# Patient Record
Sex: FEMALE | Race: WHITE | Employment: OTHER
[De-identification: names, ages, dates, MRNs, and addresses within clinical notes are randomized per-mention and may not be internally consistent; named-entity substitution may affect disease eponyms.]

---

## 2017-01-12 ENCOUNTER — OFFICE VISIT (OUTPATIENT)
Dept: FAMILY MEDICINE CLINIC | Facility: CLINIC | Age: 77
End: 2017-01-12

## 2017-01-12 ENCOUNTER — CARE COORDINATOR VISIT (OUTPATIENT)
Dept: CARE COORDINATION | Facility: CLINIC | Age: 77
End: 2017-01-12

## 2017-01-12 VITALS
OXYGEN SATURATION: 99 % | DIASTOLIC BLOOD PRESSURE: 77 MMHG | HEART RATE: 107 BPM | SYSTOLIC BLOOD PRESSURE: 125 MMHG | TEMPERATURE: 97.4 F | WEIGHT: 123.2 LBS | BODY MASS INDEX: 21.03 KG/M2 | HEIGHT: 64 IN

## 2017-01-12 DIAGNOSIS — R82.90 ABNORMAL URINALYSIS: Primary | ICD-10-CM

## 2017-01-12 DIAGNOSIS — Z23 NEED FOR PNEUMOCOCCAL VACCINATION: ICD-10-CM

## 2017-01-12 DIAGNOSIS — R00.0 TACHYCARDIA: ICD-10-CM

## 2017-01-12 DIAGNOSIS — S01.112A LACERATION OF EYEBROW, LEFT, INITIAL ENCOUNTER: ICD-10-CM

## 2017-01-12 DIAGNOSIS — I49.3 PVC (PREMATURE VENTRICULAR CONTRACTION): ICD-10-CM

## 2017-01-12 DIAGNOSIS — R55 VASOVAGAL SYNCOPE: Primary | ICD-10-CM

## 2017-01-12 DIAGNOSIS — N17.9 AKI (ACUTE KIDNEY INJURY) (HCC): ICD-10-CM
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
## 2017-01-12 DIAGNOSIS — R10.32 LLQ PAIN: ICD-10-CM

## 2017-01-12 PROCEDURE — G0009 ADMIN PNEUMOCOCCAL VACCINE: HCPCS | Performed by: FAMILY MEDICINE

## 2017-01-12 PROCEDURE — 90670 PCV13 VACCINE IM: CPT | Performed by: FAMILY MEDICINE

## 2017-01-12 PROCEDURE — 99214 OFFICE O/P EST MOD 30 MIN: CPT | Performed by: FAMILY MEDICINE

## 2017-01-12 ASSESSMENT — ENCOUNTER SYMPTOMS
NAUSEA: 0
ABDOMINAL PAIN: 1
CHEST TIGHTNESS: 0
SHORTNESS OF BREATH: 0
COUGH: 0
ABDOMINAL DISTENTION: 0
DIARRHEA: 0
VOMITING: 0
CONSTIPATION: 0
WHEEZING: 0

## 2017-02-21 ENCOUNTER — OFFICE VISIT (OUTPATIENT)
Dept: GASTROENTEROLOGY | Facility: CLINIC | Age: 77
End: 2017-02-21

## 2017-02-21 VITALS
WEIGHT: 121 LBS | HEIGHT: 67 IN | DIASTOLIC BLOOD PRESSURE: 85 MMHG | TEMPERATURE: 97.5 F | HEART RATE: 101 BPM | BODY MASS INDEX: 18.99 KG/M2 | RESPIRATION RATE: 12 BRPM | OXYGEN SATURATION: 96 % | SYSTOLIC BLOOD PRESSURE: 140 MMHG

## 2017-02-21 DIAGNOSIS — Z86.010 HISTORY OF COLON POLYPS: ICD-10-CM

## 2017-02-21 DIAGNOSIS — R10.32 LLQ PAIN: ICD-10-CM

## 2017-02-21 DIAGNOSIS — K21.9 GASTROESOPHAGEAL REFLUX DISEASE WITHOUT ESOPHAGITIS: Primary | ICD-10-CM

## 2017-02-21 DIAGNOSIS — R63.4 WEIGHT LOSS: ICD-10-CM

## 2017-02-21 PROCEDURE — 99214 OFFICE O/P EST MOD 30 MIN: CPT | Performed by: INTERNAL MEDICINE

## 2017-02-21 RX ORDER — RANITIDINE 150 MG/1
150 CAPSULE ORAL PRN
COMMUNITY
End: 2017-03-10 | Stop reason: ALTCHOICE

## 2017-02-21 ASSESSMENT — ENCOUNTER SYMPTOMS
ABDOMINAL DISTENTION: 1
NAUSEA: 0
CHOKING: 0
VOICE CHANGE: 0
TROUBLE SWALLOWING: 0
VOMITING: 0
CONSTIPATION: 0
SORE THROAT: 0
DIARRHEA: 0
ABDOMINAL PAIN: 1
RESPIRATORY NEGATIVE: 1
WHEEZING: 0
BLOOD IN STOOL: 0
ANAL BLEEDING: 0
COUGH: 0
SHORTNESS OF BREATH: 0
SINUS PRESSURE: 1
RECTAL PAIN: 0
BACK PAIN: 1

## 2017-03-10 ENCOUNTER — CARE COORDINATOR VISIT (OUTPATIENT)
Dept: CARE COORDINATION | Facility: CLINIC | Age: 77
End: 2017-03-10

## 2017-03-10 ENCOUNTER — OFFICE VISIT (OUTPATIENT)
Dept: FAMILY MEDICINE CLINIC | Facility: CLINIC | Age: 77
End: 2017-03-10

## 2017-03-10 VITALS
SYSTOLIC BLOOD PRESSURE: 116 MMHG | DIASTOLIC BLOOD PRESSURE: 68 MMHG | OXYGEN SATURATION: 97 % | BODY MASS INDEX: 20.69 KG/M2 | TEMPERATURE: 96.8 F | WEIGHT: 121.2 LBS | HEART RATE: 102 BPM | HEIGHT: 64 IN

## 2017-03-10 DIAGNOSIS — R42 ORTHOSTATIC DIZZINESS: Primary | ICD-10-CM

## 2017-03-10 DIAGNOSIS — R00.0 TACHYCARDIA: ICD-10-CM

## 2017-03-10 DIAGNOSIS — E21.3 HYPERPARATHYROIDISM (HCC): ICD-10-CM

## 2017-03-10 DIAGNOSIS — F41.1 GAD (GENERALIZED ANXIETY DISORDER): ICD-10-CM

## 2017-03-10 DIAGNOSIS — F51.01 PRIMARY INSOMNIA: ICD-10-CM

## 2017-03-10 DIAGNOSIS — F33.42 RECURRENT MAJOR DEPRESSIVE EPISODES, IN FULL REMISSION (HCC): ICD-10-CM

## 2017-03-10 DIAGNOSIS — J43.1 PANLOBULAR EMPHYSEMA (HCC): ICD-10-CM

## 2017-03-10 DIAGNOSIS — R55 VASOVAGAL SYNCOPE: ICD-10-CM

## 2017-03-10 DIAGNOSIS — I49.3 PVC (PREMATURE VENTRICULAR CONTRACTION): ICD-10-CM

## 2017-03-10 PROCEDURE — G0444 DEPRESSION SCREEN ANNUAL: HCPCS | Performed by: FAMILY MEDICINE

## 2017-03-10 PROCEDURE — 99215 OFFICE O/P EST HI 40 MIN: CPT | Performed by: FAMILY MEDICINE

## 2017-03-10 RX ORDER — ESZOPICLONE 3 MG/1
3 TABLET, FILM COATED ORAL NIGHTLY
Qty: 90 TABLET | Refills: 0 | Status: SHIPPED | OUTPATIENT
Start: 2017-03-10 | End: 2017-05-12 | Stop reason: SDUPTHER

## 2017-03-10 RX ORDER — PAROXETINE HYDROCHLORIDE 12.5 MG/1
12.5 TABLET, FILM COATED, EXTENDED RELEASE ORAL EVERY EVENING
Qty: 90 TABLET | Refills: 0 | Status: SHIPPED | OUTPATIENT
Start: 2017-03-10 | End: 2017-05-12 | Stop reason: HOSPADM

## 2017-03-10 ASSESSMENT — ANXIETY QUESTIONNAIRES
1. FEELING NERVOUS, ANXIOUS, OR ON EDGE: 1-SEVERAL DAYS
7. FEELING AFRAID AS IF SOMETHING AWFUL MIGHT HAPPEN: 0-NOT AT ALL SURE
4. TROUBLE RELAXING: 0-NOT AT ALL SURE
5. BEING SO RESTLESS THAT IT IS HARD TO SIT STILL: 0-NOT AT ALL SURE
6. BECOMING EASILY ANNOYED OR IRRITABLE: 1-SEVERAL DAYS
3. WORRYING TOO MUCH ABOUT DIFFERENT THINGS: 0-NOT AT ALL SURE
2. NOT BEING ABLE TO STOP OR CONTROL WORRYING: 1-SEVERAL DAYS
GAD7 TOTAL SCORE: 3

## 2017-03-10 ASSESSMENT — ENCOUNTER SYMPTOMS
DIARRHEA: 0
SHORTNESS OF BREATH: 0
NAUSEA: 0
WHEEZING: 0
COUGH: 0
ABDOMINAL PAIN: 0
VOMITING: 0
CONSTIPATION: 0
ABDOMINAL DISTENTION: 0
CHEST TIGHTNESS: 0

## 2017-03-10 ASSESSMENT — PATIENT HEALTH QUESTIONNAIRE - PHQ9
1. LITTLE INTEREST OR PLEASURE IN DOING THINGS: 0
7. TROUBLE CONCENTRATING ON THINGS, SUCH AS READING THE NEWSPAPER OR WATCHING TELEVISION: 0
10. IF YOU CHECKED OFF ANY PROBLEMS, HOW DIFFICULT HAVE THESE PROBLEMS MADE IT FOR YOU TO DO YOUR WORK, TAKE CARE OF THINGS AT HOME, OR GET ALONG WITH OTHER PEOPLE: 3
2. FEELING DOWN, DEPRESSED OR HOPELESS: 0
8. MOVING OR SPEAKING SO SLOWLY THAT OTHER PEOPLE COULD HAVE NOTICED. OR THE OPPOSITE, BEING SO FIGETY OR RESTLESS THAT YOU HAVE BEEN MOVING AROUND A LOT MORE THAN USUAL: 0
9. THOUGHTS THAT YOU WOULD BE BETTER OFF DEAD, OR OF HURTING YOURSELF: 0
SUM OF ALL RESPONSES TO PHQ QUESTIONS 1-9: 4
4. FEELING TIRED OR HAVING LITTLE ENERGY: 1
SUM OF ALL RESPONSES TO PHQ9 QUESTIONS 1 & 2: 0
6. FEELING BAD ABOUT YOURSELF - OR THAT YOU ARE A FAILURE OR HAVE LET YOURSELF OR YOUR FAMILY DOWN: 0
3. TROUBLE FALLING OR STAYING ASLEEP: 3
5. POOR APPETITE OR OVEREATING: 0

## 2017-03-12 PROBLEM — F33.42 RECURRENT MAJOR DEPRESSIVE EPISODES, IN FULL REMISSION (HCC): Status: ACTIVE | Noted: 2017-03-12

## 2017-03-12 PROBLEM — R42 ORTHOSTATIC DIZZINESS: Status: ACTIVE | Noted: 2017-03-12
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
## 2017-03-13 DIAGNOSIS — R55 VASOVAGAL SYNCOPE: ICD-10-CM

## 2017-04-05 ENCOUNTER — HOSPITAL ENCOUNTER (OUTPATIENT)
Dept: ULTRASOUND IMAGING | Age: 77
Discharge: HOME | End: 2017-04-05
Payer: MEDICARE

## 2017-04-05 ENCOUNTER — HOSPITAL ENCOUNTER (OUTPATIENT)
Dept: NON INVASIVE DIAGNOSTICS | Age: 77
Discharge: HOME | End: 2017-04-05
Payer: MEDICARE

## 2017-04-05 DIAGNOSIS — R42 ORTHOSTATIC DIZZINESS: ICD-10-CM

## 2017-04-05 DIAGNOSIS — R55 VASOVAGAL SYNCOPE: ICD-10-CM

## 2017-04-05 DIAGNOSIS — I49.3 PVC (PREMATURE VENTRICULAR CONTRACTION): ICD-10-CM

## 2017-04-05 DIAGNOSIS — E05.90 HYPERTHYROIDISM: ICD-10-CM

## 2017-04-05 DIAGNOSIS — R00.0 TACHYCARDIA: ICD-10-CM

## 2017-04-05 LAB
LV EF: 60 %
LVEF MODALITY: NORMAL

## 2017-04-05 PROCEDURE — 93306 TTE W/DOPPLER COMPLETE: CPT

## 2017-04-05 PROCEDURE — 76536 US EXAM OF HEAD AND NECK: CPT

## 2017-04-13 ENCOUNTER — HOSPITAL ENCOUNTER (OUTPATIENT)
Age: 77
Discharge: HOME | End: 2017-04-13
Payer: MEDICARE

## 2017-04-13 LAB
CALCIUM SERPL-MCNC: 9.1 MG/DL (ref 8.6–10.4)
PTH INTACT: 124 PG/ML (ref 15–65)
T3 FREE: 2.99 PG/ML (ref 2.02–4.43)
THYROXINE, FREE: 1.21 NG/DL (ref 0.93–1.7)
TSH SERPL DL<=0.05 MIU/L-ACNC: 1.36 MIU/L (ref 0.3–5)

## 2017-04-13 PROCEDURE — 83970 ASSAY OF PARATHORMONE: CPT

## 2017-04-13 PROCEDURE — 84439 ASSAY OF FREE THYROXINE: CPT

## 2017-04-13 PROCEDURE — 84481 FREE ASSAY (FT-3): CPT

## 2017-04-13 PROCEDURE — 82310 ASSAY OF CALCIUM: CPT

## 2017-04-13 PROCEDURE — 36415 COLL VENOUS BLD VENIPUNCTURE: CPT

## 2017-04-13 PROCEDURE — 82523 COLLAGEN CROSSLINKS: CPT

## 2017-04-13 PROCEDURE — 84443 ASSAY THYROID STIM HORMONE: CPT

## 2017-04-15 LAB
CREATININE URINE /VOLUME: 149 MG/DL
N-TELO/CREAT. RATIO: 12

## 2017-05-12 ENCOUNTER — CARE COORDINATOR VISIT (OUTPATIENT)
Dept: CARE COORDINATION | Age: 77
End: 2017-05-12

## 2017-05-12 ENCOUNTER — OFFICE VISIT (OUTPATIENT)
Dept: FAMILY MEDICINE CLINIC | Age: 77
End: 2017-05-12
Payer: MEDICARE

## 2017-05-12 VITALS
DIASTOLIC BLOOD PRESSURE: 69 MMHG | SYSTOLIC BLOOD PRESSURE: 108 MMHG | RESPIRATION RATE: 20 BRPM | TEMPERATURE: 97.3 F | OXYGEN SATURATION: 96 % | HEIGHT: 64 IN | WEIGHT: 124 LBS | BODY MASS INDEX: 21.17 KG/M2 | HEART RATE: 87 BPM

## 2017-05-12 DIAGNOSIS — F51.01 PRIMARY INSOMNIA: ICD-10-CM

## 2017-05-12 DIAGNOSIS — F41.1 GAD (GENERALIZED ANXIETY DISORDER): ICD-10-CM

## 2017-05-12 DIAGNOSIS — H61.21 IMPACTED CERUMEN OF RIGHT EAR: ICD-10-CM

## 2017-05-12 DIAGNOSIS — M81.0 OSTEOPOROSIS: ICD-10-CM

## 2017-05-12 DIAGNOSIS — J43.1 PANLOBULAR EMPHYSEMA (HCC): ICD-10-CM

## 2017-05-12 DIAGNOSIS — E78.5 HYPERLIPIDEMIA WITH TARGET LDL LESS THAN 100: ICD-10-CM

## 2017-05-12 DIAGNOSIS — R53.82 CHRONIC FATIGUE: Primary | ICD-10-CM

## 2017-05-12 PROBLEM — R00.0 TACHYCARDIA: Status: RESOLVED | Noted: 2017-01-12 | Resolved: 2017-05-12

## 2017-05-12 PROBLEM — R10.32 LLQ PAIN: Status: RESOLVED | Noted: 2017-01-12 | Resolved: 2017-05-12

## 2017-05-12 PROBLEM — N17.9 AKI (ACUTE KIDNEY INJURY) (HCC): Status: RESOLVED | Noted: 2017-01-12 | Resolved: 2017-05-12

## 2017-05-12 PROCEDURE — G8510 SCR DEP NEG, NO PLAN REQD: HCPCS | Performed by: FAMILY MEDICINE

## 2017-05-12 PROCEDURE — 99214 OFFICE O/P EST MOD 30 MIN: CPT | Performed by: FAMILY MEDICINE

## 2017-05-12 RX ORDER — ESZOPICLONE 3 MG/1
3 TABLET, FILM COATED ORAL NIGHTLY
Qty: 90 TABLET | Refills: 0 | Status: SHIPPED | OUTPATIENT
Start: 2017-05-12 | End: 2017-09-18 | Stop reason: SDUPTHER

## 2017-05-12 RX ORDER — INHALER, ASSIST DEVICES
SPACER (EA) MISCELLANEOUS
Qty: 1 EACH | Refills: 0 | Status: SHIPPED | OUTPATIENT
Start: 2017-05-12 | End: 2021-12-07 | Stop reason: ALTCHOICE

## 2017-05-12 RX ORDER — SERTRALINE HYDROCHLORIDE 25 MG/1
25 TABLET, FILM COATED ORAL EVERY MORNING
Qty: 30 TABLET | Refills: 0 | Status: SHIPPED | OUTPATIENT
Start: 2017-05-12 | End: 2017-10-20

## 2017-05-12 RX ORDER — PRAVASTATIN SODIUM 20 MG
20 TABLET ORAL EVERY EVENING
Qty: 90 TABLET | Refills: 3 | Status: SHIPPED | OUTPATIENT
Start: 2017-05-12 | End: 2017-10-20

## 2017-05-12 RX ORDER — ALBUTEROL SULFATE 90 UG/1
2 AEROSOL, METERED RESPIRATORY (INHALATION) EVERY 6 HOURS PRN
Qty: 18 G | Refills: 3 | Status: SHIPPED | OUTPATIENT
Start: 2017-05-12 | End: 2019-05-09 | Stop reason: SDUPTHER

## 2017-05-12 ASSESSMENT — ANXIETY QUESTIONNAIRES
3. WORRYING TOO MUCH ABOUT DIFFERENT THINGS: 1-SEVERAL DAYS
GAD7 TOTAL SCORE: 3
2. NOT BEING ABLE TO STOP OR CONTROL WORRYING: 0-NOT AT ALL SURE
4. TROUBLE RELAXING: 0-NOT AT ALL SURE
5. BEING SO RESTLESS THAT IT IS HARD TO SIT STILL: 1-SEVERAL DAYS
6. BECOMING EASILY ANNOYED OR IRRITABLE: 0-NOT AT ALL SURE
7. FEELING AFRAID AS IF SOMETHING AWFUL MIGHT HAPPEN: 0-NOT AT ALL SURE
1. FEELING NERVOUS, ANXIOUS, OR ON EDGE: 1-SEVERAL DAYS

## 2017-05-12 ASSESSMENT — PATIENT HEALTH QUESTIONNAIRE - PHQ9
SUM OF ALL RESPONSES TO PHQ9 QUESTIONS 1 & 2: 0
1. LITTLE INTEREST OR PLEASURE IN DOING THINGS: 0
SUM OF ALL RESPONSES TO PHQ QUESTIONS 1-9: 0
2. FEELING DOWN, DEPRESSED OR HOPELESS: 0

## 2017-05-12 ASSESSMENT — ENCOUNTER SYMPTOMS
SHORTNESS OF BREATH: 1
ABDOMINAL DISTENTION: 0
VOMITING: 0
CHEST TIGHTNESS: 0
CONSTIPATION: 0
COUGH: 0
WHEEZING: 0
ABDOMINAL PAIN: 0
NAUSEA: 0
DIARRHEA: 0

## 2017-06-26 ENCOUNTER — TELEPHONE (OUTPATIENT)
Dept: INFUSION THERAPY | Age: 77
End: 2017-06-26

## 2017-07-18 ENCOUNTER — CARE COORDINATION (OUTPATIENT)
Dept: CARE COORDINATION | Age: 77
End: 2017-07-18

## 2017-08-09 ENCOUNTER — HOSPITAL ENCOUNTER (OUTPATIENT)
Age: 77
Discharge: HOME | End: 2017-08-09
Payer: MEDICARE

## 2017-08-09 DIAGNOSIS — E78.5 HYPERLIPIDEMIA WITH TARGET LDL LESS THAN 100: ICD-10-CM

## 2017-08-09 DIAGNOSIS — R53.82 CHRONIC FATIGUE: ICD-10-CM

## 2017-08-09 DIAGNOSIS — M81.0 OSTEOPOROSIS: ICD-10-CM

## 2017-08-09 LAB
ALBUMIN SERPL-MCNC: 4 G/DL (ref 3.5–5.2)
ALBUMIN/GLOBULIN RATIO: ABNORMAL (ref 1–2.5)
ALP BLD-CCNC: 70 U/L (ref 35–104)
ALT SERPL-CCNC: 13 U/L (ref 5–33)
ANION GAP SERPL CALCULATED.3IONS-SCNC: 14 MMOL/L (ref 9–17)
AST SERPL-CCNC: 16 U/L
BILIRUB SERPL-MCNC: 0.39 MG/DL (ref 0.3–1.2)
BUN BLDV-MCNC: 20 MG/DL (ref 8–23)
BUN/CREAT BLD: ABNORMAL (ref 9–20)
CALCIUM SERPL-MCNC: 8.9 MG/DL (ref 8.6–10.4)
CHLORIDE BLD-SCNC: 101 MMOL/L (ref 98–107)
CHOLESTEROL/HDL RATIO: 3.5
CHOLESTEROL: 207 MG/DL
CO2: 26 MMOL/L (ref 20–31)
CREAT SERPL-MCNC: 0.94 MG/DL (ref 0.5–0.9)
FOLATE: 14.3 NG/ML
GFR AFRICAN AMERICAN: >60 ML/MIN
GFR NON-AFRICAN AMERICAN: 58 ML/MIN
GFR SERPL CREATININE-BSD FRML MDRD: ABNORMAL ML/MIN/{1.73_M2}
GFR SERPL CREATININE-BSD FRML MDRD: ABNORMAL ML/MIN/{1.73_M2}
GLUCOSE BLD-MCNC: 111 MG/DL (ref 70–99)
HDLC SERPL-MCNC: 60 MG/DL
LDL CHOLESTEROL: 130 MG/DL (ref 0–130)
POTASSIUM SERPL-SCNC: 4.4 MMOL/L (ref 3.7–5.3)
SODIUM BLD-SCNC: 141 MMOL/L (ref 135–144)
TOTAL PROTEIN: 6.5 G/DL (ref 6.4–8.3)
TRIGL SERPL-MCNC: 84 MG/DL
VITAMIN B-12: 427 PG/ML (ref 211–946)
VITAMIN D 25-HYDROXY: 49 NG/ML (ref 30–100)
VLDLC SERPL CALC-MCNC: ABNORMAL MG/DL (ref 1–30)

## 2017-08-09 PROCEDURE — 82746 ASSAY OF FOLIC ACID SERUM: CPT

## 2017-08-09 PROCEDURE — 80053 COMPREHEN METABOLIC PANEL: CPT

## 2017-08-09 PROCEDURE — 80061 LIPID PANEL: CPT

## 2017-08-09 PROCEDURE — 82607 VITAMIN B-12: CPT

## 2017-08-09 PROCEDURE — 36415 COLL VENOUS BLD VENIPUNCTURE: CPT

## 2017-08-09 PROCEDURE — 82306 VITAMIN D 25 HYDROXY: CPT

## 2017-08-14 ENCOUNTER — OFFICE VISIT (OUTPATIENT)
Dept: FAMILY MEDICINE CLINIC | Age: 77
End: 2017-08-14
Payer: MEDICARE

## 2017-08-14 VITALS
SYSTOLIC BLOOD PRESSURE: 112 MMHG | DIASTOLIC BLOOD PRESSURE: 69 MMHG | HEART RATE: 85 BPM | TEMPERATURE: 97 F | RESPIRATION RATE: 17 BRPM | BODY MASS INDEX: 21.62 KG/M2 | OXYGEN SATURATION: 98 % | HEIGHT: 63 IN | WEIGHT: 122 LBS

## 2017-08-14 DIAGNOSIS — M54.9 CHRONIC BACK PAIN GREATER THAN 3 MONTHS DURATION: ICD-10-CM

## 2017-08-14 DIAGNOSIS — J43.1 PANLOBULAR EMPHYSEMA (HCC): Primary | ICD-10-CM

## 2017-08-14 DIAGNOSIS — F51.01 PRIMARY INSOMNIA: ICD-10-CM

## 2017-08-14 DIAGNOSIS — E78.5 HYPERLIPIDEMIA WITH TARGET LDL LESS THAN 100: ICD-10-CM

## 2017-08-14 DIAGNOSIS — R73.9 HYPERGLYCEMIA: ICD-10-CM

## 2017-08-14 DIAGNOSIS — G89.29 CHRONIC BACK PAIN GREATER THAN 3 MONTHS DURATION: ICD-10-CM

## 2017-08-14 DIAGNOSIS — R35.0 FREQUENCY OF URINATION: ICD-10-CM

## 2017-08-14 PROCEDURE — 99214 OFFICE O/P EST MOD 30 MIN: CPT | Performed by: FAMILY MEDICINE

## 2017-08-14 ASSESSMENT — PATIENT HEALTH QUESTIONNAIRE - PHQ9
1. LITTLE INTEREST OR PLEASURE IN DOING THINGS: 0
SUM OF ALL RESPONSES TO PHQ9 QUESTIONS 1 & 2: 0
SUM OF ALL RESPONSES TO PHQ QUESTIONS 1-9: 0
2. FEELING DOWN, DEPRESSED OR HOPELESS: 0

## 2017-08-14 ASSESSMENT — ANXIETY QUESTIONNAIRES
5. BEING SO RESTLESS THAT IT IS HARD TO SIT STILL: 0-NOT AT ALL SURE
GAD7 TOTAL SCORE: 2
4. TROUBLE RELAXING: 0-NOT AT ALL SURE
2. NOT BEING ABLE TO STOP OR CONTROL WORRYING: 0-NOT AT ALL SURE
3. WORRYING TOO MUCH ABOUT DIFFERENT THINGS: 0-NOT AT ALL SURE
1. FEELING NERVOUS, ANXIOUS, OR ON EDGE: 1-SEVERAL DAYS
7. FEELING AFRAID AS IF SOMETHING AWFUL MIGHT HAPPEN: 0-NOT AT ALL SURE
6. BECOMING EASILY ANNOYED OR IRRITABLE: 1-SEVERAL DAYS

## 2017-08-14 ASSESSMENT — ENCOUNTER SYMPTOMS
ABDOMINAL DISTENTION: 0
NAUSEA: 0
DIARRHEA: 0
BACK PAIN: 1
SHORTNESS OF BREATH: 1
WHEEZING: 0
CHEST TIGHTNESS: 0
CONSTIPATION: 0
COUGH: 0
ABDOMINAL PAIN: 1
VOMITING: 0

## 2017-08-28 ENCOUNTER — TELEPHONE (OUTPATIENT)
Dept: FAMILY MEDICINE CLINIC | Age: 77
End: 2017-08-28

## 2017-09-13 ENCOUNTER — HOSPITAL ENCOUNTER (OUTPATIENT)
Dept: PHYSICAL THERAPY | Age: 77
Setting detail: THERAPIES SERIES
Discharge: HOME | End: 2017-09-13
Payer: MEDICARE

## 2017-09-13 ENCOUNTER — CARE COORDINATION (OUTPATIENT)
Dept: CARE COORDINATION | Age: 77
End: 2017-09-13

## 2017-09-13 PROCEDURE — G8978 MOBILITY CURRENT STATUS: HCPCS

## 2017-09-13 PROCEDURE — 97161 PT EVAL LOW COMPLEX 20 MIN: CPT

## 2017-09-13 PROCEDURE — 97110 THERAPEUTIC EXERCISES: CPT

## 2017-09-13 PROCEDURE — G8979 MOBILITY GOAL STATUS: HCPCS

## 2017-09-13 ASSESSMENT — PAIN DESCRIPTION - PAIN TYPE: TYPE: CHRONIC PAIN

## 2017-09-13 ASSESSMENT — PAIN DESCRIPTION - LOCATION: LOCATION: BACK

## 2017-09-13 ASSESSMENT — PAIN DESCRIPTION - ONSET: ONSET: GRADUAL

## 2017-09-13 ASSESSMENT — PAIN DESCRIPTION - PROGRESSION: CLINICAL_PROGRESSION: GRADUALLY WORSENING

## 2017-09-13 ASSESSMENT — PAIN SCALES - GENERAL: PAINLEVEL_OUTOF10: 6

## 2017-09-13 ASSESSMENT — PAIN DESCRIPTION - DESCRIPTORS: DESCRIPTORS: ACHING;BURNING

## 2017-09-13 ASSESSMENT — PAIN DESCRIPTION - ORIENTATION: ORIENTATION: MID;UPPER

## 2017-09-13 ASSESSMENT — PAIN DESCRIPTION - FREQUENCY: FREQUENCY: INTERMITTENT

## 2017-09-18 DIAGNOSIS — F51.01 PRIMARY INSOMNIA: ICD-10-CM

## 2017-09-18 RX ORDER — ESZOPICLONE 3 MG/1
3 TABLET, FILM COATED ORAL NIGHTLY
Qty: 90 TABLET | Refills: 0 | Status: SHIPPED | OUTPATIENT
Start: 2017-09-18 | End: 2017-09-27

## 2017-09-19 ENCOUNTER — PATIENT MESSAGE (OUTPATIENT)
Dept: FAMILY MEDICINE CLINIC | Age: 77
End: 2017-09-19

## 2017-09-19 DIAGNOSIS — J30.9 ALLERGIC RHINITIS, UNSPECIFIED ALLERGIC RHINITIS TRIGGER, UNSPECIFIED RHINITIS SEASONALITY: Primary | ICD-10-CM

## 2017-09-20 RX ORDER — LORATADINE 10 MG/1
10 TABLET ORAL DAILY
Qty: 90 TABLET | Refills: 3 | Status: SHIPPED | OUTPATIENT
Start: 2017-09-20 | End: 2019-04-11 | Stop reason: SDUPTHER

## 2017-09-27 ENCOUNTER — TELEPHONE (OUTPATIENT)
Dept: FAMILY MEDICINE CLINIC | Age: 77
End: 2017-09-27

## 2017-09-27 DIAGNOSIS — F51.04 PSYCHOPHYSIOLOGICAL INSOMNIA: Primary | ICD-10-CM

## 2017-09-27 RX ORDER — RAMELTEON 8 MG/1
8 TABLET ORAL NIGHTLY
Qty: 90 TABLET | Refills: 0 | Status: SHIPPED | OUTPATIENT
Start: 2017-09-27 | End: 2017-10-20

## 2017-10-09 ENCOUNTER — HOSPITAL ENCOUNTER (OUTPATIENT)
Dept: PHYSICAL THERAPY | Age: 77
Setting detail: THERAPIES SERIES
Discharge: HOME | End: 2017-10-09
Payer: MEDICARE

## 2017-10-09 PROCEDURE — 97110 THERAPEUTIC EXERCISES: CPT

## 2017-10-09 ASSESSMENT — PAIN DESCRIPTION - LOCATION: LOCATION: BACK

## 2017-10-09 ASSESSMENT — PAIN SCALES - GENERAL: PAINLEVEL_OUTOF10: 0

## 2017-10-09 ASSESSMENT — PAIN DESCRIPTION - ONSET: ONSET: GRADUAL

## 2017-10-09 ASSESSMENT — PAIN DESCRIPTION - FREQUENCY: FREQUENCY: INTERMITTENT

## 2017-10-09 ASSESSMENT — PAIN DESCRIPTION - DESCRIPTORS: DESCRIPTORS: ACHING

## 2017-10-09 ASSESSMENT — PAIN DESCRIPTION - ORIENTATION: ORIENTATION: MID;LOWER

## 2017-10-09 ASSESSMENT — PAIN DESCRIPTION - PROGRESSION: CLINICAL_PROGRESSION: GRADUALLY IMPROVING

## 2017-10-09 ASSESSMENT — PAIN DESCRIPTION - PAIN TYPE: TYPE: CHRONIC PAIN

## 2017-10-09 NOTE — PROGRESS NOTES
Therapeutic Intervention  Body structures, Functions, Activity limitations: Decreased endurance  Assessment: Pt would benefit from combo of land/water based ex's to improve postural endurance and dec pain  Treatment Diagnosis: Upper back weakness, postural issues due to osteoporosis  Prognosis: Good  Decision Making: Low Complexity  Patient Education: HEP, posture, POC  Barriers to Learning: none  REQUIRES PT FOLLOW UP: Yes  Treatment Initiated : eval, education  Activity Tolerance  Activity Tolerance: Patient Tolerated treatment well      G-Code:     OutComes Score                                                     Goals:  Short term goals  Time Frame for Short term goals: 9 visits  Short term goal 1: Indep HEP  Short term goal 2: Dec pain 50 %  Long term goals  Time Frame for Long term goals : 18 visits  Long term goal 1: Optimal <4  Patient Goals   Patient goals : Get rid of the pain    Plan:     Continue   Timed Code Treatment Minutes: 30 Minutes  Total Treatment Time: 30     Therapy Time   Individual Concurrent Group Co-treatment   Time In  1400         Time Out  1430         Minutes  30         Timed Code Treatment Minutes: [place] [address] Wayne Schwarz, PT

## 2017-10-11 ENCOUNTER — HOSPITAL ENCOUNTER (OUTPATIENT)
Dept: PHYSICAL THERAPY | Age: 77
Setting detail: THERAPIES SERIES
Discharge: HOME | End: 2017-10-11
Payer: MEDICARE

## 2017-10-11 ENCOUNTER — HOSPITAL ENCOUNTER (OUTPATIENT)
Age: 77
Discharge: HOME | End: 2017-10-11
Payer: MEDICARE

## 2017-10-11 DIAGNOSIS — R73.9 HYPERGLYCEMIA: ICD-10-CM

## 2017-10-11 DIAGNOSIS — R35.0 FREQUENCY OF URINATION: ICD-10-CM

## 2017-10-11 LAB
-: ABNORMAL
AMORPHOUS: ABNORMAL
BACTERIA: ABNORMAL
BILIRUBIN URINE: NEGATIVE
CALCIUM SERPL-MCNC: 9 MG/DL (ref 8.6–10.4)
CASTS UA: ABNORMAL /LPF
COLOR: YELLOW
COMMENT UA: ABNORMAL
CRYSTALS, UA: ABNORMAL /HPF
EPITHELIAL CELLS UA: ABNORMAL /HPF
ESTIMATED AVERAGE GLUCOSE: 111 MG/DL
GLUCOSE URINE: NEGATIVE
HBA1C MFR BLD: 5.5 % (ref 4–6)
KETONES, URINE: NEGATIVE
LEUKOCYTE ESTERASE, URINE: ABNORMAL
MUCUS: ABNORMAL
NITRITE, URINE: NEGATIVE
OTHER OBSERVATIONS UA: ABNORMAL
PH UA: 6.5 (ref 5–8)
PROTEIN UA: NEGATIVE
PTH INTACT: 114.7 PG/ML (ref 15–65)
RBC UA: ABNORMAL /HPF
RENAL EPITHELIAL, UA: ABNORMAL /HPF
SPECIFIC GRAVITY UA: 1.01 (ref 1–1.03)
TRICHOMONAS: ABNORMAL
TURBIDITY: CLEAR
URINE HGB: NEGATIVE
UROBILINOGEN, URINE: NORMAL
WBC UA: ABNORMAL /HPF
YEAST: ABNORMAL

## 2017-10-11 PROCEDURE — 82043 UR ALBUMIN QUANTITATIVE: CPT

## 2017-10-11 PROCEDURE — 82570 ASSAY OF URINE CREATININE: CPT

## 2017-10-11 PROCEDURE — 97113 AQUATIC THERAPY/EXERCISES: CPT

## 2017-10-11 PROCEDURE — 36415 COLL VENOUS BLD VENIPUNCTURE: CPT

## 2017-10-11 PROCEDURE — 87086 URINE CULTURE/COLONY COUNT: CPT

## 2017-10-11 PROCEDURE — 83970 ASSAY OF PARATHORMONE: CPT

## 2017-10-11 PROCEDURE — 81001 URINALYSIS AUTO W/SCOPE: CPT

## 2017-10-11 PROCEDURE — 87077 CULTURE AEROBIC IDENTIFY: CPT

## 2017-10-11 PROCEDURE — 83036 HEMOGLOBIN GLYCOSYLATED A1C: CPT

## 2017-10-11 PROCEDURE — 87186 SC STD MICRODIL/AGAR DIL: CPT

## 2017-10-11 PROCEDURE — 82310 ASSAY OF CALCIUM: CPT

## 2017-10-11 ASSESSMENT — PAIN DESCRIPTION - DIRECTION: RADIATING_TOWARDS: DENIES

## 2017-10-11 ASSESSMENT — PAIN DESCRIPTION - LOCATION: LOCATION: BACK

## 2017-10-11 ASSESSMENT — PAIN DESCRIPTION - ORIENTATION: ORIENTATION: LOWER;MID

## 2017-10-11 ASSESSMENT — PAIN DESCRIPTION - PAIN TYPE: TYPE: CHRONIC PAIN

## 2017-10-11 ASSESSMENT — PAIN SCALES - GENERAL: PAINLEVEL_OUTOF10: 3

## 2017-10-11 NOTE — PROGRESS NOTES
[address]   Outpatient Physical Therapy  [address] [facility]. [address]  Phone: [phone]  Fax: [phone]  Daily Progress Note    Date: 10/11/17    Patient Name: Teofilo Sow        MRN: [id number]  Account: [de-identified] : 1940      General Information:  Referring Practitioner: Prakash Kerr  Referral Date : 17  Diagnosis: Chronic LBP. PT Insurance Information: [employer] Medicare  Total # of Visits Approved: 18  Total # of Visits to Date: 3  No Show: 0  Canceled Appointment: 0  Progress Note Counter: 3/10 G-Codes    Subjective:  Subjective: Reports pain stays constant in low back. Pain:  Patient Currently in Pain: Yes  Pain Assessment: 0-10  Pain Level: 3  Pain Type: Chronic pain  Pain Location: Back  Pain Orientation: Lower;Mid  Pain Radiating Towards: Denies     Objective:  [facility] Exercise Log  Aquatic, Hip & DLS Program- Phase 1    Date of Eval:    17                            Primary PT: Sveta Goodwin, PT  Diagnosis: Chronic Low Back Pain  Things to Focus On (goals):   Surgical Precautions:  Medical Precautions:  [] C-9 dates  [] Occ Med   [x] Medicare     Date 10/11/17       Visit # 3/18       Walk F/L/R 2 Laps       Marching 10x       Squats 10x5\"       Step-Ups F/L        Heel-toe raises 10x       SLR F/L/R 10x       Knee/Flex/Ext        F/L Lunges        Kickboard Ex. Small       Iso Abd. 10x5\"       Push-pull 10x       Paddling                UE Exercise  Add      Horiz Abd/Add        IR/ER        Alt Flex/Ext        Alt Press Down        Abd/Add        Stretches        Achllies 2x20\"       Hamstring        . Cool Down 2 Laps       Pain Rating 3         Comment:  Comments: Initiated aquatic therapy with emphasis on core stability & postural awareness. Educated patient on pelvic neutral.    Assessment:   Body structures, Functions, Activity limitations: Decreased endurance  Treatment Diagnosis: Upper back weakness,

## 2017-10-11 NOTE — PROGRESS NOTES
Soniat comment sent to patient. Abnormal urine test, let's wait for urine culture.  Can take 48 hrs for the result to come back  A1c is normal, no prediabetes

## 2017-10-12 ENCOUNTER — HOSPITAL ENCOUNTER (OUTPATIENT)
Dept: PHYSICAL THERAPY | Age: 77
Setting detail: THERAPIES SERIES
Discharge: HOME | End: 2017-10-12
Payer: MEDICARE

## 2017-10-12 LAB
CREATININE URINE: 77.7 MG/DL (ref 28–217)
MICROALBUMIN/CREAT 24H UR: <12 MG/L
MICROALBUMIN/CREAT UR-RTO: 15 MCG/MG CREAT

## 2017-10-12 PROCEDURE — 97113 AQUATIC THERAPY/EXERCISES: CPT

## 2017-10-12 NOTE — PROGRESS NOTES
Mychart comment sent to patient.   There are no proteins in the urine, which is great  Urine culture is still pending

## 2017-10-12 NOTE — PROGRESS NOTES
[address]   Outpatient Physical Therapy  [address] [facility]. [address]  Phone: [phone]  Fax: [phone]  Daily Progress Note     Date: 10/12/17    Patient Name: Ky Thomas        UTX:[id number]   Account: [de-identified]   : 1940                      General Information:  Referring Practitioner: Carl Cerda  Referral Date : 17  Diagnosis: Chronic LBP. PT Insurance Information: [employer] Medicare  Total # of Visits Approved: 18  Total # of Visits to Date: 4  No Show: 0  Canceled Appointment: 0  Progress Note Counter: 4/10 G-Codes     Subjective:  Subjective: No issues after last visit- pain is about the same  Pain:  Patient Currently in Pain: Yes  Pain Assessment: 0-10  Pain Level: 3  Pain Type: Chronic pain  Pain Location: Back  Pain Orientation: Lower;Mid  Pain Radiating Towards: Denies     Objective:  [address] [facility] Exercise Log  Aquatic, Hip & DLS Program- Phase 1     Date of Eval:    17                            Primary PT: Brittney Ribeiro, PT  Diagnosis: Chronic Low Back Pain  Things to Focus On (goals):   Surgical Precautions:  Medical Precautions:  [] C-9 dates  [] Occ Med   [x] Medicare      Date 10/11/17 10/12/17          Visit # 3/18  4/18         Walk F/L/R 2 Laps  2 Laps         Marching 10x 10x          Squats 10x5\"  10x5\"         Step-Ups F/L    Low 10x         Heel-toe raises 10x 10x          SLR F/L/R 10x  10x         Knee/Flex/Ext    10x         F/L Lunges             Kickboard Ex.  Small  Small         Iso Abd. 10x5\"  10x5\"         Push-pull 10x  10x         Paddling                           UE Exercise    Add        Horiz Abd/Add             IR/ER             Alt Flex/Ext             Alt Press Down             Abd/Add             Stretches             Achllies 2x20\" 2x20\"          Hamstring    2x20\"         .             Cool Down 2 Laps  2 Laps         Pain Rating 3 3             Comment:  Comments: Reviewed postural awareness and core stability- emphasis on technique with all exercise     Assessment: Body structures, Functions, Activity limitations: Decreased endurance  Treatment Diagnosis: Upper back weakness, postural issues due to osteoporosis  Prognosis: Good  REQUIRES PT FOLLOW UP: Yes  Activity Tolerance: Patient Tolerated treatment well- added step ups, hamstring stretches and hip/knee flex/ext.    Comments: Tightness in low back noted with exercise but no increased pain     Plan:  Plan: Continue with current plan - add UE exercise for core stability     Therapy Time:  Time In: 1353  Time Out: 1435  Minutes: 42  Timed Code Treatment Minutes: 34 Minutes     Treatment Charges: Minutes Units   []  Ultrasound       []  Electrical-Stim       []  Iontophoresis       []  Traction       []  Massage       []  Eval       []  Gait       []  Ther Exercise       []  Manual Therapy       []  Ther Activities       [x]  Aquatics 34 2   []  Neuro Re-Ed       []  Other       Total Treatment Time: 29  2         Chayo Cox, JOSH

## 2017-10-13 ENCOUNTER — PATIENT MESSAGE (OUTPATIENT)
Dept: FAMILY MEDICINE CLINIC | Age: 77
End: 2017-10-13

## 2017-10-13 DIAGNOSIS — N30.00 ACUTE CYSTITIS WITHOUT HEMATURIA: Primary | ICD-10-CM

## 2017-10-13 RX ORDER — CIPROFLOXACIN 500 MG/1
500 TABLET, FILM COATED ORAL 2 TIMES DAILY
Qty: 20 TABLET | Refills: 0 | Status: SHIPPED | OUTPATIENT
Start: 2017-10-13 | End: 2017-10-23

## 2017-10-13 NOTE — PROGRESS NOTES
Please notify pt that I sent a prescription to [address] [place] [address] for UTI.   cipro BID for 10 days

## 2017-10-14 LAB
CULTURE: ABNORMAL
CULTURE: ABNORMAL
Lab: ABNORMAL
ORGANISM: ABNORMAL
SPECIMEN DESCRIPTION: ABNORMAL
SPECIMEN DESCRIPTION: ABNORMAL
STATUS: ABNORMAL

## 2017-10-14 NOTE — TELEPHONE ENCOUNTER
From: Stephanie Brizuela MD  To: Anita Harper  Sent: 10/13/2017 4:46 PM EDT  Subject: UTI    Jerierick Christopher     I sent medication for you to your pharmacy: [facility] on [address]. Ciprofloxacin 500 MG by mouth twice a day for 10 days    If you have any questions, please let me know.  REPLY TO THIS MESSAGE, OTHERWISE I WILL NOT GET IT RIGHT AWAY      Stephanie Brizuela MD

## 2017-10-16 ENCOUNTER — HOSPITAL ENCOUNTER (OUTPATIENT)
Dept: PULMONOLOGY | Age: 77
Discharge: HOME | End: 2017-10-16
Payer: MEDICARE

## 2017-10-16 ENCOUNTER — CARE COORDINATION (OUTPATIENT)
Dept: CARE COORDINATION | Age: 77
End: 2017-10-16

## 2017-10-16 ENCOUNTER — APPOINTMENT (OUTPATIENT)
Dept: PHYSICAL THERAPY | Age: 77
End: 2017-10-16
Payer: MEDICARE

## 2017-10-16 DIAGNOSIS — J43.1 PANLOBULAR EMPHYSEMA (HCC): ICD-10-CM

## 2017-10-16 PROCEDURE — 94726 PLETHYSMOGRAPHY LUNG VOLUMES: CPT

## 2017-10-16 PROCEDURE — 94728 AIRWY RESIST BY OSCILLOMETRY: CPT

## 2017-10-16 PROCEDURE — 94060 EVALUATION OF WHEEZING: CPT

## 2017-10-16 PROCEDURE — 94727 GAS DIL/WSHOT DETER LNG VOL: CPT

## 2017-10-16 PROCEDURE — 94729 DIFFUSING CAPACITY: CPT

## 2017-10-16 PROCEDURE — 6360000002 HC RX W HCPCS: Performed by: FAMILY MEDICINE

## 2017-10-16 RX ORDER — ALBUTEROL SULFATE 2.5 MG/3ML
2.5 SOLUTION RESPIRATORY (INHALATION) ONCE
Status: COMPLETED | OUTPATIENT
Start: 2017-10-16 | End: 2017-10-16

## 2017-10-16 RX ADMIN — ALBUTEROL SULFATE 2.5 MG: 2.5 SOLUTION RESPIRATORY (INHALATION) at 13:20

## 2017-10-16 ASSESSMENT — ENCOUNTER SYMPTOMS: DYSPNEA ASSOCIATED WITH: EXERTION

## 2017-10-16 NOTE — CARE COORDINATION
MD Kenneth   PROLIA 60 MG/ML SOLN SC injection  4/25/17  Yes Historical Provider, MD   sertraline (ZOLOFT) 25 MG tablet Take 1 tablet by mouth every morning STOP PAXIL.  TAKE WITH FOOD 5/12/17  Yes Cristofer Chery MD   pravastatin (PRAVACHOL) 20 MG tablet Take 1 tablet by mouth every evening 5/12/17  Yes Cristofer Chery MD   albuterol sulfate  (90 BASE) MCG/ACT inhaler Inhale 2 puffs into the lungs every 6 hours as needed for Wheezing or Shortness of Breath 5/12/17  Yes Cristofer Chery MD   Spacer/Aero-Holding Chambers (AEROCHAMBER MV) MISC To be used with inhaler 5/12/17  Yes Cristofer Chery MD   dorzolamide-timolol (COSOPT) 22.3-6.8 MG/ML ophthalmic solution Place 1 drop into both eyes 2 times daily   Yes Historical Provider, MD   Multiple Vitamins-Minerals (OCUVITE EXTRA) TABS Take 1 tablet by mouth daily   Yes Historical Provider, MD   Biotin 2500 MCG CAPS Take 1 capsule by mouth daily   Yes Historical Provider, MD   ALPHAGAN P 0.1 % SOLN Place 1 drop into both eyes 3 times daily  8/7/16  Yes Historical Provider, MD   latanoprost (XALATAN) 0.005 % ophthalmic solution Place 1 drop into both eyes nightly  8/25/16  Yes Historical Provider, MD   pantoprazole (PROTONIX) 40 MG tablet Take 1 tablet by mouth daily 9/7/16  Yes Ivan Cordova MD   SENSIPAR 30 MG tablet Take 30 mg by mouth daily  12/10/15  Yes Historical Provider, MD       Future Appointments  Date Time Provider Shirley Vibha   10/19/2017 4:30 PM Yoli Faulkner PTA STCZ MOB PT 1 [facility]   10/23/2017 10:00 AM Malini Chang PTA ST[facility] MOB PT 1 [facility]   10/23/2017 1:30 PM MD AMY Jara [facility]   10/25/2017 2:00 PM Everardo Hernandez, PT ST[facility] MOB PT 1 [facility]   10/26/2017 1:45 PM Yoli Faulkner PTA ST[facility] MOB PT 1 [facility]   11/14/2017 10:45 AM Cristofer Chery MD [facility]   11/15/2017 11:00 AM ST SHORT STAY INFUSION CHAIR 01 [place]  None

## 2017-10-16 NOTE — TELEPHONE ENCOUNTER
I cannot do \"medical necessity papers \" for Lunesta. The main reason this was discontinued, and she also had a nurse from insurance coming to her house, is that Toni Estrada is a high risk medication at her age. If she falls and I'm the prescriber, then it's my fault about it. I WOULD SUGGEST TRYING OTHER WAYS TO DEAL with INSOMNIA. 1. SUGGEST appointment with Arik Norman, our [address] [place] [address] Consultant to work on cognitive behavioral therapy. 2. CHAMOMILE TEA. LAVENDER AND CHAMOMILE OILS    3. DISCUSS SOME OF SLEEP HYGIENE OPTIONS AND KEEP DIARY    4.  MELATONIN UP TO 10 MG, 30-60 MINS BEFORE SLEEP                      Future Appointments  Date Time Provider Shirley Dunne   10/19/2017 4:30 PM Shawn Perez MOB PT [place]   10/23/2017 10:00 AM JOSH LincolnCZ MOB PT [place]   10/23/2017 1:30 PM [facility]   10/25/2017 2:00 PM ADIN Rushing MOB PT [place]   10/26/2017 1:45 PM JOSH Perez MOB PT [place]   11/14/2017 10:45 AM [facility]   11/15/2017 11:00 AM [facility] SHORT STAY INFUSION CHAIR 01 [place]  None

## 2017-10-17 ENCOUNTER — CARE COORDINATION (OUTPATIENT)
Dept: CARE COORDINATION | Age: 77
End: 2017-10-17

## 2017-10-18 ENCOUNTER — CARE COORDINATION (OUTPATIENT)
Dept: CARE COORDINATION | Age: 77
End: 2017-10-18

## 2017-10-18 NOTE — CARE COORDINATION
Call received from patient to follow up on insomnia. Reviewed conversation with PCP and explained reasoning of why Hilton Udell was discontinued. Discussed other intervention to help promote sleep (Teas, oils, melatonin, sleep hygiene options, Paxil, meeting with [facility] counselor). Patient states she has tried many of those and [place] don't work. \"    Patient was reluctant to switch to Paxil or meet with [facility]. Appointment scheduled with PCP.

## 2017-10-19 ENCOUNTER — HOSPITAL ENCOUNTER (OUTPATIENT)
Dept: PHYSICAL THERAPY | Age: 77
Setting detail: THERAPIES SERIES
Discharge: HOME | End: 2017-10-19
Payer: MEDICARE

## 2017-10-19 PROCEDURE — 97113 AQUATIC THERAPY/EXERCISES: CPT

## 2017-10-20 ENCOUNTER — TELEPHONE (OUTPATIENT)
Dept: FAMILY MEDICINE CLINIC | Age: 77
End: 2017-10-20

## 2017-10-20 ENCOUNTER — OFFICE VISIT (OUTPATIENT)
Dept: FAMILY MEDICINE CLINIC | Age: 77
End: 2017-10-20
Payer: MEDICARE

## 2017-10-20 ENCOUNTER — CARE COORDINATOR VISIT (OUTPATIENT)
Dept: CARE COORDINATION | Age: 77
End: 2017-10-20

## 2017-10-20 VITALS
SYSTOLIC BLOOD PRESSURE: 134 MMHG | TEMPERATURE: 97.4 F | WEIGHT: 124.4 LBS | OXYGEN SATURATION: 98 % | HEIGHT: 63 IN | HEART RATE: 79 BPM | BODY MASS INDEX: 22.04 KG/M2 | DIASTOLIC BLOOD PRESSURE: 80 MMHG

## 2017-10-20 DIAGNOSIS — F41.1 GAD (GENERALIZED ANXIETY DISORDER): ICD-10-CM

## 2017-10-20 DIAGNOSIS — F51.04 PSYCHOPHYSIOLOGICAL INSOMNIA: Primary | ICD-10-CM

## 2017-10-20 PROCEDURE — 99213 OFFICE O/P EST LOW 20 MIN: CPT | Performed by: FAMILY MEDICINE

## 2017-10-20 RX ORDER — PAROXETINE 10 MG/1
10 TABLET, FILM COATED ORAL EVERY EVENING
Qty: 30 TABLET | Refills: 0 | Status: SHIPPED | OUTPATIENT
Start: 2017-10-20 | End: 2018-01-08 | Stop reason: SDUPTHER

## 2017-10-20 ASSESSMENT — ENCOUNTER SYMPTOMS
DYSPNEA ASSOCIATED WITH: EXERTION
SHORTNESS OF BREATH: 0

## 2017-10-20 NOTE — PATIENT INSTRUCTIONS

## 2017-10-20 NOTE — PROGRESS NOTES
Chief Complaint   Patient presents with    Insomnia    Discuss Medications         Patient presents today for an acute visit secondary to Insomnia and Discuss Medications   . HPI    Insomnia  Patient has difficulty falling asleep and staying asleep . She is very frustrated that Rosalva Riggins is not covered anymore and she is actually telling me that I should do a prior authorization as she spoke with her insurance. She needs an order for medical necessity for Lunesta. Has been struggling with insomnia for many years. Reports that she worries a lot when she needs to go to sleep . She also has difficulty staying asleep    She did try Effexor at one point,but made her more anxious. She tried mirtazapine, trazodone, Lunesta 2 mg , but they didn't work. She also tried melatonin and Rozerem and they don't work. Recently she was placed on Zoloft but she doesn't take it anymore and she didn't feel it helped her relax. Says she tried scopolamine from a friend, and it helped. Aleve PM hurt her stomach    We discussed high risk medications at her age and I showed her the alert in the system regarding risks of taking  Lunesta at her age: can increase the risk of falls , delirium and altered mental status  She tells me she doesn't fall. Patient had an episode of vasovagal syncope 1/12/17  laceration and had injury     -vital signs stable and within normal limits  /80   Pulse 79   Temp 97.4 °F (36.3 °C) (Tympanic)   Ht 5' 3\" (1.6 m)   Wt 124 lb 6.4 oz (56.4 kg)   SpO2 98% Comment: ra @ rest  Breastfeeding? No   BMI 22.04 kg/m²   Body mass index is 22.04 kg/m².   Wt Readings from Last 3 Encounters:   10/20/17 124 lb 6.4 oz (56.4 kg)   08/14/17 122 lb (55.3 kg)   05/12/17 124 lb (56.2 kg)       Past Medical History:   Diagnosis Date    Abdominal pain     Colon polyp 3/7/2012    TUBULAR ADENOMA    COPD (chronic obstructive pulmonary disease) ([facility] [address]     Diverticul disease small and large intestine, no perforati or abscess     GERD (gastroesophageal reflux disease)     Glaucoma     Hyperthyroidism 2012    Orthostatic dizziness 3/12/2017    Tubal pregnancy 8581,8254             The patient's past medical, surgical, social, and family history as well as her current medications and allergies were reviewed as documented in today's encounter. Rest of complaints with corresponding details per ROS. Review of Systems   Constitutional: Positive for fatigue. Negative for activity change, appetite change, chills, diaphoresis and fever. Respiratory: Negative for shortness of breath. Psychiatric/Behavioral: Positive for sleep disturbance. The patient is nervous/anxious. Physical Exam   Constitutional: She is oriented to person, place, and time. She appears well-developed and well-nourished. No distress. HENT:   Head: Normocephalic and atraumatic. Eyes: Conjunctivae and EOM are normal. Right eye exhibits no discharge. Left eye exhibits no discharge. Neck: Normal range of motion. Neck supple. Pulmonary/Chest: Effort normal. No respiratory distress. Neurological: She is alert and oriented to person, place, and time. Skin: Skin is warm and dry. She is not diaphoretic. Psychiatric: Her behavior is normal. Judgment and thought content normal. Her mood appears anxious. Nursing note and vitals reviewed. ASSESSMENT AND PLAN      1. Psychophysiological insomnia  Patient adamant to restart Lunesta. I had a long discussion with the patient regarding Schaller Ogren which is a high risk medication at her age, for falls, delirium, altered mental status. Reluctantly, she accepts to try Paxil for a month  We discussed also sleep hygiene measures  I advised her to keep a sleep diary  - trial of PARoxetine (PAXIL) 10 MG tablet; Take 1 tablet by mouth every evening  Dispense: 30 tablet;  Refill: 6 - [address] [place] meeting with cognitive behavioral therapist  I reprinted prior instructions  Advised to try chamomile tea, lavender  Defers melatonin  Might want to try Tylenol PM but has benadryl in it and I told her might be better off of it  Refusing sleep study  Agrees for sleep referral  Will try Lunesta 2 mg if Paxil doesn't help     2. DINORA (generalized anxiety disorder)  - trial of PARoxetine (PAXIL) 10 MG tablet; Take 1 tablet by mouth every evening  Dispense: 30 tablet; Refill: 0                  Orders Placed This Encounter   Procedures   [facility]     Referral Priority:   Routine     Referral Type:   Outpatient Service     Referral Reason:   Specialty Services Required     Requested Specialty:   Sleep Center     Number of Visits Requested:   1       Orders Placed This Encounter   Medications    PARoxetine (PAXIL) 10 MG tablet     Sig: Take 1 tablet by mouth every evening     Dispense:  30 tablet     Refill:  0       Medications Discontinued During This Encounter   Medication Reason    pantoprazole (PROTONIX) 40 MG tablet     pravastatin (PRAVACHOL) 20 MG tablet     sertraline (ZOLOFT) 25 MG tablet     ramelteon (ROZEREM) 8 MG tablet        Marrilee received counseling on the following healthy behaviors: nutrition, exercise and medication adherence  Reviewed prior labs and health maintenance. Continue current medications, diet and exercise. Discussed use, benefit, and side effects of prescribed medications. Barriers to medication compliance addressed. Patient given educational materials - see patient instructions. All patient questions answered. Patient voiced understanding. The patient's past medical, surgical, social, and family history as well as her   current medications and allergies were reviewed as documented in today's encounter. Medications, labs, diagnostic studies, consultations and follow-up as documented in this encounter. Return for KEEP APPT. Patient was seen with total face to face time of 15 minutes. More than 50% of this visit was counseling and education. Future Appointments  Date Time Provider Shirley Goodmani   10/23/2017 10:00 AM Cindy Tor, PTA [facility] MOB PT [facility]   10/23/2017 1:30 PM [facility]   10/25/2017 2:00 PM Jimi Dickerson, PTA [facility] MOB PT [facility]   10/26/2017 1:45 PM Juliaraysa Alvares, PTA [facility] MOB PT [facility]   11/14/2017 10:45 AM [facility]   11/15/2017 11:00 AM ST SHORT STAY INFUSION CHAIR 01 [facility] SS None     This note was completed by using the assistance of a speech-recognition program. However, inadvertent computerized transcription errors may be present. Although every effort was made to ensure accuracy, no guarantees can be provided that every mistake has been identified and corrected by editing.     Electronically signed by Noemi Jaime MD on 10/20/2017 at 12:57 PM

## 2017-10-20 NOTE — TELEPHONE ENCOUNTER
I placed a new referral and was able to place the name of the doctor this time. Do you need it faxed or you can see it in the system?

## 2017-10-20 NOTE — PATIENT INSTRUCTIONS
1.) Start Paxil as ordered  2.) Follow up with sleep center at [facility]. 3.) Contact CC if sleep center hasn't contacted you in one week.

## 2017-10-20 NOTE — CARE COORDINATION
Ambulatory Care Coordination Note  10/20/2017  CM Risk Score: 2  Deepika Mortality Risk Score: 16.97    ACC: Mir Young RN    Summary Note: Toshia Urbina is a 68 y.o. female. PMH includes COPD, GERD, recurrent UTI, Tachycardia, History of PVCs, and Fall history. Here today to discuss insomnia with PCP. Patient states for past several nights she sleeps very little if any. Voices no complaints concerning  COPD. COPD Assessment    Does the patient understand envrionmental exposure?:  Yes  Is the patient able to verbalize Rescue vs. Long Acting medications?:  Yes  Does the patient have a nebulizer?:  No  Does the patient use a space with inhaled medications?:  Yes            Symptoms:   None:  Yes      Symptom course:  stable  Breathlessness:  exertion  Increase use of rapid acting/rescue inhaled medications?:  No  Change in chronic cough?:  No/At Baseline  Change in sputum?:  No/At Baseline  Sputum characteristics:  White  Have you had a recent diagnosis of pneumonia either by PCP or at a hospital?:  No       Care Coordination Interventions    Program Enrollment:  Rising Risk  Referral from Primary Care Provider:  No  Suggested Interventions and Community Resources  Other Services:  Completed (Comment: [facility])         Goals Addressed      Care Coordination Self Management                  CC Self Management Goal  Patient Goal (What steps will patient take to achieve goal? )  To overcome insomnia as directed by PCP.   Patient is able to discuss self-management of condition(s):  Pt demonstrates adherence to medications  Pt demonstrates understanding of self-monitoring  Patient is able to identify Red Flags:  Alert to potential adverse drug reactions(s) or side effects and actions to take should they arise  Discuss target symptoms and actions to take should they arise  Identify problems that require immediate PCP or specialist visit  Patient demonstrates understanding of access to PCP/Specialist:  Understands about scheduling routine Follow Up appointments   Understands about sick day appointment options for worsening of symptoms/progression (Same Day, E Visits)            Prior to Admission medications    Medication Sig Start Date End Date Taking?  Authorizing Provider   PARoxetine (PAXIL) 10 MG tablet Take 1 tablet by mouth every evening 10/20/17   Jean Rodriguez MD   ciprofloxacin (CIPRO) 500 MG tablet Take 1 tablet by mouth 2 times daily for 10 days For UTI with pseudomonas 10/13/17 10/23/17  Jean Rodriguez MD   loratadine (CLARITIN) 10 MG tablet Take 1 tablet by mouth daily 9/20/17   Jean Rodriguez MD   tiotropium (Lockie Showman) 18 MCG inhalation capsule Inhale 1 capsule into the lungs daily 8/14/17   Jean Rodriguez MD   PROLIA 60 MG/ML SOLN SC injection  4/25/17   Historical Provider, MD   albuterol sulfate  (90 BASE) MCG/ACT inhaler Inhale 2 puffs into the lungs every 6 hours as needed for Wheezing or Shortness of Breath 5/12/17   Jean Rodriguez MD   Spacer/Aero-Holding Chambers (AEROCHAMBER MV) MISC To be used with inhaler 5/12/17   Jean Rodriguez MD   dorzolamide-timolol (COSOPT) 22.3-6.8 MG/ML ophthalmic solution Place 1 drop into both eyes 2 times daily    Historical Provider, MD   Multiple Vitamins-Minerals (OCUVITE EXTRA) TABS Take 1 tablet by mouth daily    Historical Provider, MD   Biotin 2500 MCG CAPS Take 1 capsule by mouth daily    Historical Provider, MD   ALPHAGAN P 0.1 % SOLN Place 1 drop into both eyes 3 times daily  8/7/16   Historical Provider, MD   latanoprost (XALATAN) 0.005 % ophthalmic solution Place 1 drop into both eyes nightly  8/25/16   Historical Provider, MD   SENSIPAR 30 MG tablet Take 30 mg by mouth daily  12/10/15   Historical Provider, MD       Future Appointments  Date Time Provider Shirley Dunne   10/23/2017 10:00 AM Aki Gonzalez PTA ST[facility] MOB PT [facility]   10/23/2017 1:30 PM Mai Juan MD [facility]   10/25/2017 2:00 PM Ruth Hughes, JOSH STWIL MOB PT [facility]   10/26/2017 1:45 PM JOSH Rain MOB PT [facility]   11/14/2017 10:45 AM MD [facility]ardo [facility]   11/15/2017 11:00 AM [facility] SHORT STAY INFUSION CHAIR 01 FRANK  None

## 2017-10-23 ENCOUNTER — OFFICE VISIT (OUTPATIENT)
Dept: GASTROENTEROLOGY | Age: 77
End: 2017-10-23
Payer: MEDICARE

## 2017-10-23 ENCOUNTER — HOSPITAL ENCOUNTER (OUTPATIENT)
Dept: PHYSICAL THERAPY | Age: 77
Setting detail: THERAPIES SERIES
Discharge: HOME | End: 2017-10-23
Payer: MEDICARE

## 2017-10-23 VITALS
SYSTOLIC BLOOD PRESSURE: 128 MMHG | DIASTOLIC BLOOD PRESSURE: 80 MMHG | TEMPERATURE: 98.1 F | WEIGHT: 127.1 LBS | HEART RATE: 79 BPM | RESPIRATION RATE: 14 BRPM | HEIGHT: 63 IN | OXYGEN SATURATION: 99 % | BODY MASS INDEX: 22.52 KG/M2

## 2017-10-23 DIAGNOSIS — Z86.010 HISTORY OF COLON POLYPS: ICD-10-CM

## 2017-10-23 DIAGNOSIS — K21.9 GASTROESOPHAGEAL REFLUX DISEASE WITHOUT ESOPHAGITIS: Primary | ICD-10-CM

## 2017-10-23 PROCEDURE — 97113 AQUATIC THERAPY/EXERCISES: CPT

## 2017-10-23 PROCEDURE — 99214 OFFICE O/P EST MOD 30 MIN: CPT | Performed by: INTERNAL MEDICINE

## 2017-10-23 ASSESSMENT — ENCOUNTER SYMPTOMS
WHEEZING: 0
ABDOMINAL DISTENTION: 0
CONSTIPATION: 0
DIARRHEA: 0
ANAL BLEEDING: 0
BLOOD IN STOOL: 0
GASTROINTESTINAL NEGATIVE: 1
RECTAL PAIN: 0
SORE THROAT: 0
VOICE CHANGE: 0
SINUS PRESSURE: 1
VOMITING: 0
ABDOMINAL PAIN: 0
COUGH: 0
CHOKING: 0
RESPIRATORY NEGATIVE: 1
BACK PAIN: 1
NAUSEA: 0
TROUBLE SWALLOWING: 0
SHORTNESS OF BREATH: 0

## 2017-10-23 ASSESSMENT — PAIN DESCRIPTION - PAIN TYPE: TYPE: CHRONIC PAIN

## 2017-10-23 ASSESSMENT — PAIN DESCRIPTION - LOCATION: LOCATION: BACK

## 2017-10-23 ASSESSMENT — PAIN SCALES - GENERAL: PAINLEVEL_OUTOF10: 4

## 2017-10-23 NOTE — PROGRESS NOTES
Subjective:      Patient ID: Calli Osei is a 68 y.o. female. HPI   Dr. Linette Mora MD our mutual patient Calli Osei was seen  for   1. Gastroesophageal reflux disease without esophagitis    2. History of colon polyps, tubular adenoma in 2012     . Her for f/u after 8 months  She was losing weight and now has gained 6 lbs  She had some gastritis in the past but is doing well now  Has been on PPI now  Pt is clinically feeling well GI wise  Has No significant abdominal pains, bloating or cramping  Has no sig GERD symptoms  Has no Dysphagia, No Nausea or any vomiting  Denies any rectal bleeding or any melena  Denies any sig constipation or any diarrhea symptoms   appetite is generally good. Past Medical, Family, and Social History reviewed and does contribute to the patient presenting condition. patient\"s PMH/PSH,SH,PSYCH hx, MEDs, ALLERGIES, and ROS was all reviewed and updated ion the appropriate sections      Review of Systems   Constitutional: Negative. Negative for appetite change, fatigue and unexpected weight change. HENT: Positive for postnasal drip and sinus pressure. Negative for dental problem, sore throat, trouble swallowing and voice change. Eyes: Positive for visual disturbance (glasses). Respiratory: Negative. Negative for cough, choking, shortness of breath and wheezing. Cardiovascular: Negative. Negative for chest pain. Gastrointestinal: Negative. Negative for abdominal distention, abdominal pain, anal bleeding, blood in stool, constipation, diarrhea, nausea, rectal pain and vomiting. Denies   Endocrine: Negative. Genitourinary: Negative. Musculoskeletal: Positive for arthralgias and back pain. Skin: Negative. Allergic/Immunologic: Positive for environmental allergies. Neurological: Positive for dizziness. Negative for weakness and light-headedness. Hematological: Negative. Psychiatric/Behavioral: Positive for sleep disturbance.

## 2017-10-23 NOTE — PROGRESS NOTES
Physical Therapy  [address] [facility]  [address] [facility]. [address]  Phone: [phone]  Fax: [phone]  Daily Progress Note    Date: 10/23/17    Patient Name: Inocencia Lopez        MRN: [id number]  Account: [de-identified] : 1940      General Information:  Referring Practitioner: Kimmie Velasco  Referral Date : 17  Diagnosis: Chronic LBP. PT Insurance Information: [employer] Medicare  Total # of Visits Approved: 18  Total # of Visits to Date: 6  No Show: 0  Canceled Appointment: 0  Progress Note Counter: 6/10 G-codes    Subjective:  Subjective: reports the weather causing increased pain when patient awoke this morning. states did not do anything out of the ordinary this weekend to cause increased pain. Pain:  Patient Currently in Pain: Yes  Pain Assessment: 0-10  Pain Level: 4  Pain Type: Chronic pain  Pain Location: Back       Objective:  [address] [facility] Exercise Log  Aquatic, Hip & DLS Program- Phase 1     Date of Eval:    17                            Primary PT: Elsy Knowles PT  Diagnosis: Chronic Low Back Pain  Things to Focus On (goals):   Surgical Precautions:  Medical Precautions:  [] C-9 dates  [] Occ Med   [x] Medicare      Date 10/11/17 10/12/17  10/19/17   10/23/17     Visit # 3/18  4/18  5/18  6/18     Walk F/L/R 2 Laps  2 Laps  2 Laps 2 Laps     Marching 10x 10x  2 Laps  2 Laps     Squats 10x5\"  10x5\" 15x5\"  15x5\"     Step-Ups F/L    Low 10x Low 15x  Low 10x     Heel-toe raises 10x 10x   15x  15x     SLR F/L/R 10x  10x  15x 15x     Knee/Flex/Ext    10x  15x 15x     F/L Lunges             Kickboard Ex.  Small  Small Med  Med     Iso Abd. 10x5\"  10x5\"  10x5\" 10x5\"     Push-pull 10x  10x  10x  10x     Paddling                           UE Exercise             Horiz Abd/Add     10x  10x     IR/ER       10x     Alt Flex/Ext     10x  10x     Alt Press Down       10x      Abd/Add      10x 10x     Stretches             Achllies 2x20\" 2x20\"   2x20\"  2x20\"     Hamstring    2x20\"  2x20\"  2x20\"     .             Cool Down 2 Laps  2 Laps  2 Laps  2 Laps     Pain Rating 3 3   3 4          Assessment:   Body structures, Functions, Activity limitations: Decreased endurance  Treatment Diagnosis: Upper back weakness, postural issues due to osteoporosis  Prognosis: Good  REQUIRES PT FOLLOW UP: Yes  Activity Tolerance: Patient Tolerated treatment well    Plan:  Plan: Continue with current plan    Therapy Time:  Time In: 1000  Time Out: 1040  Minutes: 40  Timed Code Treatment Minutes: 30 Minutes    Treatment Charges: Minutes Units   []  Ultrasound     []  Electrical-Stim     []  Iontophoresis     []  Traction     []  Massage       []  Eval     []  Gait     []  Ther Exercise       []  Manual Therapy       []  Ther Activities       [x]  Aquatics 30 2   []  Neuro Re-Ed       []  Other       Total Treatment Time: 30  2       Jean Morin, JOSH

## 2017-10-24 NOTE — PROCEDURES
[address]                [address] [facility], [address]                              PULMONARY FUNCTION    PATIENT NAME: Karine Milton                    :             1940  MED REC NO:   [id number]                               ROOM:  ACCOUNT NO:   [de-identified]                            ADMISSION DATE:  10/16/2017  PROVIDER:     Jennifer Sibley    DATE OF PROCEDURE:  10/16/2017    Flow volume loop showed adequate effort and tracings. Spirometry is  notable for a moderate-to-severe obstructive defect. By ATS criteria,  there is no improvement with bronchodilator therapy. Static lung  volumes revealed moderate-to-severe air trapping and mild  hyperinflation. The DLCO is severely reduced. 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         Gildardo Castillo    D: 10/23/2017 18:40:03       T: 10/23/2017 19:46:11     [email or web address]  Job#: [id number]     Doc#: [id number]

## 2017-10-24 NOTE — PROGRESS NOTES
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 I would like to see her a pulmonologist and I could add one more inhaler  as well. please let me know if she agrees.

## 2017-10-25 ENCOUNTER — HOSPITAL ENCOUNTER (OUTPATIENT)
Dept: PHYSICAL THERAPY | Age: 77
Setting detail: THERAPIES SERIES
Discharge: HOME | End: 2017-10-25
Payer: MEDICARE

## 2017-10-25 PROCEDURE — 97110 THERAPEUTIC EXERCISES: CPT

## 2017-10-25 ASSESSMENT — PAIN DESCRIPTION - PROGRESSION: CLINICAL_PROGRESSION: GRADUALLY IMPROVING

## 2017-10-25 NOTE — PROGRESS NOTES
Physical Therapy  Daily Treatment Note  Date: 10/25/2017  Patient Name: Orlando Gomez  MRN: [id number]     :   1940    Subjective:   General  Additional Pertinent Hx: Osteoporosis  Referring Practitioner: Larissa Beauchamp Visit Information  PT Insurance Information: A[employer]na Medicare  Total # of Visits Approved: 18  Total # of Visits to Date: 7  No Show: 0  Canceled Appointment: 0  Progress Note Counter: 7/10  Subjective  Subjective: Patient noted LBP only present in AM and with activity, no pain at rest  General Comment  Comments: Patient expressed interest in progressing to more land based therapy now that pain has improved to focs on strengthening   Pain Screening  Patient Currently in Pain: Yes  Pain Assessment  Clinical Progression: Gradually improving  Vital Signs  Patient Currently in Pain: Yes       Treatment Activities:      Bed mobility  Scooting: Independent  Transfers  Sit to Stand: Independent  Stand to sit:  Independent   Balance  Posture: Good  Sitting - Static: Good  Sitting - Dynamic: Good  Standing - Static: Good  Standing - Dynamic: Good  AROM RLE (degrees)  RLE AROM: WNL  AROM LLE (degrees)  LLE AROM : WNL  AROM RUE (degrees)  RUE AROM : WNL  AROM LUE (degrees)  LUE AROM : WNL  Strength RLE  Strength RLE: WFL  Strength LLE  Strength LLE: WFL  Strength RUE  Strength RUE: WFL  Strength LUE  Strength LUE: WFL    Exercises  Exercise 1: LTR 10x  Exercise 2: SKTC 10x  Exercise 3: DKTC  10x  Exercise 4: Parifimis 30' x 2   Exercise 5: HS 30 '' x 2 (B)   Exercise 6: Bridges 10x   Exercise 7: Dead bug 10 x   Exercise 8: seated flexion 10 x   Exercise 9: Supine AB Curl up 10x  Exercise 10: Prone LE Ext 10x  Exercise 11: Prone Alt UE/LE Ext 10x  Exercise 12: Cable Rows/Ext #20 2x10      Assessment:   Conditions Requiring Skilled Therapeutic Intervention  Body structures, Functions, Activity limitations: Decreased endurance  Assessment: Pt would benefit from combo of land/water based ex's to improve postural endurance and dec pain  Treatment Diagnosis: Upper back weakness, postural issues due to osteoporosis  Prognosis: Good  Patient Education: HEP, posture, POC  REQUIRES PT FOLLOW UP: Yes  Treatment Initiated : eval, education  Activity Tolerance  Activity Tolerance: Patient Tolerated treatment well      G-Code: 7/10   Goals:  Short term goals  Time Frame for Short term goals: 9 visits  Short term goal 1: Indep HEP  Short term goal 2: Dec pain 50 %  Long term goals  Time Frame for Long term goals : 18 visits  Long term goal 1: Optimal <4    Plan:  Cont with POC     Timed Code Treatment Minutes: 30 Minutes  Total Treatment Time: 30     Therapy Time   Individual Concurrent Group Co-treatment   Time In  2:00         Time Out  2:30         Minutes  30         Timed Code Treatment Minutes: Zafar Wu 92, PTA

## 2017-10-26 ENCOUNTER — HOSPITAL ENCOUNTER (OUTPATIENT)
Dept: PHYSICAL THERAPY | Age: 77
Setting detail: THERAPIES SERIES
Discharge: HOME | End: 2017-10-26
Payer: MEDICARE

## 2017-10-26 PROCEDURE — 97113 AQUATIC THERAPY/EXERCISES: CPT

## 2017-10-26 ASSESSMENT — PAIN DESCRIPTION - DIRECTION: RADIATING_TOWARDS: DENIES

## 2017-10-26 ASSESSMENT — PAIN DESCRIPTION - LOCATION: LOCATION: BACK

## 2017-10-26 ASSESSMENT — PAIN DESCRIPTION - PAIN TYPE: TYPE: CHRONIC PAIN

## 2017-10-26 ASSESSMENT — PAIN DESCRIPTION - ORIENTATION: ORIENTATION: MID;LOWER

## 2017-10-26 ASSESSMENT — PAIN SCALES - GENERAL: PAINLEVEL_OUTOF10: 3

## 2017-10-29 ENCOUNTER — PATIENT MESSAGE (OUTPATIENT)
Dept: FAMILY MEDICINE CLINIC | Age: 77
End: 2017-10-29

## 2017-10-29 DIAGNOSIS — F51.04 PSYCHOPHYSIOLOGICAL INSOMNIA: Primary | ICD-10-CM

## 2017-10-30 ENCOUNTER — PATIENT MESSAGE (OUTPATIENT)
Dept: FAMILY MEDICINE CLINIC | Age: 77
End: 2017-10-30

## 2017-10-30 ENCOUNTER — HOSPITAL ENCOUNTER (OUTPATIENT)
Dept: PHYSICAL THERAPY | Age: 77
Setting detail: THERAPIES SERIES
Discharge: HOME | End: 2017-10-30
Payer: MEDICARE

## 2017-10-30 PROCEDURE — G8979 MOBILITY GOAL STATUS: HCPCS

## 2017-10-30 PROCEDURE — 97110 THERAPEUTIC EXERCISES: CPT

## 2017-10-30 PROCEDURE — G8978 MOBILITY CURRENT STATUS: HCPCS

## 2017-10-30 RX ORDER — ESZOPICLONE 2 MG/1
2 TABLET, FILM COATED ORAL NIGHTLY
Qty: 30 TABLET | Refills: 0 | Status: SHIPPED | OUTPATIENT
Start: 2017-10-30 | End: 2017-12-07 | Stop reason: SDUPTHER

## 2017-10-30 ASSESSMENT — PAIN DESCRIPTION - PAIN TYPE: TYPE: CHRONIC PAIN

## 2017-10-30 ASSESSMENT — PAIN DESCRIPTION - LOCATION: LOCATION: BACK

## 2017-10-30 ASSESSMENT — PAIN DESCRIPTION - PROGRESSION: CLINICAL_PROGRESSION: GRADUALLY IMPROVING

## 2017-10-30 ASSESSMENT — PAIN DESCRIPTION - ORIENTATION: ORIENTATION: MID;LOWER

## 2017-10-30 ASSESSMENT — PAIN SCALES - GENERAL: PAINLEVEL_OUTOF10: 3

## 2017-11-01 ENCOUNTER — APPOINTMENT (OUTPATIENT)
Dept: PHYSICAL THERAPY | Age: 77
End: 2017-11-01
Payer: MEDICARE

## 2017-11-01 ENCOUNTER — HOSPITAL ENCOUNTER (OUTPATIENT)
Dept: PHYSICAL THERAPY | Age: 77
Setting detail: THERAPIES SERIES
Discharge: HOME | End: 2017-11-01
Payer: MEDICARE

## 2017-11-01 PROCEDURE — 97110 THERAPEUTIC EXERCISES: CPT

## 2017-11-01 ASSESSMENT — PAIN DESCRIPTION - LOCATION: LOCATION: BACK

## 2017-11-01 ASSESSMENT — PAIN DESCRIPTION - PROGRESSION: CLINICAL_PROGRESSION: GRADUALLY IMPROVING

## 2017-11-01 ASSESSMENT — PAIN DESCRIPTION - ORIENTATION: ORIENTATION: MID;LOWER

## 2017-11-01 ASSESSMENT — PAIN SCALES - GENERAL: PAINLEVEL_OUTOF10: 2

## 2017-11-01 ASSESSMENT — PAIN DESCRIPTION - PAIN TYPE: TYPE: CHRONIC PAIN

## 2017-11-01 NOTE — PROGRESS NOTES
Ther Activities       []  Aquatics     []  Neuro Re-Ed       []  Other       Total Treatment Time: 30 2       Keegan Ceballos, PT

## 2017-11-03 ENCOUNTER — APPOINTMENT (OUTPATIENT)
Dept: PHYSICAL THERAPY | Age: 77
End: 2017-11-03
Payer: MEDICARE

## 2017-11-06 ENCOUNTER — HOSPITAL ENCOUNTER (OUTPATIENT)
Dept: PHYSICAL THERAPY | Age: 77
Setting detail: THERAPIES SERIES
Discharge: HOME | End: 2017-11-06
Payer: MEDICARE

## 2017-11-06 PROCEDURE — 97110 THERAPEUTIC EXERCISES: CPT

## 2017-11-06 ASSESSMENT — PAIN DESCRIPTION - PAIN TYPE: TYPE: CHRONIC PAIN

## 2017-11-06 ASSESSMENT — PAIN DESCRIPTION - LOCATION: LOCATION: BACK

## 2017-11-06 ASSESSMENT — PAIN DESCRIPTION - PROGRESSION: CLINICAL_PROGRESSION: GRADUALLY IMPROVING

## 2017-11-06 ASSESSMENT — PAIN SCALES - GENERAL: PAINLEVEL_OUTOF10: 1

## 2017-11-06 ASSESSMENT — PAIN DESCRIPTION - ORIENTATION: ORIENTATION: MID;LOWER

## 2017-11-06 NOTE — PROGRESS NOTES
Physical Therapy  Daily Treatment Note  Date: 2017  Patient Name: Matthieu Luna  MRN: [id number]     :   1940    Subjective:   General  Additional Pertinent Hx: Osteoporosis  Referring Practitioner: Luis A Nguyen Visit Information  PT Insurance Information: A[employer]na Medicare  Total # of Visits Approved: 18  Total # of Visits to Date: 11  No Show: 0  Progress Note Counter: 3/10 G-Codes  Subjective  Subjective: I feel real good today. Pain Screening  Patient Currently in Pain: Yes  Pain Assessment  Pain Assessment: 0-10  Pain Level: 1  Pain Type: Chronic pain  Pain Location: Back  Pain Orientation: Mid;Lower  Clinical Progression: Gradually improving  Vital Signs  Patient Currently in Pain: Yes       Treatment Activities:      Bed mobility  Scooting: Independent  Transfers  Sit to Stand: Independent  Stand to sit:  Independent      Balance  Posture: Good  Sitting - Static: Good  Sitting - Dynamic: Good  Standing - Static: Good  AROM RLE (degrees)  RLE AROM: WNL  AROM LLE (degrees)  LLE AROM : WNL  AROM RUE (degrees)  RUE AROM : WNL  AROM LUE (degrees)  LUE AROM : WNL  Strength RLE  Strength RLE: WFL  Strength LLE  Strength LLE: WFL  Strength RUE  Strength RUE: WFL  Strength LUE  Strength LUE: WFL    Exercises  Exercise 1: LTR 15x  Exercise 2: SKTC 15x  Exercise 3: DKTC  15x  Exercise 4: Parifimis 30' x 2   Exercise 5: HS 30 '' x 2 (B)   Exercise 6: Bridges 15x   Exercise 7: Dead bug 15 x   Exercise 8: seated flexion 10 x   Exercise 9: seated pariformis 30 '' x 2 (B)  Exercise 10: Bridge kick outs 15 x   Exercise 11: T mill 10 min 2.5 mph   Exercise 12: Cable Rows/Ext #20 2x10         Assessment:   Conditions Requiring Skilled Therapeutic Intervention  Body structures, Functions, Activity limitations: Decreased endurance  Treatment Diagnosis: Upper back weakness, postural issues due to osteoporosis  Prognosis: Good  REQUIRES PT FOLLOW UP: Yes  Activity Tolerance  Activity Tolerance: Patient Tolerated

## 2017-11-08 ENCOUNTER — HOSPITAL ENCOUNTER (OUTPATIENT)
Dept: PHYSICAL THERAPY | Age: 77
Setting detail: THERAPIES SERIES
Discharge: HOME | End: 2017-11-08
Payer: MEDICARE

## 2017-11-08 PROCEDURE — 97110 THERAPEUTIC EXERCISES: CPT

## 2017-11-08 ASSESSMENT — PAIN DESCRIPTION - ORIENTATION: ORIENTATION: LOWER;MID

## 2017-11-08 ASSESSMENT — PAIN SCALES - GENERAL: PAINLEVEL_OUTOF10: 1

## 2017-11-08 ASSESSMENT — PAIN DESCRIPTION - LOCATION: LOCATION: BACK

## 2017-11-08 ASSESSMENT — PAIN DESCRIPTION - PAIN TYPE: TYPE: CHRONIC PAIN

## 2017-11-08 ASSESSMENT — PAIN DESCRIPTION - PROGRESSION: CLINICAL_PROGRESSION: GRADUALLY IMPROVING

## 2017-11-09 ENCOUNTER — HOSPITAL ENCOUNTER (OUTPATIENT)
Dept: PHYSICAL THERAPY | Age: 77
Setting detail: THERAPIES SERIES
Discharge: HOME | End: 2017-11-09
Payer: MEDICARE

## 2017-11-09 PROCEDURE — 97110 THERAPEUTIC EXERCISES: CPT

## 2017-11-09 ASSESSMENT — PAIN DESCRIPTION - LOCATION: LOCATION: BACK

## 2017-11-09 ASSESSMENT — PAIN DESCRIPTION - PAIN TYPE: TYPE: CHRONIC PAIN

## 2017-11-09 ASSESSMENT — PAIN DESCRIPTION - PROGRESSION: CLINICAL_PROGRESSION: GRADUALLY IMPROVING

## 2017-11-09 ASSESSMENT — PAIN DESCRIPTION - ORIENTATION: ORIENTATION: LOWER;MID

## 2017-11-09 ASSESSMENT — PAIN SCALES - GENERAL: PAINLEVEL_OUTOF10: 1

## 2017-11-09 NOTE — PROGRESS NOTES
Home independently  Activity Tolerance  Activity Tolerance: Patient Tolerated treatment well         Goals:  Short term goals  Time Frame for Short term goals: 9 visits  Short term goal 1: Indep HEP (Goal Met )  Short term goal 2: Dec pain 50 % (Goal Not Met)  Long term goals  Time Frame for Long term goals : 18 visits  Long term goal 1: Optimal less than 4/3.   (Goal Not Met )    Plan:     Continue   Timed Code Treatment Minutes: 45 Minutes  Total Treatment Time: 45  Frequency and duration of TX  Days: 3  Weeks: 4     Therapy Time   Individual Concurrent Group Co-treatment   Time In  1200         Time Out  1245         Minutes  45 min   Ther x          Timed Code Treatment Minutes: [address] [facility] Lewis Muir, PT

## 2017-11-13 ENCOUNTER — HOSPITAL ENCOUNTER (OUTPATIENT)
Dept: PHYSICAL THERAPY | Age: 77
Setting detail: THERAPIES SERIES
Discharge: HOME | End: 2017-11-13
Payer: MEDICARE

## 2017-11-13 PROCEDURE — 97110 THERAPEUTIC EXERCISES: CPT

## 2017-11-13 ASSESSMENT — PAIN SCALES - GENERAL: PAINLEVEL_OUTOF10: 0

## 2017-11-13 NOTE — PROGRESS NOTES
Physical Therapy  Daily Treatment Note  Date: 2017  Patient Name: Dalton Martinez  MRN: [id number]     :   1940    Subjective:   General  Additional Pertinent Hx: Osteoporosis  Referring Practitioner: Lisa Odonnell Visit Information  PT Insurance Information: Aetna Medicare  Total # of Visits Approved: 18  Total # of Visits to Date: 14  No Show: 0  Progress Note Counter: 6/10 G-Codes  Subjective  Subjective: I feel like I will be ready to finish up this week. Pain Screening  Patient Currently in Pain: Denies  Pain Assessment  Pain Assessment: 0-10  Pain Level: 0  Vital Signs  Patient Currently in Pain: Denies       Treatment Activities:      Bed mobility  Scooting: Independent  Transfers  Sit to Stand: Independent  Stand to sit:  Independent                 Balance  Posture: Good  Sitting - Static: Good  Sitting - Dynamic: Good  Standing - Static: Good  AROM RLE (degrees)  RLE AROM: WNL  AROM LLE (degrees)  LLE AROM : WNL  AROM RUE (degrees)  RUE AROM : WNL  AROM LUE (degrees)  LUE AROM : WNL  Strength RLE  Strength RLE: WFL  Strength LLE  Strength LLE: WFL  Strength RUE  Strength RUE: WFL  Strength LUE  Strength LUE: WFL    Exercises  Exercise 1: LTR 15x  Exercise 2: SKTC 15x  Exercise 3: DKTC  15x  Exercise 4: Parifimis 30' x 2   Exercise 5: HS 30 '' x 2 (B)   Exercise 6: Bridges 15x   Exercise 7: Dead bug 15 x   Exercise 8: seated flexion 10 x   Exercise 9: seated piriformis 30 '' x 2 (B)  Exercise 10: Bridge kick outs 15 x   Exercise 11: T mill 10 min 2.5 mph   Exercise 12: Cable Rows/Ext #20 2x10            Assessment:   Conditions Requiring Skilled Therapeutic Intervention  Body structures, Functions, Activity limitations: Decreased endurance  Treatment Diagnosis: Upper back weakness, postural issues due to osteoporosis  Prognosis: Good  REQUIRES PT FOLLOW UP: Yes  Discharge Recommendations: Home independently  Activity Tolerance  Activity Tolerance: Patient Tolerated treatment well Goals:  Short term goals  Time Frame for Short term goals: 9 visits  Short term goal 1: Indep HEP (Goal Met )  Short term goal 2: Dec pain 50 % (Goal Not Met)  Long term goals  Time Frame for Long term goals : 18 visits  Long term goal 1: Optimal <4 (Goal Not Met )    Plan:     Continue   Timed Code Treatment Minutes: 30 Minutes  Total Treatment Time: 30  Frequency and duration of TX  Days: 3  Weeks: 4     Therapy Time   Individual Concurrent Group Co-treatment   Time In  1230         Time Out  1300         Minutes  30         Timed Code Treatment Minutes: [address] [facility] Shant Lopez, PT

## 2017-11-15 ENCOUNTER — HOSPITAL ENCOUNTER (OUTPATIENT)
Dept: PHYSICAL THERAPY | Age: 77
Setting detail: THERAPIES SERIES
Discharge: HOME | End: 2017-11-15
Payer: MEDICARE

## 2017-11-15 ENCOUNTER — HOSPITAL ENCOUNTER (OUTPATIENT)
Dept: VASCULAR LAB | Age: 77
Discharge: HOME | End: 2017-11-15
Payer: MEDICARE

## 2017-11-15 ENCOUNTER — HOSPITAL ENCOUNTER (OUTPATIENT)
Dept: GENERAL RADIOLOGY | Age: 77
Discharge: HOME | End: 2017-11-15
Payer: MEDICARE

## 2017-11-15 ENCOUNTER — HOSPITAL ENCOUNTER (OUTPATIENT)
Age: 77
Discharge: HOME | End: 2017-11-15
Payer: MEDICARE

## 2017-11-15 ENCOUNTER — HOSPITAL ENCOUNTER (OUTPATIENT)
Dept: INPATIENT UNIT | Age: 77
Setting detail: THERAPIES SERIES
Discharge: HOME | End: 2017-11-15
Payer: MEDICARE

## 2017-11-15 VITALS
HEART RATE: 80 BPM | DIASTOLIC BLOOD PRESSURE: 67 MMHG | SYSTOLIC BLOOD PRESSURE: 123 MMHG | OXYGEN SATURATION: 100 % | RESPIRATION RATE: 16 BRPM | TEMPERATURE: 97 F

## 2017-11-15 DIAGNOSIS — R55 VASOVAGAL SYNCOPE: ICD-10-CM

## 2017-11-15 DIAGNOSIS — J43.1 PANLOBULAR EMPHYSEMA (HCC): ICD-10-CM

## 2017-11-15 DIAGNOSIS — M81.0 OSTEOPOROSIS, UNSPECIFIED OSTEOPOROSIS TYPE, UNSPECIFIED PATHOLOGICAL FRACTURE PRESENCE: ICD-10-CM
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
## 2017-11-15 LAB
CALCIUM SERPL-MCNC: 9 MG/DL (ref 8.6–10.4)
CREAT SERPL-MCNC: 1.07 MG/DL (ref 0.5–0.9)
GFR AFRICAN AMERICAN: >60 ML/MIN
GFR NON-AFRICAN AMERICAN: 50 ML/MIN
GFR SERPL CREATININE-BSD FRML MDRD: ABNORMAL ML/MIN/{1.73_M2}
GFR SERPL CREATININE-BSD FRML MDRD: ABNORMAL ML/MIN/{1.73_M2}
MAGNESIUM: 1.9 MG/DL (ref 1.6–2.6)
PHOSPHORUS: 3.2 MG/DL (ref 2.6–4.5)

## 2017-11-15 PROCEDURE — 83735 ASSAY OF MAGNESIUM: CPT

## 2017-11-15 PROCEDURE — 82310 ASSAY OF CALCIUM: CPT

## 2017-11-15 PROCEDURE — 71020 XR CHEST STANDARD TWO VW: CPT

## 2017-11-15 PROCEDURE — 93880 EXTRACRANIAL BILAT STUDY: CPT

## 2017-11-15 PROCEDURE — 36415 COLL VENOUS BLD VENIPUNCTURE: CPT

## 2017-11-15 PROCEDURE — 6360000002 HC RX W HCPCS: Performed by: INTERNAL MEDICINE

## 2017-11-15 PROCEDURE — 82565 ASSAY OF CREATININE: CPT

## 2017-11-15 PROCEDURE — 97110 THERAPEUTIC EXERCISES: CPT

## 2017-11-15 PROCEDURE — 84100 ASSAY OF PHOSPHORUS: CPT

## 2017-11-15 PROCEDURE — 96372 THER/PROPH/DIAG INJ SC/IM: CPT

## 2017-11-15 RX ADMIN — DENOSUMAB 60 MG: 60 INJECTION SUBCUTANEOUS at 11:23

## 2017-11-16 ENCOUNTER — HOSPITAL ENCOUNTER (OUTPATIENT)
Dept: PHYSICAL THERAPY | Age: 77
Setting detail: THERAPIES SERIES
Discharge: HOME | End: 2017-11-16
Payer: MEDICARE

## 2017-11-16 PROCEDURE — G8979 MOBILITY GOAL STATUS: HCPCS

## 2017-11-16 PROCEDURE — G8980 MOBILITY D/C STATUS: HCPCS

## 2017-11-16 PROCEDURE — 97110 THERAPEUTIC EXERCISES: CPT

## 2017-11-16 NOTE — PROGRESS NOTES
Physical Therapy  Discharge Note  Date: 2017  Patient Name: Jimmy Solano  MRN: [id number]     :   1940    Subjective:   General  Additional Pertinent Hx: Osteoporosis  Referring Practitioner: Santino Kolb Visit Information  PT Insurance Information: Aetna Medicare  Total # of Visits Approved: 18  Total # of Visits to Date: 16  No Show: 0  Canceled Appointment: 0  Progress Note Counter: 87/10 G-Codes  Subjective  Subjective: I'm ready to go at it on my own, I'm doing well. Pain Screening  Patient Currently in Pain: Denies  Vital Signs  Patient Currently in Pain: Denies       Treatment Activities:      Bed mobility  Scooting: Independent  Transfers  Sit to Stand: Independent  Stand to sit: Independent                 Balance  Posture: Good  Sitting - Static: Good  Sitting - Dynamic: Good  Standing - Static: Good  AROM RLE (degrees)  RLE AROM: WNL  AROM LLE (degrees)  LLE AROM : WNL  AROM RUE (degrees)  RUE AROM : WNL  AROM LUE (degrees)  LUE AROM : WNL  Strength RLE  Strength RLE: WFL  Strength LLE  Strength LLE: WFL  Strength RUE  Strength RUE: WFL  Strength LUE  Strength LUE: WFL    Exercises  Exercise 1: LTR 15x  Exercise 2: SKTC 15x  Exercise 3: DKTC  15x  Exercise 4: Parifimis 30' x 2   Exercise 5: HS 30 '' x 2 (B)   Exercise 6: Bridges 15x   Exercise 7: Dead bug 15 x   Exercise 8: seated flexion 10 x   Exercise 9: seated piriformis 30 '' x 2 (B)  Exercise 10: Bridge kick outs 15 x   Exercise 11: T mill 10 min 2.5 mph   Exercise 12: Cable Rows/Ext #20 2x10            Assessment:   Conditions Requiring Skilled Therapeutic Intervention  Body structures, Functions, Activity limitations: Decreased endurance  Assessment: Discharge PT at this time.    Treatment Diagnosis: Upper back weakness, postural issues due to osteoporosis  Prognosis: Good  REQUIRES PT FOLLOW UP: No  No Skilled PT: Independent with functional mobility   Discharge Recommendations: Home independently  Activity Tolerance  Activity Tolerance: Patient Tolerated treatment well      G-Code:  PT G-Codes  Functional Assessment Tool Used: OPTIMAL   Score: OPTIMAL = 3/3   Functional Limitation: Mobility: Walking and moving around  Mobility: Walking and Moving Around Goal Status (): 0 percent impaired, limited or restricted  Mobility: Walking and Moving Around Discharge Status (): 0 percent impaired, limited or restricted  Goals:  Short term goals  Time Frame for Short term goals: 9 visits  Short term goal 1: Indep HEP (Goal Met )  Short term goal 2: Dec pain 50 % (Goal Met)  Long term goals  Time Frame for Long term goals : 18 visits  Long term goal 1: Optimal <4 (Goal Met )    Plan:     Discharge to home program   Timed Code Treatment Minutes: 30 Minutes  Total Treatment Time: 30  Frequency and duration of TX  Days: 3  Weeks: 4     Therapy Time   Individual Concurrent Group Co-treatment   Time In  1230         Time Out  1300         Minutes  30 min   There x          Timed Code Treatment Minutes: [address] Ev Cyr, PT

## 2017-11-29 ENCOUNTER — HOSPITAL ENCOUNTER (OUTPATIENT)
Dept: SLEEP CENTER | Age: 77
Discharge: HOME | End: 2017-11-29
Payer: MEDICARE

## 2017-11-29 PROCEDURE — 90834 PSYTX W PT 45 MINUTES: CPT

## 2017-11-29 NOTE — PROGRESS NOTES
Patient seen for 60 minute session of individual psychotherapy. Initial consultation for chronic insomnia.       Diagnosis:  F51.01    Yakelin Marcelino, Ph.D  Licensed Clinical Psychologist  Diplomate, American Board of Sleep Medicine          D

## 2017-12-05 ENCOUNTER — TELEPHONE (OUTPATIENT)
Dept: FAMILY MEDICINE CLINIC | Age: 77
End: 2017-12-05

## 2017-12-05 DIAGNOSIS — F51.04 PSYCHOPHYSIOLOGICAL INSOMNIA: ICD-10-CM

## 2017-12-05 NOTE — TELEPHONE ENCOUNTER
I received a call from Dr. Priya Noonan. Patient had visit with sleep specialist.  He is suggesting to continue with Lunesta 2 mg and to start psychotherapy and sleep hygiene. Patient failed multiple other therapies before. Patient was previously adamant to increase dosage to Lunesta 3 MG. We, me and Dr. Priya Noonan both agreed to try to work with patient and continue Lunesta 2 mg and to start some cognitive behavioral therapy and sleep hygiene.

## 2017-12-06 ENCOUNTER — PATIENT MESSAGE (OUTPATIENT)
Dept: FAMILY MEDICINE CLINIC | Age: 77
End: 2017-12-06

## 2017-12-06 ENCOUNTER — TELEPHONE (OUTPATIENT)
Dept: FAMILY MEDICINE CLINIC | Age: 77
End: 2017-12-06

## 2017-12-06 ENCOUNTER — HOSPITAL ENCOUNTER (OUTPATIENT)
Dept: SLEEP CENTER | Age: 77
Discharge: HOME | End: 2017-12-06
Payer: MEDICARE

## 2017-12-06 DIAGNOSIS — F51.04 PSYCHOPHYSIOLOGICAL INSOMNIA: Primary | ICD-10-CM

## 2017-12-06 PROCEDURE — 90834 PSYTX W PT 45 MINUTES: CPT

## 2017-12-06 NOTE — TELEPHONE ENCOUNTER
Thank you! Will continue Lunesta 2 mg for now    FYI    Additional information   Received: Today   Message Contents   Girma Wilson, PhD  Kimberly Roy MD             I met with this patient again today to lay out a treatment plan, relative to continued use of 2mg [place].  She was surprisingly receptive and will pursue my behavioral recommendations along with the [place] for the next two weeks. I will see her back in my office then for review of progress and continued treatment.       I forgot to mention yesterday that I do NOT feel that Ms. Napier is in need of a sleep study at this time.       Will keep you informed.      Davis People

## 2017-12-07 ENCOUNTER — CARE COORDINATION (OUTPATIENT)
Dept: CARE COORDINATION | Age: 77
End: 2017-12-07

## 2017-12-07 RX ORDER — ESZOPICLONE 2 MG/1
2 TABLET, FILM COATED ORAL NIGHTLY
Qty: 30 TABLET | Refills: 0 | Status: SHIPPED | OUTPATIENT
Start: 2017-12-07 | End: 2018-01-08 | Stop reason: SDUPTHER

## 2017-12-07 ASSESSMENT — ENCOUNTER SYMPTOMS: DYSPNEA ASSOCIATED WITH: EXERTION

## 2017-12-07 NOTE — CARE COORDINATION
to discuss self-management of condition(s):  Pt demonstrates adherence to medications  Pt demonstrates understanding of self-monitoring  Patient is able to identify Red Flags:  Alert to potential adverse drug reactions(s) or side effects and actions to take should they arise  Discuss target symptoms and actions to take should they arise  Identify problems that require immediate PCP or specialist visit  Patient demonstrates understanding of access to PCP/Specialist:  Understands about scheduling routine Follow Up appointments   Understands about sick day appointment options for worsening of symptoms/progression (Same Day, E Visits)            Prior to Admission medications    Medication Sig Start Date End Date Taking? Authorizing Provider   eszopiclone (LUNESTA) 2 MG TABS Take 1 tablet by mouth nightly .  12/7/17 12/7/18 Yes Wade Hernandez MD   PARoxetine (PAXIL) 10 MG tablet Take 1 tablet by mouth every evening 10/20/17  Yes Wade Hernandez MD   loratadine (CLARITIN) 10 MG tablet Take 1 tablet by mouth daily 9/20/17  Yes Wade Hernandez MD   tiotropium (Isidore Lessen) 18 MCG inhalation capsule Inhale 1 capsule into the lungs daily 8/14/17  Yes Wade Hernandez MD   PROLIA 60 MG/ML SOLN SC injection  4/25/17  Yes Historical Provider, MD   albuterol sulfate  (90 BASE) MCG/ACT inhaler Inhale 2 puffs into the lungs every 6 hours as needed for Wheezing or Shortness of Breath 5/12/17  Yes Wade Hernandez MD   Spacer/Aero-Holding Chambers (AEROCHAMBER MV) MISC To be used with inhaler 5/12/17  Yes Wade Hernandez MD   dorzolamide-timolol (COSOPT) 22.3-6.8 MG/ML ophthalmic solution Place 1 drop into both eyes 2 times daily   Yes Historical Provider, MD   Multiple Vitamins-Minerals (OCUVITE EXTRA) TABS Take 1 tablet by mouth daily   Yes Historical Provider, MD   Biotin 2500 MCG CAPS Take 1 capsule by mouth daily   Yes Historical Provider, MD   ALPHAGAN P 0.1 % SOLN Place 1 drop into both

## 2017-12-11 ENCOUNTER — OFFICE VISIT (OUTPATIENT)
Dept: FAMILY MEDICINE CLINIC | Age: 77
End: 2017-12-11
Payer: MEDICARE

## 2017-12-11 VITALS
OXYGEN SATURATION: 99 % | BODY MASS INDEX: 21.68 KG/M2 | WEIGHT: 127 LBS | HEIGHT: 64 IN | SYSTOLIC BLOOD PRESSURE: 133 MMHG | RESPIRATION RATE: 17 BRPM | TEMPERATURE: 97.2 F | HEART RATE: 73 BPM | DIASTOLIC BLOOD PRESSURE: 76 MMHG

## 2017-12-11 DIAGNOSIS — F41.1 GAD (GENERALIZED ANXIETY DISORDER): ICD-10-CM

## 2017-12-11 DIAGNOSIS — Z80.3 FAMILY HISTORY OF BREAST CANCER IN SISTER: ICD-10-CM

## 2017-12-11 DIAGNOSIS — F51.04 PSYCHOPHYSIOLOGICAL INSOMNIA: Primary | ICD-10-CM

## 2017-12-11 DIAGNOSIS — Z12.31 ENCOUNTER FOR SCREENING MAMMOGRAM FOR BREAST CANCER: ICD-10-CM

## 2017-12-11 DIAGNOSIS — R53.82 CHRONIC FATIGUE: ICD-10-CM

## 2017-12-11 DIAGNOSIS — N18.30 CKD (CHRONIC KIDNEY DISEASE) STAGE 3, GFR 30-59 ML/MIN (HCC): ICD-10-CM

## 2017-12-11 DIAGNOSIS — J43.1 PANLOBULAR EMPHYSEMA (HCC): ICD-10-CM

## 2017-12-11 DIAGNOSIS — R91.1 LUNG NODULE, SOLITARY: ICD-10-CM

## 2017-12-11 PROCEDURE — 99214 OFFICE O/P EST MOD 30 MIN: CPT | Performed by: FAMILY MEDICINE

## 2017-12-11 ASSESSMENT — ENCOUNTER SYMPTOMS
DIARRHEA: 0
VOMITING: 0
ABDOMINAL PAIN: 0
COUGH: 0
SHORTNESS OF BREATH: 1
ABDOMINAL DISTENTION: 0
WHEEZING: 0
NAUSEA: 0
CONSTIPATION: 0
CHEST TIGHTNESS: 0

## 2017-12-11 NOTE — PROGRESS NOTES
Chief Complaint   Patient presents with    COPD     f/u    Discuss Labs    Insomnia   `      Anita Harper  here today for follow up on chronic medical problems, go over labs and/or diagnostic studies, and medication refills. COPD (f/u); Discuss Labs; and Insomnia      HPI    Insomnia  Patient has difficulty falling asleep and staying asleep for many years. She currently established with sleep specialist, Dr. Milena Arita and has been doing sleep therapy. She is to change the way she sleeps and to fall asleep and to go to sleep at the same time. Patient was started back on Lunesta 2 mg. She says that with Lunesta 2 mg, she has been waking up 3-4 times/night,  and takes her 1 hr to fall back asleep. Wakes up every night when taking 2 mg  Taking  Ibuprofen to help her relax, but she is afraid she is going to upset her stomach again. Last night she took 3 mg Lunesta and she slept the whole night and she did not wake up at all. Says that it takes her 2 hrs for Lunesta to work. Patient tried multiple sleeping aids in the past, Rozerem, melatonin, trazodone, mirtazapine, but none worked. At one point, I gave her a prescription of Paxil and she says she still has it in the cupboard and she tried some in the past but didn't work. I suggested her to try to take it with dinner, and then to take the Lunesta at the time she takes it usually around 10 PM.  She continues to worry a lot about her sleep and other issues, and she has trouble relaxing. She admits that she never tried the Aromatherapy I suggested her in the past.    COPD:  Reports dyspnea on exertion only with intense exertion otherwise doing fine. Cold weather doesn't make it worse. She refuses to see pulmonologist.  Denies cough, wheezing or chest tightness. She reports compliance with this.   The and she has been using Proair sporadically    CXR 11/15/17  Emphysematous changes within the lungs.       There appears to be a small nodular density over the right upper lung.  This   may be artifactual due to overlapping structures or represent a small lung   nodule.  Dedicated chest CT recommended for further evaluation on a   nonemergent basis.         PFTs On 10/16/17-Moderate to severe emphysema and she refused to see the pulmonologist    \"Flow volume loop showed adequate effort and tracings. Spirometry is  notable for a moderate-to-severe obstructive defect. By ATS criteria,  there is no improvement with bronchodilator therapy. Static lung  volumes revealed moderate-to-severe air trapping and mild  hyperinflation. The DLCO is severely reduced.   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 \"     Fatigue  She admits that she always feels tired. She thinks that because she doesn't sleep well, she is tired. Received Prolia on 11/15/17  Started to go to [facility]  Reports shooting pains in the right hip. She runs on the treadmill. I advised her to do the elliptical and she will let me know if the pain persists. She is afraid about fractures while taking Prolia. -vital signs stable and within normal limits     /76   Pulse 73   Temp 97.2 °F (36.2 °C) (Temporal)   Resp 17   Ht 5' 3.5\" (1.613 m)   Wt 127 lb (57.6 kg)   SpO2 99% Comment: COPD  BMI 22.14 kg/m²   Body mass index is 22.14 kg/m². Wt Readings from Last 3 Encounters:   12/11/17 127 lb (57.6 kg)   10/23/17 127 lb 1.6 oz (57.7 kg)   10/20/17 124 lb 6.4 oz (56.4 kg)           [x]Negative depression screening. PHQ Scores 8/14/2017 5/12/2017 3/10/2017 10/20/2016 6/8/2016   PHQ2 Score 0 0 0 0 2   PHQ9 Score 0 0 4 0 2     Discussed testing with the patient and all questions fully answered. Chronic kidney disease stage 3, slightly worsening from prior . Mild hyperglycemia, no prediabetes. Mild hyperlipidemia, slightly worsening from prior.   Hospital Outpatient Visit on 11/15/2017   Component Date Value Ref Range Status    Calcium 11/15/2017 9.0 8.6 - 10.4 mg/dL Final    Comment: Performed at [facility]: [address] [facility] [address]   [phone]      CREATININE 11/15/2017 1.07* 0.50 - 0.90 mg/dL Final    GFR Non- 11/15/2017 50* >60 mL/min Final    GFR  11/15/2017 >60  >60 mL/min Final    GFR Comment 11/15/2017        Final    Comment: Average GFR for 79or more years old:   76 mL/min/1.73sq m  Chronic Kidney Disease:   <60 mL/min/1.73sq m  Kidney failure:   <15 mL/min/1.73sq m              eGFR calculated using average adult body mass. Additional eGFR calculator   available at:        "[employer].br        Performed at [facility]: [facility] [address]   [phone]      GFR Staging 11/15/2017 NOT REPORTED   Final    Phosphorus 11/15/2017 3.2  2.6 - 4.5 mg/dL Final    Comment: Performed at [facility]: [facility] [address]   [phone]      Magnesium 11/15/2017 1.9  1.6 - 2.6 mg/dL Final    Comment: Performed at [facility]: [facility] [address]  [address]   [phone]         Lab Results   Component Value Date    WBC 8.6 01/09/2017    HGB 14.3 01/09/2017    HCT 42.8 01/09/2017    MCV 90.0 01/09/2017     01/09/2017       Lab Results   Component Value Date     08/09/2017    K 4.4 08/09/2017     08/09/2017    CO2 26 08/09/2017    BUN 20 08/09/2017    CREATININE 1.07 11/15/2017    GLUCOSE 111 08/09/2017    GLUCOSE 104 02/27/2012    CALCIUM 9.0 11/15/2017      Lab Results   Component Value Date    LABA1C 5.5 10/11/2017     Lab Results   Component Value Date     10/11/2017       Lab Results   Component Value Date    ALT 13 08/09/2017    AST 16 08/09/2017    ALKPHOS 70 08/09/2017    BILITOT 0.39 08/09/2017       Lab Results   Component Value Date    TSH 1.36 04/13/2017       Lab Results   Component Value Date    CHOL 207 (H) 08/09/2017 CHOL 154 06/10/2016    CHOL 204 (H) 03/31/2015     Lab Results   Component Value Date    TRIG 84 08/09/2017    TRIG 71 06/10/2016    TRIG 93 03/31/2015     Lab Results   Component Value Date    HDL 60 08/09/2017    HDL 56 06/10/2016    HDL 63 03/31/2015     Lab Results   Component Value Date    LDLCHOLESTEROL 130 08/09/2017    LDLCHOLESTEROL 84 06/10/2016    LDLCHOLESTEROL 122 03/31/2015       Lab Results   Component Value Date    CHOLHDLRATIO 3.5 08/09/2017    CHOLHDLRATIO 2.8 06/10/2016    CHOLHDLRATIO 3.2 03/31/2015       Lab Results   Component Value Date    LABA1C 5.5 10/11/2017       Lab Results   Component Value Date    ZIULQYZQ12 427 08/09/2017       Lab Results   Component Value Date    FOLATE 14.3 08/09/2017       No results found for: IRON, TIBC, FERRITIN    Lab Results   Component Value Date    VITD25 49.0 08/09/2017             Current Outpatient Prescriptions   Medication Sig Dispense Refill    eszopiclone (LUNESTA) 2 MG TABS Take 1 tablet by mouth nightly .  30 tablet 0    PARoxetine (PAXIL) 10 MG tablet Take 1 tablet by mouth every evening 30 tablet 0    loratadine (CLARITIN) 10 MG tablet Take 1 tablet by mouth daily 90 tablet 3    tiotropium (SPIRIVA HANDIHALER) 18 MCG inhalation capsule Inhale 1 capsule into the lungs daily 90 capsule 3    PROLIA 60 MG/ML SOLN SC injection   0    albuterol sulfate  (90 BASE) MCG/ACT inhaler Inhale 2 puffs into the lungs every 6 hours as needed for Wheezing or Shortness of Breath 18 g 3    Spacer/Aero-Holding Chambers (AEROCHAMBER MV) MISC To be used with inhaler 1 each 0    dorzolamide-timolol (COSOPT) 22.3-6.8 MG/ML ophthalmic solution Place 1 drop into both eyes 2 times daily      Multiple Vitamins-Minerals (OCUVITE EXTRA) TABS Take 1 tablet by mouth daily      Biotin 2500 MCG CAPS Take 1 capsule by mouth daily      ALPHAGAN P 0.1 % SOLN Place 1 drop into both eyes 3 times daily       latanoprost (XALATAN) 0.005 % ophthalmic solution Place 1 drop into both eyes nightly       SENSIPAR 30 MG tablet Take 30 mg by mouth daily   0     No current facility-administered medications for this visit. Social History     Social History    Marital status:      Spouse name: N/A    Number of children: N/A    Years of education: N/A     Occupational History    Not on file. Social History Main Topics    Smoking status: Former Smoker     Packs/day: 1.00     Years: 30.00     Types: Cigarettes     Start date: 6/1/1960     Quit date: 6/20/2000    Smokeless tobacco: Never Used      Comment: still uses OTC nicorette gum    Alcohol use 0.0 oz/week      Comment: occassional    Drug use: No    Sexual activity: Not on file     Other Topics Concern    Not on file     Social History Narrative    No narrative on file     Counseling given: Yes        Family History   Problem Relation Age of Onset    Other Father      COPD    Stroke Sister     Heart Attack Sister     Breast Cancer Sister     Cancer Maternal Grandmother      Stomach cancer    Osteoporosis Mother     Other Sister      suicide             -rest of complaints with corresponding details per ROS    The patient's past medical, surgical, social, and family history as well as her current medications and allergies were reviewed as documented in today's encounter. Review of Systems   Constitutional: Positive for fatigue. Negative for activity change, appetite change, chills, diaphoresis, fever and unexpected weight change. Eyes: Positive for visual disturbance (wears glasses). Respiratory: Positive for shortness of breath (dyspnea on exertion with intense exertion). Negative for cough, chest tightness and wheezing. Cardiovascular: Negative for chest pain, palpitations and leg swelling. Gastrointestinal: Negative for abdominal distention, abdominal pain, constipation, diarrhea, nausea and vomiting.    Endocrine: Negative for cold intolerance, heat intolerance, polydipsia, polyphagia and polyuria. Genitourinary: Negative for difficulty urinating. Denies urinary incontinence   Musculoskeletal: Positive for arthralgias (right hip and right SIJ). Psychiatric/Behavioral: Positive for sleep disturbance. Negative for dysphoric mood, self-injury and suicidal ideas. The patient is nervous/anxious. Physical Exam   Constitutional: She is oriented to person, place, and time. She appears well-developed and well-nourished. No distress. HENT:   Head: Normocephalic and atraumatic. Mouth/Throat: Oropharynx is clear and moist. No oropharyngeal exudate. Eyes: Conjunctivae and EOM are normal. Right eye exhibits no discharge. Left eye exhibits no discharge. No scleral icterus. Neck: Normal range of motion. Neck supple. No thyromegaly present. Cardiovascular: Normal rate, regular rhythm, normal heart sounds and intact distal pulses. Pulmonary/Chest: Effort normal and breath sounds normal. No respiratory distress. She has no wheezes. She has no rales. She exhibits no tenderness. Abdominal: Soft. Bowel sounds are normal. She exhibits no distension. There is no tenderness. Musculoskeletal: Normal range of motion. She exhibits tenderness. She exhibits no edema. Lumbar back: She exhibits tenderness (Right SI joint tenderness) and bony tenderness. She exhibits normal range of motion, no pain and no spasm. Up and go test less than 12 seconds. Neurological: She is alert and oriented to person, place, and time. No cranial nerve deficit. She exhibits normal muscle tone. Skin: Skin is warm and dry. No rash noted. She is not diaphoretic. Psychiatric: Her behavior is normal. Judgment and thought content normal.   Nursing note and vitals reviewed. ASSESSMENT AND PLAN      1. Psychophysiological insomnia  Continue Lunesta 2 mg    - TSH without Reflex;  Future  Advised to start Paxil 10 mg with Dinner at 6 PM, and then take Lunesta 2 mg at 10 PM   Continue with cognitive behavior therapy with sleep specialist  Patient will have blood work for hyperparathyroidism per Dr. Nba Cao soon  Start using aromatherapy, chamomile, lavender oil, Vanilla oil  Avoid ibuprofen at bedtime due to prior GI problems    2. 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 Chronic fatigue  Multifactorial  - CBC; Future  - Comprehensive Metabolic Panel; Future  - TSH without Reflex; Future  Improve sleep  Avoid worsening CKD stage III  Improve anxiety  Has hyperparathyroidism, pending blood work per Dr. Christiano Garcia for COPD    4. DINORA (generalized anxiety disorder)  Restart Paxil  - TSH without Reflex; Future    5. CKD (chronic kidney disease) stage 3, GFR 30-59 ml/min  Low salt diet, avoidance of NSAIDs and dehydration, good BP control discussed  - CBC; Future  - Comprehensive Metabolic Panel; Future  - CT Chest WO Contrast; Future-due to CKD stage III  - TSH without Reflex; Future    6. Lung nodule, solitary  - CT Chest WO Contrast; Future    7. Family history of breast cancer in sister  - BRIDGET DIGITAL SCREEN W OR WO CAD BILATERAL; Future    8. Encounter for screening mammogram for breast cancer  - BRIDGET DIGITAL SCREEN W OR WO CAD BILATERAL;  Future    Advised to avoid the treadmill and to start using the elliptical.  She will call me if the right hip pain persists      Orders Placed This Encounter   Procedures    BRIDGET DIGITAL SCREEN W OR WO CAD BILATERAL     Standing Status:   Future     Standing Expiration Date:   2/11/2019    CT Chest WO Contrast     Standing Status:   Future     Standing Expiration Date:   12/11/2018     Order Specific Question:   Reason for exam:     Answer:   CKD    CBC     Standing Status:   Future     Standing Expiration Date:   12/11/2018    Comprehensive Metabolic Panel     Standing Status:   Future     Standing Expiration Date:   12/11/2018    TSH without Reflex     Standing Status:   Future     Standing Expiration Date:   12/12/2018         There are no discontinued medications. Ap received counseling on the following healthy behaviors: nutrition, exercise and medication adherence  Reviewed prior labs and health maintenance  Continue current medications, diet and exercise. Discussed use, benefit, and side effects of prescribed medications. Barriers to medication compliance addressed. Patient given educational materials - see patient instructions  Was a self-tracking handout given in paper form or via [employer]? Yes    Requested Prescriptions      No prescriptions requested or ordered in this encounter       All patient questions answered. Patient voiced understanding. Quality Measures    Body mass index is 22.14 kg/m². Normal. Weight control planned discussed Healthy diet and regular exercise. BP: 133/76. Blood pressure is normal. Treatment plan consists of No treatment change needed. Fall Risk 10/20/2017 10/20/2016 5/11/2015   2 or more falls in past year? no no no   Fall with injury in past year? no no no     The patient does not have a history of falls. I did , complete a risk assessment for falls. A plan of care for falls in-office gait and balance testing performed using The Timed Up and Go Test was negative for increased falls risk- no further intervention is currently indicated, No Treatment plan indicated      Hyperlipidemia, continue low fat diet, no statin     Lab Results   Component Value Date    LDLCHOLESTEROL 130 08/09/2017    (goal LDL reduction with dx if diabetes is 50% LDL reduction)      Negative depression screening.    PHQ Scores 8/14/2017 5/12/2017 3/10/2017 10/20/2016 6/8/2016   PHQ2 Score 0 0 0 0 2   PHQ9 Score 0 0 4 0 2     Interpretation of Total Score Depression Severity: 1-4 = Minimal depression, 5-9 = Mild depression, 10-14 = Moderate depression, 15-19 = Moderately severe depression, 20-27 = Severe depression        The patient's past medical, surgical, social, and family history as well as her   current medications and allergies were reviewed as documented in today's encounter. Medications, labs, diagnostic studies, consultations and follow-up as documented in this encounter. Return in about 3 months (around 3/11/2018) for anxiety, insomnia. Patient was seen with total face to face time of  25 minutes. More than 50% of this visit was counseling and education. Future Appointments  Date Time Provider Shirley Dunne   3/12/2018 1:45 PM Inna George MD [id number]   5/9/2018 10:00 AM [facility] SHORT STAY INFUSION CHAIR 01 [facility] SS None   10/25/2018 1:00 PM Krysta Tsai MD [facility]     This note was completed by using the assistance of a speech-recognition program. However, inadvertent computerized transcription errors may be present. Although every effort was made to ensure accuracy, no guarantees can be provided that every mistake has been identified and corrected by editing.   Electronically signed by Inna George MD on 12/11/2017  7:09 PM

## 2017-12-18 ENCOUNTER — HOSPITAL ENCOUNTER (OUTPATIENT)
Dept: SLEEP CENTER | Age: 77
Discharge: HOME | End: 2017-12-18
Payer: MEDICARE

## 2017-12-18 PROCEDURE — 90834 PSYTX W PT 45 MINUTES: CPT

## 2017-12-19 NOTE — PROGRESS NOTES
Patient seen for 60 minute session of individual psychotherapy.       Diagnosis:   F51.01    Chel Parnell, Ph.D  Licensed Clinical Psychologist  Diplomate, American Board of Sleep Medicine

## 2018-01-03 ENCOUNTER — HOSPITAL ENCOUNTER (OUTPATIENT)
Dept: SLEEP CENTER | Age: 78
Discharge: HOME | End: 2018-01-03
Payer: MEDICARE

## 2018-01-03 PROCEDURE — 90834 PSYTX W PT 45 MINUTES: CPT

## 2018-01-04 ENCOUNTER — OFFICE VISIT (OUTPATIENT)
Dept: FAMILY MEDICINE CLINIC | Age: 78
End: 2018-01-04
Payer: MEDICARE

## 2018-01-04 ENCOUNTER — CARE COORDINATION (OUTPATIENT)
Dept: CARE COORDINATION | Age: 78
End: 2018-01-04

## 2018-01-04 VITALS
HEART RATE: 78 BPM | OXYGEN SATURATION: 98 % | HEIGHT: 63 IN | BODY MASS INDEX: 22.86 KG/M2 | SYSTOLIC BLOOD PRESSURE: 126 MMHG | WEIGHT: 129 LBS | TEMPERATURE: 98.2 F | DIASTOLIC BLOOD PRESSURE: 78 MMHG

## 2018-01-04 DIAGNOSIS — Z13.31 POSITIVE DEPRESSION SCREENING: ICD-10-CM

## 2018-01-04 DIAGNOSIS — J43.1 PANLOBULAR EMPHYSEMA (HCC): ICD-10-CM

## 2018-01-04 DIAGNOSIS — R00.0 TACHYCARDIA: ICD-10-CM

## 2018-01-04 DIAGNOSIS — F33.41 MAJOR DEPRESSIVE DISORDER, RECURRENT, IN PARTIAL REMISSION (HCC): ICD-10-CM

## 2018-01-04 DIAGNOSIS — E21.3 HYPERPARATHYROIDISM (HCC): ICD-10-CM

## 2018-01-04 DIAGNOSIS — F51.04 PSYCHOPHYSIOLOGICAL INSOMNIA: Primary | ICD-10-CM

## 2018-01-04 DIAGNOSIS — J01.10 ACUTE NON-RECURRENT FRONTAL SINUSITIS: ICD-10-CM

## 2018-01-04 PROCEDURE — G0444 DEPRESSION SCREEN ANNUAL: HCPCS | Performed by: FAMILY MEDICINE

## 2018-01-04 PROCEDURE — 99214 OFFICE O/P EST MOD 30 MIN: CPT | Performed by: FAMILY MEDICINE

## 2018-01-04 PROCEDURE — G8431 POS CLIN DEPRES SCRN F/U DOC: HCPCS | Performed by: FAMILY MEDICINE

## 2018-01-04 RX ORDER — SODIUM CHLORIDE/ALOE VERA
GEL (GRAM) NASAL PRN
Qty: 1 TUBE | Refills: 3 | Status: SHIPPED | OUTPATIENT
Start: 2018-01-04 | End: 2018-10-23

## 2018-01-04 RX ORDER — FLUTICASONE PROPIONATE 50 MCG
2 SPRAY, SUSPENSION (ML) NASAL DAILY
Qty: 1 BOTTLE | Refills: 3 | Status: SHIPPED | OUTPATIENT
Start: 2018-01-04 | End: 2018-03-12 | Stop reason: ALTCHOICE

## 2018-01-04 ASSESSMENT — PATIENT HEALTH QUESTIONNAIRE - PHQ9
4. FEELING TIRED OR HAVING LITTLE ENERGY: 1
5. POOR APPETITE OR OVEREATING: 1
9. THOUGHTS THAT YOU WOULD BE BETTER OFF DEAD, OR OF HURTING YOURSELF: 0
1. LITTLE INTEREST OR PLEASURE IN DOING THINGS: 1
SUM OF ALL RESPONSES TO PHQ9 QUESTIONS 1 & 2: 1
7. TROUBLE CONCENTRATING ON THINGS, SUCH AS READING THE NEWSPAPER OR WATCHING TELEVISION: 0
SUM OF ALL RESPONSES TO PHQ QUESTIONS 1-9: 6
2. FEELING DOWN, DEPRESSED OR HOPELESS: 0
3. TROUBLE FALLING OR STAYING ASLEEP: 3
10. IF YOU CHECKED OFF ANY PROBLEMS, HOW DIFFICULT HAVE THESE PROBLEMS MADE IT FOR YOU TO DO YOUR WORK, TAKE CARE OF THINGS AT HOME, OR GET ALONG WITH OTHER PEOPLE: 0
6. FEELING BAD ABOUT YOURSELF - OR THAT YOU ARE A FAILURE OR HAVE LET YOURSELF OR YOUR FAMILY DOWN: 0
8. MOVING OR SPEAKING SO SLOWLY THAT OTHER PEOPLE COULD HAVE NOTICED. OR THE OPPOSITE, BEING SO FIGETY OR RESTLESS THAT YOU HAVE BEEN MOVING AROUND A LOT MORE THAN USUAL: 0

## 2018-01-04 ASSESSMENT — ENCOUNTER SYMPTOMS
RHINORRHEA: 1
ABDOMINAL PAIN: 0
SINUS PAIN: 1
DYSPNEA ASSOCIATED WITH: EXERTION
SINUS PRESSURE: 1
ABDOMINAL DISTENTION: 0
COUGH: 0
DIARRHEA: 0
VOMITING: 0
SHORTNESS OF BREATH: 1
WHEEZING: 0
CONSTIPATION: 0
NAUSEA: 0
CHEST TIGHTNESS: 0

## 2018-01-04 NOTE — PATIENT INSTRUCTIONS
hot, wet towel or a warm gel pack on your face 3 or 4 times a day for 5 to 10 minutes each time. · Try a decongestant nasal spray like oxymetazoline (Afrin). Do not use it for more than 3 days in a row. Using it for more than 3 days can make your congestion worse. When should you call for help? Call your doctor now or seek immediate medical care if:  ? · You have new or worse swelling or redness in your face or around your eyes. ? · You have a new or higher fever. ? Watch closely for changes in your health, and be sure to contact your doctor if:  ? · You have new or worse facial pain. ? · The mucus from your nose becomes thicker (like pus) or has new blood in it. ? · You are not getting better as expected. Where can you learn more? Go to https://[employer]Lyxia. org and sign in to your [employer] account. Enter G334 in the [employer] retargeting box to learn more about \"Sinusitis: Care Instructions. \"     If you do not have an account, please click on the \"Sign Up Now\" link. Current as of: May 12, 2017  Content Version: 11.5  © 7657-1395 [employer]wise, Inc[employer]. Care instructions adapted under license by [facility] ([facility]). If you have questions about a medical condition or this instruction, always ask your healthcare professional. [address]  any warranty or liability for your use of this information.

## 2018-01-04 NOTE — PROGRESS NOTES
Chief Complaint   Patient presents with    Insomnia     talk about doseage of the lunesta and paxil    Sinusitis     for the last week         Patient presents today for an acute visit secondary to Insomnia (talk about doseage of the lunesta and paxil) and Sinusitis (for the last week)   . HPI   Here with Care coordinator, Migdalia Araiza RN     Insomnia is improved since on both Paxil and Lunesta. Sleeps 7-8 hrs/night  However, says that she feels drowsy in the morning and sometimes during daytime. Feels it is too much Paxil, so she cut down the dose, currently taking 5 mg, but continued to take Lunesta 2 mg. Not sure if she could cut down on Lunesta as she still feels drowsy in the mornings. She did work with Dr. Raman Mario for insomnia on cognitive behavioral therapy and feels that it helped her. Feels that anxiety is better. Says she had no energy while visiting relatives since started on paxil. Depression  Marrilee reports anhedonia, fatigue and insomnia. 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 Reports sinus infection for about 1 week, currently getting better. Reports associated epistaxis, clear rhinorrhea, frontal headache, postnasal drip   Has nety pot, but didn't use it. COPD  Reports dyspnea on exertion only when climbing up the stairs and mild dry cough. Denies chest tightness, wheezing or chest pain  CT chest w/o contrast ordered, but not done yet. PFTs done on 10/16/17: moderate to severe COPD/Emphysema.     Hyperparathyroidism   On Sensipar, tolerates it well  Will have labs in March per Dr. Severo Corning  PTH is getting better    PTH was 114.7 on 10/11/17  PTH was Value Date    WBC 8.6 01/09/2017    HGB 14.3 01/09/2017    HCT 42.8 01/09/2017    MCV 90.0 01/09/2017     01/09/2017       Lab Results   Component Value Date     08/09/2017    K 4.4 08/09/2017     08/09/2017    CO2 26 08/09/2017    BUN 20 08/09/2017    CREATININE 1.07 11/15/2017    GLUCOSE 111 08/09/2017    GLUCOSE 104 02/27/2012    CALCIUM 9.0 11/15/2017        Lab Results   Component Value Date    ALT 13 08/09/2017    AST 16 08/09/2017    ALKPHOS 70 08/09/2017    BILITOT 0.39 08/09/2017       Lab Results   Component Value Date    TSH 1.36 04/13/2017       Lab Results   Component Value Date    CHOL 207 (H) 08/09/2017    CHOL 154 06/10/2016    CHOL 204 (H) 03/31/2015     Lab Results   Component Value Date    TRIG 84 08/09/2017    TRIG 71 06/10/2016    TRIG 93 03/31/2015     Lab Results   Component Value Date    HDL 60 08/09/2017    HDL 56 06/10/2016    HDL 63 03/31/2015     Lab Results   Component Value Date    LDLCHOLESTEROL 130 08/09/2017    LDLCHOLESTEROL 84 06/10/2016    LDLCHOLESTEROL 122 03/31/2015       Lab Results   Component Value Date    CHOLHDLRATIO 3.5 08/09/2017    CHOLHDLRATIO 2.8 06/10/2016    CHOLHDLRATIO 3.2 03/31/2015       Lab Results   Component Value Date    LABA1C 5.5 10/11/2017       Lab Results   Component Value Date    TXYKUVWM68 427 08/09/2017       Lab Results   Component Value Date    FOLATE 14.3 08/09/2017       No results found for: IRON, TIBC, FERRITIN    Lab Results   Component Value Date    VITD25 49.0 08/09/2017         The patient's past medical, surgical, social, and family history as well as her current medications and allergies were reviewed as documented in today's encounter. Rest of complaints with corresponding details per ROS. Review of Systems   Constitutional: Positive for fatigue. Negative for activity change, appetite change, chills, diaphoresis, fever and unexpected weight change.    HENT: Positive for congestion, nosebleeds, postnasal drip, rhinorrhea, sinus pain and sinus pressure (frontal b/l). Negative for sore throat and trouble swallowing. Respiratory: Positive for shortness of breath (OGDEN). Negative for cough, chest tightness and wheezing. Cardiovascular: Negative for chest pain, palpitations and leg swelling. Gastrointestinal: Negative for abdominal distention, abdominal pain, constipation, diarrhea, nausea and vomiting. Endocrine: Negative for cold intolerance, heat intolerance, polydipsia, polyphagia and polyuria. Psychiatric/Behavioral: Positive for dysphoric mood and sleep disturbance. Negative for self-injury and suicidal ideas. The patient is nervous/anxious. Physical Exam   Constitutional: She is oriented to person, place, and time. She appears well-developed and well-nourished. No distress. HENT:   Head: Normocephalic and atraumatic. Nose: Mucosal edema present. No rhinorrhea. Right sinus exhibits frontal sinus tenderness (mild). Left sinus exhibits frontal sinus tenderness (mild). Mouth/Throat: Posterior oropharyngeal erythema present. No oropharyngeal exudate. Eyes: Conjunctivae and EOM are normal. Right eye exhibits no discharge. Left eye exhibits no discharge. No scleral icterus. Neck: Normal range of motion. Neck supple. No thyromegaly present. Cardiovascular: Normal rate, regular rhythm, normal heart sounds and intact distal pulses. Pulmonary/Chest: Effort normal and breath sounds normal. No respiratory distress. She has no wheezes. She has no rales. She exhibits no tenderness. Abdominal: Soft. Bowel sounds are normal. She exhibits no distension. There is no tenderness. Musculoskeletal: Normal range of motion. She exhibits no edema or tenderness. Neurological: She is alert and oriented to person, place, and time. No cranial nerve deficit. She exhibits normal muscle tone. Skin: Skin is warm and dry. No rash noted. She is not diaphoretic.    Psychiatric: Her behavior is normal. Judgment speech-recognition program. However, inadvertent computerized transcription errors may be present. Although every effort was made to ensure accuracy, no guarantees can be provided that every mistake has been identified and corrected by editing.     Electronically signed by Angelito Pickens MD on 1/6/2018 at 9:56 PM

## 2018-01-04 NOTE — PROGRESS NOTES
Pt is here today for a discussion of medications. Pt states that the medications doseages are not right for paxil and lunesta. Visit Information    Have you changed or started any medications since your last visit including any over-the-counter medicines, vitamins, or herbal medicines? no   Are you having any side effects from any of your medications? -  no  Have you stopped taking any of your medications? Is so, why? -  no    Have you seen any other physician or provider since your last visit? Yes - Records Obtained  Have you had any other diagnostic tests since your last visit? No  Have you been seen in the emergency room and/or had an admission to a hospital since we last saw you? No  Have you had your routine dental cleaning in the past 6 months? no    Have you activated your [employer] account? If not, what are your barriers?  Yes     Patient Care Team:  Jhoana Luo MD as PCP - Sabrina Ellis MD as PCP - [facility] Attributed Provider  Tripp Monreal MD as Surgeon (General Surgery)  Shanice Soares MD as Surgeon (Ophthalmology)  Berhane Navarro MD as Consulting Physician (Gastroenterology)  Mago Saavedra MD as Consulting Physician (Endocrinology)  Nancie Green MD as Surgeon (Ophthalmology)  Odalys Martinez MD as Consulting Physician (Internal Medicine Cardiovascular Disease)  Gael Burton MD as Consulting Physician (Cardiology)  Pablo Poole RN as Care Coordinator  Jonathan Dos Santos, PhD (Sleep Medicine)    Medical History Review  Past Medical, Family, and Social History reviewed and does contribute to the patient presenting condition    Health Maintenance   Topic Date Due    Potassium monitoring  1940    Creatinine monitoring  1940    Pneumococcal low/med risk (2 of 2 - PPSV23) 01/12/2018    Breast cancer screen  10/26/2018    Colon cancer screen colonoscopy  06/13/2019    Lipid screen  08/09/2022    DTaP/Tdap/Td vaccine (2 - Td) 01/09/2027    Zostavax vaccine Completed    DEXA (modify frequency per FRAX score)  Completed    Flu vaccine  Completed

## 2018-01-06 PROBLEM — J01.10 ACUTE NON-RECURRENT FRONTAL SINUSITIS: Status: ACTIVE | Noted: 2018-01-06

## 2018-01-06 PROBLEM — F33.41 MAJOR DEPRESSIVE DISORDER, RECURRENT, IN PARTIAL REMISSION (HCC): Status: ACTIVE | Noted: 2018-01-06

## 2018-01-06 ASSESSMENT — ENCOUNTER SYMPTOMS
TROUBLE SWALLOWING: 0
SORE THROAT: 0

## 2018-01-08 ENCOUNTER — CARE COORDINATION (OUTPATIENT)
Dept: CARE COORDINATION | Age: 78
End: 2018-01-08

## 2018-01-08 ENCOUNTER — TELEPHONE (OUTPATIENT)
Dept: FAMILY MEDICINE CLINIC | Age: 78
End: 2018-01-08

## 2018-01-08 DIAGNOSIS — F51.04 PSYCHOPHYSIOLOGICAL INSOMNIA: ICD-10-CM

## 2018-01-08 DIAGNOSIS — F41.1 GAD (GENERALIZED ANXIETY DISORDER): ICD-10-CM

## 2018-01-08 RX ORDER — ESZOPICLONE 2 MG/1
2 TABLET, FILM COATED ORAL NIGHTLY
Qty: 90 TABLET | Refills: 0 | Status: SHIPPED | OUTPATIENT
Start: 2018-01-08 | End: 2018-03-12 | Stop reason: DRUGHIGH

## 2018-01-08 RX ORDER — PAROXETINE 10 MG/1
10 TABLET, FILM COATED ORAL EVERY EVENING
Qty: 90 TABLET | Refills: 3 | Status: SHIPPED | OUTPATIENT
Start: 2018-01-08 | End: 2018-03-05 | Stop reason: SINTOL

## 2018-01-08 NOTE — TELEPHONE ENCOUNTER
Orders Placed This Encounter   Medications    eszopiclone (LUNESTA) 2 MG TABS     Sig: Take 1 tablet by mouth nightly for 90 days. Dispense:  90 tablet     Refill:  0    PARoxetine (PAXIL) 10 MG tablet     Sig: Take 1 tablet by mouth every evening     Dispense:  90 tablet     Refill:  3       Controlled Substances Monitoring:     Attestation: The Prescription Monitoring Report for this patient was reviewed today. Jovany Abraham MD)  Documentation: No signs of potential drug abuse or diversion identified. Jovany Abraham MD)  Medication Contracts: Existing medication contract. Jovany Abraham MD)        FYI     Patient calls requesting PCP send RX to her pharmacy for Lunesta 2 mg and Paxil 10 mg. We discussed patient's appointment from last week. Patient states she tried taking Paxil 10mg only for two nights and got no sleep. States she took 1 mg Lunesta  and 10 mg Paxil last night and slept well. States she feels less drowsy today than she had previously. Informed patient I would discuss with PCP and call her back.   Unsigned   Documentation

## 2018-01-08 NOTE — TELEPHONE ENCOUNTER
I agree with the Care Coordinator's Plan of Care  I will send the prescriptions in a separate encounter and I will my chart her a message

## 2018-01-10 ENCOUNTER — TELEPHONE (OUTPATIENT)
Dept: FAMILY MEDICINE CLINIC | Age: 78
End: 2018-01-10

## 2018-01-12 ENCOUNTER — TELEPHONE (OUTPATIENT)
Dept: FAMILY MEDICINE CLINIC | Age: 78
End: 2018-01-12

## 2018-01-17 ENCOUNTER — HOSPITAL ENCOUNTER (OUTPATIENT)
Dept: WOMENS IMAGING | Age: 78
Discharge: HOME | End: 2018-01-17
Payer: MEDICARE

## 2018-01-17 DIAGNOSIS — Z12.31 ENCOUNTER FOR SCREENING MAMMOGRAM FOR BREAST CANCER: ICD-10-CM

## 2018-01-17 DIAGNOSIS — Z80.3 FAMILY HISTORY OF BREAST CANCER IN SISTER: ICD-10-CM

## 2018-01-17 PROCEDURE — 77063 BREAST TOMOSYNTHESIS BI: CPT

## 2018-01-23 ENCOUNTER — TELEPHONE (OUTPATIENT)
Dept: FAMILY MEDICINE CLINIC | Age: 78
End: 2018-01-23

## 2018-03-04 ENCOUNTER — PATIENT MESSAGE (OUTPATIENT)
Dept: FAMILY MEDICINE CLINIC | Age: 78
End: 2018-03-04

## 2018-03-05 ENCOUNTER — CARE COORDINATION (OUTPATIENT)
Dept: CARE COORDINATION | Age: 78
End: 2018-03-05

## 2018-03-05 ASSESSMENT — ENCOUNTER SYMPTOMS: DYSPNEA ASSOCIATED WITH: EXERTION

## 2018-03-05 NOTE — CARE COORDINATION
Ambulatory Care Coordination Note  3/5/2018  CM Risk Score: 3  Deepika Mortality Risk Score: 13.15    ACC: Sandra Gan RN    Summary Note: Ap is a 68 y. o. female. PMH includes COPD, GERD, recurrent UTI, Tachycardia, History of PVCs, and Fall history.     Patient denies any new complaints related to COPD. States she has discontinued paroxetine as it made her feel like a \"Zombie. \"  Patient states she now has trouble sleeping but does not feel drowsy during the day. COPD Assessment    Does the patient understand envrionmental exposure?:  Yes  Is the patient able to verbalize Rescue vs. Long Acting medications?:  Yes  Does the patient have a nebulizer?:  No  Does the patient use a space with inhaled medications?:  Yes            Symptoms:   None:  Yes      Symptom course:  stable  Breathlessness:  exertion  Increase use of rapid acting/rescue inhaled medications?:  No  Change in chronic cough?:  No/At Baseline  Change in sputum?:  No/At Baseline  Sputum characteristics:  White  Have you had a recent diagnosis of pneumonia either by PCP or at a hospital?:  No         Care Coordination Interventions    Program Enrollment:  Rising Risk  Referral from Primary Care Provider:  No  Suggested Interventions and Community Resources  Other Services:  Completed (Comment: [facility])  [facility] Management Tools:  Completed (Comment: COPD)         Goals Addressed             Most Recent     Care Coordination Self Management   No change (3/5/2018)             CC Self Management Goal  Patient Goal (What steps will patient take to achieve goal? )  To overcome insomnia as directed by PCP.   Patient is able to discuss self-management of condition(s):  Pt demonstrates adherence to medications  Pt demonstrates understanding of self-monitoring  Patient is able to identify Red Flags:  Alert to potential adverse drug reactions(s) or side effects and actions to take should they arise  Discuss target symptoms and actions to take

## 2018-03-05 NOTE — TELEPHONE ENCOUNTER
From: Suzan Aguilera  To: Teresa Castellanos MD  Sent: 3/4/2018 4:41 PM EST  Subject: Non-Urgent Medical Question    Just wanted to let you know I quit taking Paxil the 1st week of February, because it turned me into a zombie. I'm still having sleep problems because the lunesta from [facility] is very ineffective. When I got it from [employer] it was a different color and worked better. I have cut the no. 2 pill in half and am taking that with a whole pill plus a glass of red wine which is the only way I get any sleep. It bothers me that I have to resort to this, because I'm not allowed to have the no. 3. Maybe the regular lunesta and not the generic would work better. I will not be taking any more antidepressents because they don't work and I'm not depressed. Will discuss this more fully with you at my appointment.

## 2018-03-12 ENCOUNTER — HOSPITAL ENCOUNTER (OUTPATIENT)
Age: 78
Discharge: HOME | End: 2018-03-12
Payer: MEDICARE

## 2018-03-12 ENCOUNTER — OFFICE VISIT (OUTPATIENT)
Dept: FAMILY MEDICINE CLINIC | Age: 78
End: 2018-03-12
Payer: MEDICARE

## 2018-03-12 VITALS
WEIGHT: 129.8 LBS | BODY MASS INDEX: 22.16 KG/M2 | HEART RATE: 96 BPM | TEMPERATURE: 97.9 F | SYSTOLIC BLOOD PRESSURE: 136 MMHG | DIASTOLIC BLOOD PRESSURE: 80 MMHG | OXYGEN SATURATION: 99 % | HEIGHT: 64 IN

## 2018-03-12 DIAGNOSIS — Z23 NEED FOR PROPHYLACTIC VACCINATION AND INOCULATION AGAINST VARICELLA: ICD-10-CM

## 2018-03-12 DIAGNOSIS — F51.04 PSYCHOPHYSIOLOGICAL INSOMNIA: Primary | ICD-10-CM

## 2018-03-12 DIAGNOSIS — J43.1 PANLOBULAR EMPHYSEMA (HCC): ICD-10-CM

## 2018-03-12 DIAGNOSIS — R73.9 HYPERGLYCEMIA: ICD-10-CM

## 2018-03-12 DIAGNOSIS — N18.30 CKD (CHRONIC KIDNEY DISEASE) STAGE 3, GFR 30-59 ML/MIN (HCC): ICD-10-CM

## 2018-03-12 DIAGNOSIS — R53.82 CHRONIC FATIGUE: ICD-10-CM

## 2018-03-12 DIAGNOSIS — F51.04 PSYCHOPHYSIOLOGICAL INSOMNIA: ICD-10-CM

## 2018-03-12 DIAGNOSIS — E21.3 HYPERPARATHYROIDISM (HCC): ICD-10-CM

## 2018-03-12 DIAGNOSIS — F41.1 GAD (GENERALIZED ANXIETY DISORDER): ICD-10-CM

## 2018-03-12 LAB
ALBUMIN SERPL-MCNC: 4.1 G/DL (ref 3.5–5.2)
ALBUMIN/GLOBULIN RATIO: ABNORMAL (ref 1–2.5)
ALP BLD-CCNC: 90 U/L (ref 35–104)
ALT SERPL-CCNC: 12 U/L (ref 5–33)
ANION GAP SERPL CALCULATED.3IONS-SCNC: 12 MMOL/L (ref 9–17)
AST SERPL-CCNC: 18 U/L
BILIRUB SERPL-MCNC: 0.43 MG/DL (ref 0.3–1.2)
BUN BLDV-MCNC: 28 MG/DL (ref 8–23)
BUN/CREAT BLD: ABNORMAL (ref 9–20)
CALCIUM SERPL-MCNC: 10 MG/DL (ref 8.6–10.4)
CALCIUM SERPL-MCNC: 10.1 MG/DL (ref 8.6–10.4)
CHLORIDE BLD-SCNC: 104 MMOL/L (ref 98–107)
CO2: 26 MMOL/L (ref 20–31)
CREAT SERPL-MCNC: 0.92 MG/DL (ref 0.5–0.9)
ESTIMATED AVERAGE GLUCOSE: 111 MG/DL
GFR AFRICAN AMERICAN: >60 ML/MIN
GFR NON-AFRICAN AMERICAN: 59 ML/MIN
GFR SERPL CREATININE-BSD FRML MDRD: ABNORMAL ML/MIN/{1.73_M2}
GFR SERPL CREATININE-BSD FRML MDRD: ABNORMAL ML/MIN/{1.73_M2}
GLUCOSE BLD-MCNC: 98 MG/DL (ref 70–99)
HBA1C MFR BLD: 5.5 % (ref 4–6)
HCT VFR BLD CALC: 41.4 % (ref 36–46)
HEMOGLOBIN: 13.6 G/DL (ref 12–16)
MAGNESIUM: 2 MG/DL (ref 1.6–2.6)
MCH RBC QN AUTO: 29.9 PG (ref 26–34)
MCHC RBC AUTO-ENTMCNC: 32.9 G/DL (ref 31–37)
MCV RBC AUTO: 90.9 FL (ref 80–100)
NRBC AUTOMATED: NORMAL PER 100 WBC
PDW BLD-RTO: 13.7 % (ref 11.5–14.9)
PLATELET # BLD: 211 K/UL (ref 150–450)
PMV BLD AUTO: 8 FL (ref 6–12)
POTASSIUM SERPL-SCNC: 4.2 MMOL/L (ref 3.7–5.3)
PTH INTACT: 75.98 PG/ML (ref 15–65)
RBC # BLD: 4.56 M/UL (ref 4–5.2)
SODIUM BLD-SCNC: 142 MMOL/L (ref 135–144)
T3 FREE: 2.81 PG/ML (ref 2.02–4.43)
THYROXINE, FREE: 1.27 NG/DL (ref 0.93–1.7)
TOTAL PROTEIN: 6.9 G/DL (ref 6.4–8.3)
TSH SERPL DL<=0.05 MIU/L-ACNC: 1.96 MIU/L (ref 0.3–5)
TSH SERPL DL<=0.05 MIU/L-ACNC: 1.97 MIU/L (ref 0.3–5)
VITAMIN D 25-HYDROXY: 40.2 NG/ML (ref 30–100)
WBC # BLD: 5 K/UL (ref 3.5–11)

## 2018-03-12 PROCEDURE — 84439 ASSAY OF FREE THYROXINE: CPT

## 2018-03-12 PROCEDURE — 80053 COMPREHEN METABOLIC PANEL: CPT

## 2018-03-12 PROCEDURE — 83735 ASSAY OF MAGNESIUM: CPT

## 2018-03-12 PROCEDURE — 82306 VITAMIN D 25 HYDROXY: CPT

## 2018-03-12 PROCEDURE — 82310 ASSAY OF CALCIUM: CPT

## 2018-03-12 PROCEDURE — 36415 COLL VENOUS BLD VENIPUNCTURE: CPT

## 2018-03-12 PROCEDURE — 84481 FREE ASSAY (FT-3): CPT

## 2018-03-12 PROCEDURE — 84443 ASSAY THYROID STIM HORMONE: CPT

## 2018-03-12 PROCEDURE — 99214 OFFICE O/P EST MOD 30 MIN: CPT | Performed by: FAMILY MEDICINE

## 2018-03-12 PROCEDURE — 83036 HEMOGLOBIN GLYCOSYLATED A1C: CPT

## 2018-03-12 PROCEDURE — 82523 COLLAGEN CROSSLINKS: CPT

## 2018-03-12 PROCEDURE — 83970 ASSAY OF PARATHORMONE: CPT

## 2018-03-12 PROCEDURE — 85027 COMPLETE CBC AUTOMATED: CPT

## 2018-03-12 RX ORDER — ESZOPICLONE 3 MG/1
3 TABLET, FILM COATED ORAL NIGHTLY PRN
Qty: 90 TABLET | Refills: 0 | Status: SHIPPED | OUTPATIENT
Start: 2018-03-12 | End: 2018-06-11 | Stop reason: SDUPTHER

## 2018-03-12 RX ORDER — PNEUMOCOCCAL VACCINE POLYVALENT 25; 25; 25; 25; 25; 25; 25; 25; 25; 25; 25; 25; 25; 25; 25; 25; 25; 25; 25; 25; 25; 25; 25 UG/.5ML; UG/.5ML; UG/.5ML; UG/.5ML; UG/.5ML; UG/.5ML; UG/.5ML; UG/.5ML; UG/.5ML; UG/.5ML; UG/.5ML; UG/.5ML; UG/.5ML; UG/.5ML; UG/.5ML; UG/.5ML; UG/.5ML; UG/.5ML; UG/.5ML; UG/.5ML; UG/.5ML; UG/.5ML; UG/.5ML
INJECTION, SOLUTION INTRAMUSCULAR; SUBCUTANEOUS
Refills: 0 | COMMUNITY
Start: 2018-02-03 | End: 2018-03-12 | Stop reason: ALTCHOICE

## 2018-03-12 ASSESSMENT — ENCOUNTER SYMPTOMS
CONSTIPATION: 0
WHEEZING: 0
ABDOMINAL PAIN: 0
SHORTNESS OF BREATH: 1
NAUSEA: 0
VOMITING: 0
COUGH: 0
DIARRHEA: 0
ABDOMINAL DISTENTION: 0
CHEST TIGHTNESS: 0

## 2018-03-12 NOTE — PROGRESS NOTES
Mychart comment sent to patient.   Chronic kidney disease stage 3 improved from prior    Pending hemoglobin A1c

## 2018-03-12 NOTE — LETTER
MEDICATION AGREEMENT     [facility]      For certain conditions, multiple classes of medications may be used to help better manage your symptoms, and to improve your ability to function at home, work and in social settings. However, these medications do have risks, which will be discussed with you, including addiction and dependency. The following prescribed medications need frequent monitoring and will require you to partner and assist in your healthcare. Medication  Dose, instructions and quantity as indicated on current prescription bottle Diagnosis/Reason(s) for Taking Category    eszopiclone (ESZOPICLONE) 3 MG TABS; Take 1 tablet by mouth nightly as needed (insomnia) for up to 90 days. Dispense: 90 tablet   Insomnia  Sleeping pill                           Benefits and goals of Controlled Substance Medications: There are two potential goals for your treatment: (1) decreased pain and suffering (2) improved daily life functions. There are many possible treatments for your chronic condition(s), and, in addition to controlled substance medications, we will try alternatives such as physical therapy, yoga, massage, home daily exercise, meditation, relaxation techniques, injections, chiropractic manipulations, surgery, cognitive therapy, hypnosis and many medications that are not habit-forming. Use of controlled substance medications may be helpful, but they are unlikely to resolve all of your symptoms or restore all function. Risks of Controlled Substance Medications:    Opioid pain medications: These medications can lead to problems such as addiction/dependence, sedation, lightheadedness/dizziness, memory issues, falls, constipation, nausea, or vomiting. They may also impair the ability to drive or operate machinery.   Additionally, these medications may lower testosterone levels, leading to loss of bone strength, stamina and sex drive. They may cause problems with breathing, sleep apnea and reduced coughing, which are especially dangerous for patients with lung disease. Overdose or dangerous interactions with alcohol and other medications may occur, leading to death. Hyperalgesia may develop, in which patients receiving opioids for the treatment of pain may actually become more sensitive to certain painful stimuli, and in some cases, experience pain from ordinarily non-painful stimuli. Women between the ages of 14-53 who could become pregnant should carefully weigh the risks and benefits of opioids with their physicians, as these medications increase the risk of pregnancy complications, including miscarriage,  delivery and stillbirth. It is also possible for babies to be born addicted to opioids. Opioid dependence withdrawal symptoms may include; feelings of uneasiness, increased pain, irritability, belly pain, diarrhea, sweats and goose-flesh. Benzodiazepines and non-benzodiazepine sleep medications: These medications can lead to problems such as addiction/dependence, sedation, fatigue, lightheadedness, dizziness, incoordination, falls, depression, hallucinations, and impaired judgment, memory and concentration. The ability to drive and operate machinery may also be affected. Abnormal sleep-related behaviors have been reported, including sleep walking, driving, making telephone calls, eating, or having sex while not fully awake. These medications can suppress breathing and worsen sleep apnea, particularly when combined with alcohol or other sedating medications, potentially leading to death. Dependence withdrawal symptoms may include tremors, anxiety, hallucinations and seizures. Stimulants:  Common adverse effects include addiction/dependence, increased blood pressure and heart rate, decreased appetite, nausea, involuntary weight loss, insomnia, irritability, and headaches.   These

## 2018-03-12 NOTE — PROGRESS NOTES
Chief Complaint   Patient presents with    Insomnia     wants to discuss sleep aid    Anxiety    COPD         Ryann Figueroa  here today for follow up on chronic medical problems, go over labs and/or diagnostic studies, and medication refills. Insomnia (wants to discuss sleep aid); Anxiety; and COPD      HPI      Insomnia  She is currently taking Lunesta 2 mg. Says she stopped Paxil, at it made her too groggy int he morning. She even took 1/2 tablet of Paxil, and she was still sleepy in the mornings. Says she is sleeping relatively well only 4 days/week with Lunesta 2 mg  and sometimes doesn't work. She would like to go back to Lunesta 3 mg which she used before for many years and says she didn't have any side effects from it. Patient did see sleep specialist, Dr. Tayler Alcala for cognitive behavioral therapy and she has been applying all those tools that she learnt with him. Reports anxiety is better. Depression is better and she doesn't think this is an issue. Patient is very frustrated that despite all the changes she has made, she still has problems sleeping and she feels tired all the time. She did try multiple medications for insomnia in the past, and nothing worked. She tried Melatonin, trazodone, mirtazapine, Rozerem, Paxil. Tita Sarah reports that she thinks her hyperparathyroidism might be causing all of the issues with her sleep, and lately her stomach has been acting out. Says she feels tired all the time. Patient follows with Dr. Tri Lerma for hyperparathyroidism and she is on Sensipar. Parathyroid scan on 11/5/15 showed parathyroid adenoma and she tells me that she saw a surgeon at that time who declined to do surgery on her. I reminded her that I also suggested her to have the adenoma removed, but she declined. PTH has improved from 131 on 12/6/16 to 75.98 on 3/12/18    GI symptoms.    Ap reports that Acid reflux is getting worse again despite her avoiding spicy food or eating late at night. He has been taking OTC PPI which help.has appointment with GI    COPD  Reports dyspnea on exertion at baseline. Denies cough, wheezing or chest tightness     Pulse ox is normal  SpO2 Readings from Last 4 Encounters:   03/12/18 99%   01/04/18 98%   12/11/17 99%   11/15/17 100%         -vital signs stable and within normal limits   /80   Pulse 96   Temp 97.9 °F (36.6 °C) (Temporal)   Ht 5' 3.6\" (1.615 m)   Wt 129 lb 12.8 oz (58.9 kg)   SpO2 99% Comment: rest @ RA  BMI 22.56 kg/m²   Body mass index is 22.56 kg/m². Wt Readings from Last 3 Encounters:   03/12/18 129 lb 12.8 oz (58.9 kg)   01/04/18 129 lb (58.5 kg)   12/11/17 127 lb (57.6 kg)            PHQ Scores 1/4/2018 8/14/2017 5/12/2017 3/10/2017 10/20/2016 6/8/2016   PHQ2 Score 1 0 0 0 0 2   PHQ9 Score 6 0 0 4 0 2      []1-4 = Minimal depression   [x]5-9 = Mild depression   []10-14 = Moderate depression   []15-19 = Moderately severe depression   []20-27 = Severe depression    Discussed testing with the patient and all questions fully answered. 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  hyperlipidemia   Hospital Outpatient Visit on 03/12/2018   Component Date Value Ref Range Status    WBC 03/12/2018 5.0  3.5 - 11.0 k/uL Final    RBC 03/12/2018 4.56  4.0 - 5.2 m/uL Final    Hemoglobin 03/12/2018 13.6  12.0 - 16.0 g/dL Final    Hematocrit 03/12/2018 41.4  36 - 46 % Final    MCV 03/12/2018 90.9  80 - 100 fL Final    MCH 03/12/2018 29.9  26 - 34 pg Final    MCHC 03/12/2018 32.9  31 - 37 g/dL Final    RDW 03/12/2018 13.7  11.5 - 14.9 % Final    Platelets 37/92/1340 211  150 - 450 k/uL Final    MPV 03/12/2018 8.0  6.0 - 12.0 fL Final    Comment: Performed at [address] [place] [address] [address]  [address], [address] [place] [address]   [phone]      NRBC Automated 03/12/2018 NOT REPORTED  per 100 WBC Final    Glucose 03/12/2018 98  70 - 99 mg/dL Final    BUN 03/12/2018 28* 8 - 23 mg/dL Final    CREATININE 03/12/2018 0.92* 0.50 - 0.90 mg/dL Final    Bun/Cre Ratio 03/12/2018 NOT REPORTED  9 - 20 Final    Calcium 03/12/2018 10.0  8.6 - 10.4 mg/dL Final    Sodium 03/12/2018 142  135 - 144 mmol/L Final    Potassium 03/12/2018 4.2  3.7 - 5.3 mmol/L Final    Chloride 03/12/2018 104  98 - 107 mmol/L Final    CO2 03/12/2018 26  20 - 31 mmol/L Final    Anion Gap 03/12/2018 12  9 - 17 mmol/L Final    Alkaline Phosphatase 03/12/2018 90  35 - 104 U/L Final    ALT 03/12/2018 12  5 - 33 U/L Final    AST 03/12/2018 18  <32 U/L Final    Total Bilirubin 03/12/2018 0.43  0.3 - 1.2 mg/dL Final    Total Protein 03/12/2018 6.9  6.4 - 8.3 g/dL Final    Alb 03/12/2018 4.1  3.5 - 5.2 g/dL Final    Albumin/Globulin Ratio 03/12/2018 NOT REPORTED  1.0 - 2.5 Final    GFR Non- 03/12/2018 59* >60 mL/min Final    GFR  03/12/2018 >60  >60 mL/min Final    GFR Comment 03/12/2018        Final    Comment: Average GFR for 79or more years old:   76 mL/min/1.73sq m  Chronic Kidney Disease:   <60 mL/min/1.73sq m  Kidney failure:   <15 mL/min/1.73sq m              eGFR calculated using average adult body mass. Additional eGFR calculator   available at:        [email or web address]        Performed at [facility]: [address] [facility]. [address]   [phone]      GFR Staging 03/12/2018 NOT REPORTED   Final    TSH 03/12/2018 1.96  0.30 - 5.00 mIU/L Final    Comment: Performed at [facility]: [address] [facility].  [address]   [phone]           Lab Results   Component Value Date    CHOL 207 (H) 08/09/2017    CHOL 154 06/10/2016    CHOL 204 (H) 03/31/2015     Lab Results   Component Value Date    TRIG 84 08/09/2017    TRIG 71 06/10/2016    TRIG 93 03/31/2015     Lab Results   Component Value Date    HDL 60 08/09/2017    HDL 56 06/10/2016    HDL 63 03/31/2015     Lab Results medications for this visit. Social History     Social History    Marital status:      Spouse name: N/A    Number of children: N/A    Years of education: N/A     Occupational History    Not on file. Social History Main Topics    Smoking status: Former Smoker     Packs/day: 1.00     Years: 30.00     Types: Cigarettes     Start date: 6/1/1960     Quit date: 6/20/2000    Smokeless tobacco: Never Used      Comment: still uses OTC nicorette gum    Alcohol use 0.0 oz/week      Comment: occassional    Drug use: No    Sexual activity: Not on file     Other Topics Concern    Not on file     Social History Narrative    No narrative on file     Counseling given: Yes                  -rest of complaints with corresponding details per ROS    The patient's past medical, surgical, social, and family history as well as her current medications and allergies were reviewed as documented in today's encounter. Review of Systems   Constitutional: Positive for fatigue. Negative for activity change, appetite change, chills, diaphoresis, fever and unexpected weight change. HENT: Negative for congestion. Respiratory: Positive for shortness of breath (OG[place]). Negative for cough, chest tightness and wheezing. Cardiovascular: Negative for chest pain, palpitations and leg swelling. Gastrointestinal: Negative for abdominal distention, abdominal pain, constipation, diarrhea, nausea and vomiting. GERD   Endocrine: Negative for cold intolerance, heat intolerance, polydipsia, polyphagia and polyuria. Psychiatric/Behavioral: Positive for dysphoric mood and sleep disturbance. Negative for self-injury and suicidal ideas. The patient is nervous/anxious. Physical Exam   Constitutional: She is oriented to person, place, and time. She appears well-developed and well-nourished. No distress. HENT:   Head: Normocephalic and atraumatic.    Mouth/Throat: Oropharynx is clear and moist. No voiced understanding. Quality Measures    Body mass index is 22.56 kg/m². Normal. Weight control planned discussed Healthy diet and regular exercise. BP: 136/80. Blood pressure is normal. Treatment plan consists of No treatment change needed. Fall Risk 3/5/2018 10/20/2017 10/20/2016 5/11/2015   2 or more falls in past year? no no no no   Fall with injury in past year? no no no no     The patient does not have a history of falls. I did , complete a risk assessment for falls. A plan of care for falls in-office gait and balance testing performed using The Timed Up and Go Test was negative for increased falls risk- no further intervention is currently indicated, No Treatment plan indicated      Hyperlipidemia, will recheck at next appointment    Lab Results   Component Value Date    LDLCHOLESTEROL 130 08/09/2017    (goal LDL reduction with dx if diabetes is 50% LDL reduction)    Mild depression , will continue to work on improving her sleep. PHQ Scores 1/4/2018 8/14/2017 5/12/2017 3/10/2017 10/20/2016 6/8/2016   PHQ2 Score 1 0 0 0 0 2   PHQ9 Score 6 0 0 4 0 2     Interpretation of Total Score Depression Severity: 1-4 = Minimal depression, 5-9 = Mild depression, 10-14 = Moderate depression, 15-19 = Moderately severe depression, 20-27 = Severe depression      The patient's past medical, surgical, social, and family history as well as her   current medications and allergies were reviewed as documented in today's encounter. Medications, labs, diagnostic studies, consultations and follow-up as documented in this encounter. Return in about 3 months (around 6/12/2018) for O2, COPD, LABS F/U. Patient was seen with total face to face time of  25 minutes. More than 50% of this visit was counseling and education.        Future Appointments  Date Time Provider Shirley Dunne   5/9/2018 10:00 AM ST SHORT STAY INFUSION CHAIR 01 ST[facility] SS None   10/25/2018 1:00 PM Read Notice, [facility]     This note was completed by using the assistance of a speech-recognition program. However, inadvertent computerized transcription errors may be present. Although every effort was made to ensure accuracy, no guarantees can be provided that every mistake has been identified and corrected by editing.   Electronically signed by Jovany Abraham MD on 3/18/2018  10:12 AM

## 2018-03-13 NOTE — PROGRESS NOTES
PLEASE NOTIFY PATIENT. Chronic kidney disease stage 3 improved from prior  . A1c normal, no prediabetes. Normal thyroid function, normal cells in the blood. The PTH=parathyroid hormone is down 75, down from 114, improved.   Follow-up with Dr. Jorge Reddy for this one

## 2018-03-14 LAB
CREATININE URINE /VOLUME: 134 MG/DL
N-TELO/CREAT. RATIO: 16

## 2018-03-18 PROBLEM — J01.10 ACUTE NON-RECURRENT FRONTAL SINUSITIS: Status: RESOLVED | Noted: 2018-01-06 | Resolved: 2018-03-18

## 2018-03-18 PROBLEM — F33.42 RECURRENT MAJOR DEPRESSIVE EPISODES, IN FULL REMISSION (HCC): Status: RESOLVED | Noted: 2017-03-12 | Resolved: 2018-03-18

## 2018-03-18 PROBLEM — H61.21 IMPACTED CERUMEN OF RIGHT EAR: Status: RESOLVED | Noted: 2017-05-12 | Resolved: 2018-03-18

## 2018-03-18 PROBLEM — R00.0 TACHYCARDIA: Status: RESOLVED | Noted: 2017-01-12 | Resolved: 2018-03-18

## 2018-03-18 PROBLEM — R35.0 FREQUENCY OF URINATION: Status: RESOLVED | Noted: 2017-08-14 | Resolved: 2018-03-18

## 2018-03-23 ENCOUNTER — HOSPITAL ENCOUNTER (OUTPATIENT)
Dept: WOMENS IMAGING | Age: 78
Discharge: HOME | End: 2018-03-25
Payer: MEDICARE

## 2018-03-23 ENCOUNTER — HOSPITAL ENCOUNTER (OUTPATIENT)
Dept: CT IMAGING | Age: 78
Discharge: HOME | End: 2018-03-25
Payer: MEDICARE

## 2018-03-23 ENCOUNTER — TELEPHONE (OUTPATIENT)
Dept: FAMILY MEDICINE CLINIC | Age: 78
End: 2018-03-23

## 2018-03-23 ENCOUNTER — HOSPITAL ENCOUNTER (OUTPATIENT)
Age: 78
Discharge: HOME | End: 2018-03-23
Payer: MEDICARE

## 2018-03-23 ENCOUNTER — HOSPITAL ENCOUNTER (OUTPATIENT)
Dept: ULTRASOUND IMAGING | Age: 78
Discharge: HOME | End: 2018-03-25
Payer: MEDICARE

## 2018-03-23 DIAGNOSIS — Z78.0 POST-MENOPAUSAL: ICD-10-CM

## 2018-03-23 DIAGNOSIS — N18.30 CKD (CHRONIC KIDNEY DISEASE) STAGE 3, GFR 30-59 ML/MIN (HCC): ICD-10-CM

## 2018-03-23 DIAGNOSIS — R91.1 LUNG NODULE, SOLITARY: ICD-10-CM

## 2018-03-23 DIAGNOSIS — J43.1 PANLOBULAR EMPHYSEMA (HCC): ICD-10-CM

## 2018-03-23 DIAGNOSIS — E04.2 GOITER, NONTOXIC, MULTINODULAR: ICD-10-CM

## 2018-03-23 LAB
EKG ATRIAL RATE: 74 BPM
EKG P AXIS: -15 DEGREES
EKG P-R INTERVAL: 182 MS
EKG Q-T INTERVAL: 358 MS
EKG QRS DURATION: 78 MS
EKG QTC CALCULATION (BAZETT): 397 MS
EKG R AXIS: -15 DEGREES
EKG T AXIS: -6 DEGREES
EKG VENTRICULAR RATE: 74 BPM

## 2018-03-23 PROCEDURE — 77080 DXA BONE DENSITY AXIAL: CPT

## 2018-03-23 PROCEDURE — 71250 CT THORAX DX C-: CPT

## 2018-03-23 PROCEDURE — 93005 ELECTROCARDIOGRAM TRACING: CPT

## 2018-03-23 PROCEDURE — 76536 US EXAM OF HEAD AND NECK: CPT

## 2018-03-23 NOTE — TELEPHONE ENCOUNTER
Dr. Haim Parnell,  I recently saw Fallon Jones and she self discontinued Paxil due to sedation, and requested increase in Lunesta dose from 2 mg to 3 mg. She is also willing to purse treatment of her hyperparathyroidism and possibly consider surgery. She read that hyperparathyroidism can affect sleep. Many years ago when she first established with me, she declined any treatment for hyperparathyroidism. Thank you for checking on my patient! Reina Hooks, PhD  Reina Pearl, MD Saleem:     I seem to have lost touch with Ms Rosemary Fonseca a month or two ago.  When last I saw here, she was sleeping great but complaining strongly about daytime sleepiness due (presumably) to the Paxil.  I did take the opportunity to catch up on her status from your recent office notes.  I'm not sure I have a lot more to offer her, but remain open to seeing her again if she/you would like.       Thanks   Yanique Arrington

## 2018-03-25 DIAGNOSIS — N13.30 HYDRONEPHROSIS, UNSPECIFIED HYDRONEPHROSIS TYPE: ICD-10-CM

## 2018-03-25 DIAGNOSIS — E21.3 HYPERPARATHYROIDISM (HCC): ICD-10-CM

## 2018-03-25 DIAGNOSIS — N28.1 CYST OF LEFT KIDNEY: Primary | ICD-10-CM

## 2018-03-25 NOTE — PROGRESS NOTES
PLEASE NOTIFY PATIENT. EKG is described as \"borderline\" which could be nothing or \"something\".   I suggest her to make appointment with her cardiologist for the \"annual\" exam and he will compare this EKG with the old EKGs  Her cardiologist is Reza Jonas MD.

## 2018-03-25 NOTE — PROGRESS NOTES
ABNORMAL. Please notify patient. Small lung nodules, 6 mm, described as postinfectious. Mild to moderate emphysema.   Left kidneys cysts and possible retention of urine in the kidney, needs ultrasound kidney as CT lung couldn't properly describe the kidneys, I placed an order for US kidneys

## 2018-04-02 ENCOUNTER — HOSPITAL ENCOUNTER (OUTPATIENT)
Dept: ULTRASOUND IMAGING | Age: 78
Discharge: HOME | End: 2018-04-04
Payer: MEDICARE

## 2018-04-02 DIAGNOSIS — N13.30 HYDRONEPHROSIS, UNSPECIFIED HYDRONEPHROSIS TYPE: ICD-10-CM

## 2018-04-02 DIAGNOSIS — N28.1 CYST OF LEFT KIDNEY: ICD-10-CM

## 2018-04-02 DIAGNOSIS — E21.3 HYPERPARATHYROIDISM (HCC): ICD-10-CM

## 2018-04-02 PROCEDURE — 76770 US EXAM ABDO BACK WALL COMP: CPT

## 2018-04-17 ENCOUNTER — TELEPHONE (OUTPATIENT)
Dept: INFUSION THERAPY | Age: 78
End: 2018-04-17

## 2018-04-19 ENCOUNTER — HOSPITAL ENCOUNTER (OUTPATIENT)
Age: 78
Discharge: HOME | End: 2018-04-19
Payer: MEDICARE

## 2018-04-19 LAB
CALCIUM SERPL-MCNC: 8.5 MG/DL (ref 8.6–10.4)
CREAT SERPL-MCNC: 1.02 MG/DL (ref 0.5–0.9)
GFR AFRICAN AMERICAN: >60 ML/MIN
GFR NON-AFRICAN AMERICAN: 52 ML/MIN
GFR SERPL CREATININE-BSD FRML MDRD: ABNORMAL ML/MIN/{1.73_M2}
GFR SERPL CREATININE-BSD FRML MDRD: ABNORMAL ML/MIN/{1.73_M2}
PTH INTACT: 36.35 PG/ML (ref 15–65)

## 2018-04-19 PROCEDURE — 82565 ASSAY OF CREATININE: CPT

## 2018-04-19 PROCEDURE — 82310 ASSAY OF CALCIUM: CPT

## 2018-04-19 PROCEDURE — 36415 COLL VENOUS BLD VENIPUNCTURE: CPT

## 2018-04-19 PROCEDURE — 83970 ASSAY OF PARATHORMONE: CPT

## 2018-04-21 ENCOUNTER — HOSPITAL ENCOUNTER (OUTPATIENT)
Age: 78
Setting detail: SPECIMEN
Discharge: HOME | End: 2018-04-21
Payer: MEDICARE

## 2018-04-21 LAB
CALCIUM TIMED UR: NORMAL MG/X H
CALCIUM URINE: 7.4 MG/DL
CALCIUM, URINE: 115 MG/24 H (ref 20–275)
CREATININE TIMED UR: NORMAL MG/ X H
CREATININE URINE: 57.1 MG/DL
CREATININE, 24H UR: 885 MG/24 H (ref 740–1570)
HOURS COLLECTED: 24 H
HOURS COLLECTED: 24 H
VOLUME: 1550 ML
VOLUME: 1550 ML

## 2018-04-21 PROCEDURE — 82340 ASSAY OF CALCIUM IN URINE: CPT

## 2018-04-21 PROCEDURE — 81050 URINALYSIS VOLUME MEASURE: CPT

## 2018-04-21 PROCEDURE — 82570 ASSAY OF URINE CREATININE: CPT

## 2018-05-03 ENCOUNTER — CARE COORDINATION (OUTPATIENT)
Dept: FAMILY MEDICINE CLINIC | Age: 78
End: 2018-05-03

## 2018-05-03 ASSESSMENT — ENCOUNTER SYMPTOMS: DYSPNEA ASSOCIATED WITH: EXERTION

## 2018-05-04 RX ORDER — 0.9 % SODIUM CHLORIDE 0.9 %
10 VIAL (ML) INJECTION ONCE
Status: CANCELLED | OUTPATIENT
Start: 2018-05-07 | End: 2018-05-07

## 2018-05-04 RX ORDER — METHYLPREDNISOLONE SODIUM SUCCINATE 125 MG/2ML
125 INJECTION, POWDER, LYOPHILIZED, FOR SOLUTION INTRAMUSCULAR; INTRAVENOUS ONCE
Status: CANCELLED | OUTPATIENT
Start: 2018-05-07 | End: 2018-05-07

## 2018-05-04 RX ORDER — SODIUM CHLORIDE 9 MG/ML
100 INJECTION, SOLUTION INTRAVENOUS CONTINUOUS
Status: CANCELLED | OUTPATIENT
Start: 2018-05-07

## 2018-05-04 RX ORDER — EPINEPHRINE 1 MG/ML
0.3 INJECTION, SOLUTION, CONCENTRATE INTRAVENOUS PRN
Status: CANCELLED | OUTPATIENT
Start: 2018-05-07

## 2018-05-04 RX ORDER — DIPHENHYDRAMINE HYDROCHLORIDE 50 MG/ML
50 INJECTION INTRAMUSCULAR; INTRAVENOUS ONCE
Status: CANCELLED | OUTPATIENT
Start: 2018-05-07 | End: 2018-05-07

## 2018-05-09 ENCOUNTER — HOSPITAL ENCOUNTER (OUTPATIENT)
Dept: INPATIENT UNIT | Age: 78
Setting detail: THERAPIES SERIES
Discharge: HOME | End: 2018-05-09
Payer: MEDICARE

## 2018-05-09 VITALS
DIASTOLIC BLOOD PRESSURE: 66 MMHG | TEMPERATURE: 97 F | HEART RATE: 82 BPM | SYSTOLIC BLOOD PRESSURE: 115 MMHG | OXYGEN SATURATION: 100 % | RESPIRATION RATE: 16 BRPM

## 2018-05-09 DIAGNOSIS — M81.0 OSTEOPOROSIS, UNSPECIFIED OSTEOPOROSIS TYPE, UNSPECIFIED PATHOLOGICAL FRACTURE PRESENCE: ICD-10-CM

## 2018-05-09 PROCEDURE — 96372 THER/PROPH/DIAG INJ SC/IM: CPT

## 2018-05-09 PROCEDURE — 6360000002 HC RX W HCPCS: Performed by: INTERNAL MEDICINE

## 2018-05-09 RX ORDER — EPINEPHRINE 1 MG/ML
0.3 INJECTION, SOLUTION, CONCENTRATE INTRAVENOUS PRN
Status: CANCELLED | OUTPATIENT
Start: 2018-05-09

## 2018-05-09 RX ORDER — SODIUM CHLORIDE 9 MG/ML
100 INJECTION, SOLUTION INTRAVENOUS CONTINUOUS
Status: CANCELLED | OUTPATIENT
Start: 2018-05-09

## 2018-05-09 RX ORDER — 0.9 % SODIUM CHLORIDE 0.9 %
10 VIAL (ML) INJECTION ONCE
Status: CANCELLED | OUTPATIENT
Start: 2018-05-09 | End: 2018-05-09

## 2018-05-09 RX ORDER — DIPHENHYDRAMINE HYDROCHLORIDE 50 MG/ML
50 INJECTION INTRAMUSCULAR; INTRAVENOUS ONCE
Status: CANCELLED | OUTPATIENT
Start: 2018-05-09 | End: 2018-05-09

## 2018-05-09 RX ORDER — METHYLPREDNISOLONE SODIUM SUCCINATE 125 MG/2ML
125 INJECTION, POWDER, LYOPHILIZED, FOR SOLUTION INTRAMUSCULAR; INTRAVENOUS ONCE
Status: CANCELLED | OUTPATIENT
Start: 2018-05-09 | End: 2018-05-09

## 2018-05-09 RX ADMIN — DENOSUMAB 60 MG: 60 INJECTION SUBCUTANEOUS at 10:09

## 2018-05-15 ENCOUNTER — HOSPITAL ENCOUNTER (OUTPATIENT)
Dept: NUCLEAR MEDICINE | Age: 78
Discharge: HOME | End: 2018-05-17
Payer: MEDICARE

## 2018-05-15 VITALS — WEIGHT: 125 LBS | BODY MASS INDEX: 19.62 KG/M2 | HEIGHT: 67 IN

## 2018-05-15 DIAGNOSIS — E21.3 HYPERPARATHYROIDISM (HCC): ICD-10-CM

## 2018-05-15 PROCEDURE — 2580000003 HC RX 258: Performed by: INTERNAL MEDICINE

## 2018-05-15 PROCEDURE — 78070 PARATHYROID PLANAR IMAGING: CPT

## 2018-05-15 PROCEDURE — A9500 TC99M SESTAMIBI: HCPCS | Performed by: INTERNAL MEDICINE

## 2018-05-15 PROCEDURE — 3430000000 HC RX DIAGNOSTIC RADIOPHARMACEUTICAL: Performed by: INTERNAL MEDICINE

## 2018-05-15 RX ORDER — SODIUM CHLORIDE 0.9 % (FLUSH) 0.9 %
10 SYRINGE (ML) INJECTION PRN
Status: DISCONTINUED | OUTPATIENT
Start: 2018-05-15 | End: 2018-05-18 | Stop reason: HOSPADM

## 2018-05-15 RX ADMIN — TETRAKIS(2-METHOXYISOBUTYLISOCYANIDE)COPPER(I) TETRAFLUOROBORATE 28 MILLICURIE: 1 INJECTION, POWDER, LYOPHILIZED, FOR SOLUTION INTRAVENOUS at 09:30

## 2018-05-15 RX ADMIN — Medication 10 ML: at 09:30

## 2018-06-05 ENCOUNTER — CARE COORDINATION (OUTPATIENT)
Dept: CARE COORDINATION | Age: 78
End: 2018-06-05

## 2018-06-09 ENCOUNTER — PATIENT MESSAGE (OUTPATIENT)
Dept: FAMILY MEDICINE CLINIC | Age: 78
End: 2018-06-09

## 2018-06-09 DIAGNOSIS — K21.9 GASTROESOPHAGEAL REFLUX DISEASE WITHOUT ESOPHAGITIS: Primary | ICD-10-CM

## 2018-06-11 DIAGNOSIS — F51.04 PSYCHOPHYSIOLOGICAL INSOMNIA: ICD-10-CM

## 2018-06-11 RX ORDER — ESZOPICLONE 3 MG/1
3 TABLET, FILM COATED ORAL NIGHTLY PRN
Qty: 90 TABLET | Refills: 0 | Status: SHIPPED | OUTPATIENT
Start: 2018-06-11 | End: 2018-08-13 | Stop reason: SDUPTHER

## 2018-06-11 RX ORDER — RANITIDINE 150 MG/1
150 TABLET ORAL DAILY
Qty: 180 TABLET | Refills: 3 | Status: SHIPPED | OUTPATIENT
Start: 2018-06-11 | End: 2020-02-05

## 2018-07-23 ENCOUNTER — CARE COORDINATION (OUTPATIENT)
Dept: CARE COORDINATION | Age: 78
End: 2018-07-23

## 2018-07-23 ENCOUNTER — OFFICE VISIT (OUTPATIENT)
Dept: FAMILY MEDICINE CLINIC | Age: 78
End: 2018-07-23
Payer: MEDICARE

## 2018-07-23 VITALS
WEIGHT: 133 LBS | TEMPERATURE: 96.9 F | SYSTOLIC BLOOD PRESSURE: 142 MMHG | DIASTOLIC BLOOD PRESSURE: 84 MMHG | HEART RATE: 81 BPM | BODY MASS INDEX: 22.71 KG/M2 | HEIGHT: 64 IN | OXYGEN SATURATION: 99 %

## 2018-07-23 DIAGNOSIS — Z01.818 PREOPERATIVE CLEARANCE: ICD-10-CM

## 2018-07-23 DIAGNOSIS — F33.0 MDD (MAJOR DEPRESSIVE DISORDER), RECURRENT EPISODE, MILD (HCC): ICD-10-CM

## 2018-07-23 DIAGNOSIS — E21.3 HYPERPARATHYROIDISM (HCC): ICD-10-CM

## 2018-07-23 DIAGNOSIS — E78.5 HYPERLIPIDEMIA WITH TARGET LDL LESS THAN 100: ICD-10-CM

## 2018-07-23 DIAGNOSIS — R94.31 ABNORMAL EKG: ICD-10-CM

## 2018-07-23 DIAGNOSIS — R03.0 ELEVATED BLOOD PRESSURE READING: ICD-10-CM

## 2018-07-23 DIAGNOSIS — M53.3 PAIN OF RIGHT SACROILIAC JOINT: ICD-10-CM

## 2018-07-23 DIAGNOSIS — F51.04 PSYCHOPHYSIOLOGICAL INSOMNIA: ICD-10-CM

## 2018-07-23 DIAGNOSIS — R53.82 CHRONIC FATIGUE: Primary | ICD-10-CM

## 2018-07-23 DIAGNOSIS — J43.1 PANLOBULAR EMPHYSEMA (HCC): ICD-10-CM

## 2018-07-23 DIAGNOSIS — Z23 NEED FOR PROPHYLACTIC VACCINATION AND INOCULATION AGAINST VARICELLA: ICD-10-CM

## 2018-07-23 DIAGNOSIS — Z13.31 POSITIVE DEPRESSION SCREENING: ICD-10-CM

## 2018-07-23 PROBLEM — F33.41 MAJOR DEPRESSIVE DISORDER, RECURRENT, IN PARTIAL REMISSION (HCC): Status: RESOLVED | Noted: 2018-01-06 | Resolved: 2018-07-23

## 2018-07-23 PROCEDURE — G8431 POS CLIN DEPRES SCRN F/U DOC: HCPCS | Performed by: FAMILY MEDICINE

## 2018-07-23 PROCEDURE — G0444 DEPRESSION SCREEN ANNUAL: HCPCS | Performed by: FAMILY MEDICINE

## 2018-07-23 PROCEDURE — 99214 OFFICE O/P EST MOD 30 MIN: CPT | Performed by: FAMILY MEDICINE

## 2018-07-23 RX ORDER — BUPROPION HYDROCHLORIDE 150 MG/1
150 TABLET ORAL EVERY MORNING
Qty: 90 TABLET | Refills: 3 | Status: SHIPPED | OUTPATIENT
Start: 2018-07-23 | End: 2020-02-05

## 2018-07-23 ASSESSMENT — PATIENT HEALTH QUESTIONNAIRE - PHQ9
6. FEELING BAD ABOUT YOURSELF - OR THAT YOU ARE A FAILURE OR HAVE LET YOURSELF OR YOUR FAMILY DOWN: 0
3. TROUBLE FALLING OR STAYING ASLEEP: 3
8. MOVING OR SPEAKING SO SLOWLY THAT OTHER PEOPLE COULD HAVE NOTICED. OR THE OPPOSITE, BEING SO FIGETY OR RESTLESS THAT YOU HAVE BEEN MOVING AROUND A LOT MORE THAN USUAL: 0
SUM OF ALL RESPONSES TO PHQ9 QUESTIONS 1 & 2: 3
4. FEELING TIRED OR HAVING LITTLE ENERGY: 3
SUM OF ALL RESPONSES TO PHQ QUESTIONS 1-9: 9
1. LITTLE INTEREST OR PLEASURE IN DOING THINGS: 1
10. IF YOU CHECKED OFF ANY PROBLEMS, HOW DIFFICULT HAVE THESE PROBLEMS MADE IT FOR YOU TO DO YOUR WORK, TAKE CARE OF THINGS AT HOME, OR GET ALONG WITH OTHER PEOPLE: 1
2. FEELING DOWN, DEPRESSED OR HOPELESS: 2
9. THOUGHTS THAT YOU WOULD BE BETTER OFF DEAD, OR OF HURTING YOURSELF: 0
7. TROUBLE CONCENTRATING ON THINGS, SUCH AS READING THE NEWSPAPER OR WATCHING TELEVISION: 0
5. POOR APPETITE OR OVEREATING: 0

## 2018-07-23 ASSESSMENT — ENCOUNTER SYMPTOMS
CONSTIPATION: 0
WHEEZING: 0
ABDOMINAL PAIN: 0
CHEST TIGHTNESS: 0
NAUSEA: 0
ABDOMINAL DISTENTION: 0
DYSPNEA ASSOCIATED WITH: EXERTION
COUGH: 0
BACK PAIN: 1
VOMITING: 0
DIARRHEA: 0

## 2018-07-23 NOTE — PROGRESS NOTES
moderate-to-severe  COPD/emphysema. CT 3/23/18  Multiple right-sided nodules, several probably calcified, largest measuring 6   mm; findings suggest a postinfectious or inflammatory etiologies, possibly   with prior bronchiolitis.  No infiltrate or pleural effusion.       COPD/emphysema.       Probable left perihilar renal cysts, but possible mild left hydronephrosis   (see recommendations below).       Osteopenia.       RECOMMENDATIONS:   1.  Fleischner Society guidelines for follow-up and management of   incidentally detected pulmonary nodules:       Multiple Solid Nodules:       Nodule size less than 6 mm   In a low-risk patient, no routine follow-up. In a high-risk patient, optional CT        SpO2 Readings from Last 4 Encounters:   07/23/18 99%   05/09/18 100%   03/12/18 99%   01/04/18 98%         Burning on the knee and burning on the top of feet, only when sitting on the couch, otherwise if sitting on the another chair doesn't have the feeling of burning. She does have a bit of right SI joint tenderness. Denies back pain otherwise. Denies numbness and tingling in her feet. Allergies   Allergen Reactions    Aspirin      GI upset        Current Outpatient Prescriptions   Medication Sig Dispense Refill    eszopiclone (ESZOPICLONE) 3 MG TABS Take 1 tablet by mouth nightly as needed (insomnia) for up to 90 days. . 90 tablet 0    ranitidine (ZANTAC) 150 MG tablet Take 1 tablet by mouth daily 180 tablet 3    saline nasal gel (AYR) GEL by Nasal route as needed for Congestion 1 Tube 3    loratadine (CLARITIN) 10 MG tablet Take 1 tablet by mouth daily 90 tablet 3    tiotropium (SPIRIVA HANDIHALER) 18 MCG inhalation capsule Inhale 1 capsule into the lungs daily 90 capsule 3    PROLIA 60 MG/ML SOLN SC injection   0    albuterol sulfate  (90 BASE) MCG/ACT inhaler Inhale 2 puffs into the lungs every 6 hours as needed for Wheezing or Shortness of Breath 18 g 3    Spacer/Aero-Holding Sherian Wedgefield cranial nerve deficit. She exhibits normal muscle tone. Skin: Skin is warm and dry. No rash noted. She is not diaphoretic. Psychiatric: Her behavior is normal. Judgment and thought content normal. Her mood appears anxious. She exhibits a depressed mood. Nursing note and vitals reviewed. Discussed testing with the patient and all questions fully answered. hyperlipidemia   Hospital Outpatient Visit on 04/21/2018   Component Date Value Ref Range Status    Volume 04/21/2018 1550  mL Final    Hours Collected 04/21/2018 24  h Final    Calcium, Ur 04/21/2018 7.4  mg/dL Final    Calcium Timed Ur 04/21/2018 NOT REPORTED  mg/X h Final    Calcium, Urine 04/21/2018 115  20 - 275 mg/24 h Final    Comment: Performed at [facility]: [address]   [phone]      Volume 04/21/2018 1550  mL Final    Hours Collected 04/21/2018 24  h Final    Creatinine, Ur 04/21/2018 57.1  mg/dL Final    Creatinine Timed Ur 04/21/2018 NOT REPORTED  mg/ X h Final    Creatinine, 24H Ur 04/21/2018 885  740 - 1,570 mg/24 h Final    Comment: Performed at [facility]: [address]  [address]   [phone]           Most recent labs reviewed:     Lab Results   Component Value Date    WBC 5.0 03/12/2018    HGB 13.6 03/12/2018    HCT 41.4 03/12/2018    MCV 90.9 03/12/2018     03/12/2018       Lab Results   Component Value Date     03/12/2018    K 4.2 03/12/2018     03/12/2018    CO2 26 03/12/2018    BUN 28 03/12/2018    CREATININE 1.02 04/19/2018    GLUCOSE 98 03/12/2018    GLUCOSE 104 02/27/2012    CALCIUM 8.5 04/19/2018        Lab Results   Component Value Date    ALT 12 03/12/2018    AST 18 03/12/2018    ALKPHOS 90 03/12/2018    BILITOT 0.43 03/12/2018       Lab Results   Component Value Date    TSH 1.96 03/12/2018       Lab Results   Component Value Date    CHOL 207 (H) 08/09/2017    CHOL 154 06/10/2016    CHOL 204 (H) 03/31/2015 adenoma removal   continue Lunesta 3 mg    5. 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 6. Hyperlipidemia with target LDL less than 100  Not controlled  Not on statin  Low carb, low fat diet, increase fruits and vegetables, and exercise 4-5 times a week 30-40 minutes a day, or walk 1-2 hours per day, or wear a pedometer and get at least 10,000 steps per day. Lab Results   Component Value Date    LDLCHOLESTEROL 130 08/09/2017       - Lipid Panel; Future    7. Preoperative clearance. 9. Abnormal EKG-nonspecific T-wave changes in inferior leads,  Referred to dr. Ryan Mayo    She was advised to make appointment with her cardiologist and to have cardiac clearance for her parathyroidectomy    8. Need for prophylactic vaccination and inoculation against varicella  - zoster recombinant adjuvanted vaccine (SHINGRIX) 50 MCG SUSR injection; 50 MCG IM then repeat 2-6 months. Dispense: 0.5 mL; Refill: 1      10. Positive depression screening    - Positive Screen for Clinical Depression with a Documented Follow-up Plan       On the basis of positive PHQ-9 screening (PHQ-9 Total Score: 9), the following plan was implemented: exercise program recommended for stress management and medication prescribed: Wellbutrin  mg- patient will call for any significant medication side effects or worsening symptoms of depression. Patient will follow-up in 3 month(s) with PCP    11. Pain of right sacroiliac joint  Back stretches advised  Salonpas  massage    12.  Elevated blood pressure reading  Low salt diet  Return in 1 week for BP check  Needs nurse visit appointment for BP check in 1 week        Orders Placed This Encounter   Procedures    Lipid Panel     Standing Status:   Future     Standing Expiration Date:   7/23/2019     Order Specific

## 2018-07-23 NOTE — PROGRESS NOTES
Visit Information    Have you changed or started any medications since your last visit including any over-the-counter medicines, vitamins, or herbal medicines? no   Are you having any side effects from any of your medications? -  no  Have you stopped taking any of your medications? Is so, why? -  no    Have you seen any other physician or provider since your last visit? Yes - Records Obtained  Have you had any other diagnostic tests since your last visit? Yes - Records Obtained  Have you been seen in the emergency room and/or had an admission to a hospital since we last saw you? No  Have you had your routine dental cleaning in the past 6 months? yes -     Have you activated your [employer] account? If not, what are your barriers?  Yes     Patient Care Team:  Kiran Mitchell MD as PCP - Steve Sifuentes MD as PCP - [facility] Attributed Provider  Pasquale Mcgraw MD as Surgeon (General Surgery)  Edgar Arreguin MD as Surgeon (Ophthalmology)  Dylon Mota MD as Consulting Physician (Gastroenterology)  Jamey Boyer MD as Consulting Physician (Endocrinology)  Christa Bagley MD as Surgeon (Ophthalmology)  Uri Fernández MD as Consulting Physician (Internal Medicine Cardiovascular Disease)  Caden Abudllahi MD as Consulting Physician (Cardiology)  Quinton Mora RN as Care Coordinator  Armand Stevens, PhD (Sleep Medicine)    Medical History Review  Past Medical, Family, and Social History reviewed and does contribute to the patient presenting condition    Health Maintenance   Topic Date Due    Shingles Vaccine (1 of 2 - 2 Dose Series) 04/01/1990    Flu vaccine (1) 09/01/2018    Potassium monitoring  03/12/2019    Creatinine monitoring  04/19/2019    Colon cancer screen colonoscopy  06/13/2019    Breast cancer screen  01/17/2020    DTaP/Tdap/Td vaccine (2 - Td) 01/09/2027    DEXA (modify frequency per FRAX score)  Completed    Pneumococcal low/med risk  Completed

## 2018-07-23 NOTE — PATIENT INSTRUCTIONS
activity. What to try  · Go to bed at the same time every night, and wake up at the same time every morning. Do not take naps during the day. · Keep your bedroom quiet, dark, and cool. · Sleep on a comfortable pillow and mattress. · If watching the clock makes you anxious, turn it facing away from you so you cannot see the time. · If you worry when you lie down, start a worry book. Well before bedtime, write down your worries, and then set the book and your concerns aside. · Try meditation or other relaxation techniques before you go to bed. · If you cannot fall asleep, get up and go to another room until you feel sleepy. Do something relaxing. Repeat your bedtime routine before you go to bed again. · Make your house quiet and calm about an hour before bedtime. Turn down the lights, turn off the TV, log off the computer, and turn down the volume on music. This can help you relax after a busy day. When should you call for help? Watch closely for changes in your health, and be sure to contact your doctor if:    · Your efforts to improve your sleep do not work.     · Your insomnia gets worse.     · You have been feeling down, depressed, or hopeless or have lost interest in things that you usually enjoy. Where can you learn more? Go to [email or web address].[email or web address].[email or web address] org and sign in to your [employer] account. Enter P513 in the [facility] box to learn more about \"Insomnia: Care Instructions. \"     If you do not have an account, please click on the \"Sign Up Now\" link. Current as of: October 10, 2017  Content Version: 11.6  © 7982-0108 [employer]. Care instructions adapted under license by [facility] ([facility]). If you have questions about a medical condition or this instruction, always ask your healthcare professional. [address] any warranty or liability for your use of this information.

## 2018-07-23 NOTE — CARE COORDINATION
drug reactions(s) or side effects and actions to take should they arise  Discuss target symptoms and actions to take should they arise  Identify problems that require immediate PCP or specialist visit  Patient demonstrates understanding of access to PCP/Specialist:  Understands about scheduling routine Follow Up appointments   Understands about sick day appointment options for worsening of symptoms/progression (Same Day, E Visits)            Prior to Admission medications    Medication Sig Start Date End Date Taking? Authorizing Provider   zoster recombinant adjuvanted vaccine [facility]) 50 MCG SUSR injection 50 MCG IM then repeat 2-6 months. 7/23/18 7/23/19  Jorge Lara MD   buPROPion (WELLBUTRIN XL) 150 MG extended release tablet Take 1 tablet by mouth every morning 7/23/18   Jorge Lara MD   eszopiclone (ESZOPICLONE) 3 MG TABS Take 1 tablet by mouth nightly as needed (insomnia) for up to 90 days. . 6/11/18 9/9/18  Jorge Lara MD   ranitidine (ZANTAC) 150 MG tablet Take 1 tablet by mouth daily 6/11/18   Jorge Lara MD   saline nasal gel (AYR) GEL by Nasal route as needed for Congestion 1/4/18   Jorge Lara MD   loratadine (CLARITIN) 10 MG tablet Take 1 tablet by mouth daily 9/20/17   Jorge Lara MD   tiotropium (Michael Plump) 18 MCG inhalation capsule Inhale 1 capsule into the lungs daily 8/14/17   Jorge Lara MD   PROLIA 60 MG/ML SOLN SC injection  4/25/17   Historical Provider, MD   albuterol sulfate  (90 BASE) MCG/ACT inhaler Inhale 2 puffs into the lungs every 6 hours as needed for Wheezing or Shortness of Breath 5/12/17   Jorge Lara MD   Spacer/Aero-Holding Chambers (AEROCHAMBER MV) MISC To be used with inhaler 5/12/17   Jorge Lara MD   dorzolamide-timolol (COSOPT) 22.3-6.8 MG/ML ophthalmic solution Place 1 drop into both eyes 2 times daily    Historical Provider, MD   Multiple Vitamins-Minerals (OCUVITE EXTRA) TABS Take 1 tablet by mouth daily    Historical Provider, MD   Biotin 2500 MCG CAPS Take 1 capsule by mouth daily    Historical Provider, MD   ALPHAGAN P 0.1 % SOLN Place 1 drop into both eyes 3 times daily  8/7/16   Historical Provider, MD   latanoprost (XALATAN) 0.005 % ophthalmic solution Place 1 drop into both eyes nightly  8/25/16   Historical Provider, MD   SENSIPAR 30 MG tablet Take 30 mg by mouth daily  12/10/15   Historical Provider, MD       Future Appointments  Date Time Provider Shirley Dunne   7/30/2018 10:30 AM SCHEDULE, TAISHA LEMA [facility]   10/23/2018 9:45 AM [facility]   10/25/2018 1:00 PM Abdi Malone MD [facility]

## 2018-07-28 PROBLEM — R03.0 ELEVATED BLOOD PRESSURE READING: Status: ACTIVE | Noted: 2018-07-28

## 2018-07-28 PROBLEM — Z13.31 POSITIVE DEPRESSION SCREENING: Status: ACTIVE | Noted: 2018-07-28

## 2018-07-28 PROBLEM — M53.3 PAIN OF RIGHT SACROILIAC JOINT: Status: ACTIVE | Noted: 2018-07-28

## 2018-07-28 ASSESSMENT — ENCOUNTER SYMPTOMS: SHORTNESS OF BREATH: 1

## 2018-07-30 ENCOUNTER — NURSE ONLY (OUTPATIENT)
Dept: FAMILY MEDICINE CLINIC | Age: 78
End: 2018-07-30
Payer: MEDICARE

## 2018-07-30 VITALS — DIASTOLIC BLOOD PRESSURE: 76 MMHG | SYSTOLIC BLOOD PRESSURE: 138 MMHG | HEART RATE: 84 BPM | OXYGEN SATURATION: 97 %

## 2018-07-30 DIAGNOSIS — R03.0 ELEVATED BLOOD PRESSURE READING: Primary | ICD-10-CM

## 2018-07-30 PROCEDURE — 99211 OFF/OP EST MAY X REQ PHY/QHP: CPT | Performed by: FAMILY MEDICINE

## 2018-07-30 NOTE — PROGRESS NOTES
Pt is here for a b/p check.  She is not currently on any b/p medication    Vitals:    07/30/18 1019   BP: 138/76   Pulse: 84   SpO2: 97%

## 2018-08-01 ENCOUNTER — HOSPITAL ENCOUNTER (OUTPATIENT)
Age: 78
Discharge: HOME | End: 2018-08-01
Payer: MEDICARE

## 2018-08-01 DIAGNOSIS — E78.5 HYPERLIPIDEMIA WITH TARGET LDL LESS THAN 100: ICD-10-CM

## 2018-08-01 LAB
CHOLESTEROL/HDL RATIO: 4.1
CHOLESTEROL: 208 MG/DL
HDLC SERPL-MCNC: 51 MG/DL
LDL CHOLESTEROL: 139 MG/DL (ref 0–130)
TRIGL SERPL-MCNC: 88 MG/DL
VLDLC SERPL CALC-MCNC: ABNORMAL MG/DL (ref 1–30)

## 2018-08-01 PROCEDURE — 80061 LIPID PANEL: CPT

## 2018-08-01 PROCEDURE — 36415 COLL VENOUS BLD VENIPUNCTURE: CPT

## 2018-08-02 DIAGNOSIS — E78.5 HYPERLIPIDEMIA WITH TARGET LDL LESS THAN 100: Primary | ICD-10-CM

## 2018-08-02 RX ORDER — ATORVASTATIN CALCIUM 10 MG/1
10 TABLET, FILM COATED ORAL DAILY
Qty: 30 TABLET | Refills: 3 | Status: SHIPPED | OUTPATIENT
Start: 2018-08-02 | End: 2019-01-23 | Stop reason: SDUPTHER

## 2018-08-13 DIAGNOSIS — F51.04 PSYCHOPHYSIOLOGICAL INSOMNIA: ICD-10-CM

## 2018-08-13 RX ORDER — ESZOPICLONE 3 MG/1
3 TABLET, FILM COATED ORAL NIGHTLY PRN
Qty: 90 TABLET | Refills: 0 | Status: SHIPPED | OUTPATIENT
Start: 2018-08-13 | End: 2018-08-21 | Stop reason: SDUPTHER

## 2018-08-13 NOTE — TELEPHONE ENCOUNTER
Controlled Substances Monitoring:     RX Monitoring 8/13/2018   Attestation The Prescription Monitoring Report for this patient was reviewed today. Documentation No signs of potential drug abuse or diversion identified. Medication Contracts Existing medication contract.

## 2018-08-21 DIAGNOSIS — F51.04 PSYCHOPHYSIOLOGICAL INSOMNIA: ICD-10-CM

## 2018-08-21 RX ORDER — ESZOPICLONE 3 MG/1
3 TABLET, FILM COATED ORAL NIGHTLY PRN
Qty: 90 TABLET | Refills: 0 | Status: SHIPPED | OUTPATIENT
Start: 2018-08-21 | End: 2018-10-23 | Stop reason: SDUPTHER

## 2018-08-21 NOTE — TELEPHONE ENCOUNTER
From: Josie Pereira  Sent: 8/21/2018 2:47 PM EDT  Subject: Medication Renewal Request    Josie Pereira would like a refill of the following medications:     eszopiclone (ESZOPICLONE) 3 MG TABS [Harper Cooper MD]   Patient Comment: [facility] has requested approval from you for this prescription    Preferred pharmacy: [address] [place] [address] , [address] [facility] [phone] - F [phone]

## 2018-08-28 ENCOUNTER — TELEPHONE (OUTPATIENT)
Dept: FAMILY MEDICINE CLINIC | Age: 78
End: 2018-08-28

## 2018-08-28 NOTE — TELEPHONE ENCOUNTER
Please fax request and I asked patient to make appointment with her cardiologist.  We need clearance from the cardiologist, then we will clear her for surgery    Mekhi Orr MD Consulting Physician Internal Medicine Cardiovascular Disease 03/10/2017 End  3/10/17   Phone: [phone];  Fax: [phone]

## 2018-09-20 ENCOUNTER — CARE COORDINATION (OUTPATIENT)
Dept: CARE COORDINATION | Age: 78
End: 2018-09-20

## 2018-09-20 ASSESSMENT — ENCOUNTER SYMPTOMS: DYSPNEA ASSOCIATED WITH: EXERTION

## 2018-09-20 NOTE — CARE COORDINATION
Yes Jong Gibson MD   dorzolamide-timolol (COSOPT) 22.3-6.8 MG/ML ophthalmic solution Place 1 drop into both eyes 2 times daily   Yes Historical Provider, MD   Multiple Vitamins-Minerals (OCUVITE EXTRA) TABS Take 1 tablet by mouth daily   Yes Historical Provider, MD   Biotin 2500 MCG CAPS Take 1 capsule by mouth daily   Yes Historical Provider, MD   ALPHAGAN P 0.1 % SOLN Place 1 drop into both eyes 3 times daily  8/7/16  Yes Historical Provider, MD   latanoprost (XALATAN) 0.005 % ophthalmic solution Place 1 drop into both eyes nightly  8/25/16  Yes Historical Provider, MD   SENSIPAR 30 MG tablet Take 30 mg by mouth daily  12/10/15  Yes Historical Provider, MD       Future Appointments  Date Time Provider Shirley Dunne   10/23/2018 9:45 AM Jong Gibson MD [facility]   10/25/2018 1:00 PM Quintin Moreau MD [address] [place] [address] Coordination Interventions    Program Enrollment:  Rising Risk  Referral from Primary Care Provider:  No  Suggested Interventions and Community Resources  Other Services:  Completed (Comment: [facility])  Zone Management Tools:  Completed (Comment: COPD)         Goals Addressed             Most Recent     Care Coordination Self Management   No change (9/20/2018)             CC Self Management Goal  Patient Goal (What steps will patient take to achieve goal? )  To overcome insomnia as directed by PCP.   Patient is able to discuss self-management of condition(s):  Pt demonstrates adherence to medications  Pt demonstrates understanding of self-monitoring  Patient is able to identify Red Flags:  Alert to potential adverse drug reactions(s) or side effects and actions to take should they arise  Discuss target symptoms and actions to take should they arise  Identify problems that require immediate PCP or specialist visit  Patient demonstrates understanding of access to PCP/Specialist:  Understands about scheduling routine Follow Up appointments Understands about sick day appointment options for worsening of symptoms/progression (Same Day, E Visits)            Prior to Admission medications    Medication Sig Start Date End Date Taking? Authorizing Provider   eszopiclone (ESZOPICLONE) 3 MG TABS Take 1 tablet by mouth nightly as needed (insomnia) for up to 90 days. . 8/21/18 11/19/18 Yes Kalie Coffman MD   atorvastatin (LIPITOR) 10 MG tablet Take 1 tablet by mouth daily 8/2/18  Yes Kalie Coffman MD   zoster recombinant adjuvanted vaccine (SHINGRIX) 50 MCG SUSR injection 50 MCG IM then repeat 2-6 months. 7/23/18 7/23/19 Yes Kalie Coffman MD   buPROPion (WELLBUTRIN XL) 150 MG extended release tablet Take 1 tablet by mouth every morning 7/23/18  Yes Kalie Coffman MD   ranitidine (ZANTAC) 150 MG tablet Take 1 tablet by mouth daily 6/11/18  Yes Kalie Coffman MD   saline nasal gel (AYR) GEL by Nasal route as needed for Congestion 1/4/18  Yes Kalie Coffman MD   loratadine (CLARITIN) 10 MG tablet Take 1 tablet by mouth daily 9/20/17  Yes Kalie Coffman MD   tiotropium (Daune Lento) 18 MCG inhalation capsule Inhale 1 capsule into the lungs daily 8/14/17  Yes Kalie Coffman MD   PROLIA 60 MG/ML SOLN SC injection  4/25/17  Yes Historical Provider, MD   albuterol sulfate  (90 BASE) MCG/ACT inhaler Inhale 2 puffs into the lungs every 6 hours as needed for Wheezing or Shortness of Breath 5/12/17  Yes Kalei Coffman MD   Spacer/Aero-Holding Chambers (AEROCHAMBER MV) MISC To be used with inhaler 5/12/17  Yes Kalie Coffman MD   dorzolamide-timolol (COSOPT) 22.3-6.8 MG/ML ophthalmic solution Place 1 drop into both eyes 2 times daily   Yes Historical Provider, MD   Multiple Vitamins-Minerals (OCUVITE EXTRA) TABS Take 1 tablet by mouth daily   Yes Historical Provider, MD   Biotin 2500 MCG CAPS Take 1 capsule by mouth daily   Yes Historical Provider, MD   ALPHAGAN P 0.1 % SOLN Place 1 drop into both eyes 3

## 2018-10-01 ENCOUNTER — HOSPITAL ENCOUNTER (OUTPATIENT)
Age: 78
Discharge: HOME | End: 2018-10-03
Payer: MEDICARE

## 2018-10-01 ENCOUNTER — HOSPITAL ENCOUNTER (OUTPATIENT)
Dept: GENERAL RADIOLOGY | Age: 78
Discharge: HOME | End: 2018-10-03
Payer: MEDICARE

## 2018-10-01 ENCOUNTER — CARE COORDINATION (OUTPATIENT)
Dept: CARE COORDINATION | Age: 78
End: 2018-10-01

## 2018-10-01 ENCOUNTER — OFFICE VISIT (OUTPATIENT)
Dept: FAMILY MEDICINE CLINIC | Age: 78
End: 2018-10-01
Payer: MEDICARE

## 2018-10-01 VITALS
OXYGEN SATURATION: 98 % | WEIGHT: 130 LBS | BODY MASS INDEX: 22.2 KG/M2 | HEART RATE: 90 BPM | TEMPERATURE: 97.9 F | HEIGHT: 64 IN | DIASTOLIC BLOOD PRESSURE: 70 MMHG | RESPIRATION RATE: 18 BRPM | SYSTOLIC BLOOD PRESSURE: 122 MMHG

## 2018-10-01 DIAGNOSIS — R06.02 SHORTNESS OF BREATH: ICD-10-CM

## 2018-10-01 DIAGNOSIS — J20.9 ACUTE BRONCHITIS WITH COPD (HCC): Primary | ICD-10-CM

## 2018-10-01 DIAGNOSIS — R06.89 DECREASED LUNG SOUNDS: ICD-10-CM

## 2018-10-01 DIAGNOSIS — J44.0 ACUTE BRONCHITIS WITH COPD (HCC): Primary | ICD-10-CM

## 2018-10-01 DIAGNOSIS — R05.9 COUGH: ICD-10-CM

## 2018-10-01 DIAGNOSIS — R09.82 PND (POST-NASAL DRIP): ICD-10-CM

## 2018-10-01 DIAGNOSIS — H92.03 OTALGIA OF BOTH EARS: ICD-10-CM

## 2018-10-01 PROCEDURE — 99214 OFFICE O/P EST MOD 30 MIN: CPT | Performed by: NURSE PRACTITIONER

## 2018-10-01 PROCEDURE — 71046 X-RAY EXAM CHEST 2 VIEWS: CPT

## 2018-10-01 RX ORDER — FLUTICASONE PROPIONATE 50 MCG
1 SPRAY, SUSPENSION (ML) NASAL DAILY
Qty: 1 BOTTLE | Refills: 1 | Status: SHIPPED | OUTPATIENT
Start: 2018-10-01 | End: 2019-04-11 | Stop reason: SDUPTHER

## 2018-10-01 RX ORDER — BENZONATATE 100 MG/1
100 CAPSULE ORAL 3 TIMES DAILY PRN
Qty: 21 CAPSULE | Refills: 0 | Status: SHIPPED | OUTPATIENT
Start: 2018-10-01 | End: 2018-10-08

## 2018-10-01 RX ORDER — AZITHROMYCIN 250 MG/1
TABLET, FILM COATED ORAL
Qty: 1 PACKET | Refills: 0 | Status: SHIPPED | OUTPATIENT
Start: 2018-10-01 | End: 2018-10-05

## 2018-10-01 RX ORDER — LORATADINE AND PSEUDOEPHEDRINE 10; 240 MG/1; MG/1
1 TABLET, EXTENDED RELEASE ORAL DAILY
Qty: 14 TABLET | Refills: 0 | Status: SHIPPED | OUTPATIENT
Start: 2018-10-01 | End: 2018-10-23 | Stop reason: ALTCHOICE

## 2018-10-01 ASSESSMENT — ENCOUNTER SYMPTOMS
SHORTNESS OF BREATH: 1
RHINORRHEA: 1
WHEEZING: 0
SORE THROAT: 1
SINUS PRESSURE: 1
COUGH: 1

## 2018-10-01 NOTE — PROGRESS NOTES
mouth daily 90 tablet 3    tiotropium (SPIRIVA HANDIHALER) 18 MCG inhalation capsule Inhale 1 capsule into the lungs daily 90 capsule 3    PROLIA 60 MG/ML SOLN SC injection   0    Spacer/Aero-Holding Chambers (AEROCHAMBER MV) MISC To be used with inhaler 1 each 0    dorzolamide-timolol (COSOPT) 22.3-6.8 MG/ML ophthalmic solution Place 1 drop into both eyes 2 times daily      Multiple Vitamins-Minerals (OCUVITE EXTRA) TABS Take 1 tablet by mouth daily      Biotin 2500 MCG CAPS Take 1 capsule by mouth daily      latanoprost (XALATAN) 0.005 % ophthalmic solution Place 1 drop into both eyes nightly       SENSIPAR 30 MG tablet Take 30 mg by mouth daily   0    atorvastatin (LIPITOR) 10 MG tablet Take 1 tablet by mouth daily 30 tablet 3    albuterol sulfate  (90 BASE) MCG/ACT inhaler Inhale 2 puffs into the lungs every 6 hours as needed for Wheezing or Shortness of Breath 18 g 3    ALPHAGAN P 0.1 % SOLN Place 1 drop into both eyes 3 times daily        No current facility-administered medications for this visit. Allergies   Allergen Reactions    Aspirin      GI upset         Subjective:      Review of Systems   Constitutional: Positive for fatigue. HENT: Positive for congestion, postnasal drip, rhinorrhea, sinus pressure and sore throat. Respiratory: Positive for cough (yellow spuutm) and shortness of breath. Negative for wheezing. Neurological: Positive for headaches. Objective:     Physical Exam   Constitutional: She is oriented to person, place, and time. She appears well-developed and well-nourished. HENT:   Head: Normocephalic and atraumatic. Right Ear: Hearing, tympanic membrane and ear canal normal.   Left Ear: Hearing, tympanic membrane and ear canal normal.   Nose: No mucosal edema.    Mouth/Throat: No posterior oropharyngeal edema or posterior oropharyngeal erythema.   +  tenderness with palpation, frontal/ethmoid/maxillary  sinuses    Eyes: Conjunctivae and EOM are normal.

## 2018-10-23 ENCOUNTER — CARE COORDINATION (OUTPATIENT)
Dept: CARE COORDINATION | Age: 78
End: 2018-10-23

## 2018-10-23 ENCOUNTER — OFFICE VISIT (OUTPATIENT)
Dept: FAMILY MEDICINE CLINIC | Age: 78
End: 2018-10-23
Payer: MEDICARE

## 2018-10-23 VITALS
HEART RATE: 98 BPM | DIASTOLIC BLOOD PRESSURE: 84 MMHG | SYSTOLIC BLOOD PRESSURE: 138 MMHG | HEIGHT: 63 IN | OXYGEN SATURATION: 98 % | BODY MASS INDEX: 23.28 KG/M2 | WEIGHT: 131.4 LBS

## 2018-10-23 DIAGNOSIS — E21.3 HYPERPARATHYROIDISM (HCC): Primary | ICD-10-CM

## 2018-10-23 DIAGNOSIS — E78.5 HYPERLIPIDEMIA WITH TARGET LDL LESS THAN 100: ICD-10-CM

## 2018-10-23 DIAGNOSIS — Z01.818 PREOPERATIVE CLEARANCE: ICD-10-CM

## 2018-10-23 DIAGNOSIS — F51.04 PSYCHOPHYSIOLOGICAL INSOMNIA: ICD-10-CM

## 2018-10-23 DIAGNOSIS — F33.41 RECURRENT MAJOR DEPRESSIVE DISORDER, IN PARTIAL REMISSION (HCC): ICD-10-CM

## 2018-10-23 DIAGNOSIS — J43.1 PANLOBULAR EMPHYSEMA (HCC): ICD-10-CM

## 2018-10-23 PROCEDURE — 99214 OFFICE O/P EST MOD 30 MIN: CPT | Performed by: FAMILY MEDICINE

## 2018-10-23 RX ORDER — ESZOPICLONE 3 MG/1
3 TABLET, FILM COATED ORAL NIGHTLY PRN
Qty: 90 TABLET | Refills: 0 | Status: SHIPPED | OUTPATIENT
Start: 2018-10-23 | End: 2019-01-23 | Stop reason: SDUPTHER

## 2018-10-23 ASSESSMENT — ENCOUNTER SYMPTOMS
DYSPNEA ASSOCIATED WITH: EXERTION
NAUSEA: 0
ABDOMINAL PAIN: 0
CHEST TIGHTNESS: 0
WHEEZING: 0
DIARRHEA: 0
ABDOMINAL DISTENTION: 0
CONSTIPATION: 0
SHORTNESS OF BREATH: 1
VOMITING: 0
COUGH: 0

## 2018-10-23 ASSESSMENT — PATIENT HEALTH QUESTIONNAIRE - PHQ9
SUM OF ALL RESPONSES TO PHQ QUESTIONS 1-9: 0
SUM OF ALL RESPONSES TO PHQ QUESTIONS 1-9: 0
SUM OF ALL RESPONSES TO PHQ9 QUESTIONS 1 & 2: 0
2. FEELING DOWN, DEPRESSED OR HOPELESS: 0
1. LITTLE INTEREST OR PLEASURE IN DOING THINGS: 0

## 2018-10-23 NOTE — PROGRESS NOTES
her sedated, Lunesta 2 mg which didn't help, melatonin, trazodone, mirtazapine, Rozerem. Patient also had interventions with Dr. Mandeep Balderas for cognitive behavioral therapy. COPD  Patient reports dyspnea on exertion at baseline. Patient denies wheezing, hemoptysis, chest pain, chest tightness or cough. Patient reports compliance with Spiriva daily. Went to [employer] and she needed the inhaler more often because she was more active and she walked a lot. Patient says she needed to use the inhaler more when walking 4 miles at a time. She is also using the inhaler when going to [employer]YM. Patient reports she can climb up 1 flight of stairs before she gets short of breath. Chest x-ray on 10/1/18 and within normal limits. 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 Pulse ox is normal  SpO2 Readings from Last 5 Encounters:   10/23/18 98%   10/01/18 98%   07/30/18 97%   07/23/18 99%   05/09/18 100%       Depression is better since she restarted Wellbutrin  Patient denies depression, hopelessness, anhedonia. He continues to struggle with fatigue and insomnia. Denies suicidal ideation, plan or intent. Tolerates Wellbutrin well. [x]Negative depressionscreening. PHQ Scores 10/23/2018 7/23/2018 1/4/2018 8/14/2017 5/12/2017 3/10/2017 10/20/2016   PHQ2 Score 0 3 1 0 0 0 0   PHQ9 Score 0 9 6 0 0 4 0      []1-4 = Minimal depression   []5-9 = Mild depression   []10-14 = Moderate depression   []15-19 = Moderately severe depression   []20-27 = Severe depression      Hyperlipidemia:  No new myalgias or GI upset on atorvastatin (Lipitor). Medication compliance: compliant all of the time. Patient is  following a low fat, low cholesterol diet. She is  exercising regularly.      Lab Results   Component Value Date    CHOL 208 (H) 08/01/2018    TRIG 88 08/01/2018    HDL 51 08/01/2018     Lab Results   Component Value Date    ALT 12 03/12/2018    AST 18 03/12/2018            Allergies   Allergen Reactions    Aspirin Head: Normocephalic and atraumatic. Right Ear: External ear normal.   Left Ear: External ear normal.   Nose: Nose normal.   Mouth/Throat: Oropharynx is clear and moist. No oropharyngeal exudate. Eyes: Conjunctivae and EOM are normal. Right eye exhibits no discharge. Left eye exhibits no discharge. No scleral icterus. Neck: Normal range of motion. Neck supple. No thyromegaly present. Cardiovascular: Normal rate, regular rhythm, normal heart sounds and intact distal pulses. Pulmonary/Chest: Effort normal. No respiratory distress. She has decreased breath sounds in the right lower field and the left lower field. She has no wheezes. She has no rales. She exhibits no tenderness. Abdominal: Soft. Bowel sounds are normal. She exhibits no distension. There is no tenderness. Musculoskeletal: Normal range of motion. She exhibits no edema or tenderness. Up and go test less than 12 seconds. Neurological: She is alert and oriented to person, place, and time. No cranial nerve deficit. She exhibits normal muscle tone. Skin: Skin is warm and dry. Capillary refill takes less than 2 seconds. No rash noted. She is not diaphoretic. Psychiatric: Her behavior is normal. Judgment and thought content normal. Her mood appears anxious. Nursing note and vitals reviewed. Discussed testing with the patient and all questions fully answered.   Hyperlipidemia  Otherwise labs within normal limits    Hospital Outpatient Visit on 08/01/2018   Component Date Value Ref Range Status    Cholesterol 08/01/2018 208* <200 mg/dL Final    Comment:    Cholesterol Guidelines:      <200  Desirable   200-240  Borderline      >240  Undesirable         HDL 08/01/2018 51  >40 mg/dL Final    Comment:    HDL Guidelines:    <40     Undesirable   40-59    Borderline    >59     Desirable         LDL Cholesterol 08/01/2018 139* 0 - 130 mg/dL Final    Comment:    LDL Guidelines:     <100    Desirable   100-129   Near to/above Score 0 9 6 0 0 4 0     Interpretation of Total Score Depression Severity: 1-4 = Minimal depression, 5-9 = Mild depression, 10-14 = Moderate depression, 15-19 = Moderately severe depression, 20-27 = Severe depression      The patient's past medical, surgical, social, and family history as well as her   current medicationsand allergies were reviewed as documented in today's encounter. Medications, labs, diagnostic studies, consultations and follow-up as documented in this encounter. Return in about 3 months (around 1/23/2019) for depression, COPD, O2. Patient was seen with total face to face time of  25 minutes. More than 50% of this visit was counselingand education. Future Appointments  Date Time Provider Shirley Dunne   1/23/2019 10:15 AM Andrea Merchant MD [facility]     This note was completed by usingthe assistance of a speech-recognition program. However, inadvertent computerized transcription errors may be present. Although every effort was made to ensure accuracy, no guarantees can be provided that every mistake hasbeen identified and corrected by editing.   Electronically signed by Andrea Merchant MD on 10/28/2018  4:39 PM

## 2018-10-23 NOTE — PATIENT INSTRUCTIONS
hours). You inhale most bronchodilators, so they start to act quickly. Always carry your quick-relief inhaler with you in case you need it while you are away from home. ¨ Corticosteroids (prednisone, budesonide). These reduce airway inflammation. They come in pill or inhaled form. You must take these medicines every day for them to work well.     · A spacer may help you get more inhaled medicine to your lungs. Ask your doctor or pharmacist if a spacer is right for you. If it is, ask how to use it properly.     · Do not take any vitamins, over-the-counter medicine, or herbal products without talking to your doctor first.     · If your doctor prescribed antibiotics, take them as directed. Do not stop taking them just because you feel better. You need to take the full course of antibiotics.     · Oxygen therapy boosts the amount of oxygen in your blood and helps you breathe easier. Use the flow rate your doctor has recommended, and do not change it without talking to your doctor first.   Activity    · Get regular exercise. Walking is an easy way to get exercise. Start out slowly, and walk a little more each day.     · Pay attention to your breathing. You are exercising too hard if you cannot talk while you are exercising.     · Take short rest breaks when doing household chores and other activities.     · Learn breathing methods-such as breathing through pursed lips-to help you become less short of breath.     · If your doctor has not set you up with a pulmonary rehabilitation program, talk to him or her about whether rehab is right for you. Rehab includes exercise programs, education about your disease and how to manage it, help with diet and other changes, and emotional support. Diet    · Eat regular, healthy meals. Use bronchodilators about 1 hour before you eat to make it easier to eat. Eat several small meals instead of three large ones. Drink beverages at the end of the meal. Avoid foods that are hard to chew.

## 2018-10-24 ENCOUNTER — TELEPHONE (OUTPATIENT)
Dept: INFUSION THERAPY | Age: 78
End: 2018-10-24

## 2018-10-26 ENCOUNTER — TELEPHONE (OUTPATIENT)
Dept: INFUSION THERAPY | Age: 78
End: 2018-10-26

## 2018-10-28 PROBLEM — N13.30 HYDRONEPHROSIS: Status: RESOLVED | Noted: 2018-03-25 | Resolved: 2018-10-28

## 2018-10-28 PROBLEM — R03.0 ELEVATED BLOOD PRESSURE READING: Status: RESOLVED | Noted: 2018-07-28 | Resolved: 2018-10-28

## 2018-11-06 ENCOUNTER — PATIENT MESSAGE (OUTPATIENT)
Dept: FAMILY MEDICINE CLINIC | Age: 78
End: 2018-11-06

## 2018-11-08 ENCOUNTER — HOSPITAL ENCOUNTER (OUTPATIENT)
Age: 78
Discharge: HOME | End: 2018-11-08
Payer: MEDICARE

## 2018-11-08 LAB
CALCIUM SERPL-MCNC: 8.6 MG/DL (ref 8.6–10.4)
PTH INTACT: 72.72 PG/ML (ref 15–65)

## 2018-11-08 PROCEDURE — 83970 ASSAY OF PARATHORMONE: CPT

## 2018-11-08 PROCEDURE — 82310 ASSAY OF CALCIUM: CPT

## 2018-11-08 PROCEDURE — 36415 COLL VENOUS BLD VENIPUNCTURE: CPT

## 2018-11-14 ENCOUNTER — CARE COORDINATION (OUTPATIENT)
Dept: CARE COORDINATION | Age: 78
End: 2018-11-14

## 2018-11-30 ENCOUNTER — HOSPITAL ENCOUNTER (OUTPATIENT)
Age: 78
Discharge: HOME | End: 2018-11-30
Payer: MEDICARE

## 2018-11-30 LAB
CALCIUM SERPL-MCNC: 9 MG/DL (ref 8.6–10.4)
PTH INTACT: 98.53 PG/ML (ref 15–65)
VITAMIN D 25-HYDROXY: 37.4 NG/ML (ref 30–100)

## 2018-11-30 PROCEDURE — 82310 ASSAY OF CALCIUM: CPT

## 2018-11-30 PROCEDURE — 83970 ASSAY OF PARATHORMONE: CPT

## 2018-11-30 PROCEDURE — 36415 COLL VENOUS BLD VENIPUNCTURE: CPT

## 2018-11-30 PROCEDURE — 82306 VITAMIN D 25 HYDROXY: CPT

## 2018-12-11 ENCOUNTER — TELEPHONE (OUTPATIENT)
Dept: INFUSION THERAPY | Age: 78
End: 2018-12-11

## 2018-12-13 RX ORDER — EPINEPHRINE 1 MG/ML
0.3 INJECTION, SOLUTION, CONCENTRATE INTRAVENOUS PRN
Status: CANCELLED | OUTPATIENT
Start: 2018-12-14

## 2018-12-13 RX ORDER — 0.9 % SODIUM CHLORIDE 0.9 %
10 VIAL (ML) INJECTION ONCE
Status: CANCELLED | OUTPATIENT
Start: 2018-12-14 | End: 2018-12-14

## 2018-12-13 RX ORDER — METHYLPREDNISOLONE SODIUM SUCCINATE 125 MG/2ML
125 INJECTION, POWDER, LYOPHILIZED, FOR SOLUTION INTRAMUSCULAR; INTRAVENOUS ONCE
Status: CANCELLED | OUTPATIENT
Start: 2018-12-14 | End: 2018-12-14

## 2018-12-13 RX ORDER — DIPHENHYDRAMINE HYDROCHLORIDE 50 MG/ML
50 INJECTION INTRAMUSCULAR; INTRAVENOUS ONCE
Status: CANCELLED | OUTPATIENT
Start: 2018-12-14 | End: 2018-12-14

## 2018-12-13 RX ORDER — SODIUM CHLORIDE 9 MG/ML
100 INJECTION, SOLUTION INTRAVENOUS CONTINUOUS
Status: CANCELLED | OUTPATIENT
Start: 2018-12-14

## 2018-12-19 ENCOUNTER — TELEPHONE (OUTPATIENT)
Dept: INFUSION THERAPY | Age: 78
End: 2018-12-19

## 2018-12-28 ENCOUNTER — HOSPITAL ENCOUNTER (OUTPATIENT)
Dept: INPATIENT UNIT | Age: 78
Setting detail: THERAPIES SERIES
Discharge: HOME | End: 2018-12-28
Payer: MEDICARE

## 2018-12-28 VITALS
OXYGEN SATURATION: 99 % | TEMPERATURE: 97 F | RESPIRATION RATE: 16 BRPM | SYSTOLIC BLOOD PRESSURE: 139 MMHG | HEART RATE: 79 BPM | DIASTOLIC BLOOD PRESSURE: 68 MMHG

## 2018-12-28 DIAGNOSIS — M81.0 OSTEOPOROSIS, UNSPECIFIED OSTEOPOROSIS TYPE, UNSPECIFIED PATHOLOGICAL FRACTURE PRESENCE: ICD-10-CM

## 2018-12-28 LAB
CALCIUM SERPL-MCNC: 9.2 MG/DL (ref 8.6–10.4)
CREAT SERPL-MCNC: 1.03 MG/DL (ref 0.5–0.9)
GFR AFRICAN AMERICAN: >60 ML/MIN
GFR NON-AFRICAN AMERICAN: 52 ML/MIN
GFR SERPL CREATININE-BSD FRML MDRD: ABNORMAL ML/MIN/{1.73_M2}
GFR SERPL CREATININE-BSD FRML MDRD: ABNORMAL ML/MIN/{1.73_M2}
MAGNESIUM: 1.9 MG/DL (ref 1.6–2.6)
PHOSPHORUS: 3.1 MG/DL (ref 2.6–4.5)

## 2018-12-28 PROCEDURE — 96372 THER/PROPH/DIAG INJ SC/IM: CPT

## 2018-12-28 PROCEDURE — 83735 ASSAY OF MAGNESIUM: CPT

## 2018-12-28 PROCEDURE — 82310 ASSAY OF CALCIUM: CPT

## 2018-12-28 PROCEDURE — 82565 ASSAY OF CREATININE: CPT

## 2018-12-28 PROCEDURE — 36415 COLL VENOUS BLD VENIPUNCTURE: CPT

## 2018-12-28 PROCEDURE — 6360000002 HC RX W HCPCS: Performed by: INTERNAL MEDICINE

## 2018-12-28 PROCEDURE — 84100 ASSAY OF PHOSPHORUS: CPT

## 2018-12-28 RX ORDER — 0.9 % SODIUM CHLORIDE 0.9 %
10 VIAL (ML) INJECTION ONCE
Status: CANCELLED | OUTPATIENT
Start: 2018-12-28 | End: 2018-12-28

## 2018-12-28 RX ORDER — DIPHENHYDRAMINE HYDROCHLORIDE 50 MG/ML
50 INJECTION INTRAMUSCULAR; INTRAVENOUS ONCE
Status: CANCELLED | OUTPATIENT
Start: 2018-12-28 | End: 2018-12-28

## 2018-12-28 RX ORDER — SODIUM CHLORIDE 9 MG/ML
100 INJECTION, SOLUTION INTRAVENOUS CONTINUOUS
Status: CANCELLED | OUTPATIENT
Start: 2018-12-28

## 2018-12-28 RX ORDER — EPINEPHRINE 1 MG/ML
0.3 INJECTION, SOLUTION, CONCENTRATE INTRAVENOUS PRN
Status: CANCELLED | OUTPATIENT
Start: 2018-12-28

## 2018-12-28 RX ORDER — METHYLPREDNISOLONE SODIUM SUCCINATE 125 MG/2ML
125 INJECTION, POWDER, LYOPHILIZED, FOR SOLUTION INTRAMUSCULAR; INTRAVENOUS ONCE
Status: CANCELLED | OUTPATIENT
Start: 2018-12-28 | End: 2018-12-28

## 2018-12-28 RX ADMIN — DENOSUMAB 60 MG: 60 INJECTION SUBCUTANEOUS at 14:38

## 2019-01-18 ENCOUNTER — CARE COORDINATION (OUTPATIENT)
Dept: CARE COORDINATION | Age: 79
End: 2019-01-18

## 2019-01-23 ENCOUNTER — CARE COORDINATION (OUTPATIENT)
Dept: CARE COORDINATION | Age: 79
End: 2019-01-23

## 2019-01-23 ENCOUNTER — OFFICE VISIT (OUTPATIENT)
Dept: FAMILY MEDICINE CLINIC | Age: 79
End: 2019-01-23
Payer: MEDICARE

## 2019-01-23 ENCOUNTER — TELEPHONE (OUTPATIENT)
Dept: FAMILY MEDICINE CLINIC | Age: 79
End: 2019-01-23

## 2019-01-23 VITALS
OXYGEN SATURATION: 98 % | DIASTOLIC BLOOD PRESSURE: 85 MMHG | SYSTOLIC BLOOD PRESSURE: 142 MMHG | HEART RATE: 94 BPM | BODY MASS INDEX: 23.53 KG/M2 | HEIGHT: 63 IN | WEIGHT: 132.8 LBS

## 2019-01-23 DIAGNOSIS — Z23 NEED FOR PROPHYLACTIC VACCINATION AND INOCULATION AGAINST VARICELLA: ICD-10-CM

## 2019-01-23 DIAGNOSIS — R53.82 CHRONIC FATIGUE: Primary | ICD-10-CM

## 2019-01-23 DIAGNOSIS — E89.2 S/P PARATHYROIDECTOMY (HCC): ICD-10-CM

## 2019-01-23 DIAGNOSIS — Z29.9 PREVENTIVE MEASURE: ICD-10-CM

## 2019-01-23 DIAGNOSIS — E78.5 HYPERLIPIDEMIA WITH TARGET LDL LESS THAN 100: ICD-10-CM

## 2019-01-23 DIAGNOSIS — Z12.31 ENCOUNTER FOR SCREENING MAMMOGRAM FOR BREAST CANCER: ICD-10-CM

## 2019-01-23 DIAGNOSIS — F33.42 MDD (MAJOR DEPRESSIVE DISORDER), RECURRENT, IN FULL REMISSION (HCC): ICD-10-CM

## 2019-01-23 DIAGNOSIS — J43.1 PANLOBULAR EMPHYSEMA (HCC): ICD-10-CM

## 2019-01-23 DIAGNOSIS — R03.0 ELEVATED BLOOD PRESSURE READING: ICD-10-CM

## 2019-01-23 DIAGNOSIS — F51.04 PSYCHOPHYSIOLOGICAL INSOMNIA: ICD-10-CM

## 2019-01-23 DIAGNOSIS — E21.3 HYPERPARATHYROIDISM (HCC): ICD-10-CM

## 2019-01-23 PROBLEM — Z90.89 S/P PARATHYROIDECTOMY: Status: ACTIVE | Noted: 2018-11-16

## 2019-01-23 PROBLEM — Z13.31 POSITIVE DEPRESSION SCREENING: Status: RESOLVED | Noted: 2018-07-28 | Resolved: 2019-01-23

## 2019-01-23 PROBLEM — Z98.890 S/P PARATHYROIDECTOMY: Status: ACTIVE | Noted: 2018-11-16

## 2019-01-23 PROCEDURE — G8510 SCR DEP NEG, NO PLAN REQD: HCPCS | Performed by: FAMILY MEDICINE

## 2019-01-23 PROCEDURE — 99214 OFFICE O/P EST MOD 30 MIN: CPT | Performed by: FAMILY MEDICINE

## 2019-01-23 RX ORDER — ATORVASTATIN CALCIUM 10 MG/1
10 TABLET, FILM COATED ORAL DAILY
Qty: 30 TABLET | Refills: 11 | Status: SHIPPED | OUTPATIENT
Start: 2019-01-23 | End: 2020-02-18

## 2019-01-23 RX ORDER — ESZOPICLONE 3 MG/1
3 TABLET, FILM COATED ORAL NIGHTLY PRN
Qty: 30 TABLET | Refills: 0 | Status: SHIPPED | OUTPATIENT
Start: 2019-01-23 | End: 2019-02-22

## 2019-01-23 RX ORDER — ASPIRIN 81 MG/1
81 TABLET ORAL DAILY
Qty: 90 TABLET | Refills: 0
Start: 2019-01-23 | End: 2019-08-02

## 2019-01-23 RX ORDER — MIRTAZAPINE 30 MG/1
30 TABLET, FILM COATED ORAL NIGHTLY
Qty: 30 TABLET | Refills: 0 | Status: SHIPPED | OUTPATIENT
Start: 2019-01-23 | End: 2019-04-30 | Stop reason: ALTCHOICE

## 2019-01-23 ASSESSMENT — ENCOUNTER SYMPTOMS
SHORTNESS OF BREATH: 1
NAUSEA: 0
ABDOMINAL PAIN: 0
COUGH: 0
WHEEZING: 0
TROUBLE SWALLOWING: 0
CHEST TIGHTNESS: 0
ABDOMINAL DISTENTION: 0
CONSTIPATION: 0
VOMITING: 0
DIARRHEA: 0

## 2019-01-23 ASSESSMENT — PATIENT HEALTH QUESTIONNAIRE - PHQ9
SUM OF ALL RESPONSES TO PHQ QUESTIONS 1-9: 0
1. LITTLE INTEREST OR PLEASURE IN DOING THINGS: 0
2. FEELING DOWN, DEPRESSED OR HOPELESS: 0
SUM OF ALL RESPONSES TO PHQ9 QUESTIONS 1 & 2: 0
SUM OF ALL RESPONSES TO PHQ QUESTIONS 1-9: 0

## 2019-01-28 ENCOUNTER — APPOINTMENT (OUTPATIENT)
Dept: GENERAL RADIOLOGY | Age: 79
End: 2019-01-28
Payer: MEDICARE

## 2019-01-28 ENCOUNTER — HOSPITAL ENCOUNTER (EMERGENCY)
Age: 79
Discharge: HOME | End: 2019-01-28
Attending: EMERGENCY MEDICINE
Payer: MEDICARE

## 2019-01-28 VITALS
HEART RATE: 93 BPM | OXYGEN SATURATION: 97 % | SYSTOLIC BLOOD PRESSURE: 151 MMHG | RESPIRATION RATE: 17 BRPM | DIASTOLIC BLOOD PRESSURE: 75 MMHG | TEMPERATURE: 98.2 F

## 2019-01-28 DIAGNOSIS — R07.81 RIB PAIN ON LEFT SIDE: ICD-10-CM
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
## 2019-01-28 PROCEDURE — 6370000000 HC RX 637 (ALT 250 FOR IP): Performed by: PHYSICIAN ASSISTANT

## 2019-01-28 PROCEDURE — 99283 EMERGENCY DEPT VISIT LOW MDM: CPT

## 2019-01-28 PROCEDURE — 71101 X-RAY EXAM UNILAT RIBS/CHEST: CPT

## 2019-01-28 RX ORDER — CYCLOBENZAPRINE HCL 10 MG
10 TABLET ORAL 3 TIMES DAILY PRN
Qty: 15 TABLET | Refills: 0 | Status: SHIPPED | OUTPATIENT
Start: 2019-01-28 | End: 2019-08-02

## 2019-01-28 RX ORDER — HYDROCODONE BITARTRATE AND ACETAMINOPHEN 5; 325 MG/1; MG/1
1 TABLET ORAL ONCE
Status: COMPLETED | OUTPATIENT
Start: 2019-01-28 | End: 2019-01-28

## 2019-01-28 RX ORDER — HYDROCODONE BITARTRATE AND ACETAMINOPHEN 5; 325 MG/1; MG/1
1 TABLET ORAL EVERY 4 HOURS PRN
Qty: 12 TABLET | Refills: 0 | Status: SHIPPED | OUTPATIENT
Start: 2019-01-28 | End: 2019-01-31

## 2019-01-28 RX ORDER — CYCLOBENZAPRINE HCL 10 MG
10 TABLET ORAL ONCE
Status: COMPLETED | OUTPATIENT
Start: 2019-01-28 | End: 2019-01-28

## 2019-01-28 RX ADMIN — HYDROCODONE BITARTRATE AND ACETAMINOPHEN 1 TABLET: 5; 325 TABLET ORAL at 12:08

## 2019-01-28 RX ADMIN — CYCLOBENZAPRINE HYDROCHLORIDE 10 MG: 10 TABLET, FILM COATED ORAL at 12:08

## 2019-01-28 ASSESSMENT — PAIN DESCRIPTION - LOCATION: LOCATION: BACK

## 2019-01-28 ASSESSMENT — PAIN DESCRIPTION - ORIENTATION: ORIENTATION: LEFT;MID

## 2019-01-28 ASSESSMENT — PAIN SCALES - GENERAL: PAINLEVEL_OUTOF10: 10

## 2019-01-28 ASSESSMENT — PAIN DESCRIPTION - PAIN TYPE: TYPE: ACUTE PAIN

## 2019-01-29 ENCOUNTER — OFFICE VISIT (OUTPATIENT)
Dept: FAMILY MEDICINE CLINIC | Age: 79
End: 2019-01-29
Payer: MEDICARE

## 2019-01-29 ENCOUNTER — TELEPHONE (OUTPATIENT)
Dept: FAMILY MEDICINE CLINIC | Age: 79
End: 2019-01-29

## 2019-01-29 VITALS
OXYGEN SATURATION: 96 % | SYSTOLIC BLOOD PRESSURE: 115 MMHG | HEIGHT: 63 IN | BODY MASS INDEX: 23.71 KG/M2 | HEART RATE: 92 BPM | DIASTOLIC BLOOD PRESSURE: 62 MMHG | WEIGHT: 133.8 LBS

## 2019-01-29 DIAGNOSIS — M54.50 ACUTE LEFT-SIDED LOW BACK PAIN WITHOUT SCIATICA: ICD-10-CM
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
## 2019-01-29 PROCEDURE — 99213 OFFICE O/P EST LOW 20 MIN: CPT | Performed by: FAMILY MEDICINE

## 2019-01-29 RX ORDER — LIDOCAINE 40 MG/G
CREAM TOPICAL
Qty: 45 G | Refills: 3 | Status: SHIPPED | OUTPATIENT
Start: 2019-01-29 | End: 2019-08-02

## 2019-01-29 ASSESSMENT — ENCOUNTER SYMPTOMS
DIARRHEA: 0
WHEEZING: 0
CONSTIPATION: 0
COUGH: 0
SHORTNESS OF BREATH: 0
BACK PAIN: 1
ABDOMINAL PAIN: 0

## 2019-02-02 ENCOUNTER — PATIENT MESSAGE (OUTPATIENT)
Dept: FAMILY MEDICINE CLINIC | Age: 79
End: 2019-02-02

## 2019-02-02 DIAGNOSIS — F51.04 PSYCHOPHYSIOLOGICAL INSOMNIA: Primary | ICD-10-CM

## 2019-03-13 ENCOUNTER — HOSPITAL ENCOUNTER (OUTPATIENT)
Age: 79
Discharge: HOME | End: 2019-03-13
Payer: MEDICARE

## 2019-03-13 DIAGNOSIS — R03.0 ELEVATED BLOOD PRESSURE READING: ICD-10-CM

## 2019-03-13 DIAGNOSIS — E78.5 HYPERLIPIDEMIA WITH TARGET LDL LESS THAN 100: ICD-10-CM

## 2019-03-13 DIAGNOSIS — R53.82 CHRONIC FATIGUE: ICD-10-CM

## 2019-03-13 LAB
ALBUMIN SERPL-MCNC: 4 G/DL (ref 3.5–5.2)
ALBUMIN/GLOBULIN RATIO: ABNORMAL (ref 1–2.5)
ALP BLD-CCNC: 89 U/L (ref 35–104)
ALT SERPL-CCNC: 15 U/L (ref 5–33)
ANION GAP SERPL CALCULATED.3IONS-SCNC: 9 MMOL/L (ref 9–17)
AST SERPL-CCNC: 20 U/L
BILIRUB SERPL-MCNC: 0.52 MG/DL (ref 0.3–1.2)
BUN BLDV-MCNC: 16 MG/DL (ref 8–23)
BUN/CREAT BLD: ABNORMAL (ref 9–20)
CALCIUM IONIZED: 1.27 MMOL/L (ref 1.13–1.33)
CALCIUM SERPL-MCNC: 9.4 MG/DL (ref 8.6–10.4)
CALCIUM SERPL-MCNC: 9.4 MG/DL (ref 8.6–10.4)
CHLORIDE BLD-SCNC: 105 MMOL/L (ref 98–107)
CHOLESTEROL/HDL RATIO: 2.7
CHOLESTEROL: 160 MG/DL
CO2: 26 MMOL/L (ref 20–31)
CREAT SERPL-MCNC: 1.22 MG/DL (ref 0.5–0.9)
GFR AFRICAN AMERICAN: 52 ML/MIN
GFR NON-AFRICAN AMERICAN: 43 ML/MIN
GFR SERPL CREATININE-BSD FRML MDRD: ABNORMAL ML/MIN/{1.73_M2}
GFR SERPL CREATININE-BSD FRML MDRD: ABNORMAL ML/MIN/{1.73_M2}
GLUCOSE BLD-MCNC: 111 MG/DL (ref 70–99)
HCT VFR BLD CALC: 41.6 % (ref 36–46)
HDLC SERPL-MCNC: 59 MG/DL
HEMOGLOBIN: 13.8 G/DL (ref 12–16)
LDL CHOLESTEROL: 80 MG/DL (ref 0–130)
MCH RBC QN AUTO: 30.9 PG (ref 26–34)
MCHC RBC AUTO-ENTMCNC: 33.3 G/DL (ref 31–37)
MCV RBC AUTO: 92.9 FL (ref 80–100)
NRBC AUTOMATED: NORMAL PER 100 WBC
PDW BLD-RTO: 13.8 % (ref 11.5–14.9)
PLATELET # BLD: 215 K/UL (ref 150–450)
PMV BLD AUTO: 8.5 FL (ref 6–12)
POTASSIUM SERPL-SCNC: 4.5 MMOL/L (ref 3.7–5.3)
PTH INTACT: 57.51 PG/ML (ref 15–65)
RBC # BLD: 4.47 M/UL (ref 4–5.2)
SODIUM BLD-SCNC: 140 MMOL/L (ref 135–144)
TOTAL PROTEIN: 6.5 G/DL (ref 6.4–8.3)
TRIGL SERPL-MCNC: 106 MG/DL
VITAMIN D 25-HYDROXY: 50.4 NG/ML (ref 30–100)
VLDLC SERPL CALC-MCNC: NORMAL MG/DL (ref 1–30)
WBC # BLD: 4.6 K/UL (ref 3.5–11)

## 2019-03-13 PROCEDURE — 82310 ASSAY OF CALCIUM: CPT

## 2019-03-13 PROCEDURE — 80061 LIPID PANEL: CPT

## 2019-03-13 PROCEDURE — 82523 COLLAGEN CROSSLINKS: CPT

## 2019-03-13 PROCEDURE — 36415 COLL VENOUS BLD VENIPUNCTURE: CPT

## 2019-03-13 PROCEDURE — 83970 ASSAY OF PARATHORMONE: CPT

## 2019-03-13 PROCEDURE — 80053 COMPREHEN METABOLIC PANEL: CPT

## 2019-03-13 PROCEDURE — 85027 COMPLETE CBC AUTOMATED: CPT

## 2019-03-13 PROCEDURE — 82306 VITAMIN D 25 HYDROXY: CPT

## 2019-03-13 PROCEDURE — 82330 ASSAY OF CALCIUM: CPT

## 2019-03-16 LAB
CREATININE URINE /VOLUME: 102 MG/DL
N-TELO/CREAT. RATIO: 8

## 2019-03-18 ENCOUNTER — OFFICE VISIT (OUTPATIENT)
Dept: PULMONOLOGY | Age: 79
End: 2019-03-18
Payer: MEDICARE

## 2019-03-18 VITALS
SYSTOLIC BLOOD PRESSURE: 129 MMHG | WEIGHT: 132 LBS | DIASTOLIC BLOOD PRESSURE: 78 MMHG | HEIGHT: 64 IN | RESPIRATION RATE: 14 BRPM | BODY MASS INDEX: 22.53 KG/M2 | OXYGEN SATURATION: 97 % | HEART RATE: 80 BPM

## 2019-03-18 DIAGNOSIS — J43.1 PANLOBULAR EMPHYSEMA (HCC): ICD-10-CM

## 2019-03-18 DIAGNOSIS — E55.9 VITAMIN D DEFICIENCY: Primary | ICD-10-CM

## 2019-03-18 DIAGNOSIS — F06.4 ANXIETY DISORDER DUE TO KNOWN PHYSIOLOGICAL CONDITION: ICD-10-CM

## 2019-03-18 DIAGNOSIS — E21.3 HYPERPARATHYROIDISM (HCC): ICD-10-CM

## 2019-03-18 DIAGNOSIS — F51.04 PSYCHOPHYSIOLOGICAL INSOMNIA: ICD-10-CM

## 2019-03-18 DIAGNOSIS — N18.30 CKD (CHRONIC KIDNEY DISEASE) STAGE 3, GFR 30-59 ML/MIN (HCC): ICD-10-CM

## 2019-03-18 PROCEDURE — 99204 OFFICE O/P NEW MOD 45 MIN: CPT | Performed by: INTERNAL MEDICINE

## 2019-03-18 RX ORDER — ESZOPICLONE 3 MG/1
3 TABLET, FILM COATED ORAL NIGHTLY
COMMUNITY
End: 2019-04-30 | Stop reason: SDUPTHER

## 2019-03-18 RX ORDER — TRAZODONE HYDROCHLORIDE 50 MG/1
50 TABLET ORAL 2 TIMES DAILY
Qty: 60 TABLET | Refills: 3 | Status: SHIPPED | OUTPATIENT
Start: 2019-03-18 | End: 2019-08-02

## 2019-03-21 ENCOUNTER — HOSPITAL ENCOUNTER (OUTPATIENT)
Age: 79
Discharge: HOME | End: 2019-03-21
Payer: MEDICARE

## 2019-03-21 DIAGNOSIS — F06.4 ANXIETY DISORDER DUE TO KNOWN PHYSIOLOGICAL CONDITION: ICD-10-CM

## 2019-03-21 DIAGNOSIS — F51.04 PSYCHOPHYSIOLOGICAL INSOMNIA: ICD-10-CM

## 2019-03-21 LAB
ANION GAP SERPL CALCULATED.3IONS-SCNC: 12 MMOL/L (ref 9–17)
BUN BLDV-MCNC: 16 MG/DL (ref 8–23)
BUN/CREAT BLD: ABNORMAL (ref 9–20)
CALCIUM SERPL-MCNC: 9.2 MG/DL (ref 8.6–10.4)
CHLORIDE BLD-SCNC: 105 MMOL/L (ref 98–107)
CO2: 24 MMOL/L (ref 20–31)
CREAT SERPL-MCNC: 1.19 MG/DL (ref 0.5–0.9)
GFR AFRICAN AMERICAN: 53 ML/MIN
GFR NON-AFRICAN AMERICAN: 44 ML/MIN
GFR SERPL CREATININE-BSD FRML MDRD: ABNORMAL ML/MIN/{1.73_M2}
GFR SERPL CREATININE-BSD FRML MDRD: ABNORMAL ML/MIN/{1.73_M2}
GLUCOSE BLD-MCNC: 106 MG/DL (ref 70–99)
POTASSIUM SERPL-SCNC: 4.2 MMOL/L (ref 3.7–5.3)
SODIUM BLD-SCNC: 141 MMOL/L (ref 135–144)
TSH SERPL DL<=0.05 MIU/L-ACNC: 2.97 MIU/L (ref 0.3–5)

## 2019-03-21 PROCEDURE — 80048 BASIC METABOLIC PNL TOTAL CA: CPT

## 2019-03-21 PROCEDURE — 84443 ASSAY THYROID STIM HORMONE: CPT

## 2019-03-21 PROCEDURE — 36415 COLL VENOUS BLD VENIPUNCTURE: CPT

## 2019-04-11 DIAGNOSIS — J30.9 ALLERGIC RHINITIS: ICD-10-CM

## 2019-04-11 DIAGNOSIS — R09.82 PND (POST-NASAL DRIP): ICD-10-CM

## 2019-04-11 RX ORDER — FLUTICASONE PROPIONATE 50 MCG
1 SPRAY, SUSPENSION (ML) NASAL DAILY
Qty: 1 BOTTLE | Refills: 1 | Status: SHIPPED | OUTPATIENT
Start: 2019-04-11 | End: 2020-02-05

## 2019-04-11 RX ORDER — LORATADINE 10 MG/1
10 TABLET ORAL DAILY
Qty: 90 TABLET | Refills: 3 | Status: SHIPPED | OUTPATIENT
Start: 2019-04-11 | End: 2020-02-19

## 2019-04-11 NOTE — TELEPHONE ENCOUNTER
Please Approve or Refuse.   Send to Pharmacy per Pt's Request: Flonase     Next Visit Date:  4/30/2019   Last Visit Date: 1/29/2019    Hemoglobin A1C (%)   Date Value   03/12/2018 5.5   10/11/2017 5.5             ( goal A1C is < 7)   BP Readings from Last 3 Encounters:   03/18/19 129/78   01/29/19 115/62   01/28/19 (!) 151/75          (goal 120/80)  BUN   Date Value Ref Range Status   03/21/2019 16 8 - 23 mg/dL Final     CREATININE   Date Value Ref Range Status   03/21/2019 1.19 (H) 0.50 - 0.90 mg/dL Final     Potassium   Date Value Ref Range Status   03/21/2019 4.2 3.7 - 5.3 mmol/L Final

## 2019-04-11 NOTE — TELEPHONE ENCOUNTER
Please Approve or Refuse.   Send to Pharmacy per Pt's Request: Claritin     Next Visit Date:  4/11/2019   Last Visit Date: 1/29/2019    Hemoglobin A1C (%)   Date Value   03/12/2018 5.5   10/11/2017 5.5             ( goal A1C is < 7)   BP Readings from Last 3 Encounters:   03/18/19 129/78   01/29/19 115/62   01/28/19 (!) 151/75          (goal 120/80)  BUN   Date Value Ref Range Status   03/21/2019 16 8 - 23 mg/dL Final     CREATININE   Date Value Ref Range Status   03/21/2019 1.19 (H) 0.50 - 0.90 mg/dL Final     Potassium   Date Value Ref Range Status   03/21/2019 4.2 3.7 - 5.3 mmol/L Final

## 2019-04-30 ENCOUNTER — OFFICE VISIT (OUTPATIENT)
Dept: FAMILY MEDICINE CLINIC | Age: 79
End: 2019-04-30
Payer: MEDICARE

## 2019-04-30 VITALS
OXYGEN SATURATION: 98 % | WEIGHT: 135.8 LBS | BODY MASS INDEX: 24.06 KG/M2 | HEIGHT: 63 IN | SYSTOLIC BLOOD PRESSURE: 135 MMHG | HEART RATE: 91 BPM | DIASTOLIC BLOOD PRESSURE: 86 MMHG

## 2019-04-30 DIAGNOSIS — J43.1 PANLOBULAR EMPHYSEMA (HCC): ICD-10-CM

## 2019-04-30 DIAGNOSIS — N18.30 CKD (CHRONIC KIDNEY DISEASE) STAGE 3, GFR 30-59 ML/MIN (HCC): ICD-10-CM

## 2019-04-30 DIAGNOSIS — E89.2 S/P PARATHYROIDECTOMY (HCC): ICD-10-CM

## 2019-04-30 DIAGNOSIS — R53.82 CHRONIC FATIGUE: ICD-10-CM

## 2019-04-30 DIAGNOSIS — K21.9 GASTROESOPHAGEAL REFLUX DISEASE WITHOUT ESOPHAGITIS: Primary | ICD-10-CM

## 2019-04-30 DIAGNOSIS — Z12.31 ENCOUNTER FOR SCREENING MAMMOGRAM FOR BREAST CANCER: ICD-10-CM

## 2019-04-30 DIAGNOSIS — R10.10 PAIN OF UPPER ABDOMEN: ICD-10-CM

## 2019-04-30 DIAGNOSIS — R73.9 HYPERGLYCEMIA: ICD-10-CM

## 2019-04-30 DIAGNOSIS — F51.04 PSYCHOPHYSIOLOGICAL INSOMNIA: ICD-10-CM

## 2019-04-30 DIAGNOSIS — E21.3 HYPERPARATHYROIDISM (HCC): ICD-10-CM

## 2019-04-30 LAB — HBA1C MFR BLD: 5.4 %

## 2019-04-30 PROCEDURE — 99214 OFFICE O/P EST MOD 30 MIN: CPT | Performed by: FAMILY MEDICINE

## 2019-04-30 PROCEDURE — 83036 HEMOGLOBIN GLYCOSYLATED A1C: CPT | Performed by: FAMILY MEDICINE

## 2019-04-30 RX ORDER — ESZOPICLONE 3 MG/1
3 TABLET, FILM COATED ORAL NIGHTLY PRN
Qty: 90 TABLET | Refills: 0 | Status: SHIPPED | OUTPATIENT
Start: 2019-04-30 | End: 2019-07-01 | Stop reason: SDUPTHER

## 2019-04-30 ASSESSMENT — PATIENT HEALTH QUESTIONNAIRE - PHQ9
1. LITTLE INTEREST OR PLEASURE IN DOING THINGS: 0
2. FEELING DOWN, DEPRESSED OR HOPELESS: 0
SUM OF ALL RESPONSES TO PHQ QUESTIONS 1-9: 0
SUM OF ALL RESPONSES TO PHQ9 QUESTIONS 1 & 2: 0
SUM OF ALL RESPONSES TO PHQ QUESTIONS 1-9: 0

## 2019-04-30 ASSESSMENT — ENCOUNTER SYMPTOMS
WHEEZING: 0
ABDOMINAL PAIN: 1
CHEST TIGHTNESS: 0
ABDOMINAL DISTENTION: 0
COUGH: 0
CONSTIPATION: 0
VOMITING: 0
SHORTNESS OF BREATH: 1
DIARRHEA: 0

## 2019-04-30 NOTE — PROGRESS NOTES
Visit Information    Have you changed or started any medications since your last visit including any over-the-counter medicines, vitamins, or herbal medicines? no   Are you having any side effects from any of your medications? -  no  Have you stopped taking any of your medications? Is so, why? -  no    Have you seen any other physician or provider since your last visit? No  Have you had any other diagnostic tests since your last visit? No  Have you been seen in the emergency room and/or had an admission to a hospital since we last saw you? No  Have you had your routine dental cleaning in the past 6 months? yes -     Have you activated your [employer] account? If not, what are your barriers?  Yes     Patient Care Team:  Jay Jay Silveira MD as PCP - Consuelo Pittman MD as PCP - [facility] Attributed Provider  Mike Dewey MD as Surgeon (General Surgery)  Elisabeth Schmidt MD as Surgeon (Ophthalmology)  Bobby Burris MD as Consulting Physician (Gastroenterology)  Latrell White MD as Consulting Physician (Endocrinology)  Anaya Mckinnon MD as Surgeon (Ophthalmology)  Martin Delgado MD as Consulting Physician (Internal Medicine Cardiovascular Disease)  Fernanda Serra MD as Consulting Physician (Cardiology)  Fredy Loyd, PhD (Sleep Medicine)  Joaquin Garcia MD as Consulting Physician (Otolaryngology)  Susanna Conley MD as Consulting Physician (Cardiology)  Michelle Jacobs MD as Consulting Physician (Pulmonology)    Medical History Review  Past Medical, Family, and Social History reviewed and does contribute to the patient presenting condition    Health Maintenance   Topic Date Due    Breast cancer screen  01/17/2019    Colon cancer screen colonoscopy  06/13/2019    Potassium monitoring  03/21/2020    Creatinine monitoring  03/21/2020    DTaP/Tdap/Td vaccine (2 - Td) 01/09/2027    DEXA (modify frequency per FRAX score)  Completed    Flu vaccine  Completed    Shingles Vaccine  Completed    Pneumococcal 65+ years Vaccine  Completed

## 2019-04-30 NOTE — PROGRESS NOTES
4:58 PM - Iraj, [facility] Incoming Lab Results From [facility]     Component Value Ref Range & Units Status Collected Lab   Pth Intact 57.51  15.0 - 65.0 pg/mL Final 03/13/2019 10:34 AM Jerry Johansen     Has Chronic kidney disease stage 3 mild. Denies frequency, urgency and dysuria   Doesn't take NSAIDs    US kidneys 4/2/18  FINDINGS:   Kidneys:       The right kidney measures 8.8 cm in length and the left kidney measures 10.1   cm in length.       Kidneys demonstrate normal cortical echogenicity.  No evidence of   hydronephrosis or intrarenal stones.  Left parapelvic cysts are noted, as   seen on prior CT imaging.           Impression   Left parapelvic cysts, unchanged compared to abdominal CT in 2016.  No   hydronephrosis identified.             COPD:  Current treatment includes short-acting beta agonist inhaler, anticholinergic inhaler, which has been effective. Residual symptoms: chronic dyspnea. She denies increased dyspnea, cough, purulent sputum, wheezing, chest tightness. She requires her rescue inhaler 1 time(s) per week. Allergies   Allergen Reactions    Aspirin      GI upset        Current Outpatient Medications   Medication Sig Dispense Refill    loratadine (CLARITIN) 10 MG tablet Take 1 tablet by mouth daily 90 tablet 3    fluticasone (FLONASE) 50 MCG/ACT nasal spray 1 spray by Nasal route daily 1 Bottle 1    eszopiclone (ESZOPICLONE) 3 MG TABS Take 3 mg by mouth nightly.  lidocaine (LMX) 4 % cream Apply topically every 8 hrs as needed for pain 45 g 3    cyclobenzaprine (FLEXERIL) 10 MG tablet Take 1 tablet by mouth 3 times daily as needed for Muscle spasms 15 tablet 0    atorvastatin (LIPITOR) 10 MG tablet Take 1 tablet by mouth daily 30 tablet 11    mirtazapine (REMERON) 30 MG tablet Take 1 tablet by mouth nightly Call for refills or dose adjustment.  30 tablet 0    aspirin EC 81 MG EC tablet Take 1 tablet by mouth daily 90 tablet 0    tiotropium (Delrae Sax) 18 MCG inhalation capsule Inhale 1 capsule into the lungs daily 90 capsule 3    buPROPion (WELLBUTRIN XL) 150 MG extended release tablet Take 1 tablet by mouth every morning 90 tablet 3    ranitidine (ZANTAC) 150 MG tablet Take 1 tablet by mouth daily 180 tablet 3    albuterol sulfate  (90 BASE) MCG/ACT inhaler Inhale 2 puffs into the lungs every 6 hours as needed for Wheezing or Shortness of Breath 18 g 3    Spacer/Aero-Holding Chambers (AEROCHAMBER MV) MISC To be used with inhaler 1 each 0    dorzolamide-timolol (COSOPT) 22.3-6.8 MG/ML ophthalmic solution Place 1 drop into both eyes 2 times daily      ALPHAGAN P 0.1 % SOLN Place 1 drop into both eyes 3 times daily       latanoprost (XALATAN) 0.005 % ophthalmic solution Place 1 drop into both eyes nightly       traZODone (DESYREL) 50 MG tablet Take 1 tablet by mouth 2 times daily 60 tablet 3     No current facility-administered medications for this visit. Social History     Socioeconomic History    Marital status:      Spouse name: Not on file    Number of children: Not on file    Years of education: Not on file    Highest education level: Not on file   Occupational History    Not on file   Social Needs    Financial resource strain: Not on file    Food insecurity:     Worry: Not on file     Inability: Not on file    Transportation needs:     Medical: Not on file     Non-medical: Not on file   Tobacco Use    Smoking status: Former Smoker     Packs/day: 1.00     Years: 30.00     Pack years: 30.00     Types: Cigarettes     Start date: 1960     Last attempt to quit: 2000     Years since quittin.8    Smokeless tobacco: Never Used    Tobacco comment: still uses OTC nicorette gum   Substance and Sexual Activity    Alcohol use:  Yes     Alcohol/week: 0.0 oz     Comment: occassional    Drug use: No    Sexual activity: Not on file   Lifestyle    Physical activity:     Days per week: Not on file     Minutes per session: Not on file    Stress: Not on file   Relationships    Social connections:     Talks on phone: Not on file     Gets together: Not on file     Attends Orthodox service: Not on file     Active member of club or organization: Not on file     Attends meetings of clubs or organizations: Not on file     Relationship status: Not on file    Intimate partner violence:     Fear of current or ex partner: Not on file     Emotionally abused: Not on file     Physically abused: Not on file     Forced sexual activity: Not on file   Other Topics Concern    Not on file   Social History Narrative    Not on file     Counseling given: Yes  Comment: still uses OTC nicorette gum          -rest of complaints with corresponding details per ROS    The patient's past medical,surgical, social, and family history as well as her current medications and allergies were reviewed as documented in today's encounter. Review of Systems   Constitutional: Positive for fatigue. Negative for activity change, appetite change, chills, diaphoresis, fever and unexpected weight change. Respiratory: Positive for shortness of breath (OG[place]). Negative for cough, chest tightness and wheezing. Cardiovascular: Negative for chest pain, palpitations and leg swelling. Gastrointestinal: Positive for abdominal pain and nausea. Negative for abdominal distention, constipation, diarrhea and vomiting. Endocrine: Negative for cold intolerance, heat intolerance, polydipsia, polyphagia and polyuria. Genitourinary: Negative for difficulty urinating, dysuria, frequency and urgency. Skin: Negative for rash. Neurological: Negative for light-headedness. Psychiatric/Behavioral: Positive for sleep disturbance. Negative for dysphoric mood and suicidal ideas.  The patient is not nervous/anxious.            -vital signs stable and within normal limits  /86   Pulse 91   Ht 5' 3\" (1.6 m)   Wt 135 lb 12.8 oz (61.6 kg)   SpO2 98%   BMI 24.06 kg/m² Wt Readings from Last 3 Encounters:   04/30/19 135 lb 12.8 oz (61.6 kg)   03/18/19 132 lb (59.9 kg)   01/29/19 133 lb 12.8 oz (60.7 kg)       Physical Exam   Constitutional: She is oriented to person, place, and time. She appears well-developed and well-nourished. No distress. HENT:   Head: Normocephalic and atraumatic. Right Ear: External ear normal.   Left Ear: External ear normal.   Nose: Nose normal.   Mouth/Throat: Oropharynx is clear and moist. No oropharyngeal exudate. Eyes: Conjunctivae and EOM are normal. Right eye exhibits no discharge. Left eye exhibits no discharge. No scleral icterus. Neck: Normal range of motion. Neck supple. No thyromegaly present. Cardiovascular: Normal rate, regular rhythm, normal heart sounds and intact distal pulses. Pulmonary/Chest: Effort normal. No respiratory distress. She has decreased breath sounds in the right lower field and the left lower field. She has no wheezes. She has no rales. She exhibits no tenderness. Abdominal: Soft. Bowel sounds are normal. She exhibits no distension. There is tenderness in the epigastric area. There is no guarding and negative Hodges's sign. Musculoskeletal: Normal range of motion. She exhibits no edema or tenderness. Up and go test less than 12 seconds. Neurological: She is alert and oriented to person, place, and time. No cranial nerve deficit. She exhibits normal muscle tone. Skin: Skin is warm and dry. Capillary refill takes less than 2 seconds. No rash noted. She is not diaphoretic. Psychiatric: Her behavior is normal. Judgment and thought content normal. Her mood appears anxious. Nursing note and vitals reviewed. I personally reviewed testing . Discussed testing with the patient and all questions fully answered.   Hyperglycemia , A1c within normal limits   Chronic kidney disease stage 3 slightly worsening   hyperlipidemia Improving   Otherwise labs within normal limits    Hospital Outpatient Visit on 03/21/2019   Component Date Value Ref Range Status    TSH 03/21/2019 2.97  0.30 - 5.00 mIU/L Final    Glucose 03/21/2019 106* 70 - 99 mg/dL Final    BUN 03/21/2019 16  8 - 23 mg/dL Final    CREATININE 03/21/2019 1.19* 0.50 - 0.90 mg/dL Final    Bun/Cre Ratio 03/21/2019 NOT REPORTED  9 - 20 Final    Calcium 03/21/2019 9.2  8.6 - 10.4 mg/dL Final    Sodium 03/21/2019 141  135 - 144 mmol/L Final    Potassium 03/21/2019 4.2  3.7 - 5.3 mmol/L Final    Chloride 03/21/2019 105  98 - 107 mmol/L Final    CO2 03/21/2019 24  20 - 31 mmol/L Final    Anion Gap 03/21/2019 12  9 - 17 mmol/L Final    GFR Non- 03/21/2019 44* >60 mL/min Final    GFR  03/21/2019 53* >60 mL/min Final    GFR Comment 03/21/2019        Final    Comment: Average GFR for 79or more years old:   76 mL/min/1.73sq m  Chronic Kidney Disease:   <60 mL/min/1.73sq m  Kidney failure:   <15 mL/min/1.73sq m              eGFR calculated using average adult body mass.  Additional eGFR calculator available at:        [employer]            GFR Staging 03/21/2019 NOT REPORTED   Final     Lab Results   Component Value Date    LABA1C 5.5 03/12/2018    LABA1C 5.5 10/11/2017          Lab Results   Component Value Date    ALT 15 03/13/2019    AST 20 03/13/2019    ALKPHOS 89 03/13/2019    BILITOT 0.52 03/13/2019       Lab Results   Component Value Date    TSH 2.97 03/21/2019       Lab Results   Component Value Date    CHOL 160 03/13/2019    CHOL 208 (H) 08/01/2018    CHOL 207 (H) 08/09/2017     Lab Results   Component Value Date    TRIG 106 03/13/2019    TRIG 88 08/01/2018    TRIG 84 08/09/2017     Lab Results   Component Value Date    HDL 59 03/13/2019    HDL 51 08/01/2018    HDL 60 08/09/2017     Lab Results   Component Value Date    LDLCHOLESTEROL 80 03/13/2019    LDLCHOLESTEROL 139 (H) 08/01/2018    LDLCHOLESTEROL 130 08/09/2017       Lab Results   Component Value Date CHOLHDLRATIO 2.7 03/13/2019    CHOLHDLRATIO 4.1 08/01/2018    CHOLHDLRATIO 3.5 08/09/2017       Lab Results   Component Value Date    LABA1C 5.5 03/12/2018       Lab Results   Component Value Date    EVJOCEDK10 554 08/09/2017       Lab Results   Component Value Date    FOLATE 14.3 08/09/2017         Lab Results   Component Value Date    VITD25 50.4 03/13/2019         No results found for: ALIYAH    ASSESSMENT AND PLAN      1. Gastroesophageal reflux disease without esophagitis  Failing to change as expected. Continue Zantac   - DEJA - Marcos Saucedo MD, General Surgery, [place]    2. Pain of upper abdomen  Failing to change as expected. Remove wire in the bra  Continue Zantac  Needs EGD  - DEJA Saucedo MD, General Surgery, [place]    3. Psychophysiological insomnia  worsening  - eszopiclone (ESZOPICLONE) 3 MG TABS; Take 1 tablet by mouth nightly as needed (insomnia) for up to 90 days. Dispense: 90 tablet; Refill: 0  She is planning to see a GYN to see if she could he a candidate for HRT  I discussed with her regarding side effects from HRT and Lunesta  -sleep hygiene discussed     4. CKD (chronic kidney disease) stage 3, GFR 30-59 ml/min (HCC)  worsening  Will rule out secondary causes of Chronic kidney disease     - Urinalysis Reflex to Culture; Future  - US RENAL COMPLETE; Future  - Basic Metabolic Panel; Future  - Magnesium; Future  - Phosphorus; Future  - Protein Electrophoresis, Urine; Future  - Electrophoresis Protein, Serum without Reflex to Immunofixation; Future  - ALIYAH Screen with Reflex; Future    5. 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 Hyperparathyroidism ([address]  Improving  status post parathyroidectomy      7. Chronic fatigue  worsening  Will do more labs to rule out multiple myeloma associated with CKD or immunologic disorders    - Protein Electrophoresis, Urine;  Future  - Electrophoresis Protein, Serum without Reflex to Immunofixation; Future  - ALIYAH Screen with Reflex; Future    8. Hyperglycemia  - POCT glycosylated hemoglobin (Hb A1C) 5.4, within normal limits     Lab Results   Component Value Date    LABA1C 5.4 04/30/2019    LABA1C 5.5 03/12/2018    LABA1C 5.5 10/11/2017         9. Encounter for screening mammogram for breast cancer  - BRIDGET DIGITAL SCREEN W CAD BILATERAL; Future    10. S/P parathyroidectomy [facility])  Improved             Controlled Substances Monitoring:     RX Monitoring 4/30/2019   Attestation The Prescription Monitoring Report for this patient was reviewed today. Chronic Pain Routine Monitoring Possible medication side effects, risk of tolerance/dependence & alternative treatments discussed. ;No signs of potential drug abuse or diversion identified: otherwise, see note documentation   Chronic Pain > 50 MEDD Obtained or confirmened \"Consent of Opioid Use\" on file. A1c 5.4    Lab Results   Component Value Date    LABA1C 5.5 03/12/2018    LABA1C 5.5 10/11/2017           Data Unavailable    Orders Placed This Encounter   Procedures    US RENAL COMPLETE     Standing Status:   Future     Standing Expiration Date:   4/30/2020    BRIDGET DIGITAL SCREEN W CAD BILATERAL     Standing Status:   Future     Standing Expiration Date:   6/30/2020     Order Specific Question:   Reason for exam:     Answer:   screening mammogram    Urinalysis Reflex to Culture     Standing Status:   Future     Standing Expiration Date:   4/30/2020     Order Specific Question:   SPECIFY(EX-CATH,MIDSTREAM,CYSTO,ETC)?      Answer:   midstream    Basic Metabolic Panel     Standing Status:   Future     Standing Expiration Date:   4/29/2020    Magnesium     Standing Status:   Future     Standing Expiration Date:   4/29/2020    Phosphorus     Standing Status:   Future     Standing Expiration Date:   4/29/2020    Protein Electrophoresis, Urine     Standing Status:   Future     Standing Expiration Date:   4/29/2020    Electrophoresis Protein, Serum without Reflex to Immunofixation     Standing Status:   Future     Standing Expiration Date:   4/29/2020    ALIYAH Screen with Reflex     Standing Status:   Future     Standing Expiration Date:   4/29/2020    DEJA - Sangeetha Vera MD, General Surgery, [place]     Referral Priority:   Routine     Referral Type:   Eval and Treat     Referral Reason:   Specialty Services Required     Referred to Provider:   Di Coto MD     Requested Specialty:   General Surgery     Number of Visits Requested:   1    POCT glycosylated hemoglobin (Hb A1C)         There are no discontinued medications. Ap received counseling on the following healthy behaviors: nutrition, exercise and medication adherence  Reviewed prior labs and health maintenance  Continue current medications, diet and exercise. Discussed use, benefit, and side effects of prescribed medications. Barriers to medication compliance addressed. Patient given educational materials - see patient instructions  Was a self-tracking handout given in paper form or via [employer]? Yes    Requested Prescriptions     Signed Prescriptions Disp Refills    eszopiclone (ESZOPICLONE) 3 MG TABS 90 tablet 0     Sig: Take 1 tablet by mouth nightly as needed (insomnia) for up to 90 days. All patient questions answered. Patient voiced understanding. Quality Measures    Body mass index is 24.06 kg/m². Normal. Weight control planned discussed Healthy diet and regular exercise. BP: 135/86. Blood pressure is normal. Treatment plan consists of No treatment change needed. Fall Risk 11/14/2018 3/5/2018 10/20/2017 10/20/2016 5/11/2015   2 or more falls in past year? no no no no no   Fall with injury in past year? no no no no no     The patient does not have a history of falls. I did , complete a risk assessment for falls.  A plan of care for falls in-office gait and balance testing performed using The Timed Up and Go Test was negative for increased falls risk- no further intervention is currently indicated, home safety tips provided, No Treatment plan indicated      LDL controlled    Lab Results   Component Value Date    LDLCHOLESTEROL 80 03/13/2019    (goal LDL reduction with dx if diabetes is 50% LDL reduction)      Negative depression screening. PHQ Scores 1/23/2019 10/23/2018 7/23/2018 1/4/2018 8/14/2017 5/12/2017 3/10/2017   PHQ2 Score 0 0 3 1 0 0 0   PHQ9 Score 0 0 9 6 0 0 4     Interpretation of Total Score Depression Severity: 1-4 = Minimal depression, 5-9 = Mild depression, 10-14 = Moderate depression, 15-19 = Moderately severe depression, 20-27 = Severe depression      The patient's past medical, surgical, social, and family history as well as her   current medicationsand allergies were reviewed as documented in today's encounter. Medications, labs, diagnostic studies, consultations and follow-up as documented in this encounter. Return in about 3 months (around 7/30/2019) for AWM with me. Patient was seen with total face to face time of  25 minutes. More than 50% of this visit was counseling and education. Future Appointments   Date Time Provider Shirley Dunne   5/9/2019 10:15 AM Felicity Koch MD Resp Spec [facility]   5/9/2019 10:30 AM SCHEDULE, P RESPIRATORY SPEC Resp Spec [id number]   8/2/2019 11:00 AM [email or web address]     This note was completed by using the assistance of a speech-recognition program. However, inadvertent computerized transcription errors may be present. Although every effort was made to ensure accuracy, no guarantees can be provided that every mistake has been identified and corrected by editing.   Electronically signed by Ed Washington MD on 5/5/2019  1:54 PM

## 2019-04-30 NOTE — RESULT ENCOUNTER NOTE
Addressed during office visit today, A1c 5.4, within normal limits   Continue current treatment discussed during visit

## 2019-05-03 ENCOUNTER — TELEPHONE (OUTPATIENT)
Dept: FAMILY MEDICINE CLINIC | Age: 79
End: 2019-05-03

## 2019-05-03 NOTE — TELEPHONE ENCOUNTER
Started a prior auth on Eszopiclone 3mg tabs. It is asking if she has been on Rozerem, she has not. The next questions states clinical reason why the step therapy drug can't be used. Please advise thanks.

## 2019-05-03 NOTE — TELEPHONE ENCOUNTER
Per my note from 1/23/19, she has tried a lot of other meds& failed. I'll updated current note too    \"Patient has tried multiple medications for insomnia and depression before. She has had psychological interventions for insomnia and cognitive behavioral therapy with Dr. Tim Giles. I discussed with her to try to go to sleep at 11 PM and try to wake up around 8 AM.  Sleep hygiene reinforced again. Over the years, she has tried Paxil which make her sedated, Lunesta 2 mg which didn't help, melatonin, trazodone, mirtazapine, Rozerem. \"

## 2019-05-05 PROBLEM — M54.50 ACUTE LEFT-SIDED LOW BACK PAIN WITHOUT SCIATICA: Status: RESOLVED | Noted: 2019-01-29 | Resolved: 2019-05-05

## 2019-05-05 ASSESSMENT — ENCOUNTER SYMPTOMS: NAUSEA: 1

## 2019-05-07 ENCOUNTER — HOSPITAL ENCOUNTER (OUTPATIENT)
Age: 79
Discharge: HOME | End: 2019-05-07
Payer: MEDICARE

## 2019-05-07 DIAGNOSIS — R53.82 CHRONIC FATIGUE: ICD-10-CM

## 2019-05-07 DIAGNOSIS — N18.30 CKD (CHRONIC KIDNEY DISEASE) STAGE 3, GFR 30-59 ML/MIN (HCC): ICD-10-CM

## 2019-05-07 LAB
ANION GAP SERPL CALCULATED.3IONS-SCNC: 11 MMOL/L (ref 9–17)
BILIRUBIN URINE: NEGATIVE
BUN BLDV-MCNC: 16 MG/DL (ref 8–23)
BUN/CREAT BLD: ABNORMAL (ref 9–20)
CALCIUM SERPL-MCNC: 9.5 MG/DL (ref 8.6–10.4)
CHLORIDE BLD-SCNC: 105 MMOL/L (ref 98–107)
CO2: 25 MMOL/L (ref 20–31)
COLOR: YELLOW
COMMENT UA: NORMAL
CREAT SERPL-MCNC: 1.19 MG/DL (ref 0.5–0.9)
GFR AFRICAN AMERICAN: 53 ML/MIN
GFR NON-AFRICAN AMERICAN: 44 ML/MIN
GFR SERPL CREATININE-BSD FRML MDRD: ABNORMAL ML/MIN/{1.73_M2}
GFR SERPL CREATININE-BSD FRML MDRD: ABNORMAL ML/MIN/{1.73_M2}
GLUCOSE BLD-MCNC: 105 MG/DL (ref 70–99)
GLUCOSE URINE: NEGATIVE
KETONES, URINE: NEGATIVE
LEUKOCYTE ESTERASE, URINE: NEGATIVE
MAGNESIUM: 2 MG/DL (ref 1.6–2.6)
NITRITE, URINE: NEGATIVE
PH UA: 7 (ref 5–8)
PHOSPHORUS: 2.9 MG/DL (ref 2.6–4.5)
POTASSIUM SERPL-SCNC: 4.4 MMOL/L (ref 3.7–5.3)
PROTEIN UA: NEGATIVE
SODIUM BLD-SCNC: 141 MMOL/L (ref 135–144)
SPECIFIC GRAVITY UA: 1.01 (ref 1–1.03)
TURBIDITY: CLEAR
URINE HGB: NEGATIVE
UROBILINOGEN, URINE: NORMAL

## 2019-05-07 PROCEDURE — 84155 ASSAY OF PROTEIN SERUM: CPT

## 2019-05-07 PROCEDURE — 84156 ASSAY OF PROTEIN URINE: CPT

## 2019-05-07 PROCEDURE — 84100 ASSAY OF PHOSPHORUS: CPT

## 2019-05-07 PROCEDURE — 36415 COLL VENOUS BLD VENIPUNCTURE: CPT

## 2019-05-07 PROCEDURE — 83735 ASSAY OF MAGNESIUM: CPT

## 2019-05-07 PROCEDURE — 86225 DNA ANTIBODY NATIVE: CPT

## 2019-05-07 PROCEDURE — 80048 BASIC METABOLIC PNL TOTAL CA: CPT

## 2019-05-07 PROCEDURE — 81003 URINALYSIS AUTO W/O SCOPE: CPT

## 2019-05-07 PROCEDURE — 86038 ANTINUCLEAR ANTIBODIES: CPT

## 2019-05-07 PROCEDURE — 86235 NUCLEAR ANTIGEN ANTIBODY: CPT

## 2019-05-07 PROCEDURE — 84165 PROTEIN E-PHORESIS SERUM: CPT

## 2019-05-07 PROCEDURE — 84166 PROTEIN E-PHORESIS/URINE/CSF: CPT

## 2019-05-07 NOTE — RESULT ENCOUNTER NOTE
Mychart comment sent to patient.     Mild chronic kidney disease stage III, pending some labs  Urine test normal

## 2019-05-08 LAB
ANA REFERENCE RANGE:: ABNORMAL
ANTI DNA DOUBLE STRANDED: 0 IU/ML
ANTI JO-1 IGG: 16 U/ML
ANTI RNP AB: 14 U/ML
ANTI SSA: 2 U/ML
ANTI SSB: 5 U/ML
ANTI-CENTROMERE: 16 U/ML
ANTI-NUCLEAR ANTIBODY (ANA): POSITIVE
ANTI-SCLERODERMA: 224 U/ML
ANTI-SMITH: 10 U/ML
HISTONE ANTIBODY: 9 U/ML

## 2019-05-09 ENCOUNTER — OFFICE VISIT (OUTPATIENT)
Dept: PULMONOLOGY | Age: 79
End: 2019-05-09
Payer: MEDICARE

## 2019-05-09 VITALS — HEIGHT: 63 IN | BODY MASS INDEX: 24.45 KG/M2 | WEIGHT: 138 LBS | HEART RATE: 79 BPM | OXYGEN SATURATION: 99 %

## 2019-05-09 VITALS
HEIGHT: 64 IN | BODY MASS INDEX: 23.56 KG/M2 | OXYGEN SATURATION: 99 % | HEART RATE: 79 BPM | WEIGHT: 138 LBS | SYSTOLIC BLOOD PRESSURE: 133 MMHG | RESPIRATION RATE: 16 BRPM | DIASTOLIC BLOOD PRESSURE: 83 MMHG

## 2019-05-09 DIAGNOSIS — J43.1 PANLOBULAR EMPHYSEMA (HCC): ICD-10-CM

## 2019-05-09 DIAGNOSIS — G47.61 PLMD (PERIODIC LIMB MOVEMENT DISORDER): ICD-10-CM

## 2019-05-09 DIAGNOSIS — G47.33 OBSTRUCTIVE SLEEP APNEA: Primary | ICD-10-CM

## 2019-05-09 DIAGNOSIS — G25.81 RLS (RESTLESS LEGS SYNDROME): ICD-10-CM

## 2019-05-09 DIAGNOSIS — G47.33 OSA (OBSTRUCTIVE SLEEP APNEA): ICD-10-CM

## 2019-05-09 DIAGNOSIS — F51.01 PRIMARY INSOMNIA: Primary | ICD-10-CM

## 2019-05-09 DIAGNOSIS — R76.8 ANA POSITIVE: Primary | ICD-10-CM

## 2019-05-09 PROCEDURE — 99214 OFFICE O/P EST MOD 30 MIN: CPT | Performed by: INTERNAL MEDICINE

## 2019-05-09 PROCEDURE — 94729 DIFFUSING CAPACITY: CPT | Performed by: INTERNAL MEDICINE

## 2019-05-09 PROCEDURE — 94726 PLETHYSMOGRAPHY LUNG VOLUMES: CPT | Performed by: INTERNAL MEDICINE

## 2019-05-09 PROCEDURE — 94375 RESPIRATORY FLOW VOLUME LOOP: CPT | Performed by: INTERNAL MEDICINE

## 2019-05-09 RX ORDER — ALBUTEROL SULFATE 90 UG/1
2 AEROSOL, METERED RESPIRATORY (INHALATION) EVERY 6 HOURS PRN
Qty: 18 G | Refills: 3 | Status: SHIPPED | OUTPATIENT
Start: 2019-05-09 | End: 2022-10-14 | Stop reason: SDUPTHER

## 2019-05-09 NOTE — PROGRESS NOTES
REASON FOR THE CONSULTATION:  Insomnia  HISTORY OF PRESENT ILLNESS:  This patient is known to have chronic insomnia for the last 15 years. Etiology of her insomnia has been unclear. It does not seem to be psychophysiological insomnia. Is very likely complicated by sedative use. Insomnia. At this time. She is taking Lunesta 2 mg at bedtime for sleep. Lunesta put her to sleep within 15 minutes and she can sleep 6-8 hours. I had Started her on trazodone hoping that we may be able to wean her off for Lunesta but trazodone has not been effective and she continued to use the [place]. She does not feel sleepy, tired, fatigued in the daytime. Infected. She has difficulty Falling asleep during the daytime. He wants take a nap. Etiology of her insomnia has been unclear. I wonder if she has obstructive sleep apnea syndrome, which could be responsible for the sleep apnea, although she does not have the classical symptoms of sleep apnea syndrome. Similarly, I wonder whether she had periodic leg movements or restless legs syndrome. She denies those symptoms as well but that's a possibility which could be causing the insomnia. She does not take too much \"Coca-Cola or coffee. Her sleep environments are satisfactory. She denies any unnecessary stress. She does not participate in any exercise program later night. She is not on any other medication which could be interfering with her sleep cycle. Juan Head LUNG CANCER SCREENING     1. CRITERIA MET    [x]     CT ORDERED  []      2. CRITERIA NOT MET   []      3. REFUSED                    []        REASON CRITERIA NOT MET     1. SMOKING LESS THAN 30 PY  []      2. AGE LESS THAN 55 or GREATER 77 YEARS  []      3. QUIT SMOKING 15 YEARS OR GREATER   []      4. RECENT CT WITH IN 11 MONTHS    []      5. LIFE EXPECTANCY < 5 YEARS   []      6.  SIGNS  AND SYMPTOMS OF LUNG CANCER   []         Immunization   Immunization History   Administered Date(s) Administered   Navya Nieto Influenza Virus Vaccine 10/13/2015    Influenza, High Dose (Fluzone 65 yrs and older) 10/24/2016, 10/19/2017, 10/21/2018    Influenza, Triv, inactivated, subunit, adjuvanted, IM (Fluad 65 yrs and older) 10/21/2018    Pneumococcal 13-valent Conjugate ([id number]) 10/13/2015, 01/12/2017    Pneumococcal Polysaccharide ([id number]) 02/03/2018    Tdap (Boostrix, Adacel) 01/09/2017    Zoster Subunit (Shingrix) 04/17/2018, 07/24/2018        Pneumococcal Vaccine     [x] Up to date    [] Indicated   [] Refused  [] Contraindicated       Influenza Vaccine   [x] Up to date    [] Indicated   [] Refused  [] Contraindicated          PAST MEDICAL HISTORY:       Diagnosis Date    Abdominal pain     Allergic rhinitis     Anxiety     Chronic back pain     Chronic fatigue     Chronic kidney disease     Colon polyp 3/7/2012    TUBULAR ADENOMA    COPD (chronic obstructive pulmonary disease) ([address]     COPD, 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     Depression     Diverticul disease small and large intestine, no perforati or abscess     Elevated blood pressure reading     Fall 1/12/2017    GERD (gastroesophageal reflux disease)     Glaucoma     Hyperlipidemia     with target LDL less than 100    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[address]     Hyperthyroidism 2012    Insomnia     Psychophysiological    Neuropathy     Orthostatic dizziness 3/12/2017    Osteoarthritis     Panlobular emphysema ([address]     PVC (premature ventricular contraction) 1/12/2017    S/P parathyroidectomy ([address]     Tubal pregnancy [id number]    Vasovagal syncope 1/12/2017         Family History:       Problem Relation Age of Onset    Other Father         COPD    Stroke Sister     Heart Attack Sister     Breast Cancer Sister     Cancer Maternal Grandmother         Stomach cancer    Osteoporosis Mother     Other Sister         suicide       SURGICAL HISTORY:   Past Surgical History:   Procedure Laterality Date    APPENDECTOMY tiotropium (SPIRIVA HANDIHALER) 18 MCG inhalation capsule Inhale 1 capsule into the lungs daily 10/23/18  Yes Mariela Best MD   ranitidine (ZANTAC) 150 MG tablet Take 1 tablet by mouth daily 6/11/18  Yes Mariela Best MD   albuterol sulfate  (90 BASE) MCG/ACT inhaler Inhale 2 puffs into the lungs every 6 hours as needed for Wheezing or Shortness of Breath 5/12/17  Yes Mariela Best MD   Spacer/Aero-Holding Chambers (AEROCHAMBER MV) MISC To be used with inhaler 5/12/17  Yes Mariela Best MD   dorzolamide-timolol (COSOPT) 22.3-6.8 MG/ML ophthalmic solution Place 1 drop into both eyes 2 times daily   Yes Historical Provider, MD   ALPHAGAN P 0.1 % SOLN Place 1 drop into both eyes 3 times daily  8/7/16  Yes Historical Provider, MD   latanoprost (XALATAN) 0.005 % ophthalmic solution Place 1 drop into both eyes nightly  8/25/16  Yes Historical Provider, MD   traZODone (DESYREL) 50 MG tablet Take 1 tablet by mouth 2 times daily 3/18/19 4/17/19  Esteban Sears MD   lidocaine (LMX) 4 % cream Apply topically every 8 hrs as needed for pain 1/29/19   Miguel Angel Solano MD   cyclobenzaprine (FLEXERIL) 10 MG tablet Take 1 tablet by mouth 3 times daily as needed for Muscle spasms 1/28/19   Babita Pérez PA-C   aspirin EC 81 MG EC tablet Take 1 tablet by mouth daily 1/23/19   Mariela Best MD   buPROPion (WELLBUTRIN XL) 150 MG extended release tablet Take 1 tablet by mouth every morning 7/23/18   Mariela Best MD              REVIEW OF SYSTEMS:    CONSTITUTIONAL:  negative for  fevers, chills, sweats, fatigue, malaise, anorexia and weight loss, medium bed, no distress, very cooperative  EYES:  negative for  double vision, blurred vision, dry eyes, eye discharge and redness. No jaundice or yellow mass  HEENT:  negative for  hearing loss, tinnitus, ear drainage, earaches and nasal congestion.   No hoarseness of voice  RESPIRATORY:  See hpi  CARDIOVASCULAR:  negative for  chest pain,, G47.61    RLS (restless legs syndrome)     G25.81        :                PLAN:       I discussed the situation with the patient. I do believe that her insomnia is very likely sedative and induced a complicated insomnia which is chronic. I advised her to continue the Lunesta 3 mg at bedtime, which does per . sleep and maintaining asleep for 8-8 hours. She is not tired, fatigued, sleepy during the daytime. I however did arrange for her to have a sleep study done. I wonder if she has sleep apnea syndrome or hypoxemia months or central apneas which could be causing frequent awakening at night and not letting her sleep. Also, it will give us a chance to look for any periodic leg movements which could also be interfering with her sleep and keeping her in awake. We'll also get a CT scan of the chest to look to the lung nodules to rule out any malignancy. She was advised to give up smoking. She already had Pneumovax and influenza vaccine. Dictated by Dr. Uri Ewing M.D. dictation over thank you  Dictated by Dr. Uri Ewing M.D. dictation over thank you        Requested Prescriptions      No prescriptions requested or ordered in this encounter       There are no discontinued medications. Ap received counseling on the following healthy behaviors: nutrition, exercise and medication adherence    Patient given educational materials : see patient instruction   Patient have had    Discussed use, benefit, and side effects of prescribed medications. Barriers to medication compliance addressed. All patient questions answered. Pt voiced understanding. I hope this updates you on my evaluation and clinical thinking. Thank you for allowing me to participate in his care. Sincerely,      Electronically signed by Malini Saha MD on   3/18/19 at 12:52 PM       Please note that this chart was generated using voice recognition Dragon dictation software.   Although every effort was made to ensure the accuracy of this automated transcription, some errors in transcription may have occurred.

## 2019-05-13 ENCOUNTER — HOSPITAL ENCOUNTER (OUTPATIENT)
Dept: WOMENS IMAGING | Age: 79
Discharge: HOME | End: 2019-05-15
Payer: MEDICARE

## 2019-05-13 DIAGNOSIS — Z12.31 ENCOUNTER FOR SCREENING MAMMOGRAM FOR BREAST CANCER: ICD-10-CM

## 2019-05-13 DIAGNOSIS — N18.30 CKD (CHRONIC KIDNEY DISEASE) STAGE 3, GFR 30-59 ML/MIN (HCC): ICD-10-CM

## 2019-05-13 PROCEDURE — 77063 BREAST TOMOSYNTHESIS BI: CPT

## 2019-05-13 PROCEDURE — 76770 US EXAM ABDO BACK WALL COMP: CPT

## 2019-05-14 DIAGNOSIS — R77.8 ABNORMAL SERUM PROTEIN ELECTROPHORESIS: Primary | ICD-10-CM

## 2019-05-14 DIAGNOSIS — R53.82 CHRONIC FATIGUE: ICD-10-CM

## 2019-05-14 DIAGNOSIS — N18.30 CKD (CHRONIC KIDNEY DISEASE) STAGE 3, GFR 30-59 ML/MIN (HCC): ICD-10-CM

## 2019-05-14 LAB
ALBUMIN (CALCULATED): 4.4 G/DL (ref 3.2–5.2)
ALBUMIN PERCENT: 67 % (ref 45–65)
ALPHA 1 PERCENT: 3 % (ref 3–6)
ALPHA 2 PERCENT: 10 % (ref 6–13)
ALPHA-1-GLOBULIN: 0.2 G/DL (ref 0.1–0.4)
ALPHA-2-GLOBULIN: 0.6 G/DL (ref 0.5–0.9)
BETA GLOBULIN: 0.6 G/DL (ref 0.5–1.1)
BETA PERCENT: 9 % (ref 11–19)
GAMMA GLOBULIN %: 11 % (ref 9–20)
GAMMA GLOBULIN: 0.7 G/DL (ref 0.5–1.5)
P E INTERPRETATION, U: NORMAL
PATHOLOGIST: ABNORMAL
PATHOLOGIST: NORMAL
PROTEIN ELECTROPHORESIS, SERUM: ABNORMAL
SPECIMEN TYPE: NORMAL
TOTAL PROT. SUM,%: 100 % (ref 98–102)
TOTAL PROT. SUM: 6.5 G/DL (ref 6.3–8.2)
TOTAL PROTEIN: 6.5 G/DL (ref 6.4–8.3)
URINE TOTAL PROTEIN: 4 MG/DL

## 2019-05-15 ENCOUNTER — TELEPHONE (OUTPATIENT)
Dept: PHARMACY | Age: 79
End: 2019-05-15

## 2019-05-15 ENCOUNTER — TELEPHONE (OUTPATIENT)
Dept: FAMILY MEDICINE CLINIC | Age: 79
End: 2019-05-15

## 2019-05-15 RX ORDER — 0.9 % SODIUM CHLORIDE 0.9 %
10 VIAL (ML) INJECTION ONCE
Status: CANCELLED | OUTPATIENT
Start: 2019-05-15

## 2019-05-15 RX ORDER — METHYLPREDNISOLONE SODIUM SUCCINATE 125 MG/2ML
125 INJECTION, POWDER, LYOPHILIZED, FOR SOLUTION INTRAMUSCULAR; INTRAVENOUS ONCE
Status: CANCELLED | OUTPATIENT
Start: 2019-05-15

## 2019-05-15 RX ORDER — EPINEPHRINE 1 MG/ML
0.3 INJECTION, SOLUTION, CONCENTRATE INTRAVENOUS PRN
Status: CANCELLED | OUTPATIENT
Start: 2019-05-15

## 2019-05-15 RX ORDER — SODIUM CHLORIDE 9 MG/ML
INJECTION, SOLUTION INTRAVENOUS CONTINUOUS
Status: CANCELLED | OUTPATIENT
Start: 2019-05-15

## 2019-05-15 RX ORDER — DIPHENHYDRAMINE HYDROCHLORIDE 50 MG/ML
50 INJECTION INTRAMUSCULAR; INTRAVENOUS ONCE
Status: CANCELLED | OUTPATIENT
Start: 2019-05-15

## 2019-05-15 NOTE — TELEPHONE ENCOUNTER
Pre-authorization started for [facility] and sent via CoverMyMeds    The first PA was submitted to the wrong company.  The pt's pharm benefits is via [employer] ([employer])

## 2019-05-15 NOTE — TELEPHONE ENCOUNTER
Called office of JAYME Magana [phone] for verification of electronically signed order for Prolia  Verbal approval given by Radha Singh for Dr. Ulices Salmon

## 2019-05-23 DIAGNOSIS — M81.0 OSTEOPOROSIS, UNSPECIFIED OSTEOPOROSIS TYPE, UNSPECIFIED PATHOLOGICAL FRACTURE PRESENCE: Primary | ICD-10-CM

## 2019-06-28 ENCOUNTER — HOSPITAL ENCOUNTER (OUTPATIENT)
Dept: INPATIENT UNIT | Age: 79
Setting detail: THERAPIES SERIES
Discharge: HOME | End: 2019-06-28
Payer: MEDICARE

## 2019-06-28 VITALS
SYSTOLIC BLOOD PRESSURE: 140 MMHG | OXYGEN SATURATION: 99 % | RESPIRATION RATE: 16 BRPM | HEART RATE: 83 BPM | DIASTOLIC BLOOD PRESSURE: 74 MMHG | TEMPERATURE: 97.5 F

## 2019-06-28 DIAGNOSIS — M81.0 OSTEOPOROSIS, UNSPECIFIED OSTEOPOROSIS TYPE, UNSPECIFIED PATHOLOGICAL FRACTURE PRESENCE: Primary | ICD-10-CM

## 2019-06-28 PROCEDURE — 6360000002 HC RX W HCPCS: Performed by: INTERNAL MEDICINE

## 2019-06-28 PROCEDURE — 96372 THER/PROPH/DIAG INJ SC/IM: CPT

## 2019-06-28 RX ORDER — EPINEPHRINE 1 MG/ML
0.3 INJECTION, SOLUTION, CONCENTRATE INTRAVENOUS PRN
Status: CANCELLED | OUTPATIENT
Start: 2019-12-27

## 2019-06-28 RX ORDER — 0.9 % SODIUM CHLORIDE 0.9 %
10 VIAL (ML) INJECTION ONCE
Status: CANCELLED | OUTPATIENT
Start: 2019-12-27

## 2019-06-28 RX ORDER — SODIUM CHLORIDE 9 MG/ML
INJECTION, SOLUTION INTRAVENOUS CONTINUOUS
Status: CANCELLED | OUTPATIENT
Start: 2019-12-27

## 2019-06-28 RX ORDER — DIPHENHYDRAMINE HYDROCHLORIDE 50 MG/ML
50 INJECTION INTRAMUSCULAR; INTRAVENOUS ONCE
Status: CANCELLED | OUTPATIENT
Start: 2019-12-27

## 2019-06-28 RX ORDER — METHYLPREDNISOLONE SODIUM SUCCINATE 125 MG/2ML
125 INJECTION, POWDER, LYOPHILIZED, FOR SOLUTION INTRAMUSCULAR; INTRAVENOUS ONCE
Status: CANCELLED | OUTPATIENT
Start: 2019-12-27

## 2019-06-28 RX ADMIN — DENOSUMAB 60 MG: 60 INJECTION SUBCUTANEOUS at 15:55

## 2019-06-28 NOTE — PROGRESS NOTES
Patient arrived to infusion center for prolia injection, reviewed allergies and home medications, VS as charted, required labs done in May, reviewed signs of allergic reaction and possible side effects, injection given to left upper arm, band aid applied, tolerated well, discharged ambulatory with

## 2019-08-02 ENCOUNTER — OFFICE VISIT (OUTPATIENT)
Dept: FAMILY MEDICINE CLINIC | Age: 79
End: 2019-08-02
Payer: MEDICARE

## 2019-08-02 VITALS
WEIGHT: 132 LBS | BODY MASS INDEX: 23.39 KG/M2 | DIASTOLIC BLOOD PRESSURE: 78 MMHG | HEIGHT: 63 IN | HEART RATE: 82 BPM | SYSTOLIC BLOOD PRESSURE: 132 MMHG | OXYGEN SATURATION: 98 %

## 2019-08-02 DIAGNOSIS — Z71.89 ACP (ADVANCE CARE PLANNING): ICD-10-CM

## 2019-08-02 DIAGNOSIS — K21.9 GASTROESOPHAGEAL REFLUX DISEASE WITHOUT ESOPHAGITIS: ICD-10-CM

## 2019-08-02 DIAGNOSIS — N18.30 CKD (CHRONIC KIDNEY DISEASE) STAGE 3, GFR 30-59 ML/MIN (HCC): ICD-10-CM

## 2019-08-02 DIAGNOSIS — R20.8 BURNING SENSATION OF FEET: ICD-10-CM

## 2019-08-02 DIAGNOSIS — J30.89 NON-SEASONAL ALLERGIC RHINITIS DUE TO OTHER ALLERGIC TRIGGER: ICD-10-CM

## 2019-08-02 DIAGNOSIS — Z12.11 SCREEN FOR COLON CANCER: ICD-10-CM

## 2019-08-02 DIAGNOSIS — Z00.00 ROUTINE GENERAL MEDICAL EXAMINATION AT A HEALTH CARE FACILITY: Primary | ICD-10-CM

## 2019-08-02 PROCEDURE — 99497 ADVNCD CARE PLAN 30 MIN: CPT | Performed by: FAMILY MEDICINE

## 2019-08-02 PROCEDURE — G0438 PPPS, INITIAL VISIT: HCPCS | Performed by: FAMILY MEDICINE

## 2019-08-02 RX ORDER — IPRATROPIUM BROMIDE 21 UG/1
2 SPRAY, METERED NASAL 3 TIMES DAILY
Qty: 1 BOTTLE | Refills: 0 | Status: SHIPPED | OUTPATIENT
Start: 2019-08-02 | End: 2021-12-07

## 2019-08-02 RX ORDER — FLUTICASONE PROPIONATE 50 MCG
1 SPRAY, SUSPENSION (ML) NASAL DAILY
Qty: 1 BOTTLE | Refills: 1 | Status: CANCELLED | OUTPATIENT
Start: 2019-08-02

## 2019-08-02 ASSESSMENT — LIFESTYLE VARIABLES
HOW MANY STANDARD DRINKS CONTAINING ALCOHOL DO YOU HAVE ON A TYPICAL DAY: 0
AUDIT TOTAL SCORE: 4
HOW OFTEN DO YOU HAVE SIX OR MORE DRINKS ON ONE OCCASION: 0
HOW OFTEN DO YOU HAVE A DRINK CONTAINING ALCOHOL: 4
HOW OFTEN DURING THE LAST YEAR HAVE YOU BEEN UNABLE TO REMEMBER WHAT HAPPENED THE NIGHT BEFORE BECAUSE YOU HAD BEEN DRINKING: 0
HOW OFTEN DURING THE LAST YEAR HAVE YOU FAILED TO DO WHAT WAS NORMALLY EXPECTED FROM YOU BECAUSE OF DRINKING: 0
HOW OFTEN DURING THE LAST YEAR HAVE YOU HAD A FEELING OF GUILT OR REMORSE AFTER DRINKING: 0
AUDIT-C TOTAL SCORE: 4
HOW OFTEN DURING THE LAST YEAR HAVE YOU FOUND THAT YOU WERE NOT ABLE TO STOP DRINKING ONCE YOU HAD STARTED: 0
HAVE YOU OR SOMEONE ELSE BEEN INJURED AS A RESULT OF YOUR DRINKING: 0
HAS A RELATIVE, FRIEND, DOCTOR, OR ANOTHER HEALTH PROFESSIONAL EXPRESSED CONCERN ABOUT YOUR DRINKING OR SUGGESTED YOU CUT DOWN: 0
HOW OFTEN DURING THE LAST YEAR HAVE YOU NEEDED AN ALCOHOLIC DRINK FIRST THING IN THE MORNING TO GET YOURSELF GOING AFTER A NIGHT OF HEAVY DRINKING: 0

## 2019-08-02 ASSESSMENT — PATIENT HEALTH QUESTIONNAIRE - PHQ9
SUM OF ALL RESPONSES TO PHQ QUESTIONS 1-9: 0
SUM OF ALL RESPONSES TO PHQ QUESTIONS 1-9: 0

## 2019-08-02 ASSESSMENT — ANXIETY QUESTIONNAIRES: GAD7 TOTAL SCORE: 0

## 2019-08-02 NOTE — PROGRESS NOTES
Medicare Annual Wellness Visit  Name: Katerine Cleaning Date: 2019   MRN: [id number] Sex: Female   Age: 78 y.o. Ethnicity: Non-/Non    : 1940 Race: Tripp Napier is here for Welcome To Medicare Visit    Screenings for behavioral, psychosocial and functional/safety risks, and cognitive dysfunction are all negative except as indicated below. These results, as well as other patient data from the [address] form, are documented in Flowsheets linked to this Encounter. Allergies   Allergen Reactions    Aspirin      GI upset     Prior to Visit Medications    Medication Sig Taking?  Authorizing Provider   albuterol sulfate  (90 Base) MCG/ACT inhaler Inhale 2 puffs into the lungs every 6 hours as needed for Wheezing or Shortness of Breath Yes Kameron Bustamante MD   loratadine (CLARITIN) 10 MG tablet Take 1 tablet by mouth daily Yes Kameron Bustamante MD   fluticasone (FLONASE) 50 MCG/ACT nasal spray 1 spray by Nasal route daily Yes Kameron Bustamante MD   atorvastatin (LIPITOR) 10 MG tablet Take 1 tablet by mouth daily Yes Kameron Bustamante MD   tiotropium (SPIRIVA HANDIHALER) 18 MCG inhalation capsule Inhale 1 capsule into the lungs daily Yes Kameron Bustamante MD   ranitidine (ZANTAC) 150 MG tablet Take 1 tablet by mouth daily Yes Kameron Bustamante MD   Spacer/Aero-Holding Chambers (AEROCHAMBER MV) MISC To be used with inhaler Yes Kaemron Bustamante MD   dorzolamide-timolol (COSOPT) 22.3-6.8 MG/ML ophthalmic solution Place 1 drop into both eyes 2 times daily Yes Historical Provider, MD   ALPHAGAN P 0.1 % SOLN Place 1 drop into both eyes 3 times daily  Yes Historical Provider, MD   latanoprost (XALATAN) 0.005 % ophthalmic solution Place 1 drop into both eyes nightly  Yes Historical Provider, MD   buPROPion (WELLBUTRIN XL) 150 MG extended release tablet Take 1 tablet by mouth every morning  Patient not taking: Reported on 2019  Harper Komal Llamas MD     Past Medical History:   Diagnosis Date    Abdominal pain     Allergic rhinitis     Anxiety     Chronic back pain     Chronic fatigue     Chronic kidney disease     Colon polyp 3/7/2012    TUBULAR ADENOMA    COPD (chronic obstructive pulmonary disease) (HCC)     COPD, 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     Depression     Diverticul disease small and large intestine, no perforati or abscess     Elevated blood pressure reading     Fall 1/12/2017    GERD (gastroesophageal reflux disease)     Glaucoma     Hyperlipidemia     with target LDL less than 100    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[address]     Hyperthyroidism 2012    Insomnia     Psychophysiological    Neuropathy     Orthostatic dizziness 3/12/2017    Osteoarthritis     Panlobular emphysema (HCC)     PVC (premature ventricular contraction) 1/12/2017    [facility]     Tubal pregnancy [id number]    Vasovagal syncope 1/12/2017     Past Surgical History:   Procedure Laterality Date    APPENDECTOMY      BACK SURGERY      CAT[facility]ACT [facility]    CHOLECYSTECTOMY  2005    COLONOSCOPY      COLONOSCOPY  6/13/2014    Severe sigmoid diverticulosis; due for repeat June 2019    ENDOSCOPY, COLON, DIAGNOSTIC      EYE SURGERY      PARATHYROIDECTOMY  11/05/2018    Dr. Gracie Marquis; Left inferior parathyroid: adenoma    ROTATOR CUFF REPAIR Left     TONSILLECTOMY      TUBAL LIGATION      UPPER GASTROINTESTINAL ENDOSCOPY  6/13/2014    biopsy    UPPER GASTROINTESTINAL ENDOSCOPY  1-26-16    UPPER GASTROINTESTINAL ENDOSCOPY  5/4/16    mild gastritis     Family History   Problem Relation Age of Onset    Other Father         COPD    Stroke Sister     Heart Attack Sister     Breast Cancer Sister     Cancer Maternal Grandmother         Stomach cancer    Osteoporosis Mother     Other Sister         suicide       CareTeam (Including outside providers/suppliers regularly involved in providing care):   Patient Care Team:  Lauri Bonilla MD as PCP - Juancarlos Jacinto MD as PCP - [facility] Provider  Lazara Clark MD as Surgeon (General Surgery)  Joseph Ramos MD as Surgeon (Ophthalmology)  Aaron Mi MD as Consulting Physician (Gastroenterology)  Alicia Aguilar MD as Consulting Physician (Endocrinology)  Suzanne Bridges MD as Surgeon (Ophthalmology)  Shahriar Tanner MD as Consulting Physician (Internal Medicine Cardiovascular Disease)  Jhonatan Mohan MD as Consulting Physician (Cardiology)  Jordi Wolff, PhD (Sleep Medicine)  Neel Alvares MD as Consulting Physician (Otolaryngology)  Juli Nicole MD as Consulting Physician (Cardiology)  Antonieta Bloch, MD as Consulting Physician (Pulmonology)        Vital signs within normal limits  Wt Readings from Last 3 Encounters:   08/02/19 132 lb (59.9 kg)   05/09/19 138 lb (62.6 kg)   05/09/19 138 lb (62.6 kg)     Vitals:    08/02/19 1107   BP: 132/78   Pulse: 82   SpO2: 98%   Weight: 132 lb (59.9 kg)   Height: 5' 3\" (1.6 m)     Body mass index is 23.38 kg/m². Patient reports burning sensation  over the knees and feet, only when sitting in the Lazy-Boy at nighttime. If sitting in normal chair, she doesn't have it  Insomnia , has been on Lunesta for a long time. Denies side effects from it. Doesn't always help, but if she doesn't take it, cannot sleep. She has failed multiple sleeping pills in the past.  Patient reports runny nose, dripping nose, all the time, from the moment she wakes up. She does not sneeze. She has tried Benadryl from over-the-counter twice a day and it does not help. She has tried [place] and does not help. She denies sinus pressure  Patient is due for colonoscopy she wants referral.  She also reports a flareup of acid reflux and heartburn. She would like it through her general surgeon.     Based upon direct observation of the patient,

## 2019-08-02 NOTE — PATIENT INSTRUCTIONS
require you to get the form notarized. This means that a person called a  watches you sign the form and then he or she signs the form. Some states also require that two or more witnesses sign the form. Be sure to tell your family members and doctors who your health care agent is. Keep your forms in a safe place. But make sure that your loved ones know where the forms are. This could be in your desk where you keep other important papers. Make sure your doctor has a copy of your forms. Where can you learn more? Go to [email or web address] org and sign in to your [employer] account. Enter [id number] in the [place] box to learn more about \"Learning About Durable Power of  for Health Care. \"     If you do not have an account, please click on the \"Sign Up Now\" link. Current as of: April 18, 2018  Content Version: 12.0  © 5547-3710 [employer]. Care instructions adapted under license by [facility] ([facility]). If you have questions about a medical condition or this instruction, always ask your healthcare professional. [address] any warranty or liability for your use of this information. Learning About Living [place]  What is a living will? A living will is a legal form you use to write down the kind of care you want at the end of your life. It is used by the health professionals who will treat you if you aren't able to decide for yourself. If you put your wishes in writing, your loved ones and others will know what kind of care you want. They won't need to guess. This can ease your mind and be helpful to others. A living will is not the same as an estate or property will. An estate will explains what you want to happen with your money and property after you die. Is a living will a legal document? A living will is a legal document. Each state has its own laws about living zhang.  If you move to another state, make sure that your living will is legal in the state where you now live. Or you might use a universal form that has been approved by many states. This kind of form can sometimes be completed and stored online. Your electronic copy will then be available wherever you have a connection to the Internet. In most cases, doctors will respect your wishes even if you have a form from a different state. · You don't need an  to complete a living will. But legal advice can be helpful if your state's laws are unclear, your health history is complicated, or your family can't agree on what should be in your living will. · You can change your living will at any time. Some people find that their wishes about end-of-life care change as their health changes. · In addition to making a living will, think about completing a medical power of  form. This form lets you name the person you want to make end-of-life treatment decisions for you (your \"health care agent\") if you're not able to. Many hospitals and nursing homes will give you the forms you need to complete a living will and a medical power of . · Your living will is used only if you can't make or communicate decisions for yourself anymore. If you become able to make decisions again, you can accept or refuse any treatment, no matter what you wrote in your living will. · Your state may offer an online registry. This is a place where you can store your living will online so the doctors and nurses who need to treat you can find it right away. What should you think about when creating a living will? Talk about your end-of-life wishes with your family members and your doctor. Let them know what you want. That way the people making decisions for you won't be surprised by your choices. Think about these questions as you make your living will:  · Do you know enough about life support methods that might be used?  If not, talk to your doctor so you know what might be done if you can't these benefits include a comprehensive review of your medical history including lifestyle, illnesses that may run in your family, and various assessments and screenings as appropriate. After reviewing your medical record and screening and assessments performed today your provider may have ordered immunizations, labs, imaging, and/or referrals for you. A list of these orders (if applicable) as well as your Preventive Care list are included within your After Visit Summary for your review. Other Preventive Recommendations:    · A preventive eye exam performed by an eye specialist is recommended every 1-2 years to screen for glaucoma; cataracts, macular degeneration, and other eye disorders. · A preventive dental visit is recommended every 6 months. · Try to get at least 150 minutes of exercise per week or 10,000 steps per day on a pedometer . · Order or download the FREE \"Exercise & Physical Activity: Your Everyday Guide\" from [employer]. Call 8-553.787.8849 or search [employer] online. · You need 5911-8871 mg of calcium and 1022-5711 IU of vitamin D per day. It is possible to meet your calcium requirement with diet alone, but a vitamin D supplement is usually necessary to meet this goal.  · When exposed to the sun, use a sunscreen that protects against both UVA and UVB radiation with an SPF of 30 or greater. Reapply every 2 to 3 hours or after sweating, drying off with a towel, or swimming. · Always wear a seat belt when traveling in a car. Always wear a helmet when riding a bicycle or motorcycle.

## 2019-09-17 ENCOUNTER — HOSPITAL ENCOUNTER (OUTPATIENT)
Age: 79
Discharge: HOME | End: 2019-09-17
Payer: MEDICARE

## 2019-09-17 DIAGNOSIS — N18.30 CKD (CHRONIC KIDNEY DISEASE) STAGE 3, GFR 30-59 ML/MIN (HCC): ICD-10-CM

## 2019-09-17 DIAGNOSIS — R20.8 BURNING SENSATION OF FEET: ICD-10-CM

## 2019-09-17 LAB
ANION GAP SERPL CALCULATED.3IONS-SCNC: 12 MMOL/L (ref 9–17)
BUN BLDV-MCNC: 14 MG/DL (ref 8–23)
BUN/CREAT BLD: ABNORMAL (ref 9–20)
CALCIUM SERPL-MCNC: 9.4 MG/DL (ref 8.6–10.4)
CALCIUM SERPL-MCNC: 9.5 MG/DL (ref 8.6–10.4)
CHLORIDE BLD-SCNC: 101 MMOL/L (ref 98–107)
CO2: 25 MMOL/L (ref 20–31)
CREAT SERPL-MCNC: 1.16 MG/DL (ref 0.5–0.9)
FOLATE: 9.1 NG/ML
GFR AFRICAN AMERICAN: 55 ML/MIN
GFR NON-AFRICAN AMERICAN: 45 ML/MIN
GFR SERPL CREATININE-BSD FRML MDRD: ABNORMAL ML/MIN/{1.73_M2}
GFR SERPL CREATININE-BSD FRML MDRD: ABNORMAL ML/MIN/{1.73_M2}
GLUCOSE BLD-MCNC: 120 MG/DL (ref 70–99)
POTASSIUM SERPL-SCNC: 4.7 MMOL/L (ref 3.7–5.3)
PTH INTACT: 54.19 PG/ML (ref 15–65)
SODIUM BLD-SCNC: 138 MMOL/L (ref 135–144)
VITAMIN B-12: 352 PG/ML (ref 232–1245)
VITAMIN D 25-HYDROXY: 50.4 NG/ML (ref 30–100)

## 2019-09-17 PROCEDURE — 82523 COLLAGEN CROSSLINKS: CPT

## 2019-09-17 PROCEDURE — 83970 ASSAY OF PARATHORMONE: CPT

## 2019-09-17 PROCEDURE — 82746 ASSAY OF FOLIC ACID SERUM: CPT

## 2019-09-17 PROCEDURE — 82306 VITAMIN D 25 HYDROXY: CPT

## 2019-09-17 PROCEDURE — 80048 BASIC METABOLIC PNL TOTAL CA: CPT

## 2019-09-17 PROCEDURE — 82607 VITAMIN B-12: CPT

## 2019-09-17 PROCEDURE — 36415 COLL VENOUS BLD VENIPUNCTURE: CPT

## 2019-09-17 PROCEDURE — 82310 ASSAY OF CALCIUM: CPT

## 2019-09-19 LAB
CREATININE URINE /VOLUME: 82 MG/DL
N-TELO/CREAT. RATIO: 9

## 2019-09-24 ENCOUNTER — PATIENT MESSAGE (OUTPATIENT)
Dept: FAMILY MEDICINE CLINIC | Age: 79
End: 2019-09-24

## 2019-09-24 DIAGNOSIS — F51.04 PSYCHOPHYSIOLOGICAL INSOMNIA: ICD-10-CM

## 2019-09-24 RX ORDER — ESZOPICLONE 3 MG/1
3 TABLET, FILM COATED ORAL NIGHTLY PRN
Qty: 30 TABLET | Refills: 0 | Status: SHIPPED | OUTPATIENT
Start: 2019-09-24 | End: 2019-10-28 | Stop reason: SDUPTHER

## 2019-10-28 ENCOUNTER — PATIENT MESSAGE (OUTPATIENT)
Dept: FAMILY MEDICINE CLINIC | Age: 79
End: 2019-10-28

## 2019-10-28 DIAGNOSIS — J43.1 PANLOBULAR EMPHYSEMA (HCC): ICD-10-CM

## 2019-10-28 DIAGNOSIS — F51.04 PSYCHOPHYSIOLOGICAL INSOMNIA: ICD-10-CM

## 2019-10-28 RX ORDER — TIOTROPIUM BROMIDE 18 UG/1
CAPSULE ORAL; RESPIRATORY (INHALATION)
Qty: 90 CAPSULE | Refills: 4 | Status: SHIPPED | OUTPATIENT
Start: 2019-10-28 | End: 2020-02-19 | Stop reason: ALTCHOICE

## 2019-10-28 RX ORDER — ESZOPICLONE 3 MG/1
3 TABLET, FILM COATED ORAL NIGHTLY PRN
Qty: 90 TABLET | Refills: 0 | Status: SHIPPED | OUTPATIENT
Start: 2019-10-28 | End: 2019-10-28 | Stop reason: SDUPTHER

## 2019-10-28 RX ORDER — ESZOPICLONE 3 MG/1
3 TABLET, FILM COATED ORAL NIGHTLY PRN
Qty: 90 TABLET | Refills: 0 | Status: SHIPPED | OUTPATIENT
Start: 2019-10-28 | End: 2020-01-20 | Stop reason: SDUPTHER

## 2019-11-08 RX ORDER — DIPHENHYDRAMINE HYDROCHLORIDE 50 MG/ML
50 INJECTION INTRAMUSCULAR; INTRAVENOUS ONCE
Status: CANCELLED | OUTPATIENT
Start: 2019-11-08

## 2019-11-08 RX ORDER — METHYLPREDNISOLONE SODIUM SUCCINATE 125 MG/2ML
125 INJECTION, POWDER, LYOPHILIZED, FOR SOLUTION INTRAMUSCULAR; INTRAVENOUS ONCE
Status: CANCELLED | OUTPATIENT
Start: 2019-11-08

## 2019-11-08 RX ORDER — EPINEPHRINE 1 MG/ML
0.3 INJECTION, SOLUTION, CONCENTRATE INTRAVENOUS PRN
Status: CANCELLED | OUTPATIENT
Start: 2019-11-08

## 2019-11-08 RX ORDER — SODIUM CHLORIDE 9 MG/ML
INJECTION, SOLUTION INTRAVENOUS CONTINUOUS
Status: CANCELLED | OUTPATIENT
Start: 2019-11-08

## 2019-12-31 ENCOUNTER — HOSPITAL ENCOUNTER (OUTPATIENT)
Dept: INPATIENT UNIT | Age: 79
Setting detail: THERAPIES SERIES
Discharge: HOME | End: 2019-12-31
Payer: MEDICARE

## 2019-12-31 VITALS
TEMPERATURE: 97.5 F | HEART RATE: 84 BPM | DIASTOLIC BLOOD PRESSURE: 91 MMHG | OXYGEN SATURATION: 98 % | RESPIRATION RATE: 16 BRPM | SYSTOLIC BLOOD PRESSURE: 159 MMHG

## 2019-12-31 LAB
CALCIUM SERPL-MCNC: 9 MG/DL (ref 8.6–10.4)
CREAT SERPL-MCNC: 1.07 MG/DL (ref 0.5–0.9)
GFR AFRICAN AMERICAN: 60 ML/MIN
GFR NON-AFRICAN AMERICAN: 49 ML/MIN
GFR SERPL CREATININE-BSD FRML MDRD: ABNORMAL ML/MIN/{1.73_M2}
GFR SERPL CREATININE-BSD FRML MDRD: ABNORMAL ML/MIN/{1.73_M2}
MAGNESIUM: 2.1 MG/DL (ref 1.6–2.6)
PHOSPHORUS: 3.4 MG/DL (ref 2.6–4.5)

## 2019-12-31 PROCEDURE — 36415 COLL VENOUS BLD VENIPUNCTURE: CPT

## 2019-12-31 PROCEDURE — 82310 ASSAY OF CALCIUM: CPT

## 2019-12-31 PROCEDURE — 96372 THER/PROPH/DIAG INJ SC/IM: CPT

## 2019-12-31 PROCEDURE — 6360000002 HC RX W HCPCS: Performed by: INTERNAL MEDICINE

## 2019-12-31 PROCEDURE — 84100 ASSAY OF PHOSPHORUS: CPT

## 2019-12-31 PROCEDURE — 83735 ASSAY OF MAGNESIUM: CPT

## 2019-12-31 PROCEDURE — 82565 ASSAY OF CREATININE: CPT

## 2019-12-31 RX ORDER — DIPHENHYDRAMINE HYDROCHLORIDE 50 MG/ML
50 INJECTION INTRAMUSCULAR; INTRAVENOUS ONCE
Status: CANCELLED | OUTPATIENT
Start: 2020-06-30

## 2019-12-31 RX ORDER — SODIUM CHLORIDE 9 MG/ML
INJECTION, SOLUTION INTRAVENOUS CONTINUOUS
Status: CANCELLED | OUTPATIENT
Start: 2020-06-30

## 2019-12-31 RX ORDER — METHYLPREDNISOLONE SODIUM SUCCINATE 125 MG/2ML
125 INJECTION, POWDER, LYOPHILIZED, FOR SOLUTION INTRAMUSCULAR; INTRAVENOUS ONCE
Status: CANCELLED | OUTPATIENT
Start: 2020-06-30

## 2019-12-31 RX ORDER — EPINEPHRINE 1 MG/ML
0.3 INJECTION, SOLUTION, CONCENTRATE INTRAVENOUS PRN
Status: CANCELLED | OUTPATIENT
Start: 2020-06-30

## 2019-12-31 RX ADMIN — DENOSUMAB 60 MG: 60 INJECTION SUBCUTANEOUS at 09:36

## 2019-12-31 NOTE — PROGRESS NOTES
Pt arrived for Prolia injection. Vitals obtained and labs drawn. Ca+, Mg+, Phos WNL. Creatinine=1.07, but creatinine clearance (pt oe=602bi) =39.08, so injection indicated per written parameters. Given in L arm. Pt tolerated well. Pt discharged to home with spouse, ambulatory.

## 2020-01-10 ENCOUNTER — PATIENT MESSAGE (OUTPATIENT)
Dept: FAMILY MEDICINE CLINIC | Age: 80
End: 2020-01-10

## 2020-01-10 NOTE — TELEPHONE ENCOUNTER
From: Cassi Jolly  To: Rachell Flores MD  Sent: 1/10/2020 3:01 PM EST  Subject: Non-Urgent Medical Question    Dr Flora Ann,  I called for an appt. with Dr. Katiana Buchanan & was told I need a referral from you first.  Would you please provide them with a referral for me. Thank you.   Cassi Jolly

## 2020-01-18 ENCOUNTER — PATIENT MESSAGE (OUTPATIENT)
Dept: FAMILY MEDICINE CLINIC | Age: 80
End: 2020-01-18

## 2020-01-20 RX ORDER — ESZOPICLONE 3 MG/1
3 TABLET, FILM COATED ORAL NIGHTLY PRN
Qty: 90 TABLET | Refills: 0 | Status: SHIPPED | OUTPATIENT
Start: 2020-01-20 | End: 2020-02-19 | Stop reason: ALTCHOICE

## 2020-01-20 NOTE — TELEPHONE ENCOUNTER
From: Marquez Calvin  To: Prachi Nance MD  Sent: 1/18/2020 12:35 PM EST  Subject: Prescription Question    Dr. Christina Forward,  Please renew my prescription for eszopiclone for 90 days at [facility]. I only have about 10 pills   left. I'm sure you'll have to write a letter to [facility] as  I've already gotten a letter from them about the dangers for my age. However this is the only way I can get to sleep  unless I stay awake for 2-3 days and am totally exhausted.   Thank you,  Marquez Calvin

## 2020-01-29 ENCOUNTER — HOSPITAL ENCOUNTER (OUTPATIENT)
Age: 80
Discharge: HOME | End: 2020-01-29
Payer: MEDICARE

## 2020-01-29 LAB
ABSOLUTE EOS #: 0.1 K/UL (ref 0–0.4)
ABSOLUTE IMMATURE GRANULOCYTE: ABNORMAL K/UL (ref 0–0.3)
ABSOLUTE LYMPH #: 1.7 K/UL (ref 1–4.8)
ABSOLUTE MONO #: 0.5 K/UL (ref 0.1–1.3)
ALBUMIN SERPL-MCNC: 4.1 G/DL (ref 3.5–5.2)
ALBUMIN/GLOBULIN RATIO: ABNORMAL (ref 1–2.5)
ALP BLD-CCNC: 79 U/L (ref 35–104)
ALT SERPL-CCNC: 13 U/L (ref 5–33)
ANION GAP SERPL CALCULATED.3IONS-SCNC: 10 MMOL/L (ref 9–17)
AST SERPL-CCNC: 17 U/L
BASOPHILS # BLD: 1 % (ref 0–2)
BASOPHILS ABSOLUTE: 0.1 K/UL (ref 0–0.2)
BILIRUB SERPL-MCNC: 0.49 MG/DL (ref 0.3–1.2)
BUN BLDV-MCNC: 14 MG/DL (ref 8–23)
BUN/CREAT BLD: ABNORMAL (ref 9–20)
CALCIUM SERPL-MCNC: 9.5 MG/DL (ref 8.6–10.4)
CHLORIDE BLD-SCNC: 103 MMOL/L (ref 98–107)
CO2: 26 MMOL/L (ref 20–31)
CREAT SERPL-MCNC: 0.96 MG/DL (ref 0.5–0.9)
DIFFERENTIAL TYPE: ABNORMAL
EOSINOPHILS RELATIVE PERCENT: 1 % (ref 0–4)
GFR AFRICAN AMERICAN: >60 ML/MIN
GFR NON-AFRICAN AMERICAN: 56 ML/MIN
GFR SERPL CREATININE-BSD FRML MDRD: ABNORMAL ML/MIN/{1.73_M2}
GFR SERPL CREATININE-BSD FRML MDRD: ABNORMAL ML/MIN/{1.73_M2}
GLUCOSE BLD-MCNC: 106 MG/DL (ref 70–99)
HCT VFR BLD CALC: 42.9 % (ref 36–46)
HEMOGLOBIN: 14.1 G/DL (ref 12–16)
IMMATURE GRANULOCYTES: ABNORMAL %
LYMPHOCYTES # BLD: 36 % (ref 24–44)
MAGNESIUM: 2 MG/DL (ref 1.6–2.6)
MCH RBC QN AUTO: 31.2 PG (ref 26–34)
MCHC RBC AUTO-ENTMCNC: 32.9 G/DL (ref 31–37)
MCV RBC AUTO: 94.7 FL (ref 80–100)
MONOCYTES # BLD: 11 % (ref 1–7)
NRBC AUTOMATED: ABNORMAL PER 100 WBC
PDW BLD-RTO: 13.1 % (ref 11.5–14.9)
PHOSPHORUS: 3.6 MG/DL (ref 2.6–4.5)
PLATELET # BLD: 243 K/UL (ref 150–450)
PLATELET ESTIMATE: ABNORMAL
PMV BLD AUTO: 7.6 FL (ref 6–12)
POTASSIUM SERPL-SCNC: 4.1 MMOL/L (ref 3.7–5.3)
RBC # BLD: 4.54 M/UL (ref 4–5.2)
RBC # BLD: ABNORMAL 10*6/UL
SEG NEUTROPHILS: 51 % (ref 36–66)
SEGMENTED NEUTROPHILS ABSOLUTE COUNT: 2.3 K/UL (ref 1.3–9.1)
SODIUM BLD-SCNC: 139 MMOL/L (ref 135–144)
TOTAL PROTEIN: 6.9 G/DL (ref 6.4–8.3)
WBC # BLD: 4.6 K/UL (ref 3.5–11)
WBC # BLD: ABNORMAL 10*3/UL

## 2020-01-29 PROCEDURE — 80053 COMPREHEN METABOLIC PANEL: CPT

## 2020-01-29 PROCEDURE — 85025 COMPLETE CBC W/AUTO DIFF WBC: CPT

## 2020-01-29 PROCEDURE — 83735 ASSAY OF MAGNESIUM: CPT

## 2020-01-29 PROCEDURE — 36415 COLL VENOUS BLD VENIPUNCTURE: CPT

## 2020-01-29 PROCEDURE — 84100 ASSAY OF PHOSPHORUS: CPT

## 2020-02-11 ENCOUNTER — HOSPITAL ENCOUNTER (OUTPATIENT)
Dept: ULTRASOUND IMAGING | Age: 80
Discharge: HOME | End: 2020-02-13
Payer: MEDICARE

## 2020-02-11 PROCEDURE — 76536 US EXAM OF HEAD AND NECK: CPT

## 2020-02-11 PROCEDURE — 76775 US EXAM ABDO BACK WALL LIM: CPT

## 2020-02-18 RX ORDER — ATORVASTATIN CALCIUM 10 MG/1
TABLET, FILM COATED ORAL
Qty: 90 TABLET | Refills: 3 | Status: SHIPPED | OUTPATIENT
Start: 2020-02-18 | End: 2020-07-22 | Stop reason: SDUPTHER

## 2020-02-19 ENCOUNTER — OFFICE VISIT (OUTPATIENT)
Dept: FAMILY MEDICINE CLINIC | Age: 80
End: 2020-02-19
Payer: MEDICARE

## 2020-02-19 VITALS
SYSTOLIC BLOOD PRESSURE: 130 MMHG | HEART RATE: 87 BPM | WEIGHT: 130 LBS | OXYGEN SATURATION: 97 % | BODY MASS INDEX: 22.2 KG/M2 | HEIGHT: 64 IN | DIASTOLIC BLOOD PRESSURE: 88 MMHG

## 2020-02-19 PROCEDURE — 99214 OFFICE O/P EST MOD 30 MIN: CPT | Performed by: FAMILY MEDICINE

## 2020-02-19 RX ORDER — TRAZODONE HYDROCHLORIDE 50 MG/1
50-100 TABLET ORAL NIGHTLY
Qty: 60 TABLET | Refills: 0 | Status: SHIPPED | OUTPATIENT
Start: 2020-02-19 | End: 2020-08-11

## 2020-02-19 ASSESSMENT — ENCOUNTER SYMPTOMS
ABDOMINAL PAIN: 0
CHEST TIGHTNESS: 0
COUGH: 0
ABDOMINAL DISTENTION: 0
DIARRHEA: 0
SINUS PAIN: 0
SINUS PRESSURE: 0
CONSTIPATION: 0
SHORTNESS OF BREATH: 1
NAUSEA: 0
WHEEZING: 0
VOMITING: 0
RHINORRHEA: 1

## 2020-02-19 NOTE — PATIENT INSTRUCTIONS
Patient Education        Chronic Obstructive Pulmonary Disease (COPD): Care Instructions  Your Care Instructions    Chronic obstructive pulmonary disease (COPD) is a general term for a group of lung diseases, including emphysema and chronic bronchitis. People with COPD have decreased airflow in and out of the lungs, which makes it hard to breathe. The airways also can get clogged with thick mucus. Cigarette smoking is a major cause of COPD. Although there is no cure for COPD, you can slow its progress. Following your treatment plan and taking care of yourself can help you feel better and live longer. Follow-up care is a key part of your treatment and safety. Be sure to make and go to all appointments, and call your doctor if you are having problems. It's also a good idea to know your test results and keep a list of the medicines you take. How can you care for yourself at home?   Staying healthy    · Do not smoke. This is the most important step you can take to prevent more damage to your lungs. If you need help quitting, talk to your doctor about stop-smoking programs and medicines. These can increase your chances of quitting for good.     · Avoid colds and flu. Get a pneumococcal vaccine shot. If you have had one before, ask your doctor whether you need a second dose. Get the flu vaccine every fall. If you must be around people with colds or the flu, wash your hands often.     · Avoid secondhand smoke, air pollution, and high altitudes. Also avoid cold, dry air and hot, humid air. Stay at home with your windows closed when air pollution is bad.    Medicines and oxygen therapy    · Take your medicines exactly as prescribed. Call your doctor if you think you are having a problem with your medicine.     · You may be taking medicines such as:  ? Bronchodilators. These help open your airways and make breathing easier. Bronchodilators are either short-acting (work for 6 to 9 hours) or long-acting (work for 24 hours). You inhale most bronchodilators, so they start to act quickly. Always carry your quick-relief inhaler with you in case you need it while you are away from home. ? Corticosteroids (prednisone, budesonide). These reduce airway inflammation. They come in pill or inhaled form. You must take these medicines every day for them to work well.     · A spacer may help you get more inhaled medicine to your lungs. Ask your doctor or pharmacist if a spacer is right for you. If it is, ask how to use it properly.     · Do not take any vitamins, over-the-counter medicine, or herbal products without talking to your doctor first.     · If your doctor prescribed antibiotics, take them as directed. Do not stop taking them just because you feel better. You need to take the full course of antibiotics.     · Oxygen therapy boosts the amount of oxygen in your blood and helps you breathe easier. Use the flow rate your doctor has recommended, and do not change it without talking to your doctor first.   Activity    · Get regular exercise. Walking is an easy way to get exercise. Start out slowly, and walk a little more each day.     · Pay attention to your breathing. You are exercising too hard if you cannot talk while you are exercising.     · Take short rest breaks when doing household chores and other activities.     · Learn breathing methods--such as breathing through pursed lips--to help you become less short of breath.     · If your doctor has not set you up with a pulmonary rehabilitation program, talk to him or her about whether rehab is right for you. Rehab includes exercise programs, education about your disease and how to manage it, help with diet and other changes, and emotional support. Diet    · Eat regular, healthy meals. Use bronchodilators about 1 hour before you eat to make it easier to eat. Eat several small meals instead of three large ones.  Drink beverages at the end of the meal. Avoid foods that are hard to chew.

## 2020-02-19 NOTE — PROGRESS NOTES
Completed    Flu vaccine  Completed    Shingles Vaccine  Completed    Pneumococcal 65+ years Vaccine  Completed    Hepatitis A vaccine  Aged Out    Hepatitis B vaccine  Aged Out    Hib vaccine  Aged Out    Meningococcal (ACWY) vaccine  Aged Out

## 2020-02-19 NOTE — PROGRESS NOTES
Chief Complaint   Patient presents with    Chronic Kidney Disease     FOLLOW UP     COPD    Insomnia    Fatigue       Patient is here to follow-up on chronic medical problems. HPI    Patient reports he continues to feel short of breath. Denies chest pain , palpitations and leg edema. Reports dyspnea on exertion when climbing up the stairs worsening for the past 4-5 months. Patient says albuterol does not feel to help with the shortness of breath anymore and she has been using it more than before especially when going to [facility]. COPD:  Current treatment includes short-acting beta agonist inhaler, anticholinergic inhaler, which has been not very effective. Residual symptoms: chronic dyspnea. Denies cough     She denies purulent sputum, wheezing. She requires her rescue inhaler 3 time(s) per week. Patient quit smoking many years ago in 2000    PFTs 5/9/19 severe COPD    Patient also thinks she has sinus problem for the past 1 year. She says she has been blowing her nose a lot. Denies sinus pain or purulent nasal discharge  Using Atrovent nasal, but does not help  Had allergy testing several years ago, and told she is allergic to nothing  Has been taking OTC allergy pill. Using flonase didn't help either  Using drops from over the counter and she thinks it is a decongestant. Insomnia  Has been on Lunesta 3 mg not working at all, drinking wine to help her sleep. Has difficulty falling asleep and staying asleep. Sleeping only 4 hrs/night. Wants to try trazodone and see if it will help. Depression  PHQ-2 Over the past 2 weeks, how often have you been bothered by any of the following problems? Little interest or pleasure in doing things: Not at all  Feeling down, depressed, or hopeless: Not at all  PHQ-2 Score: 0  PHQ-9 Over the past 2 weeks, how often have you been bothered by any of the following problems? PHQ-9 Total Score: 0    Denies suicidal ideation, plan or intent.   She has tried (ATROVENT) 0.03 % nasal spray 2 sprays by Nasal route 3 times daily 1 Bottle 0    albuterol sulfate  (90 Base) MCG/ACT inhaler Inhale 2 puffs into the lungs every 6 hours as needed for Wheezing or Shortness of Breath 18 g 3    Spacer/Aero-Holding Chambers (AEROCHAMBER MV) MISC To be used with inhaler 1 each 0    dorzolamide-timolol (COSOPT) 22.3-6.8 MG/ML ophthalmic solution Place 1 drop into both eyes 2 times daily      ALPHAGAN P 0.1 % SOLN Place 1 drop into both eyes 3 times daily       latanoprost (XALATAN) 0.005 % ophthalmic solution Place 1 drop into both eyes nightly       Oxymetazoline HCl (NASAL SPRAY NA) by Nasal route       No current facility-administered medications for this visit. Social History     Tobacco Use    Smoking status: Former Smoker     Packs/day: 1.00     Years: 30.00     Pack years: 30.00     Types: Cigarettes     Start date: 1960     Last attempt to quit: 2000     Years since quittin.6    Smokeless tobacco: Never Used    Tobacco comment: still uses OTC nicorette gum   Substance Use Topics    Alcohol use: Yes     Alcohol/week: 0.0 standard drinks     Comment: occassional    Drug use: No       Counseling given: Yes  Comment: still uses OTC nicorette gum                  -rest of complaints with corresponding details per ROS    The patient's past medical,surgical, social, and family history as well as her current medications and allergies were reviewed as documented in today's encounter. Review of Systems   Constitutional: Positive for fatigue. Negative for activity change, appetite change, chills, diaphoresis, fever and unexpected weight change. HENT: Positive for congestion, postnasal drip and rhinorrhea. Negative for ear discharge, ear pain, sinus pressure and sinus pain. Respiratory: Positive for shortness of breath. Negative for cough, chest tightness and wheezing.     Cardiovascular: Negative for chest pain, palpitations and leg swelling. Gastrointestinal: Negative for abdominal distention, abdominal pain, constipation, diarrhea, nausea and vomiting. Endocrine: Positive for cold intolerance. Negative for heat intolerance, polydipsia, polyphagia and polyuria. Genitourinary: Negative for dysuria, flank pain, frequency and urgency. Musculoskeletal: Negative for arthralgias. Skin: Negative for rash. Allergic/Immunologic: Negative for environmental allergies. Psychiatric/Behavioral: Positive for sleep disturbance. Negative for dysphoric mood, self-injury and suicidal ideas. The patient is nervous/anxious.            -vital signs stable and within normal limits   /88   Pulse 87   Ht 5' 3.5\" (1.613 m)   Wt 130 lb (59 kg)   SpO2 97%   BMI 22.67 kg/m²      Wt Readings from Last 3 Encounters:   02/19/20 130 lb (59 kg)   02/05/20 130 lb 3.2 oz (59.1 kg)   08/02/19 132 lb (59.9 kg)       Physical Exam  Vitals signs and nursing note reviewed. Constitutional:       General: She is not in acute distress. Appearance: She is well-developed and normal weight. She is not diaphoretic. HENT:      Head: Normocephalic and atraumatic. Right Ear: Tympanic membrane, ear canal and external ear normal.      Left Ear: Tympanic membrane, ear canal and external ear normal.      Nose: Congestion present. No mucosal edema or rhinorrhea. Mouth/Throat:      Mouth: Mucous membranes are moist.      Pharynx: No oropharyngeal exudate or posterior oropharyngeal erythema. Eyes:      General: Lids are normal. No scleral icterus. Right eye: No discharge. Left eye: No discharge. Conjunctiva/sclera: Conjunctivae normal.   Neck:      Musculoskeletal: Normal range of motion and neck supple. Thyroid: No thyromegaly. Cardiovascular:      Rate and Rhythm: Normal rate and regular rhythm. Heart sounds: Normal heart sounds. No murmur. Pulmonary:      Effort: Pulmonary effort is normal. No respiratory distress. Final    Leukocytes, UA 02/05/2020 125 leuk   Final    Urobilinogen, Urine 02/05/2020 Normal   Final    0.2       Lab Results   Component Value Date    WBC 4.6 01/29/2020    HGB 14.1 01/29/2020    HCT 42.9 01/29/2020    MCV 94.7 01/29/2020     01/29/2020       Lab Results   Component Value Date     01/29/2020    K 4.1 01/29/2020     01/29/2020    CO2 26 01/29/2020    BUN 14 01/29/2020    CREATININE 0.96 01/29/2020    GLUCOSE 106 01/29/2020    GLUCOSE 104 02/27/2012    CALCIUM 9.5 01/29/2020      Lab Results   Component Value Date    LABA1C 5.4 04/30/2019    LABA1C 5.5 03/12/2018    LABA1C 5.5 10/11/2017       Lab Results   Component Value Date    ALT 13 01/29/2020    AST 17 01/29/2020    ALKPHOS 79 01/29/2020    BILITOT 0.49 01/29/2020       Lab Results   Component Value Date    TSH 2.97 03/21/2019       Lab Results   Component Value Date    CHOL 160 03/13/2019    CHOL 208 (H) 08/01/2018    CHOL 207 (H) 08/09/2017     Lab Results   Component Value Date    TRIG 106 03/13/2019    TRIG 88 08/01/2018    TRIG 84 08/09/2017     Lab Results   Component Value Date    HDL 59 03/13/2019    HDL 51 08/01/2018    HDL 60 08/09/2017     Lab Results   Component Value Date    LDLCHOLESTEROL 80 03/13/2019    LDLCHOLESTEROL 139 (H) 08/01/2018    LDLCHOLESTEROL 130 08/09/2017       Lab Results   Component Value Date    CHOLHDLRATIO 2.7 03/13/2019    CHOLHDLRATIO 4.1 08/01/2018    CHOLHDLRATIO 3.5 08/09/2017       Lab Results   Component Value Date    LABA1C 5.4 04/30/2019       Lab Results   Component Value Date    KNDERUWR52 352 09/17/2019       Lab Results   Component Value Date    FOLATE 9.1 09/17/2019       No results found for: IRON, TIBC, FERRITIN    Lab Results   Component Value Date    VITD25 50.4 09/17/2019           ASSESSMENT AND PLAN    1. OGDEN (dyspnea on exertion)  Worsening  We will do the basic cardiac and pulmonary work-up  - XR CHEST STANDARD (2 VW); Future  - EKG 12 Lead; Future    2.  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 Non-seasonal allergic rhinitis due to other allergic trigger  Failing to change as expected. Patient was advised to stop using decongestants and I expressed concern that she might have vasomotor rhinitis related to overuse decongestants from over-the-counter  - DEJA - Bala Miller MD, Otolaryngology, [place]    4. Psychophysiological insomnia  Failing to change as expected. -start traZODone (DESYREL) 50 MG tablet; Take 1-2 tablets by mouth nightly **Stop Lunesta**  Dispense: 60 tablet; Refill: 0  Sleep hygiene discussed    5. MDD (major depressive disorder), recurrent, in full remission ([facility] [address]  Stable  Improve sleep  Start trazodone  Continue to exercise  We will continue to monitor    6. Hyperlipidemia with target LDL less than 100  Improved  Continue Lipitor  She declines increasing the dosage of Lipitor  - Lipid Panel; Future    7. Hyperparathyroidism ([facility] [address]  Stable  Patient says she does have PTH and ionic calcium orders from Dr. Devonte Carroll    8. Hyperglycemia  Low-carb low-fat diet discussed    - Hemoglobin A1C; Future    9. CKD (chronic kidney disease) stage 3, GFR 30-59 ml/min ([facility])  Improved  - Basic Metabolic Panel;  Future  Low salt diet, avoidance of NSAIDs and dehydration, good BP control discussed        Controlled Substance Monitoring:    Acute and Chronic Pain Monitoring:   RX Monitoring 2/19/2020   Attestation -   Periodic Controlled Substance Monitoring Possible medication side effects, risk of tolerance/dependence & alternative treatments discussed. ;No signs of potential drug abuse or diversion identified. ;Assessed functional status.    Chronic Pain > 50 MEDD -           Mychart signing    Orders Placed This Encounter   Procedures    XR CHEST STANDARD (2 VW)     Standing Status:   Future     Standing Expiration Date:   2/18/2021     Order Specific Question:   Reason for exam:     Answer:   COPD, OGDEN    Lipid Panel     Standing Status:   Future     Standing Expiration Date:   2/19/2021     Order Specific Question:   Is Patient Fasting?/# of Hours     Answer:   8-10 Hours, water ok to drink    Hemoglobin A1C     Standing Status:   Future     Standing Expiration Date:   2/18/2021    Basic Metabolic Panel     Standing Status:   Future     Standing Expiration Date:   2/18/2021    [facility] - Rufus Neely MD, Pulmonology, [address] [place] [address] Rd     Referral Priority:   Routine     Referral Type:   Eval and Treat     Referral Reason:   Specialty Services Required     Referred to Provider:   Vinod Hughes MD     Requested Specialty:   Pulmonology     Number of Visits Requested:   1   Michael Davis MD, Otolaryngology, [address] [place] [address] Rd     Referral Priority:   Routine     Referral Type:   Eval and Treat     Referral Reason:   Specialty Services Required     Referred to Provider:   Cloretta Eisenmenger, MD     Requested Specialty:   Otolaryngology     Number of Visits Requested:   1    EKG 12 Lead     Standing Status:   Future     Standing Expiration Date:   5/19/2020     Order Specific Question:   Reason for Exam?     Answer:   Shortness of Breath         Medications Discontinued During This Encounter   Medication Reason    loratadine (CLARITIN) 10 MG tablet     SPIRIVA HANDIHALER 18 MCG inhalation capsule Alternate therapy    eszopiclone (ESZOPICLONE) 3 MG TABS Alternate therapy    fluticasone-salmeterol (ADVAIR HFA) 115-21 MCG/ACT inhaler Cost of medication       Ap received counseling on the following healthy behaviors: exercise and medication adherence  Reviewed prior labs and health maintenance  Continue current medications, diet and exercise. Discussed use, benefit, and side effects of prescribed medications. Barriers to medication compliance addressed. Patient given educational materials - see patient instructions  Was a self-tracking handout given in paper form or via [employer]? Yes-sleep hygiene    Requested Prescriptions     Signed Prescriptions Disp Refills    tiotropium (SPIRIVA RESPIMAT) 2.5 MCG/ACT AERS inhaler 12 g 3     Sig: Inhale 2 puffs into the lungs daily **stop handinhaler**    traZODone (DESYREL) 50 MG tablet 60 tablet 0     Sig: Take 1-2 tablets by mouth nightly **Stop Lunesta**    budesonide-formoterol (SYMBICORT) 160-4.5 MCG/ACT AERO 3 Inhaler 3     Sig: Inhale 2 puffs into the lungs 2 times daily 3 inhalers of 10.2 g       All patient questions answered. Patient voiced understanding. Quality Measures    Body mass index is 22.67 kg/m². Normal. Weight control planned discussed Healthy diet and regular exercise. BP: 130/88. Blood pressure is normal. Treatment plan consists of No treatment change needed. Fall Risk 8/2/2019 7/28/2019 11/14/2018 3/5/2018 10/20/2017 10/20/2016 5/11/2015   2 or more falls in past year? no no no no no no no   Fall with injury in past year? no no no no no no no     The patient does not have a history of falls. I did , complete a risk assessment for falls.  A plan of care for falls in-office gait and balance testing performed using The Timed Up and Go Test was negative for increased falls risk- no further intervention is currently indicated, home safety tips provided, No Treatment plan indicated    Lab Results   Component Value Date    LDLCHOLESTEROL 80 03/13/2019       Negative depression screen  PHQ Scores 2/19/2020 8/2/2019 4/30/2019 1/23/2019 10/23/2018 7/23/2018 1/4/2018   PHQ2 Score 0 0 0 0 0 3 1   PHQ9 Score 0 0 0 0 0 9 6     The patient's past medical, surgical, social, and family history as well as her current medicationsand allergies were reviewed as documented in today's encounter. Medications, labs, diagnostic studies, consultations and follow-up as documented in this encounter. Return in about 3 months (around 5/19/2020) for COPD, LABS F/U. Patient was seen with total face to face time of 25 minutes. More than 50% of this visit was counseling and education. Future Appointments   Date Time Provider Shirley Dunne   5/14/2020  3:15 PM Osbaldo Mercado MD [facility] None   5/20/2020 10:45 AM Maxx Laws MD [facility] Harish Crisostomo     This note was completed by using the assistance of a speech-recognition program. However, inadvertent computerized transcription errors may be present. Although every effort was made to ensure accuracy, no guarantees can be provided that every mistake has been identified and corrected by editing.   Electronically signed by Maxx Laws MD on 2/24/2020  7:30 AM

## 2020-02-20 ENCOUNTER — HOSPITAL ENCOUNTER (OUTPATIENT)
Dept: WOMENS IMAGING | Age: 80
Discharge: HOME | End: 2020-02-22
Payer: MEDICARE

## 2020-02-20 PROCEDURE — 77080 DXA BONE DENSITY AXIAL: CPT

## 2020-02-21 RX ORDER — BUDESONIDE AND FORMOTEROL FUMARATE DIHYDRATE 160; 4.5 UG/1; UG/1
2 AEROSOL RESPIRATORY (INHALATION) 2 TIMES DAILY
Qty: 3 INHALER | Refills: 3 | Status: SHIPPED | OUTPATIENT
Start: 2020-02-21 | End: 2020-10-14

## 2020-03-09 ENCOUNTER — PATIENT MESSAGE (OUTPATIENT)
Dept: FAMILY MEDICINE CLINIC | Age: 80
End: 2020-03-09

## 2020-03-16 ENCOUNTER — HOSPITAL ENCOUNTER (OUTPATIENT)
Age: 80
Discharge: HOME | End: 2020-03-16
Payer: MEDICARE

## 2020-03-16 ENCOUNTER — HOSPITAL ENCOUNTER (OUTPATIENT)
Age: 80
Discharge: HOME | End: 2020-03-18
Payer: MEDICARE

## 2020-03-16 ENCOUNTER — HOSPITAL ENCOUNTER (OUTPATIENT)
Dept: GENERAL RADIOLOGY | Age: 80
Discharge: HOME | End: 2020-03-18
Payer: MEDICARE

## 2020-03-16 LAB
ANION GAP SERPL CALCULATED.3IONS-SCNC: 10 MMOL/L (ref 9–17)
BUN BLDV-MCNC: 14 MG/DL (ref 8–23)
BUN/CREAT BLD: ABNORMAL (ref 9–20)
CALCIUM SERPL-MCNC: 9.1 MG/DL (ref 8.6–10.4)
CALCIUM SERPL-MCNC: 9.2 MG/DL (ref 8.6–10.4)
CHLORIDE BLD-SCNC: 105 MMOL/L (ref 98–107)
CHOLESTEROL/HDL RATIO: 2
CHOLESTEROL: 132 MG/DL
CO2: 27 MMOL/L (ref 20–31)
CREAT SERPL-MCNC: 1.12 MG/DL (ref 0.5–0.9)
ESTIMATED AVERAGE GLUCOSE: 108 MG/DL
GFR AFRICAN AMERICAN: 57 ML/MIN
GFR NON-AFRICAN AMERICAN: 47 ML/MIN
GFR SERPL CREATININE-BSD FRML MDRD: ABNORMAL ML/MIN/{1.73_M2}
GFR SERPL CREATININE-BSD FRML MDRD: ABNORMAL ML/MIN/{1.73_M2}
GLUCOSE BLD-MCNC: 112 MG/DL (ref 70–99)
HBA1C MFR BLD: 5.4 % (ref 4–6)
HDLC SERPL-MCNC: 66 MG/DL
LDL CHOLESTEROL: 53 MG/DL (ref 0–130)
POTASSIUM SERPL-SCNC: 4.6 MMOL/L (ref 3.7–5.3)
PTH INTACT: 54.09 PG/ML (ref 15–65)
SODIUM BLD-SCNC: 142 MMOL/L (ref 135–144)
T3 FREE: 2.53 PG/ML (ref 2.02–4.43)
THYROXINE, FREE: 1.12 NG/DL (ref 0.93–1.7)
TRIGL SERPL-MCNC: 63 MG/DL
TSH SERPL DL<=0.05 MIU/L-ACNC: 2.72 MIU/L (ref 0.3–5)
VITAMIN D 25-HYDROXY: 37.1 NG/ML (ref 30–100)
VLDLC SERPL CALC-MCNC: NORMAL MG/DL (ref 1–30)

## 2020-03-16 PROCEDURE — 71046 X-RAY EXAM CHEST 2 VIEWS: CPT

## 2020-03-16 PROCEDURE — 82523 COLLAGEN CROSSLINKS: CPT

## 2020-03-16 PROCEDURE — 83970 ASSAY OF PARATHORMONE: CPT

## 2020-03-16 PROCEDURE — 82310 ASSAY OF CALCIUM: CPT

## 2020-03-16 PROCEDURE — 80048 BASIC METABOLIC PNL TOTAL CA: CPT

## 2020-03-16 PROCEDURE — 84443 ASSAY THYROID STIM HORMONE: CPT

## 2020-03-16 PROCEDURE — 82306 VITAMIN D 25 HYDROXY: CPT

## 2020-03-16 PROCEDURE — 93005 ELECTROCARDIOGRAM TRACING: CPT

## 2020-03-16 PROCEDURE — 36415 COLL VENOUS BLD VENIPUNCTURE: CPT

## 2020-03-16 PROCEDURE — 80061 LIPID PANEL: CPT

## 2020-03-16 PROCEDURE — 84439 ASSAY OF FREE THYROXINE: CPT

## 2020-03-16 PROCEDURE — 83036 HEMOGLOBIN GLYCOSYLATED A1C: CPT

## 2020-03-16 PROCEDURE — 84481 FREE ASSAY (FT-3): CPT

## 2020-03-17 LAB
EKG ATRIAL RATE: 78 BPM
EKG P AXIS: 74 DEGREES
EKG P-R INTERVAL: 180 MS
EKG Q-T INTERVAL: 354 MS
EKG QRS DURATION: 80 MS
EKG QTC CALCULATION (BAZETT): 403 MS
EKG R AXIS: 80 DEGREES
EKG T AXIS: 62 DEGREES
EKG VENTRICULAR RATE: 78 BPM

## 2020-03-17 PROCEDURE — 93010 ELECTROCARDIOGRAM REPORT: CPT | Performed by: INTERNAL MEDICINE

## 2020-03-18 LAB
CREATININE URINE /VOLUME: 74 MG/DL
N-TELO/CREAT. RATIO: 7

## 2020-04-22 ENCOUNTER — PATIENT MESSAGE (OUTPATIENT)
Dept: FAMILY MEDICINE CLINIC | Age: 80
End: 2020-04-22

## 2020-04-22 RX ORDER — ESZOPICLONE 3 MG/1
3 TABLET, FILM COATED ORAL NIGHTLY PRN
Qty: 90 TABLET | Refills: 0 | Status: SHIPPED | OUTPATIENT
Start: 2020-04-22 | End: 2020-07-23 | Stop reason: SDUPTHER

## 2020-04-22 RX ORDER — ESZOPICLONE 3 MG/1
3 TABLET, FILM COATED ORAL NIGHTLY PRN
Qty: 90 TABLET | Refills: 0 | Status: SHIPPED | OUTPATIENT
Start: 2020-04-22 | End: 2020-04-22 | Stop reason: SDUPTHER

## 2020-04-22 NOTE — TELEPHONE ENCOUNTER
From: Cristal Galvin  To: Eleuterio Shah MD  Sent: 4/22/2020 12:12 PM EDT  Subject: Prescription Question    Dr. April Urrutia,  Would you please send a refill prescription to [facility] in [place] for Eszopiclone 3mg for 90 days. I only have 16 pills left and don't want to run out. I know sometimes it's held up until you make a special request.  Thank you. Hope you're having good days during this stressful time.   Cristal Galvin

## 2020-05-29 ENCOUNTER — HOSPITAL ENCOUNTER (OUTPATIENT)
Dept: CT IMAGING | Age: 80
Discharge: HOME | End: 2020-05-31
Payer: MEDICARE

## 2020-05-29 PROCEDURE — 70486 CT MAXILLOFACIAL W/O DYE: CPT

## 2020-07-22 ENCOUNTER — TELEPHONE (OUTPATIENT)
Dept: FAMILY MEDICINE CLINIC | Age: 80
End: 2020-07-22

## 2020-07-22 RX ORDER — ATORVASTATIN CALCIUM 10 MG/1
10 TABLET, FILM COATED ORAL EVERY EVENING
Qty: 90 TABLET | Refills: 3 | Status: SHIPPED | OUTPATIENT
Start: 2020-07-22 | End: 2021-08-12

## 2020-07-23 ENCOUNTER — PATIENT MESSAGE (OUTPATIENT)
Dept: FAMILY MEDICINE CLINIC | Age: 80
End: 2020-07-23

## 2020-07-23 RX ORDER — ESZOPICLONE 3 MG/1
3 TABLET, FILM COATED ORAL NIGHTLY PRN
Qty: 90 TABLET | Refills: 0 | Status: SHIPPED | OUTPATIENT
Start: 2020-07-23 | End: 2020-10-26 | Stop reason: SDUPTHER

## 2020-07-23 NOTE — TELEPHONE ENCOUNTER
[facility]  Health and Safety Notification    [facility] works with your patients' plan sponsors to provide you with the enclosed [employer] safety and health considerations for patients in your practice. Please review the health information provided and make any changes in therapy that you believe are appropriate. [facility] understands that the information may not be applicable to every patient's therapy and therefore presents it as informational only. Patient Adherence: Cholesterol Medication Your patient has demonstrated a pattern of low adherence to cholesterol medication based on claims records. Consider reviewing the treatment plan with your patient. Patients with cardiovascular disease who have poor adherence to cholesterol medication may have up to a 30% greater risk of hospitalization or death.

## 2020-07-23 NOTE — TELEPHONE ENCOUNTER
From: Lesly Gtz  To: Tomeka Humphrey MD  Sent: 7/23/2020 1:20 PM EDT  Subject: RE:Lipitor    I am taking my lipitor. I was getting it from [place]. Thanks for placing an order to [facility]. I do need you to order Eszcopiclone from [facility] as I only have 13 pills left and will run out before my next appointment. Thank you.      ----- Message -----   Shey Iniguez MD   Sent:7/22/2020 10:12 PM EDT   To:Brendamaude Napier   Subject:Lipitor    Brendamaude  Note received from [facility] that you are not taking the Lipitor. I refilled it    [facility] and Safety Notification    [address] works with your patients' plan sponsors to provide you with the enclosed [employer] safety and health considerations for patients in your practice. Please review the health information provided and make any changes in therapy that you believe are appropriate. [facility] understands that the information may not be applicable to every patient's therapy and therefore presents it as informational only. Patient Adherence: Cholesterol Medication Your patient has demonstrated a pattern of low adherence to cholesterol medication based on claims records. Consider reviewing the treatment plan with your patient. Patients with cardiovascular disease who have poor adherence to cholesterol medication may have up to a 30% greater risk of hospitalization or death. Orders Placed This Encounter   atorvastatin (LIPITOR) 10 MG tablet   Sig: Take 1 tablet by mouth every evening   Dispense: 90 tablet   Refill: [address]  [address] [address]  Phone: [phone] Fax: [phone]          If you have any questions, please let me know.     Tomeka Humphrey MD  [address] [facility] [address]  [address] [address]  Dept: [phone]  Dept Fax: [phone]

## 2020-07-31 ENCOUNTER — HOSPITAL ENCOUNTER (OUTPATIENT)
Dept: INPATIENT UNIT | Age: 80
Setting detail: THERAPIES SERIES
Discharge: HOME | End: 2020-07-31
Payer: MEDICARE

## 2020-07-31 VITALS
SYSTOLIC BLOOD PRESSURE: 145 MMHG | HEART RATE: 82 BPM | DIASTOLIC BLOOD PRESSURE: 78 MMHG | RESPIRATION RATE: 18 BRPM | OXYGEN SATURATION: 97 % | TEMPERATURE: 97.5 F

## 2020-07-31 LAB
CALCIUM SERPL-MCNC: 8.8 MG/DL (ref 8.6–10.4)
CREAT SERPL-MCNC: 1.18 MG/DL (ref 0.5–0.9)
GFR AFRICAN AMERICAN: 53 ML/MIN
GFR NON-AFRICAN AMERICAN: 44 ML/MIN
GFR SERPL CREATININE-BSD FRML MDRD: ABNORMAL ML/MIN/{1.73_M2}
GFR SERPL CREATININE-BSD FRML MDRD: ABNORMAL ML/MIN/{1.73_M2}

## 2020-07-31 PROCEDURE — 82310 ASSAY OF CALCIUM: CPT

## 2020-07-31 PROCEDURE — 82565 ASSAY OF CREATININE: CPT

## 2020-07-31 PROCEDURE — 96372 THER/PROPH/DIAG INJ SC/IM: CPT

## 2020-07-31 PROCEDURE — 36415 COLL VENOUS BLD VENIPUNCTURE: CPT

## 2020-07-31 PROCEDURE — 6360000002 HC RX W HCPCS: Performed by: INTERNAL MEDICINE

## 2020-07-31 RX ORDER — DIPHENHYDRAMINE HYDROCHLORIDE 50 MG/ML
50 INJECTION INTRAMUSCULAR; INTRAVENOUS ONCE
Status: CANCELLED | OUTPATIENT
Start: 2021-01-01

## 2020-07-31 RX ORDER — METHYLPREDNISOLONE SODIUM SUCCINATE 125 MG/2ML
125 INJECTION, POWDER, LYOPHILIZED, FOR SOLUTION INTRAMUSCULAR; INTRAVENOUS ONCE
Status: CANCELLED | OUTPATIENT
Start: 2021-01-01

## 2020-07-31 RX ORDER — EPINEPHRINE 1 MG/ML
0.3 INJECTION, SOLUTION, CONCENTRATE INTRAVENOUS PRN
Status: CANCELLED | OUTPATIENT
Start: 2021-01-01

## 2020-07-31 RX ORDER — SODIUM CHLORIDE 9 MG/ML
INJECTION, SOLUTION INTRAVENOUS CONTINUOUS
Status: CANCELLED | OUTPATIENT
Start: 2021-01-01

## 2020-07-31 RX ADMIN — DENOSUMAB 60 MG: 60 INJECTION SUBCUTANEOUS at 13:31

## 2020-08-11 ENCOUNTER — HOSPITAL ENCOUNTER (OUTPATIENT)
Age: 80
Setting detail: SPECIMEN
Discharge: HOME | End: 2020-08-11
Payer: MEDICARE

## 2020-08-11 ENCOUNTER — TELEPHONE (OUTPATIENT)
Dept: FAMILY MEDICINE CLINIC | Age: 80
End: 2020-08-11

## 2020-08-11 ENCOUNTER — OFFICE VISIT (OUTPATIENT)
Dept: FAMILY MEDICINE CLINIC | Age: 80
End: 2020-08-11
Payer: MEDICARE

## 2020-08-11 VITALS
SYSTOLIC BLOOD PRESSURE: 130 MMHG | HEART RATE: 89 BPM | OXYGEN SATURATION: 99 % | TEMPERATURE: 96.4 F | HEIGHT: 64 IN | DIASTOLIC BLOOD PRESSURE: 80 MMHG | WEIGHT: 134 LBS | BODY MASS INDEX: 22.88 KG/M2

## 2020-08-11 PROBLEM — J96.10 CHRONIC RESPIRATORY FAILURE (HCC): Status: ACTIVE | Noted: 2020-08-11

## 2020-08-11 PROBLEM — Z87.891 HISTORY OF TOBACCO USE: Status: ACTIVE | Noted: 2020-08-11

## 2020-08-11 LAB
ALBUMIN SERPL-MCNC: 4.3 G/DL (ref 3.5–5.2)
ALBUMIN/GLOBULIN RATIO: ABNORMAL (ref 1–2.5)
ALP BLD-CCNC: 97 U/L (ref 35–104)
ALT SERPL-CCNC: 16 U/L (ref 5–33)
ANION GAP SERPL CALCULATED.3IONS-SCNC: 8 MMOL/L (ref 9–17)
AST SERPL-CCNC: 20 U/L
BILIRUB SERPL-MCNC: 0.61 MG/DL (ref 0.3–1.2)
BILIRUBIN URINE: NEGATIVE
BUN BLDV-MCNC: 14 MG/DL (ref 8–23)
BUN/CREAT BLD: ABNORMAL (ref 9–20)
CALCIUM SERPL-MCNC: 9.4 MG/DL (ref 8.6–10.4)
CHLORIDE BLD-SCNC: 103 MMOL/L (ref 98–107)
CO2: 27 MMOL/L (ref 20–31)
COLOR: YELLOW
COMMENT UA: NORMAL
CREAT SERPL-MCNC: 1.03 MG/DL (ref 0.5–0.9)
GFR AFRICAN AMERICAN: >60 ML/MIN
GFR NON-AFRICAN AMERICAN: 52 ML/MIN
GFR SERPL CREATININE-BSD FRML MDRD: ABNORMAL ML/MIN/{1.73_M2}
GFR SERPL CREATININE-BSD FRML MDRD: ABNORMAL ML/MIN/{1.73_M2}
GLUCOSE BLD-MCNC: 92 MG/DL (ref 70–99)
GLUCOSE URINE: NEGATIVE
HCT VFR BLD CALC: 45.8 % (ref 36–46)
HEMOGLOBIN: 15.3 G/DL (ref 12–16)
KETONES, URINE: NEGATIVE
LEUKOCYTE ESTERASE, URINE: NEGATIVE
MAGNESIUM: 2.1 MG/DL (ref 1.6–2.6)
MCH RBC QN AUTO: 31.7 PG (ref 26–34)
MCHC RBC AUTO-ENTMCNC: 33.4 G/DL (ref 31–37)
MCV RBC AUTO: 94.7 FL (ref 80–100)
NITRITE, URINE: NEGATIVE
NRBC AUTOMATED: NORMAL PER 100 WBC
PDW BLD-RTO: 13.3 % (ref 11.5–14.9)
PH UA: 7 (ref 5–8)
PHOSPHORUS: 3.2 MG/DL (ref 2.6–4.5)
PLATELET # BLD: 224 K/UL (ref 150–450)
PMV BLD AUTO: 8.7 FL (ref 6–12)
POTASSIUM SERPL-SCNC: 4.6 MMOL/L (ref 3.7–5.3)
PROTEIN UA: NEGATIVE
RBC # BLD: 4.83 M/UL (ref 4–5.2)
SODIUM BLD-SCNC: 138 MMOL/L (ref 135–144)
SPECIFIC GRAVITY UA: 1.01 (ref 1–1.03)
TOTAL PROTEIN: 7 G/DL (ref 6.4–8.3)
TURBIDITY: CLEAR
URINE HGB: NEGATIVE
UROBILINOGEN, URINE: NORMAL
VITAMIN D 25-HYDROXY: 40.8 NG/ML (ref 30–100)
WBC # BLD: 6.4 K/UL (ref 3.5–11)

## 2020-08-11 PROCEDURE — 84100 ASSAY OF PHOSPHORUS: CPT

## 2020-08-11 PROCEDURE — 85027 COMPLETE CBC AUTOMATED: CPT

## 2020-08-11 PROCEDURE — 80053 COMPREHEN METABOLIC PANEL: CPT

## 2020-08-11 PROCEDURE — 83735 ASSAY OF MAGNESIUM: CPT

## 2020-08-11 PROCEDURE — G0439 PPPS, SUBSEQ VISIT: HCPCS | Performed by: FAMILY MEDICINE

## 2020-08-11 PROCEDURE — 36415 COLL VENOUS BLD VENIPUNCTURE: CPT

## 2020-08-11 PROCEDURE — 81003 URINALYSIS AUTO W/O SCOPE: CPT

## 2020-08-11 PROCEDURE — 82306 VITAMIN D 25 HYDROXY: CPT

## 2020-08-11 ASSESSMENT — LIFESTYLE VARIABLES
HOW MANY STANDARD DRINKS CONTAINING ALCOHOL DO YOU HAVE ON A TYPICAL DAY: 0
HAS A RELATIVE, FRIEND, DOCTOR, OR ANOTHER HEALTH PROFESSIONAL EXPRESSED CONCERN ABOUT YOUR DRINKING OR SUGGESTED YOU CUT DOWN: 0
HOW OFTEN DO YOU HAVE A DRINK CONTAINING ALCOHOL: 4
HAVE YOU OR SOMEONE ELSE BEEN INJURED AS A RESULT OF YOUR DRINKING: 0
HOW OFTEN DURING THE LAST YEAR HAVE YOU NEEDED AN ALCOHOLIC DRINK FIRST THING IN THE MORNING TO GET YOURSELF GOING AFTER A NIGHT OF HEAVY DRINKING: 0
HOW OFTEN DO YOU HAVE SIX OR MORE DRINKS ON ONE OCCASION: 0
AUDIT-C TOTAL SCORE: 4
HOW OFTEN DURING THE LAST YEAR HAVE YOU HAD A FEELING OF GUILT OR REMORSE AFTER DRINKING: 0
HOW OFTEN DURING THE LAST YEAR HAVE YOU BEEN UNABLE TO REMEMBER WHAT HAPPENED THE NIGHT BEFORE BECAUSE YOU HAD BEEN DRINKING: 0
HOW OFTEN DURING THE LAST YEAR HAVE YOU FOUND THAT YOU WERE NOT ABLE TO STOP DRINKING ONCE YOU HAD STARTED: 0
AUDIT TOTAL SCORE: 4
HOW OFTEN DURING THE LAST YEAR HAVE YOU FAILED TO DO WHAT WAS NORMALLY EXPECTED FROM YOU BECAUSE OF DRINKING: 0

## 2020-08-11 ASSESSMENT — VISUAL ACUITY
OD_CC: 20/30
OS_CC: 20/100

## 2020-08-11 ASSESSMENT — PATIENT HEALTH QUESTIONNAIRE - PHQ9
SUM OF ALL RESPONSES TO PHQ QUESTIONS 1-9: 0
SUM OF ALL RESPONSES TO PHQ QUESTIONS 1-9: 0

## 2020-08-11 NOTE — PROGRESS NOTES
To be used with inhaler Yes Michelle Millan MD   dorzolamide-timolol (COSOPT) 22.3-6.8 MG/ML ophthalmic solution Place 1 drop into both eyes 2 times daily Yes Historical Provider, MD   ALPHAGAN P 0.1 % SOLN Place 1 drop into both eyes 3 times daily  Yes Historical Provider, MD   latanoprost (XALATAN) 0.005 % ophthalmic solution Place 1 drop into both eyes nightly  Yes Historical Provider, MD         Past Medical History:   Diagnosis Date    Abdominal pain     Allergic rhinitis     Anxiety     Chronic back pain     Chronic fatigue     Chronic kidney disease     Colon polyp 3/7/2012    TUBULAR ADENOMA    COPD (chronic obstructive pulmonary disease) ([facility] [address]     COPD, 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     Depression     Diverticul disease small and large intestine, no perforati or abscess     Elevated blood pressure reading     Fall 1/12/2017    GERD (gastroesophageal reflux disease)     Glaucoma     Hyperlipidemia     with target LDL less than 100    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[facility] [address]     Hyperthyroidism 2012    Insomnia     Psychophysiological    Neuropathy     Orthostatic dizziness 3/12/2017    Osteoarthritis     Panlobular emphysema ([facility] [address]     PVC (premature ventricular contraction) 1/12/2017    S/P parathyroidectomy     Tubal pregnancy [id number],2904    Vasovagal syncope 1/12/2017       Past Surgical History:   Procedure Laterality Date    APPENDECTOMY      BACK SURGERY      CATARACT REMOVAL WITH IMPLANT Left 04/01/14    [address]    CHOLECYSTECTOMY  2005    COLONOSCOPY      COLONOSCOPY  6/13/2014    Severe sigmoid diverticulosis; due for repeat June 2019    ENDOSCOPY, COLON, DIAGNOSTIC      EYE SURGERY      PARATHYROIDECTOMY  11/05/2018    Dr. Glory Ryder; Left inferior parathyroid: adenoma    ROTATOR CUFF REPAIR Left     TONSILLECTOMY      TUBAL LIGATION      UPPER GASTROINTESTINAL ENDOSCOPY  6/13/2014    biopsy    UPPER GASTROINTESTINAL ENDOSCOPY 1-26-16    UPPER GASTROINTESTINAL ENDOSCOPY  5/4/16    mild gastritis         Family History   Problem Relation Age of Onset    Other Father         COPD    Stroke Sister     Heart Attack Sister     Breast Cancer Sister     Cancer Maternal Grandmother         Stomach cancer    Osteoporosis Mother     Other Sister         suicide       CareTeam (Including outside providers/suppliers regularly involved in providing care):   Patient Care Team:  Courtney Cooper MD as PCP - Brendan Garrido MD as PCP - [facility]  Harrison Rivera MD as Surgeon (General Surgery)  Mohsen Edward MD as Surgeon (Ophthalmology)  Divya Dunn MD as Consulting Physician (Gastroenterology)  Essie Wharton MD as Consulting Physician (Endocrinology)  Yuniel Cruz MD as Surgeon (Ophthalmology)  Lady Burr MD as Consulting Physician (Internal Medicine Cardiovascular Disease)  Amelia Kevin MD as Consulting Physician (Cardiology)  Rocael Marie, PhD (Sleep Medicine)  Raul Uriostegui MD as Consulting Physician (Otolaryngology)  Romel Mercado MD as Consulting Physician (Cardiology)  Dorothye Cooks, MD as Consulting Physician (Pulmonology)  Shahida Araiza MD as Consulting Physician (Nephrology)  Ana Leigh MD as Consulting Physician (Pulmonology)      Vital signs within normal limits. Pulse ox normal.       Wt Readings from Last 3 Encounters:   08/11/20 134 lb (60.8 kg)   02/19/20 130 lb (59 kg)   02/05/20 130 lb 3.2 oz (59.1 kg)     Vitals:    08/11/20 1250   BP: 130/80   Pulse: 89   Temp: 96.4 °F (35.8 °C)   SpO2: 99%   Weight: 134 lb (60.8 kg)   Height: 5' 3.5\" (1.613 m)     Body mass index is 23.36 kg/m². Based upon direct observation of the patient, evaluation of cognition reveals recent and remote memory intact.     Physical exam  General Appearance: alert and oriented to person, place and time, well-developed and well-nourished, in no acute distress  Head: with your hearing?: (!) Yes  Do you have difficulty driving, watching TV, or doing any of your daily activities because of your eyesight?: No  Have you had an eye exam within the past year?: Yes  Hearing/Vision Interventions:  · Hearing concerns:   Follow-up with ENT, patient already has hearing aids  ·     Safety:  Safety  Do you have working smoke detectors?: Yes  Have all throw rugs been removed or fastened?: (!) No  Do you have non-slip mats or surfaces in all bathtubs/showers?: Yes  Do all of your stairways have a railing or banister?: Yes  Are your doorways, halls and stairs free of clutter?: Yes  Do you always fasten your seatbelt when you are in a car?: Yes  Safety Interventions:  · Home safety tips provided    Personalized Preventive Plan   Current Health Maintenance Status  Immunization History   Administered Date(s) Administered    Influenza Virus Vaccine 10/30/2013, 10/13/2015, 10/18/2019    Influenza, High Dose (Fluzone 65 yrs and older) 10/22/2014, 10/24/2016, 10/19/2017, 10/21/2018    Influenza, Intradermal, Preservative free 10/28/2011    Influenza, Quadv, IM, PF (6 mo and older Fluzone, Flulaval, Fluarix, and 3 yrs and older Afluria) 10/18/2019    Influenza, Triv, inactivated, subunit, adjuvanted, IM (Fluad 65 yrs and older) 10/21/2018    Pneumococcal Conjugate 13-valent (Mumombs20) 10/13/2015, 01/12/2017    Pneumococcal Conjugate 7-valent (Prevnar7) 10/13/2015    Pneumococcal Polysaccharide (Cktxalins37) 02/03/2018    Tdap (Boostrix, Adacel) 01/09/2017    Zoster Recombinant (Shingrix) 04/17/2018, 07/24/2018        Health Maintenance   Topic Date Due    Annual Wellness Visit (AWV)  05/29/2019    Breast cancer screen  05/13/2020    Flu vaccine (1) 09/01/2020    Lipid screen  03/16/2021    Potassium monitoring  03/16/2021    Creatinine monitoring  07/31/2021    Colon cancer screen colonoscopy  10/25/2024    DTaP/Tdap/Td vaccine (2 - Td) 01/09/2027    DEXA (modify frequency per [facility] score)  Completed    Shingles Vaccine  Completed    Pneumococcal 65+ years Vaccine  Completed    Hepatitis A vaccine  Aged Out    Hepatitis B vaccine  Aged Out    Hib vaccine  Aged Out    Meningococcal (ACWY) vaccine  Aged Out     Recommendations for Common Curriculum Due: see orders and patient instructions/AVS.  . Recommended screening schedule for the next 5-10 years is provided to the patient in written form: see Patient Instructions/AVS.    Karrie Winkler was seen today for medicare awv and copd. 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 Marsha Jenkins note  Patient has been having difficulty doing those as nobody contacted her from the pulmonologist office  Her pulse ox today is normal but Dr. Marsha Jenkins is suspecting nocturnal hypoxia which might be contributing to her insomnia    CKD (chronic kidney disease) stage 3, GFR 30-59 ml/min ([facility] [address]    Stable  We will update her labs  Prior appointment with nephrologist was canceled due to the coronavirus pandemic, and finally rescheduled in a few months  Advised to stay away from NSAIDs which she has been doing, and to avoid dehydration  -     CBC; Future  -     Comprehensive Metabolic Panel; Future  -     Magnesium; Future  -     Phosphorus; Future  -     Urinalysis Reflex to Culture; Future  -     Vitamin D 25 Hydroxy;  Future    Psychophysiological insomnia  Continue Lunesta, denies side effects  She understands that this is a high risk medication for her, however she has been taking it for several years, she has failed multiple medications in the past, and she cannot sleep without Lunesta  Controlled Substance Monitoring:    Acute and Chronic Pain Monitoring:   RX Monitoring 8/11/2020   Attestation -   Periodic Controlled Substance Monitoring Possible medication side effects, risk of tolerance/dependence & alternative treatments discussed. ;No signs of potential drug abuse or diversion identified. ;Assessed functional status.    Chronic Pain > 50 MEDD -               Future Appointments   Date Time Provider Shirley Dunne   10/14/2020  2:30 PM Adrien Keene MD [facility]   12/11/2020  1:00 PM Faviola Mart MD [facility]   8/12/2021  1:00 PM Faviola Mart MD fp sc [facility]

## 2020-08-11 NOTE — PATIENT INSTRUCTIONS
Advance Directives: Care Instructions  Overview  An advance directive is a legal way to state your wishes at the end of your life. It tells your family and your doctor what to do if you can't say what you want. There are two main types of advance directives. You can change them any time your wishes change. Living will. This form tells your family and your doctor your wishes about life support and other treatment. The form is also called a declaration. Medical power of . This form lets you name a person to make treatment decisions for you when you can't speak for yourself. This person is called a health care agent (health care proxy, health care surrogate). The form is also called a durable power of  for health care. If you do not have an advance directive, decisions about your medical care may be made by a family member, or by a doctor or a  who doesn't know you. It may help to think of an advance directive as a gift to the people who care for you. If you have one, they won't have to make tough decisions by themselves. Follow-up care is a key part of your treatment and safety. Be sure to make and go to all appointments, and call your doctor if you are having problems. It's also a good idea to know your test results and keep a list of the medicines you take. What should you include in an advance directive? Many states have a unique advance directive form. (It may ask you to address specific issues.) Or you might use a universal form that's approved by many states. If your form doesn't tell you what to address, it may be hard to know what to include in your advance directive. Use the questions below to help you get started. · Who do you want to make decisions about your medical care if you are not able to? · What life-support measures do you want if you have a serious illness that gets worse over time or can't be cured? · What are you most afraid of that might happen? (Maybe you're afraid of having pain, losing your independence, or being kept alive by machines.)  · Where would you prefer to die? (Your home? A hospital? A nursing home?)  · Do you want to donate your organs when you die? · Do you want certain Pentecostal practices performed before you die? When should you call for help? Be sure to contact your doctor if you have any questions. Where can you learn more? Go to [email or web address] and sign in to your [employer] account. Enter R264 in the [employer] box to learn more about \"Advance Directives: Care Instructions. \"     If you do not have an account, please click on the \"Sign Up Now\" link. Current as of: December 9, 2019               Content Version: 12.5  © 4224-3138 Healthwise, Incorporated. Care instructions adapted under license by [facility] ([facility]). If you have questions about a medical condition or this instruction, always ask your healthcare professional. [place] [address] any warranty or liability for your use of this information. Learning About Medical Power of   What is a medical power of ? A medical power of , also called a durable power of  for health care, is one type of the legal forms called advance directives. It lets you name the person you want to make treatment decisions for you if you can't speak or decide for yourself. The person you choose is called your health care agent. This person is also called a health care proxy or health care surrogate. A medical power of  may be called something else in your state. How do you choose a health care agent? Choose your health care agent carefully. This person may or may not be a family member. Talk to the person before you make your final decision. Make sure he or she is comfortable with this responsibility. It's a good idea to choose someone who:  · Is at least 25years old.   · Knows you well and understands what makes life meaningful for you. · Understands your Adventism and moral values. · Will do what you want, not what he or she wants. · Will be able to make difficult choices at a stressful time. · Will be able to refuse or stop treatment, if that is what you would want, even if you could die. · Will be firm and confident with health professionals if needed. · Will ask questions to get needed information. · Lives near you or agrees to travel to you if needed. Your family may help you make medical decisions while you can still be part of that process. But it's important to choose one person to be your health care agent in case you aren't able to make decisions for yourself. If you don't fill out the legal form and name a health care agent, the decisions your family can make may be limited. A health care agent may be called something else in your state. Who will make decisions for you if you don't have a health care agent? If you don't have a health care agent or a living will, you may not get the care you want. Decisions may be made by family members who disagree about your medical care. Or decisions may be made by a medical professional who doesn't know you well. In some cases, a  makes the decisions. When you name a health care agent, it is very clear who has the power to make health decisions for you. How do you name a health care agent? You name your health care agent on a legal form. This form is usually called a medical power of . Ask your hospital, state bar association, or office on aging where to find these forms. You must sign the form to make it legal. Some states require you to get the form notarized. This means that a person called a  watches you sign the form and then he or she signs the form. Some states also require that two or more witnesses sign the form. Be sure to tell your family members and doctors who your health care agent is.   Where can you learn more? Go to [email or web address] org and sign in to your [employer] account. Enter [id number] in the [facility] box to learn more about \"Learning About [address]. \"     If you do not have an account, please click on the \"Sign Up Now\" link. Current as of: December 9, 2019               Content Version: 12.5  © 8067-3921 [employer]. Care instructions adapted under license by [facility] ([facility]). If you have questions about a medical condition or this instruction, always ask your healthcare professional. [address] any warranty or liability for your use of this information. Learning About [place]  What is a living will? A living will, also called a declaration, is a legal form. It tells your family and your doctor your wishes when you can't speak for yourself. It's used by the health professionals who will treat you as you near the end of your life or if you get seriously hurt or ill. If you put your wishes in writing, your loved ones and others will know what kind of care you want. They won't need to guess. This can ease your mind and be helpful to others. And you can change or cancel your living will at any time. A living will is not the same as an estate or property will. An estate will explains what you want to happen with your money and property after you die. How do you use it? A living will is used to describe the kinds of treatment or life support you want as you near the end of your life or if you get seriously hurt or ill. Keep these facts in mind about living zhang. · Your living will is used only if you can't speak or make decisions for yourself. Most often, one or more doctors must certify that you can't speak or decide for yourself before your living will takes effect. · If you get better and can speak for yourself again, you can accept or refuse any treatment.  It doesn't matter what you said in your can't breathe on your own, your heart stops, or you can't swallow. · What things would you still want to be able to do after you receive life-support methods? Would you want to be able to walk? To speak? To eat on your own? To live without the help of machines? · Do you want certain Jain practices performed if you become very ill? · If you have a choice, where do you want to be cared for? In your home? At a hospital or nursing home? · If you have a choice at the end of your life, where would you prefer to die? At home? In a hospital or nursing home? Somewhere else? · Would you prefer to be buried or cremated? · Do you want your organs to be donated after you die? What should you do with your living will? · Make sure that your family members and your health care agent have copies of your living will (also called a declaration). · Give your doctor a copy of your living will. Ask him or her to keep it as part of your medical record. If you have more than one doctor, make sure that each one has a copy. · Put a copy of your living will where it can be easily found. For example, some people may put a copy on their refrigerator door. If you are using a digital copy, be sure your doctor, family members, and health care agent know how to find and access it. Where can you learn more? Go to [email or web address] [email or web address] org and sign in to your [employer] account. Enter P718 in the [facility] box to learn more about \"Learning About Living Ashley. \"     If you do not have an account, please click on the \"Sign Up Now\" link. Current as of: December 9, 2019               Content Version: 12.5  © 9020-9469 Healthwise, Incorporated. Care instructions adapted under license by [facility] ([facility]).  If you have questions about a medical condition or this instruction, always ask your healthcare professional. [address] any warranty or liability for your use of this information. Personalized Preventive Plan for Kim Millan - 8/11/2020  Medicare offers a range of preventive health benefits. Some of the tests and screenings are paid in full while other may be subject to a deductible, co-insurance, and/or copay. Some of these benefits include a comprehensive review of your medical history including lifestyle, illnesses that may run in your family, and various assessments and screenings as appropriate. After reviewing your medical record and screening and assessments performed today your provider may have ordered immunizations, labs, imaging, and/or referrals for you. A list of these orders (if applicable) as well as your Preventive Care list are included within your After Visit Summary for your review. Other Preventive Recommendations:    · A preventive eye exam performed by an eye specialist is recommended every 1-2 years to screen for glaucoma; cataracts, macular degeneration, and other eye disorders. · A preventive dental visit is recommended every 6 months. · Try to get at least 150 minutes of exercise per week or 10,000 steps per day on a pedometer . · Order or download the FREE \"Exercise & Physical Activity: Your Everyday Guide\" from [employer]. Call 4-372.612.4430 or search [employer] online. · You need 7645-5830 mg of calcium and 9355-6730 IU of vitamin D per day. It is possible to meet your calcium requirement with diet alone, but a vitamin D supplement is usually necessary to meet this goal.  · When exposed to the sun, use a sunscreen that protects against both UVA and UVB radiation with an SPF of 30 or greater. Reapply every 2 to 3 hours or after sweating, drying off with a towel, or swimming. · Always wear a seat belt when traveling in a car. Always wear a helmet when riding a bicycle or motorcycle. Heart-Healthy Diet   Sodium, Fat, and Cholesterol Controlled Diet       What Is a Heart Healthy Diet? A heart-healthy diet is one that limits sodium , certain types of fat , and cholesterol . This type of diet is recommended for:   People with any form of cardiovascular disease (eg, coronary heart disease , peripheral vascular disease , previous heart attack , previous stroke )   People with risk factors for cardiovascular disease, such as high blood pressure , high cholesterol , or diabetes   Anyone who wants to lower their risk of developing cardiovascular disease   Sodium    Sodium is a mineral found in many foods. In general, most people consume much more sodium than they need. Diets high in sodium can increase blood pressure and lead to edema (water retention). On a heart-healthy diet, you should consume no more than 2,300 mg (milligrams) of sodium per dayabout the amount in one teaspoon of table salt. The foods highest in sodium include table salt (about 50% sodium), processed foods, convenience foods, and preserved foods. Cholesterol    Cholesterol is a fat-like, waxy substance in your blood. Our bodies make some cholesterol. It is also found in animal products, with the highest amounts in fatty meat, egg yolks, whole milk, cheese, shellfish, and organ meats. On a heart-healthy diet, you should limit your cholesterol intake to less than 200 mg per day. It is normal and important to have some cholesterol in your bloodstream. But too much cholesterol can cause plaque to build up within your arteries, which can eventually lead to a heart attack or stroke. The two types of cholesterol that are most commonly referred to are:   Low-density lipoprotein (LDL) cholesterol  Also known as bad cholesterol, this is the cholesterol that tends to build up along your arteries. Bad cholesterol levels are increased by eating fats that are saturated or hydrogenated. Optimal level of this cholesterol is less than 100. Over 130 starts to get risky for heart disease.    High-density lipoprotein (HDL) cholesterol  Also known as good cholesterol, this type of cholesterol actually carries cholesterol away from your arteries and may, therefore, help lower your risk of having a heart attack. You want this level to be high (ideally greater than 60). It is a risk to have a level less than 40. You can raise this good cholesterol by eating olive oil, canola oil, avocados, or nuts. Exercise raises this level, too. Fat    Fat is calorie dense and packs a lot of calories into a small amount of food. Even though fats should be limited due to their high calorie content, not all fats are bad. In fact, some fats are quite healthful. Fat can be broken down into four main types. The good-for-you fats are:   Monounsaturated fat  found in oils such as olive and canola, avocados, and nuts and natural nut butters; can decrease cholesterol levels, while keeping levels of HDL cholesterol high   Polyunsaturated fat  found in oils such as safflower, sunflower, soybean, corn, and sesame; can decrease total cholesterol and LDL cholesterol   Omega-3 fatty acids  particularly those found in fatty fish (such as salmon, trout, tuna, mackerel, herring, and sardines); can decrease risk of arrhythmias, decrease triglyceride levels, and slightly lower blood pressure   The fats that you want to limit are:   Saturated fat  found in animal products, many fast foods, and a few vegetables; increases total blood cholesterol, including LDL levels   Animal fats that are saturated include: butter, lard, whole-milk dairy products, meat fat, and poultry skin   Vegetable fats that are saturated include: hydrogenated shortening, palm oil, coconut oil, cocoa butter   Hydrogenated or trans fat  found in margarine and vegetable shortening, most shelf stable snack foods, and fried foods; increases LDL and decreases HDL     It is generally recommended that you limit your total fat for the day to less than 30% of your total calories.  If you follow an 1800-calorie heart healthy diet, for example, this would mean 60 grams of fat or less per day. Saturated fat and trans fat in your diet raises your blood cholesterol the most, much more than dietary cholesterol does. For this reason, on a heart-healthy diet, less than 7% of your calories should come from saturated fat and ideally 0% from trans fat. On an 1800-calorie diet, this translates into less than 14 grams of saturated fat per day, leaving 46 grams of fat to come from mono- and polyunsaturated fats.    Food Choices on a Heart Healthy Diet   Food Category   Foods Recommended   Foods to Avoid   Grains   Breads and rolls without salted tops Most dry and cooked cereals Unsalted crackers and breadsticks Low-sodium or homemade breadcrumbs or stuffing All rice and pastas   Breads, rolls, and crackers with salted tops High-fat baked goods (eg, muffins, donuts, pastries) Quick breads, self-rising flour, and biscuit mixes Regular bread crumbs Instant hot cereals Commercially prepared rice, pasta, or stuffing mixes   Vegetables   Most fresh, frozen, and low-sodium canned vegetables Low-sodium and salt-free vegetable juices Canned vegetables if unsalted or rinsed   Regular canned vegetables and juices, including sauerkraut and pickled vegetables Frozen vegetables with sauces Commercially prepared potato and vegetable mixes   Fruits   Most fresh, frozen, and canned fruits All fruit juices   Fruits processed with salt or sodium   Milk   Nonfat or low-fat (1%) milk Nonfat or low-fat yogurt Cottage cheese, low-fat ricotta, cheeses labeled as low-fat and low-sodium   Whole milk Reduced-fat (2%) milk Malted and chocolate milk Full fat yogurt Most cheeses (unless low-fat and low salt) Buttermilk (no more than 1 cup per week)   Meats and Beans   Lean cuts of fresh or frozen beef, veal, lamb, or pork (look for the word loin) Fresh or frozen poultry without the skin Fresh or frozen fish and some shellfish Egg whites and egg substitutes (Limit whole eggs to three per week) Tofu Nuts or seeds (unsalted, dry-roasted), low-sodium peanut butter Dried peas, beans, and lentils   Any smoked, cured, salted, or canned meat, fish, or poultry (including cook, chipped beef, cold cuts, hot dogs, sausages, sardines, and anchovies) Poultry skins Breaded and/or fried fish or meats Canned peas, beans, and lentils Salted nuts   Fats and Oils   Olive oil and canola oil Low-sodium, low-fat salad dressings and mayonnaise   Butter, margarine, coconut and palm oils, cook fat   Snacks, Sweets, and Condiments   Low-sodium or unsalted versions of broths, soups, soy sauce, and condiments Pepper, herbs, and spices; vinegar, lemon, or lime juice Low-fat frozen desserts (yogurt, sherbet, fruit bars) Sugar, cocoa powder, honey, syrup, jam, and preserves Low-fat, trans-fat free cookies, cakes, and pies David and animal crackers, fig bars, keith snaps   High-fat desserts Broth, soups, gravies, and sauces, made from instant mixes or other high-sodium ingredients Salted snack foods Canned olives Meat tenderizers, seasoning salt, and most flavored vinegars   Beverages   Low-sodium carbonated beverages Tea and coffee in moderation Soy milk   Commercially softened water   Suggestions   Make whole grains, fruits, and vegetables the base of your diet. Choose heart-healthy fats such as canola, olive, and flaxseed oil, and foods high in heart-healthy fats, such as nuts, seeds, soybeans, tofu, and fish. Eat fish at least twice per week; the fish highest in omega-3 fatty acids and lowest in mercury include salmon, herring, mackerel, sardines, and canned chunk light tuna. If you eat fish less than twice per week or have high triglycerides, talk to your doctor about taking fish oil supplements. Read food labels.    For products low in fat and cholesterol, look for fat free, low-fat, cholesterol free, saturated fat free, and trans fat freeAlso scan the Nutrition Facts Label, which lists saturated fat, trans fat, and cholesterol amounts. For products low in sodium, look for sodium free, very low sodium, low sodium, no added salt, and unsalted   Skip the salt when cooking or at the table; if food needs more flavor, get creative and try out different herbs and spices. Garlic and onion also add substantial flavor to foods. Trim any visible fat off meat and poultry before cooking, and drain the fat off after vivar. Use cooking methods that require little or no added fat, such as grilling, boiling, baking, poaching, broiling, roasting, steaming, stir-frying, and sauting. Avoid fast food and convenience food. They tend to be high in saturated and trans fat and have a lot of added salt. Talk to a registered dietitian for individualized diet advice. Last Reviewed: March 2011 Emily Calderon MS, MPH, RD   Updated: 3/29/2011   ·     Heart-Healthy Diet   Sodium, Fat, and Cholesterol Controlled Diet       What Is a Heart Healthy Diet? A heart-healthy diet is one that limits sodium , certain types of fat , and cholesterol . This type of diet is recommended for:   People with any form of cardiovascular disease (eg, coronary heart disease , peripheral vascular disease , previous heart attack , previous stroke )   People with risk factors for cardiovascular disease, such as high blood pressure , high cholesterol , or diabetes   Anyone who wants to lower their risk of developing cardiovascular disease   Sodium    Sodium is a mineral found in many foods. In general, most people consume much more sodium than they need. Diets high in sodium can increase blood pressure and lead to edema (water retention). On a heart-healthy diet, you should consume no more than 2,300 mg (milligrams) of sodium per dayabout the amount in one teaspoon of table salt. The foods highest in sodium include table salt (about 50% sodium), processed foods, convenience foods, and preserved foods.    Cholesterol    Cholesterol is a fat-like, waxy substance in your blood. Our bodies make some cholesterol. It is also found in animal products, with the highest amounts in fatty meat, egg yolks, whole milk, cheese, shellfish, and organ meats. On a heart-healthy diet, you should limit your cholesterol intake to less than 200 mg per day. It is normal and important to have some cholesterol in your bloodstream. But too much cholesterol can cause plaque to build up within your arteries, which can eventually lead to a heart attack or stroke. The two types of cholesterol that are most commonly referred to are:   Low-density lipoprotein (LDL) cholesterol  Also known as bad cholesterol, this is the cholesterol that tends to build up along your arteries. Bad cholesterol levels are increased by eating fats that are saturated or hydrogenated. Optimal level of this cholesterol is less than 100. Over 130 starts to get risky for heart disease. High-density lipoprotein (HDL) cholesterol  Also known as good cholesterol, this type of cholesterol actually carries cholesterol away from your arteries and may, therefore, help lower your risk of having a heart attack. You want this level to be high (ideally greater than 60). It is a risk to have a level less than 40. You can raise this good cholesterol by eating olive oil, canola oil, avocados, or nuts. Exercise raises this level, too. Fat    Fat is calorie dense and packs a lot of calories into a small amount of food. Even though fats should be limited due to their high calorie content, not all fats are bad. In fact, some fats are quite healthful. Fat can be broken down into four main types.    The good-for-you fats are:   Monounsaturated fat  found in oils such as olive and canola, avocados, and nuts and natural nut butters; can decrease cholesterol levels, while keeping levels of HDL cholesterol high   Polyunsaturated fat  found in oils such as safflower, sunflower, soybean, corn, and sesame; can decrease total cholesterol and LDL cholesterol   Omega-3 fatty acids  particularly those found in fatty fish (such as salmon, trout, tuna, mackerel, herring, and sardines); can decrease risk of arrhythmias, decrease triglyceride levels, and slightly lower blood pressure   The fats that you want to limit are:   Saturated fat  found in animal products, many fast foods, and a few vegetables; increases total blood cholesterol, including LDL levels   Animal fats that are saturated include: butter, lard, whole-milk dairy products, meat fat, and poultry skin   Vegetable fats that are saturated include: hydrogenated shortening, palm oil, coconut oil, cocoa butter   Hydrogenated or trans fat  found in margarine and vegetable shortening, most shelf stable snack foods, and fried foods; increases LDL and decreases HDL     It is generally recommended that you limit your total fat for the day to less than 30% of your total calories. If you follow an 1800-calorie heart healthy diet, for example, this would mean 60 grams of fat or less per day. Saturated fat and trans fat in your diet raises your blood cholesterol the most, much more than dietary cholesterol does. For this reason, on a heart-healthy diet, less than 7% of your calories should come from saturated fat and ideally 0% from trans fat. On an 1800-calorie diet, this translates into less than 14 grams of saturated fat per day, leaving 46 grams of fat to come from mono- and polyunsaturated fats.    Food Choices on a Heart Healthy Diet   Food Category   Foods Recommended   Foods to Avoid   Grains   Breads and rolls without salted tops Most dry and cooked cereals Unsalted crackers and breadsticks Low-sodium or homemade breadcrumbs or stuffing All rice and pastas   Breads, rolls, and crackers with salted tops High-fat baked goods (eg, muffins, donuts, pastries) Quick breads, self-rising flour, and biscuit mixes Regular bread crumbs Instant hot cereals Commercially prepared rice, pasta, or stuffing mixes Salted snack foods Canned olives Meat tenderizers, seasoning salt, and most flavored vinegars   Beverages   Low-sodium carbonated beverages Tea and coffee in moderation Soy milk   Commercially softened water   Suggestions   Make whole grains, fruits, and vegetables the base of your diet. Choose heart-healthy fats such as canola, olive, and flaxseed oil, and foods high in heart-healthy fats, such as nuts, seeds, soybeans, tofu, and fish. Eat fish at least twice per week; the fish highest in omega-3 fatty acids and lowest in mercury include salmon, herring, mackerel, sardines, and canned chunk light tuna. If you eat fish less than twice per week or have high triglycerides, talk to your doctor about taking fish oil supplements. Read food labels. For products low in fat and cholesterol, look for fat free, low-fat, cholesterol free, saturated fat free, and trans fat freeAlso scan the Nutrition Facts Label, which lists saturated fat, trans fat, and cholesterol amounts. For products low in sodium, look for sodium free, very low sodium, low sodium, no added salt, and unsalted   Skip the salt when cooking or at the table; if food needs more flavor, get creative and try out different herbs and spices. Garlic and onion also add substantial flavor to foods. Trim any visible fat off meat and poultry before cooking, and drain the fat off after vivar. Use cooking methods that require little or no added fat, such as grilling, boiling, baking, poaching, broiling, roasting, steaming, stir-frying, and sauting. Avoid fast food and convenience food. They tend to be high in saturated and trans fat and have a lot of added salt. Talk to a registered dietitian for individualized diet advice. Last Reviewed: March 2011 Mauricio Copeland MS, MPH, RD   Updated: 3/29/2011   ·     Heart-Healthy Diet   Sodium, Fat, and Cholesterol Controlled Diet       What Is a Heart Healthy Diet?    A heart-healthy diet is one that limits sodium , certain types of fat , and cholesterol . This type of diet is recommended for:   People with any form of cardiovascular disease (eg, coronary heart disease , peripheral vascular disease , previous heart attack , previous stroke )   People with risk factors for cardiovascular disease, such as high blood pressure , high cholesterol , or diabetes   Anyone who wants to lower their risk of developing cardiovascular disease   Sodium    Sodium is a mineral found in many foods. In general, most people consume much more sodium than they need. Diets high in sodium can increase blood pressure and lead to edema (water retention). On a heart-healthy diet, you should consume no more than 2,300 mg (milligrams) of sodium per dayabout the amount in one teaspoon of table salt. The foods highest in sodium include table salt (about 50% sodium), processed foods, convenience foods, and preserved foods. Cholesterol    Cholesterol is a fat-like, waxy substance in your blood. Our bodies make some cholesterol. It is also found in animal products, with the highest amounts in fatty meat, egg yolks, whole milk, cheese, shellfish, and organ meats. On a heart-healthy diet, you should limit your cholesterol intake to less than 200 mg per day. It is normal and important to have some cholesterol in your bloodstream. But too much cholesterol can cause plaque to build up within your arteries, which can eventually lead to a heart attack or stroke. The two types of cholesterol that are most commonly referred to are:   Low-density lipoprotein (LDL) cholesterol  Also known as bad cholesterol, this is the cholesterol that tends to build up along your arteries. Bad cholesterol levels are increased by eating fats that are saturated or hydrogenated. Optimal level of this cholesterol is less than 100. Over 130 starts to get risky for heart disease.    High-density lipoprotein (HDL) cholesterol  Also known as good cholesterol, this type of cholesterol actually carries cholesterol away from your arteries and may, therefore, help lower your risk of having a heart attack. You want this level to be high (ideally greater than 60). It is a risk to have a level less than 40. You can raise this good cholesterol by eating olive oil, canola oil, avocados, or nuts. Exercise raises this level, too. Fat    Fat is calorie dense and packs a lot of calories into a small amount of food. Even though fats should be limited due to their high calorie content, not all fats are bad. In fact, some fats are quite healthful. Fat can be broken down into four main types. The good-for-you fats are:   Monounsaturated fat  found in oils such as olive and canola, avocados, and nuts and natural nut butters; can decrease cholesterol levels, while keeping levels of HDL cholesterol high   Polyunsaturated fat  found in oils such as safflower, sunflower, soybean, corn, and sesame; can decrease total cholesterol and LDL cholesterol   Omega-3 fatty acids  particularly those found in fatty fish (such as salmon, trout, tuna, mackerel, herring, and sardines); can decrease risk of arrhythmias, decrease triglyceride levels, and slightly lower blood pressure   The fats that you want to limit are:   Saturated fat  found in animal products, many fast foods, and a few vegetables; increases total blood cholesterol, including LDL levels   Animal fats that are saturated include: butter, lard, whole-milk dairy products, meat fat, and poultry skin   Vegetable fats that are saturated include: hydrogenated shortening, palm oil, coconut oil, cocoa butter   Hydrogenated or trans fat  found in margarine and vegetable shortening, most shelf stable snack foods, and fried foods; increases LDL and decreases HDL     It is generally recommended that you limit your total fat for the day to less than 30% of your total calories.  If you follow an 1800-calorie heart healthy diet, for example, this would mean 60 grams of fat or less per day. Saturated fat and trans fat in your diet raises your blood cholesterol the most, much more than dietary cholesterol does. For this reason, on a heart-healthy diet, less than 7% of your calories should come from saturated fat and ideally 0% from trans fat. On an 1800-calorie diet, this translates into less than 14 grams of saturated fat per day, leaving 46 grams of fat to come from mono- and polyunsaturated fats.    Food Choices on a Heart Healthy Diet   Food Category   Foods Recommended   Foods to Avoid   Grains   Breads and rolls without salted tops Most dry and cooked cereals Unsalted crackers and breadsticks Low-sodium or homemade breadcrumbs or stuffing All rice and pastas   Breads, rolls, and crackers with salted tops High-fat baked goods (eg, muffins, donuts, pastries) Quick breads, self-rising flour, and biscuit mixes Regular bread crumbs Instant hot cereals Commercially prepared rice, pasta, or stuffing mixes   Vegetables   Most fresh, frozen, and low-sodium canned vegetables Low-sodium and salt-free vegetable juices Canned vegetables if unsalted or rinsed   Regular canned vegetables and juices, including sauerkraut and pickled vegetables Frozen vegetables with sauces Commercially prepared potato and vegetable mixes   Fruits   Most fresh, frozen, and canned fruits All fruit juices   Fruits processed with salt or sodium   Milk   Nonfat or low-fat (1%) milk Nonfat or low-fat yogurt Cottage cheese, low-fat ricotta, cheeses labeled as low-fat and low-sodium   Whole milk Reduced-fat (2%) milk Malted and chocolate milk Full fat yogurt Most cheeses (unless low-fat and low salt) Buttermilk (no more than 1 cup per week)   Meats and Beans   Lean cuts of fresh or frozen beef, veal, lamb, or pork (look for the word loin) Fresh or frozen poultry without the skin Fresh or frozen fish and some shellfish Egg whites and egg substitutes (Limit whole eggs to three per week) Tofu Nuts or seeds (unsalted, dry-roasted), low-sodium peanut butter Dried peas, beans, and lentils   Any smoked, cured, salted, or canned meat, fish, or poultry (including cook, chipped beef, cold cuts, hot dogs, sausages, sardines, and anchovies) Poultry skins Breaded and/or fried fish or meats Canned peas, beans, and lentils Salted nuts   Fats and Oils   Olive oil and canola oil Low-sodium, low-fat salad dressings and mayonnaise   Butter, margarine, coconut and palm oils, cook fat   Snacks, Sweets, and Condiments   Low-sodium or unsalted versions of broths, soups, soy sauce, and condiments Pepper, herbs, and spices; vinegar, lemon, or lime juice Low-fat frozen desserts (yogurt, sherbet, fruit bars) Sugar, cocoa powder, honey, syrup, jam, and preserves Low-fat, trans-fat free cookies, cakes, and pies David and animal crackers, fig bars, keith snaps   High-fat desserts Broth, soups, gravies, and sauces, made from instant mixes or other high-sodium ingredients Salted snack foods Canned olives Meat tenderizers, seasoning salt, and most flavored vinegars   Beverages   Low-sodium carbonated beverages Tea and coffee in moderation Soy milk   Commercially softened water   Suggestions   Make whole grains, fruits, and vegetables the base of your diet. Choose heart-healthy fats such as canola, olive, and flaxseed oil, and foods high in heart-healthy fats, such as nuts, seeds, soybeans, tofu, and fish. Eat fish at least twice per week; the fish highest in omega-3 fatty acids and lowest in mercury include salmon, herring, mackerel, sardines, and canned chunk light tuna. If you eat fish less than twice per week or have high triglycerides, talk to your doctor about taking fish oil supplements. Read food labels. For products low in fat and cholesterol, look for fat free, low-fat, cholesterol free, saturated fat free, and trans fat freeAlso scan the Nutrition Facts Label, which lists saturated fat, trans fat, and cholesterol amounts. For products low in sodium, look for sodium free, very low sodium, low sodium, no added salt, and unsalted   Skip the salt when cooking or at the table; if food needs more flavor, get creative and try out different herbs and spices. Garlic and onion also add substantial flavor to foods. Trim any visible fat off meat and poultry before cooking, and drain the fat off after vivar. Use cooking methods that require little or no added fat, such as grilling, boiling, baking, poaching, broiling, roasting, steaming, stir-frying, and sauting. Avoid fast food and convenience food. They tend to be high in saturated and trans fat and have a lot of added salt. Talk to a registered dietitian for individualized diet advice. Last Reviewed: March 2011 Tawanda Hays MS, MPH, RD   Updated: 3/29/2011   ·     Preventing Osteoporosis: After Your Visit  Your Care Instructions  Osteoporosis means the bones are weak and thin enough that they can break easily. The older you are, the more likely you are to get osteoporosis. But with plenty of calcium, vitamin D, and exercise, you can help prevent osteoporosis. The preteen and teen years are a key time for bone building. With the help of calcium, vitamin D, and exercise in those early years and beyond, the bones reach their peak density and strength by age 27. After age 27, your bones naturally start to thin and weaken. The stronger your bones are at around age 27, the lower your risk for osteoporosis. But no matter what your age and risk are, your bones still need calcium, vitamin D, and exercise to stay strong. Also avoid smoking, and limit alcohol. Smoking and heavy alcohol use can make your bones thinner. Talk to your doctor about any special risks you might have, such as having a close relative with osteoporosis or taking a medicine that can weaken bones. Your doctor can tell you the best ways to protect your bones from thinning.   Follow-up care is a key part of your treatment and safety. Be sure to make and go to all appointments, and call your doctor if you are having problems. It's also a good idea to know your test results and keep a list of the medicines you take. How can you care for yourself at home? Get enough calcium and vitamin D. The [place] of [employer] recommends adults younger than age 46 need 1,000 mg of calcium and 600 IU of vitamin D each day. Women ages 46 to 79 need 1,200 mg of calcium and 600 IU of vitamin D each day. Men ages 46 to 79 need 1,000 mg of calcium and 600 IU of vitamin D each day. Adults 71 and older need 1,200 mg of calcium and 800 IU of vitamin D each day. Eat foods rich in calcium, like yogurt, cheese, milk, and dark green vegetables. Eat foods rich in vitamin D, like eggs, fatty fish, cereal, and fortified milk. Get some sunshine. Your body uses sunshine to make its own vitamin D. The safest time to be out in the sun is before 10 a.m. or after 3 p.m. Avoid getting sunburned. Sunburn can increase your risk of skin cancer. Talk to your doctor about taking a calcium plus vitamin D supplement. Ask about what type of calcium is right for you, and how much to take at a time. Adults ages 23 to 48 should not get more than 2,500 mg of calcium and 4,000 IU of vitamin D each day, whether it is from supplements and/or food. Adults ages 46 and older should not get more than 2,000 mg of calcium and 4,000 IU of vitamin D each day from supplements and/or food. Get regular bone-building exercise. Weight-bearing and resistance exercises keep bones healthy by working the muscles and bones against gravity. Start out at an exercise level that feels right for you. Add a little at a time until you can do the following:  Do 30 minutes of weight-bearing exercise on most days of the week. Walking, jogging, stair climbing, and dancing are good choices. Do resistance exercises with weights or elastic bands 2 to 3 days a week. Limit alcohol.  Drink no more than 1 alcohol drink a day if you are a woman. Drink no more than 2 alcohol drinks a day if you are a man. Do not smoke. Smoking can make bones thin faster. If you need help quitting, talk to your doctor about stop-smoking programs and medicines. These can increase your chances of quitting for good. When should you call for help? Watch closely for changes in your health, and be sure to contact your doctor if:  You need help with a healthy eating plan. You need help with an exercise plan    © 2006-2012 [employer]. Care instructions adapted under license by [facility]. This care instruction is for use with your licensed healthcare professional. If you have questions about a medical condition or this instruction, always ask your healthcare professional. [address] any warranty or liability for your use of this information. Content Version: 9.4.29654; Last Revised: June 20, 2011              ·     DASH Diet: After Your Visit  Your Care Instructions  The DASH diet is an eating plan that can help lower your blood pressure. DASH stands for Dietary Approaches to Stop Hypertension. Hypertension is high blood pressure. The DASH diet focuses on eating foods that are high in calcium, potassium, and magnesium. These nutrients can lower blood pressure. The foods that are highest in these nutrients are fruits, vegetables, low-fat dairy products, nuts, seeds, and legumes. But taking calcium, potassium, and magnesium supplements instead of eating foods that are high in those nutrients does not have the same effect. The DASH diet also includes whole grains, fish, and poultry. The DASH diet is one of several lifestyle changes your doctor may recommend to lower your high blood pressure. Your doctor may also want you to decrease the amount of sodium in your diet.  Lowering sodium while following the DASH diet can lower blood pressure even further than just the DASH diet alone.  Follow-up care is a key part of your treatment and safety. Be sure to make and go to all appointments, and call your doctor if you are having problems. Its also a good idea to know your test results and keep a list of the medicines you take. How can you care for yourself at home? Following the DASH diet  Eat 4 to 5 servings of fruit each day. A serving is 1 medium-sized piece of fruit, ½ cup chopped or canned fruit, 1/4 cup dried fruit, or 4 ounces (½ cup) of fruit juice. Choose fruit more often than fruit juice. Eat 4 to 5 servings of vegetables each day. A serving is 1 cup of lettuce or raw leafy vegetables, ½ cup of chopped or cooked vegetables, or 4 ounces (½ cup) of vegetable juice. Choose vegetables more often than vegetable juice. Get 2 to 3 servings of low-fat and fat-free dairy each day. A serving is 8 ounces of milk, 1 cup of yogurt, or 1 ½ ounces of cheese. Eat 7 to 8 servings of grains each day. A serving is 1 slice of bread, 1 ounce of dry cereal, or ½ cup of cooked rice, pasta, or cooked cereal. Try to choose whole-grain products as much as possible. Limit lean meat, poultry, and fish to 6 ounces each day. Six ounces is about the size of two decks of cards. Eat 4 to 5 servings of nuts, seeds, and legumes (cooked dried beans, lentils, and split peas) each week. A serving is 1/3 cup of nuts, 2 tablespoons of seeds, or ½ cup cooked dried beans or peas. Limit sweets and added sugars to 5 servings or less a week. A serving is 1 tablespoon jelly or jam, ½ cup sorbet, or 1 cup of lemonade. Tips for success  Start small. Do not try to make dramatic changes to your diet all at once. You might feel that you are missing out on your favorite foods and then be more likely to not follow the plan. Make small changes, and stick with them. Once those changes become habit, add a few more changes. Try some of the following:  Make it a goal to eat a fruit or vegetable at every meal and at snacks.  This will make it easy to get the recommended amount of fruits and vegetables each day. Try yogurt topped with fruit and nuts for a snack or healthy dessert. Add lettuce, tomato, cucumber, and onion to sandwiches. Combine a ready-made pizza crust with low-fat mozzarella cheese and lots of vegetable toppings. Try using tomatoes, squash, spinach, broccoli, carrots, cauliflower, and onions. Have a variety of cut-up vegetables with a low-fat dip as an appetizer instead of chips and dip. Sprinkle sunflower seeds or chopped almonds over salads. Or try adding chopped walnuts or almonds to cooked vegetables. Try some vegetarian meals using beans and peas. Add garbanzo or kidney beans to salads. Make burritos and tacos with mashed hinojosa beans or black beans    © 5372-8043 [employer]. Care instructions adapted under license by [facility]. This care instruction is for use with your licensed healthcare professional. If you have questions about a medical condition or this instruction, always ask your healthcare professional. [place] [address] any warranty or liability for your use of this information. Content Version: 9.4.69619; Last Revised: March 15, 2012              ·     High-Fiber Diet     What Is Fiber? Dietary fiber is a form of carbohydrate found in plants that cannot be digested by humans. All plants contain fiber, including fruits, vegetables, grains, and legumes. Fiber is often classified into two categories: soluble and insoluble. Soluble fiber draws water into the bowel and can help slow digestion. Examples of foods that are high in soluble fiber include oatmeal, oat bran, barley, legumes (eg, beans and peas), apples, and strawberries. Insoluble fiber speeds digestion and can add bulk to the stool. Examples of foods that are high in insoluble fiber include whole-wheat products, wheat bran, cauliflower, green beans, and potatoes. Why Follow a High-Fiber Diet? A high-fiber diet is often recommended to prevent and treat constipation , hemorrhoids , diverticulitis , and irritable bowel syndrome . Eating a high-fiber diet can also help improve your cholesterol levels, lower your risk of coronary heart disease , reduce your risk of type 2 diabetes , and lower your weight. For people with type 1 or 2 diabetes, a high-fiber diet can also help stabilize blood sugar levels. How Much Fiber Should I Eat? A high-fiber diet should contain  20-35 grams  of fiber a day. This is actually the amount recommended for the general adult population; however, most Americans eat only 15 grams of fiber per day. Digestion of Fiber   Eating a higher fiber diet than usual can take some getting used to by your body's digestive system. To avoid the side effects of sudden increases in dietary fiber (eg, gas, cramping, bloating, and diarrhea), increase fiber gradually and be sure to drink plenty of fluids every day. Tips for Increasing Fiber Intake   Whenever possible, choose whole grains over refined grains (eg, brown rice instead of white rice, whole-wheat bread instead of white bread). Include a variety of grains in your diet, such as wheat, rye, barley, oats, quinoa, and bulgur. Eat more vegetarian-based meals. Here are some ideas: black bean burgers, eggplant lasagna, and veggie tofu stir-jimenez. Choose high-fiber snacks, such as fruits, popcorn, whole-grain crackers, and nuts. Make whole-grain cereal or whole-grain toast part of your daily breakfast regime. When eating out, whether ordering a sandwich or dinner, ask for extra vegetables. When baking, replace part of the white flour with whole-wheat flour. Whole-wheat flour is particularly easy to incorporate into a recipe.     High-Fiber Diet Eating Guide   Food Category   Foods Recommended   Notes   Grains   Whole-grain breads, muffins, bagels, or kathryn bread Rye bread Whole-wheat crackers or crisp breads Whole-grain or bran cereals Oatmeal, oat bran, or grits Wheat germ Whole-wheat pasta and brown rice   Read the ingredients list on food labels. Look for products that list \"whole\" as the first ingredient (eg, whole-wheat, whole oats). Choose cereals with at least 2 grams of fiber per serving. Vegetables   All vegetables, especially asparagus, bean sprouts, broccoli, [place] sprouts, cabbage, carrots, cauliflower, celery, corn, greens, green beans, green pepper, onions, peas, potatoes (with skin), snow peas, spinach, squash, sweet potatoes, tomatoes, zucchini   For maximum fiber intake, eat the peels of fruits and vegetablesjust be sure to wash them well first.   Fruits   All fruits, especially apples, berries, grapefruits, mangoes, nectarines, oranges, peaches, pears, dried fruits (figs, dates, prunes, raisins)   Choose raw fruits and vegetables over juice, cooked, or cannedraw fruit has more fiber. Dried fruit is also a good source of fiber. Milk   With the exception of yogurt containing inulin (a type of fiber), dairy foods provide little fiber. Add more fiber by topping your yogurt or cottage cheese with fresh fruit, whole grain or bran cereals, nuts, or seeds. Meats and Beans   All beans and peas, especially Garbanzo beans, kidney beans, lentils, lima beans, split peas, and hinojosa beans All nuts and seeds, especially almonds, peanuts, [place] nuts, cashews, peanut butter, walnuts, sesame and sunflower seeds All meat, poultry, fish, and eggs   Increase fiber in meat dishes by adding hinojosa beans, kidney beans, black-eyed peas, bran, or oatmeal. If you are following a low-fat diet, use nuts and seeds only in moderation.    Fats and Oils   All in moderation   Fats and oils do not provide fiber   Snacks, Sweets, and Condiments   Fruit Nuts Popcorn, whole-wheat pretzels, or trail mix made with dried fruits, nuts, and seeds Cakes, breads, and cookies made with oatmeal or whole-wheat flour   Most snack foods do not provide much fiber. Choose snacks with at least 2 grams of fiber per serving. Last Reviewed: March 2011 Jazmin Peña MS, MPH, RD   Updated: 3/29/2011   ·     Keep Your Memory Zahida Grass       Many factors can affect your ability to remembera hectic lifestyle, aging, stress, chronic disease, and certain medicines. But, there are steps you can take to sharpen your mind and help preserve your memory. Challenge Your Brain   Regularly challenging your mind may help keeps it in top shape. Good mental exercises include:   Crossword puzzlesUse a dictionary if you need it; you will learn more that way. Brainteasers Try some! Crafts, such as wood working and sewing   Hobbies, such as gardening and building model airplanes   SocializingVisit old friends or join groups to meet new ones. Reading   Learning a new language   Taking a class, whether it be art history or caitlin chi   TravelingExperience the food, history, and culture of your destination   Learning to use a computer   Going to museums, the theater, or thought-provoking movies   Changing things in your daily life, such as reversing your pattern in the grocery store or brushing your teeth using your nondominant hand   Use Memory Aids   There is no need to remember every detail on your own. These memory aids can help:   Calendars and day planners   Electronic organizers to store all sorts of helpful informationThese devices can \"beep\" to remind you of appointments. A book of days to record birthdays, anniversaries, and other occasions that occur on the same date every year   Detailed \"to-do\" lists and strategically placed sticky notes   Quick \"study\" sessionsBefore a gathering, review who will be there so their names will be fresh in your mind.    Establish routinesFor example, keep your keys, wallet, and umbrella in the same place all the time or take medicine with your 8:00 AM glass of juice   Live a Healthy Life   Many actions that will keep your body strong will do the same for your mind. For example:   Talk to Your Doctor About Herbs and Supplements    Malnutrition and vitamin deficiencies can impair your mental function. For example, vitamin B12 deficiency can cause a range of symptoms, including confusion. But, what if your nutritional needs are being met? Can herbs and supplements still offer a benefit? Researchers have investigated a range of natural remedies, such as ginkgo , ginseng , and the supplement phosphatidylserine (PS). So far, though, the evidence is inconsistent as to whether these products can improve memory or thinking. If you are interested in taking herbs and supplements, talk to your doctor first because they may interact with other medicines that you are taking. Exercise Regularly    Among the many benefits of regular exercise are increased blood flow to the brain and decreased risk of certain diseases that can interfere with memory function. One study found that even moderate exercise has a beneficial effect. Examples of \"moderate\" exercise include:   Playing 18 holes of golf once a week, without a cart   Playing tennis twice a week   Walking one mile per day   Manage Stress    It can be tough to remember what is important when your mind is cluttered. Make time for relaxation. Choose activities that calm you down, and make it routine. Manage Chronic Conditions    Side effects of high blood pressure , diabetes, and heart disease can interfere with mental function. Many of the lifestyle steps discussed here can help manage these conditions. Strive to eat a healthy diet, exercise regularly, get stress under control, and follow your doctor's advice for your condition. Minimize Medications    Talk to your doctor about the medicines that you take. Some may be unnecessary. Also, healthy lifestyle habits may lower the need for certain drugs.      Last Reviewed: April 2010 Bret Lara MD   Updated: 4/13/2010   ·   Smoking Cessation  This document explains the best ways for you to quit smoking and new treatments to help. It lists new medicines that can double or triple your chances of quitting and quitting for good. It also considers ways to avoid relapses and concerns you may have about quitting, including weight gain. NICOTINE: A POWERFUL ADDICTION  If you have tried to quit smoking, you know how hard it can be. It is hard because nicotine is a very addictive drug. For some people, it can be as addictive as heroin or cocaine. Usually, people make 2 or 3 tries, or more, before finally being able to quit. Each time you try to quit, you can learn about what helps and what hurts. Quitting takes hard work and a lot of effort, but you can quit smoking. QUITTING SMOKING IS ONE OF THE MOST IMPORTANT THINGS YOU WILL EVER DO. You will live longer, feel better, and live better. The impact on your body of quitting smoking is felt almost immediately:  Within 20 minutes, blood pressure decreases. Pulse returns to its normal level. After 8 hours, carbon monoxide levels in the blood return to normal. Oxygen level increases. After 24 hours, chance of heart attack starts to decrease. Breath, hair, and body stop smelling like smoke. After 48 hours, damaged nerve endings begin to recover. Sense of taste and smell improve. After 72 hours, the body is virtually free of nicotine. Bronchial tubes relax and breathing becomes easier. After 2 to 12 weeks, lungs can hold more air. Exercise becomes easier and circulation improves. Quitting will reduce your risk of having a heart attack, stroke, cancer, or lung disease:  After 1 year, the risk of coronary heart disease is cut in half. After 5 years, the risk of stroke falls to the same as a nonsmoker. After 10 years, the risk of lung cancer is cut in half and the risk of other cancers decreases significantly. After 15 years, the risk of coronary heart disease drops, usually to the level of a nonsmoker.   If you are pregnant, quitting them quit and stay off tobacco.  3. LEARN NEW SKILLS AND BEHAVIORS  Try to distract yourself from urges to smoke. Talk to someone, go for a walk, or occupy your time with a task. When you first try to quit, change your routine. Take a different route to work. Drink tea instead of coffee. Eat breakfast in a different place. Do something to reduce your stress. Take a hot bath, exercise, or read a book. Plan something enjoyable to do every day. Reward yourself for not smoking. Explore interactive web-based programs that specialize in helping you quit. 4. GET MEDICINE AND USE IT CORRECTLY  Medicines can help you stop smoking and decrease the urge to smoke. Combining medicine with the above behavioral methods and support can quadruple your chances of successfully quitting smoking. The [place] Food and Drug Administration (FDA) has approved 7 medicines to help you quit smoking. These medicines fall into 3 categories. Nicotine replacement therapy (delivers nicotine to your body without the negative effects and risks of smoking):  Nicotine gum: Available over-the-counter. Nicotine lozenges: Available over-the-counter. Nicotine inhaler: Available by prescription. Nicotine nasal spray: Available by prescription. Nicotine skin patches (transdermal): Available by prescription and over-the-counter. Antidepressant medicine (helps people abstain from smoking, but how this works is unknown): Bupropion sustained-release (SR) tablets: Available by prescription. Nicotinic receptor partial agonist (simulates the effect of nicotine in your brain):  Varenicline tartrate tablets: Available by prescription. Ask your caregiver for advice about which medicines to use and how to use them. Carefully read the information on the package. Everyone who is trying to quit may benefit from using a medicine.  If you are pregnant or trying to become pregnant, nursing an infant, you are under age 25, or you smoke fewer than 10 cigarettes per day, talk to your caregiver before taking any nicotine replacement medicines. You should stop using a nicotine replacement product and call your caregiver if you experience nausea, dizziness, weakness, vomiting, fast or irregular heartbeat, mouth problems with the lozenge or gum, or redness or swelling of the skin around the patch that does not go away. Do not use any other product containing nicotine while using a nicotine replacement product. Talk to your caregiver before using these products if you have diabetes, heart disease, asthma, stomach ulcers, you had a recent heart attack, you have high blood pressure that is not controlled with medicine, a history of irregular heartbeat, or you have been prescribed medicine to help you quit smoking. 5. BE PREPARED FOR RELAPSE OR DIFFICULT SITUATIONS  Most relapses occur within the first 3 months after quitting. Do not be discouraged if you start smoking again. Remember, most people try several times before they finally quit. You may have symptoms of withdrawal because your body is used to nicotine. You may crave cigarettes, be irritable, feel very hungry, cough often, get headaches, or have difficulty concentrating. The withdrawal symptoms are only temporary. They are strongest when you first quit, but they will go away within 10 to 14 days. Here are some difficult situations to watch for:  Alcohol. Avoid drinking alcohol. Drinking lowers your chances of successfully quitting. Caffeine. Try to reduce the amount of caffeine you consume. It also lowers your chances of successfully quitting. Other smokers. Being around smoking can make you want to smoke. Avoid smokers. Weight gain. Many smokers will gain weight when they quit, usually less than 10 pounds. Eat a healthy diet and stay active. Do not let weight gain distract you from your main goal, quitting smoking. Some medicines that help you quit smoking may also help delay weight gain.  You can always lose the weight gained after you quit. Bad mood or depression. There are a lot of ways to improve your mood other than smoking. If you are having problems with any of these situations, talk to your caregiver. SPECIAL SITUATIONS AND CONDITIONS  Studies suggest that everyone can quit smoking. Your situation or condition can give you a special reason to quit. Pregnant women/new mothers: By quitting, you protect your baby's health and your own. Hospitalized patients: By quitting, you reduce health problems and help healing. Heart attack patients: By quitting, you reduce your risk of a second heart attack. Lung, head, and neck cancer patients: By quitting, you reduce your chance of a second cancer. Parents of children and adolescents: By quitting, you protect your children from illnesses caused by secondhand smoke. QUESTIONS TO THINK ABOUT  Think about the following questions before you try to stop smoking. You may want to talk about your answers with your caregiver. Why do you want to quit? If you tried to quit in the past, what helped and what did not? What will be the most difficult situations for you after you quit? How will you plan to handle them? Who can help you through the tough times? Your family? Friends? Caregiver? What pleasures do you get from smoking? What ways can you still get pleasure if you quit? Here are some questions to ask your caregiver: How can you help me to be successful at quitting? What medicine do you think would be best for me and how should I take it? What should I do if I need more help? What is smoking withdrawal like? How can I get information on withdrawal?  Quitting takes hard work and a lot of effort, but you can quit smoking. FOR MORE INFORMATION   Smokefree. gov ([employer]ableGrid.se) provides free, accurate, evidence-based information and professional assistance to help support the immediate and long-term needs of people trying to quit smoking.   Document Released: using the toilet. To avoid dizziness, get up slowly from a lying down position. Sit up first, dangling your legs for a minute or two before rising to a standing position. Overall Home Safety Check   According to the Consumer Product Safety Commision's \"Older Consumer Home Safety Checklist,\" it is important to check for potential hazards in each room. And remember, proper lighting is an essential factor in home safety. If you cannot see clearly, you are more likely to fall. Important questions to ask yourself include:   Are lamp, electric, extension, and telephone cords placed out of the flow of traffic and maintained in good condition? Have frayed cords been replaced? Are all small rugs and runners slip resistant? If not, you can secure them to the floor with a special double-sided carpet tape. Are smoke detectors properly locatedone on every floor of your home and one outside of every sleeping area? Are they in good working order? Are batteries replaced at least once a year? Do you have a well-maintained carbon monoxide detector outside every sleeping are in your home? Does your furniture layout leave plenty of space to maneuver between and around chairs, tables, beds, and sofas? Are hallways, stairs and passages between rooms well lit? Can you reach a lamp without getting out of bed? Are floor surfaces well maintained? Shag rugs, high-pile carpeting, tile floors, and polished wood floors can be particularly slippery. Stairs should always have handrails and be carpeted or fitted with a non-skid tread. Is your telephone easily reachable. Is the cord safely tucked away? Room by Room   According to the Association of Aging, bathrooms and abhishek are the two most potentially hazardous rooms in your home. In the Kitchen    Be sure your stove is in proper working order and always make sure burners and the oven are off before you go out or go to sleep.     Keep pots on the back burners, turn handles away from the front of the stove, and keep stove clean and free of grease build-up. Kitchen ventilation systems and range exhausts should be working properly. Keep flammable objects such as towels and pot holders away from the cooking area except when in use. Make sure kitchen curtains are tied back. Move cords and appliances away from the sink and hot surfaces. If extension cords are needed, install wiring guides so they do not hang over the sink, range, or working areas. Look for coffee pots, kettles and toaster ovens with automatic shut-offs. Keep a mop handy in the kitchen so you can wipe up spills instantly. You should also have a small fire extinguisher. Arrange your kitchen with frequently used items on lower shelves to avoid the need to stand on a stepstool to reach them. Make sure countertops are well-lit to avoid injuries while cutting and preparing food. In the Bathroom    Use a non-slip mat or decals in the tub and shower, since wet, soapy tile or porcelain surfaces are extremely slippery. Make sure bathroom rugs are non-skid or tape them firmly to the floor. Bathtubs should have at least one, preferably two, grab bars, firmly attached to structural supports in the wall. (Do not use built-in soap holders or glass shower doors as grab bars.)    Tub seats fitted with non-slip material on the legs allow you to wash sitting down. For people with limited mobility, bathtub transfer benches allow you to slide safely into the tub. Raised toilet seats and toilet safety rails are helpful for those with knee or hip problems. In the [facility]    Make sure you use a nightlight and that the area around your bed is clear of potential obstacles. Be careful with electric blankets and never go to sleep with a heating pad, which can cause serious burns even if on a low setting. Use fire-resistant mattress covers and pillows, and NEVER smoke in bed.     Keep a phone next to the bed that is programmed to dial 911 at the push of a button. If you have a chronic condition, you may want to sign on with an automatic call-in service. Typically the system includes a small pendant that connects directly to an emergency medical voice-response system. You should also make arrangements to stay in contact with someonefriend, neighbor, family memberon a regular schedule. Fire Prevention   According to the [employer]. (Smoke Alarms for Every) [address] [place] [address], senior citizens are one of the two highest risk groups for death and serious injuries due to residential fires. When cooking, wear short-sleeved items, never a bulky long-sleeved robe. The [facility]'s Safety Checklist for Older Consumers emphasizes the importance of checking basements, garages, workshops and storage areas for fire hazards, such as volatile liquids, piles of old rags or clothing and overloaded circuits. Never smoke in bed or when lying down on a couch or recliner chair. Small portable electric or kerosene heaters are responsible for many home fires and should be used cautiously if at all. If you do use one, be sure to keep them away from flammable materials. In case of fire, make sure you have a pre-established emergency exit plan. Have a professional check your fireplace and other fuel-burning appliances yearly. Helping Hands   Baby boomers entering the vergara years will continue to see the development of new products to help older adults live safely and independently in spite of age-related changes. Making Life More Livable  , by Sara Kumar, lists over 1,000 products for \"living well in the mature years,\" such as bathing and mobility aids, household security devices, ergonomically designed knives and peelers, and faucet valves and knobs for temperature control.  Medical supply stores and organizations are good sources of information about products that improve your quality of life and insure your safety.      Last Reviewed: November 2009 Carmen Boyce MD   Updated: 3/7/2011     ·

## 2020-08-11 NOTE — TELEPHONE ENCOUNTER
4 mo COPD, insomnia . CALL DR Zoë Rodrigez OFFICE , PT WAS NOT SCHEDULED FOR PULM REHAB AND PULSE OX NOCTURNAL .       I tried calling the office just ringing     Tried calling again , just ringing

## 2020-08-11 NOTE — PROGRESS NOTES
Visit Information    Have you changed or started any medications since your last visit including any over-the-counter medicines, vitamins, or herbal medicines? no   Are you having any side effects from any of your medications? -  no  Have you stopped taking any of your medications? Is so, why? -  no    Have you seen any other physician or provider since your last visit? No  Have you had any other diagnostic tests since your last visit? No  Have you been seen in the emergency room and/or had an admission to a hospital since we last saw you? No  Have you had your routine dental cleaning in the past 6 months? yes     Have you activated your babbel account? If not, what are your barriers?  Yes     Patient Care Team:  Jung Gavin MD as PCP - Claudeen Armour, MD as PCP - [facility]  Riri Alonzo MD as Surgeon (General Surgery)  Tucker Yang MD as Surgeon (Ophthalmology)  Alejandra Falcon MD as Consulting Physician (Gastroenterology)  Ava Villafana MD as Consulting Physician (Endocrinology)  Kayleen Perez MD as Surgeon (Ophthalmology)  Arleth Dewitt MD as Consulting Physician (Internal Medicine Cardiovascular Disease)  Kassie Shook MD as Consulting Physician (Cardiology)  Chip Ryan, PhD (Sleep Medicine)  Pa Chaudhari MD as Consulting Physician (Otolaryngology)  Zaria Cabezas MD as Consulting Physician (Cardiology)  Shannon Pate MD as Consulting Physician (Pulmonology)  Jane Bagley MD as Consulting Physician (Nephrology)  Florentin Mancia MD as Consulting Physician (Pulmonology)    Medical History Review  Past Medical, Family, and Social History reviewed and does contribute to the patient presenting condition    Health Maintenance   Topic Date Due    Annual Wellness Visit (AWV)  05/29/2019    Breast cancer screen  05/13/2020    Flu vaccine (1) 09/01/2020    Lipid screen  03/16/2021    Potassium monitoring  03/16/2021    Creatinine monitoring  07/31/2021    Colon cancer screen colonoscopy  10/25/2024    DTaP/Tdap/Td vaccine (2 - Td) 01/09/2027    DEXA (modify frequency per FRAX score)  Completed    Shingles Vaccine  Completed    Pneumococcal 65+ years Vaccine  Completed    Hepatitis A vaccine  Aged Out    Hepatitis B vaccine  Aged Out    Hib vaccine  Aged Out    Meningococcal (ACWY) vaccine  Aged Out

## 2020-08-12 ENCOUNTER — TELEPHONE (OUTPATIENT)
Dept: SPIRITUAL SERVICES | Age: 80
End: 2020-08-12

## 2020-08-13 ENCOUNTER — TELEPHONE (OUTPATIENT)
Dept: SPIRITUAL SERVICES | Age: 80
End: 2020-08-13

## 2020-08-13 NOTE — TELEPHONE ENCOUNTER
Writer spoke to patient's spouse and he requested that we mail the ACP documents. I will mail today and the [facility] office will call in a week or so to discuss them.

## 2020-08-24 ENCOUNTER — HOSPITAL ENCOUNTER (OUTPATIENT)
Dept: PULMONOLOGY | Age: 80
Setting detail: THERAPIES SERIES
Discharge: HOME | End: 2020-08-24
Payer: MEDICARE

## 2020-08-24 VITALS — WEIGHT: 134 LBS | BODY MASS INDEX: 23.36 KG/M2

## 2020-08-24 PROCEDURE — G0424 PULMONARY REHAB W EXER: HCPCS

## 2020-08-24 ASSESSMENT — PATIENT HEALTH QUESTIONNAIRE - PHQ9: SUM OF ALL RESPONSES TO PHQ QUESTIONS 1-9: 4

## 2020-08-25 ENCOUNTER — TELEPHONE (OUTPATIENT)
Dept: SPIRITUAL SERVICES | Age: 80
End: 2020-08-25

## 2020-08-25 NOTE — TELEPHONE ENCOUNTER
Writer spoke to patient's  and they have not received the documents yet. The [facility] office will call in a few days.

## 2020-08-31 ENCOUNTER — TELEPHONE (OUTPATIENT)
Dept: SPIRITUAL SERVICES | Age: 80
End: 2020-08-31

## 2020-08-31 ENCOUNTER — HOSPITAL ENCOUNTER (OUTPATIENT)
Dept: PULMONOLOGY | Age: 80
Setting detail: THERAPIES SERIES
Discharge: HOME | End: 2020-08-31
Payer: MEDICARE

## 2020-08-31 VITALS — WEIGHT: 135 LBS | BODY MASS INDEX: 23.54 KG/M2

## 2020-08-31 PROCEDURE — G0424 PULMONARY REHAB W EXER: HCPCS

## 2020-09-02 ENCOUNTER — HOSPITAL ENCOUNTER (OUTPATIENT)
Dept: PULMONOLOGY | Age: 80
Setting detail: THERAPIES SERIES
Discharge: HOME | End: 2020-09-02
Payer: MEDICARE

## 2020-09-02 VITALS — WEIGHT: 135.6 LBS | BODY MASS INDEX: 23.64 KG/M2

## 2020-09-02 PROCEDURE — G0424 PULMONARY REHAB W EXER: HCPCS

## 2020-09-04 ENCOUNTER — TELEPHONE (OUTPATIENT)
Dept: SPIRITUAL SERVICES | Age: 80
End: 2020-09-04

## 2020-09-07 ENCOUNTER — APPOINTMENT (OUTPATIENT)
Dept: PULMONOLOGY | Age: 80
End: 2020-09-07
Payer: MEDICARE

## 2020-09-09 ENCOUNTER — HOSPITAL ENCOUNTER (OUTPATIENT)
Dept: PULMONOLOGY | Age: 80
Setting detail: THERAPIES SERIES
Discharge: HOME | End: 2020-09-09
Payer: MEDICARE

## 2020-09-09 VITALS — WEIGHT: 136 LBS | BODY MASS INDEX: 23.71 KG/M2

## 2020-09-09 PROCEDURE — G0424 PULMONARY REHAB W EXER: HCPCS

## 2020-09-14 ENCOUNTER — HOSPITAL ENCOUNTER (OUTPATIENT)
Dept: PULMONOLOGY | Age: 80
Setting detail: THERAPIES SERIES
Discharge: HOME | End: 2020-09-14
Payer: MEDICARE

## 2020-09-16 ENCOUNTER — HOSPITAL ENCOUNTER (OUTPATIENT)
Dept: PULMONOLOGY | Age: 80
Setting detail: THERAPIES SERIES
Discharge: HOME | End: 2020-09-16
Payer: MEDICARE

## 2020-09-16 VITALS — WEIGHT: 136 LBS | BODY MASS INDEX: 23.71 KG/M2

## 2020-09-16 PROCEDURE — G0424 PULMONARY REHAB W EXER: HCPCS

## 2020-09-16 NOTE — PROGRESS NOTES
Refugio Curry is tolerating her workout added treadmill . Pt indicated she is less sob with walking still has trouble on hills and stairs , we will review stairs with pt prior to graduation.

## 2020-09-21 ENCOUNTER — HOSPITAL ENCOUNTER (OUTPATIENT)
Dept: PULMONOLOGY | Age: 80
Setting detail: THERAPIES SERIES
Discharge: HOME | End: 2020-09-21
Payer: MEDICARE

## 2020-09-23 ENCOUNTER — HOSPITAL ENCOUNTER (OUTPATIENT)
Dept: PULMONOLOGY | Age: 80
Setting detail: THERAPIES SERIES
Discharge: HOME | End: 2020-09-23
Payer: MEDICARE

## 2020-09-28 ENCOUNTER — HOSPITAL ENCOUNTER (OUTPATIENT)
Dept: PULMONOLOGY | Age: 80
Setting detail: THERAPIES SERIES
Discharge: HOME | End: 2020-09-28
Payer: MEDICARE

## 2020-09-28 VITALS — WEIGHT: 136 LBS | BODY MASS INDEX: 23.71 KG/M2

## 2020-09-28 PROCEDURE — G0424 PULMONARY REHAB W EXER: HCPCS

## 2020-09-28 NOTE — PROGRESS NOTES
Pt tolerated increases in exercise time and intensity well with no problems or c/o noted. Will add a grade to treadmill next visit. Free weights were not done due to time. She states she will do weight exercises at home as she has 5 lb weights. We will continue to monitor her SpO2 and modify exercise time and intensity as needed.

## 2020-09-30 ENCOUNTER — HOSPITAL ENCOUNTER (OUTPATIENT)
Dept: PULMONOLOGY | Age: 80
Setting detail: THERAPIES SERIES
Discharge: HOME | End: 2020-09-30
Payer: MEDICARE

## 2020-09-30 VITALS — WEIGHT: 135 LBS | BODY MASS INDEX: 23.54 KG/M2

## 2020-09-30 PROCEDURE — G0424 PULMONARY REHAB W EXER: HCPCS

## 2020-09-30 NOTE — PROGRESS NOTES
Myla tolerated changes to her workout intensity with plb and pacing of her work We will continue to monitor and increase as tolerated.

## 2020-10-01 ENCOUNTER — HOSPITAL ENCOUNTER (OUTPATIENT)
Dept: WOMENS IMAGING | Age: 80
Discharge: HOME | End: 2020-10-03
Payer: MEDICARE

## 2020-10-01 PROCEDURE — 77063 BREAST TOMOSYNTHESIS BI: CPT

## 2020-10-01 NOTE — RESULT ENCOUNTER NOTE
Soniat comment sent to patient.   Normal mammogram repeat in 1 year  Future Appointments  10/5/2020  12:00 PM   [address] PULM REHAB RM 5        [facility]      [facility]  10/7/2020  12:00 PM   [facility]        [facility]      [facility]  10/12/2020 12:00 PM   [facility]        [facility]      [facility]  10/13/2020 3:00 PM    SHU Rodriguez  10/14/2020 12:00 PM   [address] PULM REHAB RM 5        [facility]      [facility]  10/14/2020 2:30 PM    Lazarus Larsson, MD [facility]   [facility]  10/19/2020 12:00 PM   [facility]        [facility]      [facility]  10/21/2020 12:00 PM   [facility]        [facility]      [facility]  10/26/2020 12:00 PM   [facility] RM 5        [facility]      [facility]  10/28/2020 12:00 PM   [facility] 5        [facility]      [facility]  11/2/2020  12:00 PM   [facility]        [facility]      [facility]  11/4/2020  12:00 PM   [facility]        [facility]      [facility]  11/9/2020  12:00 PM   [facility] 5        [facility]      [facility]  11/11/2020 12:00 PM   [facility] 5        [facility]      [facility]  11/16/2020 12:00 PM   [facility] RM 5        [facility]      [facility]  11/18/2020 12:00 PM   [facility] 5        [facility]      [facility]  11/23/2020 12:00 PM   [facility] RM 5        [facility]      [facility]  11/25/2020 12:00 PM   [facility] RM 5        [facility]      [facility]  11/30/2020 12:00 PM   [facility] RM 5        [facility]      [facility]  12/2/2020  12:00 PM   [facility] 5        [facility]      [facility]  12/7/2020  12:00 PM   [facility] 5        [facility]      [facility]  12/9/2020  12:00 PM   [facility] RM 5        [facility]       [facility] 12/11/2020 1:00 PM    Zenaida Hanna MD     [facility]          [facility]  12/14/2020 12:00 PM   [facility] RM 5        [facility]       [facility] 12/16/2020 12:00 PM   [facility] RM [address] [place] [address]  12/21/2020 12:00 PM   [facility] PULM REH[facility] RM 5        [facility]UL[facility]      [facility]  12/23/2020 12:00 PM   [facility] PULM REHAB RM 5        [facility] PUL[facility]      [facility]  12/28/2020 12:00 PM   [facility] PULM REHAB RM 5        [facility] PUL[facility]      [facility]  12/30/2020 12:00 PM   [facility] PULM REHAB RM 5        [facility] PUL[facility]      [facility]  1/4/2021   12:00 PM   [facility] PULM REHAB RM 5        [facility]UL[facility]      [facility]  1/6/2021   12:00 PM   [facility] PULM REHAB RM 5        [facility] PUL[facility]      [facility]  1/11/2021  12:00 PM   [facility] PULM REHAB RM 5        [facility] PUL[facility]      [facility]  8/12/2021  1:00 PM    Maxine Conley MD     [facility]          [facility]

## 2020-10-05 ENCOUNTER — HOSPITAL ENCOUNTER (OUTPATIENT)
Dept: PULMONOLOGY | Age: 80
Setting detail: THERAPIES SERIES
Discharge: HOME | End: 2020-10-05
Payer: MEDICARE

## 2020-10-05 VITALS — BODY MASS INDEX: 23.36 KG/M2 | WEIGHT: 134 LBS

## 2020-10-05 PROCEDURE — G0424 PULMONARY REHAB W EXER: HCPCS

## 2020-10-05 NOTE — PROGRESS NOTES
Medications and goals reviewed. No change in medications. She is using PLB/DB as needed when she noticed dyspnea. Discussed using PLB/DB with activities throughout activity to help decrease dyspnea. She has not noticed change in strength and endurance. She has noticed a little improvement in SOB while using the vacuum. She states she does not climb stairs often but has noticed improvement in dyspnea when walking incline to leave pulmonary rehab. Five pound free weights were used as that is what the patient has at home. Discussed not using weights or lighter weights for any exercises that cause pain. Handouts for ADL's/Energy Conservation given. We will continue to monitor her SpO2 and modify exercises as needed.

## 2020-10-07 ENCOUNTER — APPOINTMENT (OUTPATIENT)
Dept: PULMONOLOGY | Age: 80
End: 2020-10-07
Payer: MEDICARE

## 2020-10-08 ENCOUNTER — HOSPITAL ENCOUNTER (OUTPATIENT)
Age: 80
Discharge: HOME | End: 2020-10-08
Payer: MEDICARE

## 2020-10-08 ENCOUNTER — HOSPITAL ENCOUNTER (OUTPATIENT)
Dept: PULMONOLOGY | Age: 80
Setting detail: THERAPIES SERIES
Discharge: HOME | End: 2020-10-08
Payer: MEDICARE

## 2020-10-08 VITALS — WEIGHT: 134 LBS | BODY MASS INDEX: 23.36 KG/M2

## 2020-10-08 LAB
ABSOLUTE EOS #: 0.1 K/UL (ref 0–0.4)
ABSOLUTE IMMATURE GRANULOCYTE: ABNORMAL K/UL (ref 0–0.3)
ABSOLUTE LYMPH #: 1.2 K/UL (ref 1–4.8)
ABSOLUTE MONO #: 0.5 K/UL (ref 0.1–1.3)
ALBUMIN SERPL-MCNC: 4.2 G/DL (ref 3.5–5.2)
ALBUMIN/GLOBULIN RATIO: ABNORMAL (ref 1–2.5)
ALP BLD-CCNC: 75 U/L (ref 35–104)
ALT SERPL-CCNC: 12 U/L (ref 5–33)
ANION GAP SERPL CALCULATED.3IONS-SCNC: 10 MMOL/L (ref 9–17)
AST SERPL-CCNC: 19 U/L
BASOPHILS # BLD: 1 % (ref 0–2)
BASOPHILS ABSOLUTE: 0 K/UL (ref 0–0.2)
BILIRUB SERPL-MCNC: 0.72 MG/DL (ref 0.3–1.2)
BUN BLDV-MCNC: 19 MG/DL (ref 8–23)
BUN/CREAT BLD: ABNORMAL (ref 9–20)
C-REACTIVE PROTEIN: 2.9 MG/L (ref 0–5)
CALCIUM SERPL-MCNC: 9.3 MG/DL (ref 8.6–10.4)
CHLORIDE BLD-SCNC: 104 MMOL/L (ref 98–107)
CO2: 25 MMOL/L (ref 20–31)
COMPLEMENT C3: 103 MG/DL (ref 90–180)
COMPLEMENT C4: 14 MG/DL (ref 10–40)
CREAT SERPL-MCNC: 1.04 MG/DL (ref 0.5–0.9)
CREATININE URINE: 84.8 MG/DL (ref 28–217)
DIFFERENTIAL TYPE: ABNORMAL
EOSINOPHILS RELATIVE PERCENT: 2 % (ref 0–4)
GFR AFRICAN AMERICAN: >60 ML/MIN
GFR NON-AFRICAN AMERICAN: 51 ML/MIN
GFR SERPL CREATININE-BSD FRML MDRD: ABNORMAL ML/MIN/{1.73_M2}
GFR SERPL CREATININE-BSD FRML MDRD: ABNORMAL ML/MIN/{1.73_M2}
GLUCOSE BLD-MCNC: 110 MG/DL (ref 70–99)
HCT VFR BLD CALC: 43 % (ref 36–46)
HEMOGLOBIN: 14.5 G/DL (ref 12–16)
IMMATURE GRANULOCYTES: ABNORMAL %
LYMPHOCYTES # BLD: 24 % (ref 24–44)
MAGNESIUM: 2 MG/DL (ref 1.6–2.6)
MCH RBC QN AUTO: 31.5 PG (ref 26–34)
MCHC RBC AUTO-ENTMCNC: 33.7 G/DL (ref 31–37)
MCV RBC AUTO: 93.5 FL (ref 80–100)
MONOCYTES # BLD: 11 % (ref 1–7)
NRBC AUTOMATED: ABNORMAL PER 100 WBC
PDW BLD-RTO: 13.4 % (ref 11.5–14.9)
PHOSPHORUS: 3.3 MG/DL (ref 2.6–4.5)
PLATELET # BLD: 200 K/UL (ref 150–450)
PLATELET ESTIMATE: ABNORMAL
PMV BLD AUTO: 7.5 FL (ref 6–12)
POTASSIUM SERPL-SCNC: 4.6 MMOL/L (ref 3.7–5.3)
RBC # BLD: 4.6 M/UL (ref 4–5.2)
RBC # BLD: ABNORMAL 10*6/UL
SEG NEUTROPHILS: 62 % (ref 36–66)
SEGMENTED NEUTROPHILS ABSOLUTE COUNT: 3.2 K/UL (ref 1.3–9.1)
SODIUM BLD-SCNC: 139 MMOL/L (ref 135–144)
TOTAL PROTEIN, URINE: 5 MG/DL
TOTAL PROTEIN: 6.7 G/DL (ref 6.4–8.3)
URIC ACID: 6.7 MG/DL (ref 2.4–5.7)
URINE TOTAL PROTEIN CREATININE RATIO: 0.06 (ref 0–0.2)
VITAMIN D 25-HYDROXY: 38.1 NG/ML (ref 30–100)
WBC # BLD: 5.1 K/UL (ref 3.5–11)
WBC # BLD: ABNORMAL 10*3/UL

## 2020-10-08 PROCEDURE — 36415 COLL VENOUS BLD VENIPUNCTURE: CPT

## 2020-10-08 PROCEDURE — 86140 C-REACTIVE PROTEIN: CPT

## 2020-10-08 PROCEDURE — 83735 ASSAY OF MAGNESIUM: CPT

## 2020-10-08 PROCEDURE — 83516 IMMUNOASSAY NONANTIBODY: CPT

## 2020-10-08 PROCEDURE — 82306 VITAMIN D 25 HYDROXY: CPT

## 2020-10-08 PROCEDURE — G0424 PULMONARY REHAB W EXER: HCPCS

## 2020-10-08 PROCEDURE — 80053 COMPREHEN METABOLIC PANEL: CPT

## 2020-10-08 PROCEDURE — 82570 ASSAY OF URINE CREATININE: CPT

## 2020-10-08 PROCEDURE — 84100 ASSAY OF PHOSPHORUS: CPT

## 2020-10-08 PROCEDURE — 86235 NUCLEAR ANTIGEN ANTIBODY: CPT

## 2020-10-08 PROCEDURE — 85025 COMPLETE CBC W/AUTO DIFF WBC: CPT

## 2020-10-08 PROCEDURE — 86038 ANTINUCLEAR ANTIBODIES: CPT

## 2020-10-08 PROCEDURE — 86160 COMPLEMENT ANTIGEN: CPT

## 2020-10-08 PROCEDURE — 84156 ASSAY OF PROTEIN URINE: CPT

## 2020-10-08 PROCEDURE — 84550 ASSAY OF BLOOD/URIC ACID: CPT

## 2020-10-08 PROCEDURE — 86225 DNA ANTIBODY NATIVE: CPT

## 2020-10-08 PROCEDURE — 86162 COMPLEMENT TOTAL (CH50): CPT

## 2020-10-08 NOTE — PROGRESS NOTES
Myla, did not do free weights today due to time constraints . Tolerating her workouts well, continues to walk  Up to 4 miles on off days. We will continue to monitor and adjust her work as tolerated.

## 2020-10-09 LAB
ANA REFERENCE RANGE:: ABNORMAL
ANTI DNA DOUBLE STRANDED: 23 IU/ML
ANTI JO-1 IGG: 16 U/ML
ANTI RNP AB: 17 U/ML
ANTI SSA: 11 U/ML
ANTI SSB: 11 U/ML
ANTI-CENTROMERE: 12 U/ML
ANTI-NUCLEAR ANTIBODY (ANA): POSITIVE
ANTI-SCLERODERMA: 192 U/ML
ANTI-SMITH: 14 U/ML
HISTONE ANTIBODY: 14 U/ML

## 2020-10-11 LAB — COMPLEMENT TOTAL (CH50): >95 U/ML (ref 38.7–89.9)

## 2020-10-12 ENCOUNTER — HOSPITAL ENCOUNTER (OUTPATIENT)
Dept: PULMONOLOGY | Age: 80
Setting detail: THERAPIES SERIES
Discharge: HOME | End: 2020-10-12
Payer: MEDICARE

## 2020-10-12 VITALS — BODY MASS INDEX: 23.36 KG/M2 | WEIGHT: 134 LBS

## 2020-10-12 PROCEDURE — G0424 PULMONARY REHAB W EXER: HCPCS

## 2020-10-12 NOTE — PROGRESS NOTES
Myla, is tolerating her workout. Litlte to no queuing needed. She prefers to do her free weights at home, notices she is less sob with walking , still some sob with stair climbing.

## 2020-10-13 ENCOUNTER — OFFICE VISIT (OUTPATIENT)
Dept: PODIATRY | Age: 80
End: 2020-10-13
Payer: MEDICARE

## 2020-10-13 VITALS — BODY MASS INDEX: 23.04 KG/M2 | WEIGHT: 130 LBS | HEIGHT: 63 IN

## 2020-10-13 LAB
ANCA MYELOPEROXIDASE: 6 AU/ML
ANCA PROTEINASE 3: 5 AU/ML

## 2020-10-13 PROCEDURE — 99203 OFFICE O/P NEW LOW 30 MIN: CPT | Performed by: PODIATRIST

## 2020-10-13 PROCEDURE — 11730 AVULSION NAIL PLATE SIMPLE 1: CPT | Performed by: PODIATRIST

## 2020-10-13 NOTE — PROGRESS NOTES
[facility] Podiatry  New Patient History and Physical    Chief Complaint:   Chief Complaint   Patient presents with    Foot Injury     pain in left foot since rolling it on edge of sidewalk about 2 1/2 months ago     Nail Problem     left hallux toenail        HPI: Ray Sloan is a [de-identified] y.o. female who presents to the office today complaining of 2 things. 1) left foot pain, stepped off of a sidewalk, foot twisted. Could walk on it. Gradually getting better, but still painful after walking, walks everyday. No swelling, no bruising. 2) left big toenail painful, had a trauma, nail bruised, growing in, medial border. Painful. Currently denies F/C/N/V. Allergies   Allergen Reactions    Aspirin      GI upset       Past Medical History:   Diagnosis Date    Abdominal pain     Allergic rhinitis     Anxiety     Chronic back pain     Chronic fatigue     Chronic kidney disease     Colon polyp 3/7/2012    TUBULAR ADENOMA    COPD (chronic obstructive pulmonary disease) (HCC)     COPD, 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     Depression     Diverticul disease small and large intestine, no perforati or abscess     Elevated blood pressure reading     Fall 1/12/2017    GERD (gastroesophageal reflux disease)     Glaucoma     Hyperlipidemia     with target LDL less than 100    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[facility] [address]     Hyperthyroidism 2012    Insomnia     Psychophysiological    Neuropathy     Orthostatic dizziness 3/12/2017    Osteoarthritis     Panlobular emphysema (HCC)     PVC (premature ventricular contraction) 1/12/2017    S/P parathyroidectomy     Tubal pregnancy [id number]    Vasovagal syncope 1/12/2017       Prior to Admission medications    Medication Sig Start Date End Date Taking?  Authorizing Provider   diclofenac sodium (VOLTAREN) 1 % GEL Apply 4 g topically 4 times daily 10/13/20  Yes Janet Oropeza DPM   eszopiclone (ESZOPICLONE) 3 MG TABS Take 1 tablet COLONOSCOPY  2014    Severe sigmoid diverticulosis; due for repeat 2019    ENDOSCOPY, COLON, DIAGNOSTIC      EYE SURGERY      PARATHYROIDECTOMY  2018    Dr. Srikanth Smith; Left inferior parathyroid: adenoma    ROTATOR CUFF REPAIR Left     TONSILLECTOMY      TUBAL LIGATION      UPPER GASTROINTESTINAL ENDOSCOPY  2014    biopsy    UPPER GASTROINTESTINAL ENDOSCOPY  16    UPPER GASTROINTESTINAL ENDOSCOPY  16    mild gastritis       Family History   Problem Relation Age of Onset    Other Father         COPD    Stroke Sister     Heart Attack Sister     Breast Cancer Sister     Cancer Maternal Grandmother         Stomach cancer    Osteoporosis Mother     Other Sister         suicide       Social History     Tobacco Use    Smoking status: Former Smoker     Packs/day: 1.00     Years: 30.00     Pack years: 30.00     Types: Cigarettes     Start date: 1960     Last attempt to quit: 2000     Years since quittin.3    Smokeless tobacco: Never Used    Tobacco comment: still uses OTC nicorette gum   Substance Use Topics    Alcohol use: Yes     Alcohol/week: 0.0 standard drinks     Comment: occassional       Review of Systems    Lower Extremity Physical Examination:     Vitals: There were no vitals filed for this visit. General: AAO x 3 in NAD. Vascular: DP and PT pulses palpable 2/4, Bilateral.  CFT <3 seconds, Bilateral.  Hair growth absent to the level of the digits, Bilateral.  Edema absent, Left. Varicosities absent, Bilateral. Erythema absent, Bilateral    Neurological:  Sensation present to light touch to level of digits, Bilateral.    Musculoskeletal: Muscle strength 5/5, Left.   Pain present upon palpation of shaft 5th metatarsal left, no pain at base, no pain along ankle, peroneal tendons, Left. normal medial longitudinal arch, Bilateral.  Ankle ROM normal,Bilateral.      Integument: Warm, dry, supple, Bilateral.  Open lesion absent, Bilateral. Left hallux nail,

## 2020-10-14 ENCOUNTER — HOSPITAL ENCOUNTER (OUTPATIENT)
Dept: PULMONOLOGY | Age: 80
Setting detail: THERAPIES SERIES
Discharge: HOME | End: 2020-10-14
Payer: MEDICARE

## 2020-10-14 VITALS — BODY MASS INDEX: 23.91 KG/M2 | WEIGHT: 135 LBS

## 2020-10-14 PROCEDURE — G0424 PULMONARY REHAB W EXER: HCPCS

## 2020-10-14 NOTE — PROGRESS NOTES
Exercise tolerated well. Decreased free weights to 4 pounds at pt request.  She stated the 5 pound weights seemed too heavy her last session and she had left shoulder pain. Tolerated 4 pound weights well with no problems or c/o. Minimal cueing required to perform free weight exercises properly. Treadmill time increased to 15 minutes per pt, tolerated well. She stated she felt like she \"had a good workout today. \"  We will continue to increase exercise time and intensity as tolerated.

## 2020-10-21 ENCOUNTER — HOSPITAL ENCOUNTER (OUTPATIENT)
Dept: PULMONOLOGY | Age: 80
Setting detail: THERAPIES SERIES
Discharge: HOME | End: 2020-10-21
Payer: MEDICARE

## 2020-10-21 VITALS — WEIGHT: 134 LBS | BODY MASS INDEX: 23.36 KG/M2

## 2020-10-21 PROCEDURE — G0424 PULMONARY REHAB W EXER: HCPCS

## 2020-10-21 NOTE — PROGRESS NOTES
Pt c/o back pain today stating she thinks it is from using the NuStep, arms and legs together. After completing arms then legs, she wanted to try using arms and legs together. Adjusted seat from 9 to 8 and pt was able to complete 6 minutes with a workload of 4 with no c/o back pain. Encouraged pt to be aware and notify writer if she had back pain with other exercises. No other problems or c/o noted. We will continue to increase exercise time and intensity as tolerated.

## 2020-10-25 ENCOUNTER — PATIENT MESSAGE (OUTPATIENT)
Dept: FAMILY MEDICINE CLINIC | Age: 80
End: 2020-10-25

## 2020-10-26 ENCOUNTER — HOSPITAL ENCOUNTER (OUTPATIENT)
Dept: PULMONOLOGY | Age: 80
Setting detail: THERAPIES SERIES
Discharge: HOME | End: 2020-10-26
Payer: MEDICARE

## 2020-10-26 VITALS — BODY MASS INDEX: 23.54 KG/M2 | WEIGHT: 135 LBS

## 2020-10-26 PROCEDURE — G0424 PULMONARY REHAB W EXER: HCPCS

## 2020-10-26 RX ORDER — ESZOPICLONE 3 MG/1
3 TABLET, FILM COATED ORAL NIGHTLY PRN
Qty: 90 TABLET | Refills: 0 | Status: SHIPPED | OUTPATIENT
Start: 2020-10-26 | End: 2021-01-19 | Stop reason: SDUPTHER

## 2020-10-26 NOTE — TELEPHONE ENCOUNTER
From: Roger Connor  To: Zenaida Hanna MD  Sent: 10/25/2020 4:38 PM EDT  Subject: Prescription Question    Dr. Haleigh Xavier,  Would you please refill my prescription for Eszopclone for 90 days from [facility] in [place]. I only have 7 pills left. Thank you.   Roger Connor

## 2020-10-26 NOTE — PROGRESS NOTES
Ap, is tolerating her workouts well, requires minimal / no queueing with exercises. She is planning on graduating from phase II 11/11/20 .

## 2020-10-28 ENCOUNTER — HOSPITAL ENCOUNTER (OUTPATIENT)
Dept: PULMONOLOGY | Age: 80
Setting detail: THERAPIES SERIES
Discharge: HOME | End: 2020-10-28
Payer: MEDICARE

## 2020-10-28 VITALS — WEIGHT: 135 LBS | BODY MASS INDEX: 23.54 KG/M2

## 2020-10-28 PROCEDURE — G0424 PULMONARY REHAB W EXER: HCPCS

## 2020-10-28 NOTE — PROGRESS NOTES
Pt continues to improve exercise time and intensity. No problems or c/o noted this session. We will continue to increase exercise time and intensity as tolerated.

## 2020-11-02 ENCOUNTER — HOSPITAL ENCOUNTER (OUTPATIENT)
Dept: PULMONOLOGY | Age: 80
Setting detail: THERAPIES SERIES
Discharge: HOME | End: 2020-11-02
Payer: MEDICARE

## 2020-11-02 VITALS — WEIGHT: 136 LBS | BODY MASS INDEX: 23.71 KG/M2

## 2020-11-02 PROCEDURE — G0424 PULMONARY REHAB W EXER: HCPCS

## 2020-11-02 NOTE — PROGRESS NOTES
Medications and goals reviewed. No change in medications. Pt states she is still working on using PLB/DB more frequently but does use it when she feels SOB. She has noticed a little improvement in her strength and endurance. A 70% improvement is noted with vacuuming. Although she does not have stairs at home she has noticed improvement when she does climb stairs by using PLB/DB and pacing. Tolerated exercise well with no problems or c/o noted. Will continue to increase exercise time and intensity as tolerated.

## 2020-11-04 ENCOUNTER — HOSPITAL ENCOUNTER (OUTPATIENT)
Dept: PULMONOLOGY | Age: 80
Setting detail: THERAPIES SERIES
Discharge: HOME | End: 2020-11-04
Payer: MEDICARE

## 2020-11-04 VITALS — WEIGHT: 135 LBS | BODY MASS INDEX: 23.54 KG/M2

## 2020-11-04 PROCEDURE — G0424 PULMONARY REHAB W EXER: HCPCS

## 2020-11-04 NOTE — PROGRESS NOTES
Pt tolerated exercise session well with no problems or c/o noted. She is going to do weight exercises at home today due to time constraints. We will continue to monitor her SpO2 and increase exercise time and intensity as tolerated.

## 2020-11-09 ENCOUNTER — HOSPITAL ENCOUNTER (OUTPATIENT)
Age: 80
Discharge: HOME | End: 2020-11-09
Payer: MEDICARE

## 2020-11-09 ENCOUNTER — HOSPITAL ENCOUNTER (OUTPATIENT)
Dept: PULMONOLOGY | Age: 80
Setting detail: THERAPIES SERIES
Discharge: HOME | End: 2020-11-09
Payer: MEDICARE

## 2020-11-09 VITALS — WEIGHT: 134 LBS | BODY MASS INDEX: 23.36 KG/M2

## 2020-11-09 LAB
CALCIUM IONIZED: 1.28 MMOL/L (ref 1.13–1.33)
CALCIUM SERPL-MCNC: 9.6 MG/DL (ref 8.6–10.4)
CREAT SERPL-MCNC: 1.3 MG/DL (ref 0.5–0.9)
GFR AFRICAN AMERICAN: 48 ML/MIN
GFR NON-AFRICAN AMERICAN: 39 ML/MIN
GFR SERPL CREATININE-BSD FRML MDRD: ABNORMAL ML/MIN/{1.73_M2}
GFR SERPL CREATININE-BSD FRML MDRD: ABNORMAL ML/MIN/{1.73_M2}
PTH INTACT: 67.61 PG/ML (ref 15–65)

## 2020-11-09 PROCEDURE — 82565 ASSAY OF CREATININE: CPT

## 2020-11-09 PROCEDURE — 82330 ASSAY OF CALCIUM: CPT

## 2020-11-09 PROCEDURE — 83970 ASSAY OF PARATHORMONE: CPT

## 2020-11-09 PROCEDURE — 82310 ASSAY OF CALCIUM: CPT

## 2020-11-09 PROCEDURE — 82523 COLLAGEN CROSSLINKS: CPT

## 2020-11-09 PROCEDURE — 36415 COLL VENOUS BLD VENIPUNCTURE: CPT

## 2020-11-09 PROCEDURE — G0424 PULMONARY REHAB W EXER: HCPCS

## 2020-11-09 NOTE — PROGRESS NOTES
Discussed with pt. Self assessment skills w/ HEP prior to discharge. HEP to consist of a 30\" work out. Pt would like to do phase II here after the holiday she will call us to verify Juanita Browne to graduate on Wednesday all graduation paper work given she will return it to us on Wednesday.

## 2020-11-11 ENCOUNTER — HOSPITAL ENCOUNTER (OUTPATIENT)
Dept: PULMONOLOGY | Age: 80
Setting detail: THERAPIES SERIES
Discharge: HOME | End: 2020-11-11
Payer: MEDICARE

## 2020-11-11 VITALS — WEIGHT: 135 LBS | BODY MASS INDEX: 23.54 KG/M2

## 2020-11-11 LAB
CREATININE URINE /VOLUME: 279 MG/DL
N-TELO/CREAT. RATIO: 10

## 2020-11-11 PROCEDURE — G0424 PULMONARY REHAB W EXER: HCPCS

## 2020-11-11 ASSESSMENT — PATIENT HEALTH QUESTIONNAIRE - PHQ9
SUM OF ALL RESPONSES TO PHQ QUESTIONS 1-9: 4
SUM OF ALL RESPONSES TO PHQ QUESTIONS 1-9: 4

## 2020-11-11 NOTE — PROGRESS NOTES
Pt has completed & is discharged from the pulmonary rehab program.  *outcomes of 6MWT noted @_+9% change. *Pt. was provided w/discharge handouts including a HEP & in was informed of the   Phase III pulmonary rehab program to continue wellness. She will call us after the thanksgiving holiday for availability to the program.

## 2020-11-16 ENCOUNTER — APPOINTMENT (OUTPATIENT)
Dept: PULMONOLOGY | Age: 80
End: 2020-11-16
Payer: MEDICARE

## 2020-11-18 ENCOUNTER — APPOINTMENT (OUTPATIENT)
Dept: PULMONOLOGY | Age: 80
End: 2020-11-18
Payer: MEDICARE

## 2020-11-23 ENCOUNTER — APPOINTMENT (OUTPATIENT)
Dept: PULMONOLOGY | Age: 80
End: 2020-11-23
Payer: MEDICARE

## 2020-11-25 ENCOUNTER — APPOINTMENT (OUTPATIENT)
Dept: PULMONOLOGY | Age: 80
End: 2020-11-25
Payer: MEDICARE

## 2020-11-30 ENCOUNTER — APPOINTMENT (OUTPATIENT)
Dept: PULMONOLOGY | Age: 80
End: 2020-11-30
Payer: MEDICARE

## 2020-12-22 RX ORDER — EPINEPHRINE 1 MG/ML
0.3 INJECTION, SOLUTION, CONCENTRATE INTRAVENOUS PRN
Status: CANCELLED | OUTPATIENT
Start: 2020-12-23

## 2020-12-22 RX ORDER — METHYLPREDNISOLONE SODIUM SUCCINATE 125 MG/2ML
125 INJECTION, POWDER, LYOPHILIZED, FOR SOLUTION INTRAMUSCULAR; INTRAVENOUS ONCE
Status: CANCELLED | OUTPATIENT
Start: 2020-12-23 | End: 2020-12-23

## 2020-12-22 RX ORDER — DIPHENHYDRAMINE HYDROCHLORIDE 50 MG/ML
50 INJECTION INTRAMUSCULAR; INTRAVENOUS ONCE
Status: CANCELLED | OUTPATIENT
Start: 2020-12-23 | End: 2020-12-23

## 2020-12-22 RX ORDER — SODIUM CHLORIDE 9 MG/ML
INJECTION, SOLUTION INTRAVENOUS CONTINUOUS
Status: CANCELLED | OUTPATIENT
Start: 2020-12-23

## 2021-01-04 ENCOUNTER — TELEPHONE (OUTPATIENT)
Dept: INFUSION THERAPY | Age: 81
End: 2021-01-04

## 2021-01-19 ENCOUNTER — OFFICE VISIT (OUTPATIENT)
Dept: FAMILY MEDICINE CLINIC | Age: 81
End: 2021-01-19
Payer: MEDICARE

## 2021-01-19 VITALS
WEIGHT: 135 LBS | BODY MASS INDEX: 23.05 KG/M2 | OXYGEN SATURATION: 98 % | SYSTOLIC BLOOD PRESSURE: 134 MMHG | DIASTOLIC BLOOD PRESSURE: 86 MMHG | HEART RATE: 84 BPM | HEIGHT: 64 IN | TEMPERATURE: 97.1 F

## 2021-01-19 DIAGNOSIS — H61.22 IMPACTED CERUMEN OF LEFT EAR: ICD-10-CM

## 2021-01-19 DIAGNOSIS — F51.04 PSYCHOPHYSIOLOGICAL INSOMNIA: ICD-10-CM

## 2021-01-19 DIAGNOSIS — F33.42 MDD (MAJOR DEPRESSIVE DISORDER), RECURRENT, IN FULL REMISSION (HCC): ICD-10-CM

## 2021-01-19 DIAGNOSIS — J43.1 PANLOBULAR EMPHYSEMA (HCC): Primary | ICD-10-CM

## 2021-01-19 DIAGNOSIS — E21.3 HYPERPARATHYROIDISM (HCC): ICD-10-CM

## 2021-01-19 DIAGNOSIS — N18.32 STAGE 3B CHRONIC KIDNEY DISEASE (HCC): ICD-10-CM

## 2021-01-19 PROCEDURE — 99214 OFFICE O/P EST MOD 30 MIN: CPT | Performed by: FAMILY MEDICINE

## 2021-01-19 RX ORDER — NEOMYCIN SULFATE, POLYMYXIN B SULFATE, HYDROCORTISONE 3.5; 10000; 1 MG/ML; [USP'U]/ML; MG/ML
2 SOLUTION/ DROPS AURICULAR (OTIC) EVERY 8 HOURS SCHEDULED
Qty: 10 ML | Refills: 1 | Status: SHIPPED | OUTPATIENT
Start: 2021-01-19 | End: 2021-01-29

## 2021-01-19 RX ORDER — ESZOPICLONE 3 MG/1
3 TABLET, FILM COATED ORAL NIGHTLY PRN
Qty: 90 TABLET | Refills: 0 | Status: SHIPPED | OUTPATIENT
Start: 2021-01-19 | End: 2021-04-20 | Stop reason: SDUPTHER

## 2021-01-19 ASSESSMENT — ENCOUNTER SYMPTOMS
NAUSEA: 0
WHEEZING: 0
SHORTNESS OF BREATH: 1
DIARRHEA: 0
COUGH: 0
ABDOMINAL PAIN: 0
CONSTIPATION: 0
ABDOMINAL DISTENTION: 0
VOMITING: 0
CHEST TIGHTNESS: 0

## 2021-01-19 ASSESSMENT — PATIENT HEALTH QUESTIONNAIRE - PHQ9
SUM OF ALL RESPONSES TO PHQ9 QUESTIONS 1 & 2: 0
SUM OF ALL RESPONSES TO PHQ QUESTIONS 1-9: 0
SUM OF ALL RESPONSES TO PHQ QUESTIONS 1-9: 0
2. FEELING DOWN, DEPRESSED OR HOPELESS: 0

## 2021-01-19 NOTE — PROGRESS NOTES
Steve Guaman (:  1940) is a [de-identified] y.o. female,Established patient, here for evaluation of the following chief complaint(s): COPD, Otalgia (left ear pain started 2 weeks ago ), and Insomnia      ASSESSMENT/PLAN:    1. 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 Psychophysiological insomnia  Inadequately controlled  We discussed again sleep hygiene  -Continue     eszopiclone (ESZOPICLONE) 3 MG TABS; Take 1 tablet by mouth nightly as needed (insomnia) for up to 90 days. , Disp-90 tablet, R-0Normal, she denies side effects    3. MDD (major depressive disorder), recurrent, in full remission (HCC)  Improved  -     eszopiclone (ESZOPICLONE) 3 MG TABS; Take 1 tablet by mouth nightly as needed (insomnia) for up to 90 days. , Disp-90 tablet, R-0Normal  We will continue to monitor  We will continue with Lunesta for chronic insomnia    4. Stage 3b chronic kidney disease  Worsening  GFR 51 on 10/8/2020, was 39 on 2020  Was recently evaluated by nephrologist advised observation at this time, avoid dehydration, avoid NSAIDs    5. Hyperparathyroidism ([facility] [address]  Worsening  Patient does have appointment with endocrinologist follow-up with him    6. Impacted cerumen of left ear   -     carbamide peroxide (DEBROX) 6.5 % otic solution; Place 5 drops in ear(s) 2 times daily For 5 days, Disp-1 Bottle, R-0Normal--FIRST  -     neomycin-polymyxin-hydrocortisone 1 % SOLN otic solution; Place 2 drops into the left ear every 8 hours for 10 days OK to substitute, Disp-10 mL, R-1Normal--SECOND    Controlled Substance Monitoring:    Acute and Chronic Pain Monitoring:   RX Monitoring 2021   Attestation -   Periodic Controlled Substance Monitoring Possible medication side effects, risk of tolerance/dependence & alternative treatments discussed. ;No signs of potential drug abuse or diversion identified. ;Assessed functional status.    Chronic Pain > 50 MEDD -         Marrilee received counseling on the following healthy behaviors: nutrition, exercise and medication adherence  Reviewed prior labs and health maintenance  Discussed use, benefit, and side effects of prescribed medications. Barriers to medication compliance addressed. Patient given educational materials - see patient instructions  All patient questions answered. Patient voiced understanding. The patient's past medical,surgical, social, and family history as well as her current medications and allergies were reviewed as documented in today's encounter. Medications, labs, diagnostic studies, consultations and follow-up as documented in this encounter. Return in about 3 months (around 4/19/2021) for KEEP APPT. Data Unavailable      SUBJECTIVE/OBJECTIVE:    HPI      COPD:  Current treatment includes short-acting beta agonist inhaler, anticholinergic inhaler, which has been effective. Not taking Spiriva anymore  Saw Dr. Colton Davila started her on Stiolto     Residual symptoms: chronic dyspnea. She denies increased dyspnea, cough, purulent sputum, wheezing, chest tightness. She requires her rescue inhaler 1 time(s) per week when exercising      Nicotine dependence. Smoker, counseling given to quit smoking. Counseling given: Yes  Comment: still uses OTC nicorette gum        Insomnia, and poor Sleep \"same as before\", \"Still bad\"  Taking Lunesta, Tolerates medication well, denies side effects. Sometimes doesn't help. \"My brain doesn't shut down\"  \"I can sleep and I don't get tired\"  \"Last week I went to bed at 10-11 pm  and at 4 am I was talking with myself, didn't take the pill\", cannot fall asleep at all without Lunesta. She has difficulty of both falling asleep and staying asleep. She did try trazodone recently again and did not help her fall asleep.   She has also seen sleep specialist in the past and applied sleep hygiene and did not help    \"My sister commited suicide when I was 12 yo\", and was severely depressed at that time  Had ECT and didn't do anything for her. Since she has been having issues with insomnia. Not taking naps  \"I never go to sleep\". \" I don't feel tired\"        Depression is better    PHQ-2 Over the past 2 weeks, how often have you been bothered by any of the following problems? Little interest or pleasure in doing things: Not at all  Feeling down, depressed, or hopeless: Not at all  PHQ-2 Score: 0  PHQ-9 Over the past 2 weeks, how often have you been bothered by any of the following problems? PHQ-9 Total Score: 0      Denies suicidal ideation, plan or intent. PHQ Scores 1/19/2021 11/11/2020 8/24/2020 8/11/2020 2/19/2020 8/2/2019 4/30/2019   PHQ2 Score 0 0 1 0 0 0 0   PHQ9 Score 0 4 4 0 0 0 0     Hyperparathyroidism  PTH slightly worsening  She was on Sensipar before  Patient did have left inferior parathyroid adenoma removed 11/5/2018, by Dr. Lali Cano. She denies aches and pains. She denies constipation  Patient says she does have appointment coming up with Dr. Duncan Salazar kidney disease  stable CKD  Saw Dr. Polina Bazan  She does not take NSAIDs. She denies decreased urine output. She denies frequency, urgency of urination or dysuria      Prolia injection scheduled coming up soon    Patient reports left ear hurting, denies drainage, for about a week, not getting better. Wearing hearing aids and she thinks it has been contributing to the ear pain      Prior to Visit Medications    Medication Sig Taking? Authorizing Provider   eszopiclone (ESZOPICLONE) 3 MG TABS Take 1 tablet by mouth nightly as needed (insomnia) for up to 90 days.  Yes Shakira Driver MD   diclofenac sodium (VOLTAREN) 1 % GEL Apply 4 g topically 4 times daily Yes Vickie Frank DPM   atorvastatin (LIPITOR) 10 MG tablet Take 1 tablet by mouth every evening Yes Shakira Driver MD   tiotropium (SPIRIVA RESPIMAT) 2.5 MCG/ACT AERS inhaler Inhale 2 puffs into the lungs daily **stop handinhaler** Yes Pardeep Jo MD   Fexofenadine HCl (ALLERGY 24-HR PO) Take by mouth Yes Historical Provider, MD   Oxymetazoline HCl (NASAL SPRAY NA) by Nasal route Yes Historical Provider, MD   Multiple Vitamins-Minerals (OCUVITE ADULT 50+ PO) Take by mouth Yes Historical Provider, MD   Lactobacillus (PROBIOTIC ACIDOPHILUS PO) Take by mouth Yes Historical Provider, MD   ipratropium (ATROVENT) 0.03 % nasal spray 2 sprays by Nasal route 3 times daily Yes Pardeep Jo MD   albuterol sulfate  (90 Base) MCG/ACT inhaler Inhale 2 puffs into the lungs every 6 hours as needed for Wheezing or Shortness of Breath Yes Pardeep Jo MD   Spacer/Aero-Holding Chambers (AEROCHAMBER MV) MISC To be used with inhaler Yes Pardeep Jo MD   dorzolamide-timolol (COSOPT) 22.3-6.8 MG/ML ophthalmic solution Place 1 drop into both eyes 2 times daily Yes Historical Provider, MD   ALPHAGAN P 0.1 % SOLN Place 1 drop into both eyes 3 times daily  Yes Historical Provider, MD   latanoprost (XALATAN) 0.005 % ophthalmic solution Place 1 drop into both eyes nightly  Yes Historical Provider, MD       Social History     Tobacco Use    Smoking status: Former Smoker     Packs/day: 1.00     Years: 30.00     Pack years: 30.00     Types: Cigarettes     Start date: 1960     Quit date: 2000     Years since quittin.5    Smokeless tobacco: Never Used    Tobacco comment: still uses OTC nicorette gum   Substance Use Topics    Alcohol use: Yes     Alcohol/week: 0.0 standard drinks     Comment: occassional    Drug use: No         Review of Systems   Constitutional: Negative for activity change, appetite change, chills, diaphoresis, fatigue, fever and unexpected weight change. HENT: Positive for ear pain (left) and hearing loss. Negative for ear discharge. Eyes: Positive for visual disturbance. Respiratory: Positive for shortness of breath. Negative for cough, chest tightness and wheezing. Cardiovascular: Negative for chest pain, palpitations and leg swelling. Gastrointestinal: Negative for abdominal distention, abdominal pain, constipation, diarrhea, nausea and vomiting. Endocrine: Positive for cold intolerance. Negative for heat intolerance, polydipsia, polyphagia and polyuria. Psychiatric/Behavioral: Positive for sleep disturbance. Negative for dysphoric mood, self-injury and suicidal ideas. The patient is nervous/anxious.            -vital signs stable and within normal limits   /86   Pulse 84   Temp 97.1 °F (36.2 °C)   Ht 5' 3.5\" (1.613 m)   Wt 135 lb (61.2 kg)   SpO2 98%   BMI 23.54 kg/m²      Physical Exam  Vitals signs and nursing note reviewed. Constitutional:       General: She is not in acute distress. Appearance: Normal appearance. She is well-developed. She is not diaphoretic. HENT:      Head: Normocephalic and atraumatic. Right Ear: External ear normal. Decreased hearing noted. No tenderness. There is no impacted cerumen. Left Ear: External ear normal. Decreased hearing noted. Tenderness present. There is impacted cerumen. Ears:      Comments: Has hearing aids left ear only     Nose: No mucosal edema. Mouth/Throat:      Comments: I did not examine the mouth due to coronavirus pandemic and wearing masks    Eyes:      General: Lids are normal. No scleral icterus. Right eye: No discharge. Left eye: No discharge. Extraocular Movements: Extraocular movements intact. Conjunctiva/sclera: Conjunctivae normal.   Neck:      Musculoskeletal: Normal range of motion and neck supple. Thyroid: No thyromegaly. Cardiovascular:      Rate and Rhythm: Normal rate and regular rhythm. Heart sounds: Normal heart sounds. No murmur. Pulmonary:      Effort: Pulmonary effort is normal. No respiratory distress. Breath sounds: Examination of the right-lower field reveals decreased breath sounds.  Examination of the left-lower and/or gender-specific reference   intervals for this test in the [facility] Test Directory   ([email or web address]).  Creatinine Urine /volume 11/09/2020 279  mg/dL Final    Comment: (NOTE)  Performed By: [address]  [place], [address] [facility]  : Trisha Marks. Aminata Siegel MD      Calcium 11/09/2020 9.6  8.6 - 10.4 mg/dL Final    CREATININE 11/09/2020 1.30* 0.50 - 0.90 mg/dL Final    GFR Non- 11/09/2020 39* >60 mL/min Final    GFR  11/09/2020 48* >60 mL/min Final    GFR Comment 11/09/2020        Final    Comment: Average GFR for 79or more years old:   76 mL/min/1.73sq m  Chronic Kidney Disease:   <60 mL/min/1.73sq m  Kidney failure:   <15 mL/min/1.73sq m              eGFR calculated using average adult body mass. Additional eGFR calculator available at:        [employer]            GFR Staging 11/09/2020 NOT REPORTED   Final    Pth Intact 11/09/2020 67.61* 15.0 - 65.0 pg/mL Final    Comment: SAMPLES FROM PATIENTS ROUTINELY RECEIVING HIGH DOSE BIOTIN THERAPY MAY SHOW FALSELY   DEPRESSED RESULTS. ADDITIONAL INFORMATION MAY BE REQUIRED FOR DIAGNOSIS.       Calcium, Ion 11/09/2020 1.28  1.13 - 1.33 mmol/L Final       Lab Results   Component Value Date    WBC 5.1 10/08/2020    HGB 14.5 10/08/2020    HCT 43.0 10/08/2020    MCV 93.5 10/08/2020     10/08/2020       Lab Results   Component Value Date     10/08/2020    K 4.6 10/08/2020     10/08/2020    CO2 25 10/08/2020    BUN 19 10/08/2020    CREATININE 1.30 11/09/2020    GLUCOSE 110 10/08/2020    GLUCOSE 104 02/27/2012    CALCIUM 9.6 11/09/2020        Lab Results   Component Value Date    ALT 12 10/08/2020    AST 19 10/08/2020    ALKPHOS 75 10/08/2020    BILITOT 0.72 10/08/2020       Lab Results   Component Value Date    TSH 2.72 03/16/2020       Lab Results   Component Value Date    CHOL 132 03/16/2020    CHOL 160 03/13/2019    CHOL 208 (H) 08/01/2018     Lab Results   Component Value Date    TRIG 63 03/16/2020    TRIG 106 03/13/2019    TRIG 88 08/01/2018     Lab Results   Component Value Date    HDL 66 03/16/2020    HDL 59 03/13/2019    HDL 51 08/01/2018     Lab Results   Component Value Date    LDLCHOLESTEROL 53 03/16/2020    LDLCHOLESTEROL 80 03/13/2019    LDLCHOLESTEROL 139 (H) 08/01/2018     Lab Results   Component Value Date    CHOLHDLRATIO 2.0 03/16/2020    CHOLHDLRATIO 2.7 03/13/2019    CHOLHDLRATIO 4.1 08/01/2018       Lab Results   Component Value Date    LABA1C 5.4 03/16/2020    LABA1C 5.4 04/30/2019    LABA1C 5.5 03/12/2018       Lab Results   Component Value Date    JPLPPOAR47 352 09/17/2019       Lab Results   Component Value Date    FOLATE 9.1 09/17/2019       Lab Results   Component Value Date    VITD25 38.1 10/08/2020       No orders of the defined types were placed in this encounter. Orders Placed This Encounter   Medications    eszopiclone (ESZOPICLONE) 3 MG TABS     Sig: Take 1 tablet by mouth nightly as needed (insomnia) for up to 90 days. Dispense:  90 tablet     Refill:  0    carbamide peroxide (DEBROX) 6.5 % otic solution     Sig: Place 5 drops in ear(s) 2 times daily For 5 days     Dispense:  1 Bottle     Refill:  0    neomycin-polymyxin-hydrocortisone 1 % SOLN otic solution     Sig: Place 2 drops into the left ear every 8 hours for 10 days OK to substitute     Dispense:  10 mL     Refill:  1       Medications Discontinued During This Encounter   Medication Reason    eszopiclone (ESZOPICLONE) 3 MG TABS REORDER         Return in about 3 months (around 4/19/2021) for KEEP APPT. On this date 01/19/21 I have spent  30 minutes reviewing previous notes, test results and face to face with the patient discussing the diagnosis and importance of compliance with the treatment plan as well as documenting on the day of the visit.     Future Appointments   Date Time Provider Shirley Dunne   2/1/2021 11:00 AM [facility] INFUSION CHAIR [address]   8/12/2021  1:00 PM Marcella Burton MD [email or web address]       This note was completed by using the assistance of a speech-recognition program. However, inadvertent computerized transcription errors may be present. Although every effort was made to ensure accuracy, no guarantees can be provided that every mistake has been identified and corrected by editing. An electronic signature was used to authenticate this note.   Electronically signed by Marcella Burton MD on 1/19/2021  10:40 PM

## 2021-01-19 NOTE — PROGRESS NOTES
Visit Information    Have you changed or started any medications since your last visit including any over-the-counter medicines, vitamins, or herbal medicines? no   Are you having any side effects from any of your medications? -  no  Have you stopped taking any of your medications? Is so, why? -  no    Have you seen any other physician or provider since your last visit? No  Have you had any other diagnostic tests since your last visit? No  Have you been seen in the emergency room and/or had an admission to a hospital since we last saw you? No  Have you had your routine dental cleaning in the past 6 months? yes     Have you activated your [employer] account? If not, what are your barriers?  Yes     Patient Care Team:  Dannie Smith MD as PCP - Misti Corral MD as PCP - [facility]  Kelly Huffman MD as Surgeon (General Surgery)  Consuelo Willis MD as Surgeon (Ophthalmology)  Adrien Loredo MD as Consulting Physician (Gastroenterology)  Olesya Sloan MD as Consulting Physician (Endocrinology)  Berta Barboza MD as Surgeon (Ophthalmology)  Kavon Horvath MD as Consulting Physician (Internal Medicine Cardiovascular Disease)  Sandie De Leon MD as Consulting Physician (Cardiology)  Stefanie Wren, PhD (Sleep Medicine)  Solitario Pugh MD as Consulting Physician (Otolaryngology)  Varghese García MD as Consulting Physician (Cardiology)  Donovan Martin MD as Consulting Physician (Pulmonology)  Kimberly Hernandez MD as Consulting Physician (Nephrology)  Hari Duran MD as Consulting Physician (Pulmonology)    Medical History Review  Past Medical, Family, and Social History reviewed and does contribute to the patient presenting condition    Health Maintenance   Topic Date Due    COVID-19 Vaccine (1 of 2) 04/01/1956    Lipid screen  03/16/2021    Annual Wellness Visit (AWV)  08/12/2021    Breast cancer screen  10/01/2021    Potassium monitoring  10/08/2021    Creatinine monitoring  11/09/2021    Colon cancer screen colonoscopy  10/25/2024    DTaP/Tdap/Td vaccine (2 - Td) 01/09/2027    DEXA (modify frequency per FRAX score)  Completed    Flu vaccine  Completed    Shingles Vaccine  Completed    Pneumococcal 65+ years Vaccine  Completed    Hepatitis A vaccine  Aged Out    Hepatitis B vaccine  Aged Out    Hib vaccine  Aged Out    Meningococcal (ACWY) vaccine  Aged Out

## 2021-02-01 ENCOUNTER — HOSPITAL ENCOUNTER (OUTPATIENT)
Dept: INFUSION THERAPY | Age: 81
Setting detail: INFUSION SERIES
Discharge: HOME | End: 2021-02-01
Payer: MEDICARE

## 2021-02-01 VITALS
RESPIRATION RATE: 18 BRPM | DIASTOLIC BLOOD PRESSURE: 76 MMHG | HEART RATE: 73 BPM | OXYGEN SATURATION: 99 % | SYSTOLIC BLOOD PRESSURE: 153 MMHG | TEMPERATURE: 96.7 F

## 2021-02-01 DIAGNOSIS — M81.0 OSTEOPOROSIS, UNSPECIFIED OSTEOPOROSIS TYPE, UNSPECIFIED PATHOLOGICAL FRACTURE PRESENCE: Primary | ICD-10-CM

## 2021-02-01 LAB
CALCIUM SERPL-MCNC: 9.1 MG/DL (ref 8.6–10.4)
CREAT SERPL-MCNC: 0.98 MG/DL (ref 0.5–0.9)
GFR AFRICAN AMERICAN: >60 ML/MIN
GFR NON-AFRICAN AMERICAN: 55 ML/MIN
GFR SERPL CREATININE-BSD FRML MDRD: ABNORMAL ML/MIN/{1.73_M2}
GFR SERPL CREATININE-BSD FRML MDRD: ABNORMAL ML/MIN/{1.73_M2}

## 2021-02-01 PROCEDURE — 82565 ASSAY OF CREATININE: CPT

## 2021-02-01 PROCEDURE — 6360000002 HC RX W HCPCS: Performed by: INTERNAL MEDICINE

## 2021-02-01 PROCEDURE — 36415 COLL VENOUS BLD VENIPUNCTURE: CPT

## 2021-02-01 PROCEDURE — 82310 ASSAY OF CALCIUM: CPT

## 2021-02-01 PROCEDURE — 96401 CHEMO ANTI-NEOPL SQ/IM: CPT

## 2021-02-01 PROCEDURE — 96372 THER/PROPH/DIAG INJ SC/IM: CPT

## 2021-02-01 RX ORDER — SODIUM CHLORIDE 9 MG/ML
INJECTION, SOLUTION INTRAVENOUS CONTINUOUS
Status: CANCELLED | OUTPATIENT
Start: 2021-08-02

## 2021-02-01 RX ORDER — METHYLPREDNISOLONE SODIUM SUCCINATE 125 MG/2ML
125 INJECTION, POWDER, LYOPHILIZED, FOR SOLUTION INTRAMUSCULAR; INTRAVENOUS ONCE
Status: CANCELLED | OUTPATIENT
Start: 2021-08-02 | End: 2021-08-02

## 2021-02-01 RX ORDER — DIPHENHYDRAMINE HYDROCHLORIDE 50 MG/ML
50 INJECTION INTRAMUSCULAR; INTRAVENOUS ONCE
Status: CANCELLED | OUTPATIENT
Start: 2021-08-02 | End: 2021-08-02

## 2021-02-01 RX ORDER — EPINEPHRINE 1 MG/ML
0.3 INJECTION, SOLUTION, CONCENTRATE INTRAVENOUS PRN
Status: CANCELLED | OUTPATIENT
Start: 2021-08-02

## 2021-02-01 RX ADMIN — DENOSUMAB 60 MG: 60 INJECTION SUBCUTANEOUS at 11:16

## 2021-02-01 NOTE — PROGRESS NOTES
Pt arrived for Prolia injection. Vitals obtained and labs drawn. Labs WNL, injection indicated per written parameters. Injection given in L arm. Pt tolerated well. No s/s adverse reaction noted. Pt discharged home with spouse, ambulatory.

## 2021-02-01 NOTE — RESULT ENCOUNTER NOTE
Dallin comment sent to patient.   Improved chronic kidney disease stage III, normal calcium, OK  for Prolia injection  Future Appointments  8/12/2021  1:00 PM    Keesha Bailey MD     [facility]

## 2021-02-03 NOTE — PROGRESS NOTES
Adam Black , is tolerating her workouts well, queuing required with Free weights. Indicates she is less sob with walking when she applies plb/db . We will monitor and increase her workload as tolerated.
Right Total Hip Replacement

## 2021-02-05 ENCOUNTER — APPOINTMENT (OUTPATIENT)
Dept: CT IMAGING | Age: 81
End: 2021-02-05
Payer: MEDICARE

## 2021-02-05 ENCOUNTER — APPOINTMENT (OUTPATIENT)
Dept: GENERAL RADIOLOGY | Age: 81
End: 2021-02-05
Payer: MEDICARE

## 2021-02-05 ENCOUNTER — HOSPITAL ENCOUNTER (EMERGENCY)
Age: 81
Discharge: HOME | End: 2021-02-05
Attending: EMERGENCY MEDICINE
Payer: MEDICARE

## 2021-02-05 ENCOUNTER — NURSE TRIAGE (OUTPATIENT)
Dept: OTHER | Facility: CLINIC | Age: 81
End: 2021-02-05

## 2021-02-05 VITALS
OXYGEN SATURATION: 96 % | SYSTOLIC BLOOD PRESSURE: 156 MMHG | HEART RATE: 82 BPM | TEMPERATURE: 97.5 F | BODY MASS INDEX: 22.2 KG/M2 | HEIGHT: 64 IN | WEIGHT: 130 LBS | RESPIRATION RATE: 16 BRPM | DIASTOLIC BLOOD PRESSURE: 78 MMHG

## 2021-02-05 DIAGNOSIS — R42 DIZZINESS: Primary | ICD-10-CM

## 2021-02-05 LAB
ABSOLUTE EOS #: 0.1 K/UL (ref 0–0.4)
ABSOLUTE IMMATURE GRANULOCYTE: ABNORMAL K/UL (ref 0–0.3)
ABSOLUTE LYMPH #: 1.3 K/UL (ref 1–4.8)
ABSOLUTE MONO #: 0.5 K/UL (ref 0.1–1.3)
ALBUMIN SERPL-MCNC: 3.9 G/DL (ref 3.5–5.2)
ALBUMIN/GLOBULIN RATIO: ABNORMAL (ref 1–2.5)
ALP BLD-CCNC: 79 U/L (ref 35–104)
ALT SERPL-CCNC: 16 U/L (ref 5–33)
ANION GAP SERPL CALCULATED.3IONS-SCNC: 8 MMOL/L (ref 9–17)
AST SERPL-CCNC: 21 U/L
BASOPHILS # BLD: 1 % (ref 0–2)
BASOPHILS ABSOLUTE: 0 K/UL (ref 0–0.2)
BILIRUB SERPL-MCNC: 0.86 MG/DL (ref 0.3–1.2)
BUN BLDV-MCNC: 14 MG/DL (ref 8–23)
BUN/CREAT BLD: ABNORMAL (ref 9–20)
CALCIUM SERPL-MCNC: 9.1 MG/DL (ref 8.6–10.4)
CHLORIDE BLD-SCNC: 105 MMOL/L (ref 98–107)
CO2: 24 MMOL/L (ref 20–31)
CREAT SERPL-MCNC: 1.03 MG/DL (ref 0.5–0.9)
DIFFERENTIAL TYPE: ABNORMAL
EOSINOPHILS RELATIVE PERCENT: 2 % (ref 0–4)
GFR AFRICAN AMERICAN: >60 ML/MIN
GFR NON-AFRICAN AMERICAN: 52 ML/MIN
GFR SERPL CREATININE-BSD FRML MDRD: ABNORMAL ML/MIN/{1.73_M2}
GFR SERPL CREATININE-BSD FRML MDRD: ABNORMAL ML/MIN/{1.73_M2}
GLUCOSE BLD-MCNC: 105 MG/DL (ref 65–105)
GLUCOSE BLD-MCNC: 99 MG/DL (ref 70–99)
HCT VFR BLD CALC: 40.9 % (ref 36–46)
HEMOGLOBIN: 13.9 G/DL (ref 12–16)
IMMATURE GRANULOCYTES: ABNORMAL %
LYMPHOCYTES # BLD: 32 % (ref 24–44)
MCH RBC QN AUTO: 31.2 PG (ref 26–34)
MCHC RBC AUTO-ENTMCNC: 34 G/DL (ref 31–37)
MCV RBC AUTO: 91.8 FL (ref 80–100)
MONOCYTES # BLD: 11 % (ref 1–7)
NRBC AUTOMATED: ABNORMAL PER 100 WBC
PDW BLD-RTO: 13.4 % (ref 11.5–14.9)
PLATELET # BLD: 199 K/UL (ref 150–450)
PLATELET ESTIMATE: ABNORMAL
PMV BLD AUTO: 7.8 FL (ref 6–12)
POTASSIUM SERPL-SCNC: 4.4 MMOL/L (ref 3.7–5.3)
RBC # BLD: 4.46 M/UL (ref 4–5.2)
RBC # BLD: ABNORMAL 10*6/UL
SEG NEUTROPHILS: 54 % (ref 36–66)
SEGMENTED NEUTROPHILS ABSOLUTE COUNT: 2.2 K/UL (ref 1.3–9.1)
SODIUM BLD-SCNC: 137 MMOL/L (ref 135–144)
TOTAL PROTEIN: 6.3 G/DL (ref 6.4–8.3)
TROPONIN INTERP: NORMAL
TROPONIN T: NORMAL NG/ML
TROPONIN, HIGH SENSITIVITY: 9 NG/L (ref 0–14)
WBC # BLD: 4.1 K/UL (ref 3.5–11)
WBC # BLD: ABNORMAL 10*3/UL

## 2021-02-05 PROCEDURE — 70450 CT HEAD/BRAIN W/O DYE: CPT

## 2021-02-05 PROCEDURE — 6370000000 HC RX 637 (ALT 250 FOR IP): Performed by: EMERGENCY MEDICINE

## 2021-02-05 PROCEDURE — 99285 EMERGENCY DEPT VISIT HI MDM: CPT

## 2021-02-05 PROCEDURE — 71045 X-RAY EXAM CHEST 1 VIEW: CPT

## 2021-02-05 PROCEDURE — 70496 CT ANGIOGRAPHY HEAD: CPT

## 2021-02-05 PROCEDURE — 85025 COMPLETE CBC W/AUTO DIFF WBC: CPT

## 2021-02-05 PROCEDURE — 84484 ASSAY OF TROPONIN QUANT: CPT

## 2021-02-05 PROCEDURE — 6360000004 HC RX CONTRAST MEDICATION: Performed by: EMERGENCY MEDICINE

## 2021-02-05 PROCEDURE — 96374 THER/PROPH/DIAG INJ IV PUSH: CPT

## 2021-02-05 PROCEDURE — 6360000002 HC RX W HCPCS: Performed by: EMERGENCY MEDICINE

## 2021-02-05 PROCEDURE — 36415 COLL VENOUS BLD VENIPUNCTURE: CPT

## 2021-02-05 PROCEDURE — 82947 ASSAY GLUCOSE BLOOD QUANT: CPT

## 2021-02-05 PROCEDURE — 2580000003 HC RX 258: Performed by: EMERGENCY MEDICINE

## 2021-02-05 PROCEDURE — 80053 COMPREHEN METABOLIC PANEL: CPT

## 2021-02-05 PROCEDURE — 93005 ELECTROCARDIOGRAM TRACING: CPT | Performed by: EMERGENCY MEDICINE

## 2021-02-05 RX ORDER — SODIUM CHLORIDE 0.9 % (FLUSH) 0.9 %
10 SYRINGE (ML) INJECTION PRN
Status: DISCONTINUED | OUTPATIENT
Start: 2021-02-05 | End: 2021-02-05 | Stop reason: HOSPADM

## 2021-02-05 RX ORDER — MECLIZINE HYDROCHLORIDE 25 MG/1
25 TABLET ORAL 3 TIMES DAILY PRN
Qty: 15 TABLET | Refills: 0 | Status: SHIPPED | OUTPATIENT
Start: 2021-02-05 | End: 2021-02-15

## 2021-02-05 RX ORDER — MECLIZINE HYDROCHLORIDE 25 MG/1
25 TABLET ORAL ONCE
Status: COMPLETED | OUTPATIENT
Start: 2021-02-05 | End: 2021-02-05

## 2021-02-05 RX ORDER — 0.9 % SODIUM CHLORIDE 0.9 %
1000 INTRAVENOUS SOLUTION INTRAVENOUS ONCE
Status: COMPLETED | OUTPATIENT
Start: 2021-02-05 | End: 2021-02-05

## 2021-02-05 RX ORDER — ASPIRIN 81 MG/1
81 TABLET, CHEWABLE ORAL DAILY
Qty: 30 TABLET | Refills: 0 | Status: SHIPPED | OUTPATIENT
Start: 2021-02-05 | End: 2021-08-12

## 2021-02-05 RX ORDER — 0.9 % SODIUM CHLORIDE 0.9 %
80 INTRAVENOUS SOLUTION INTRAVENOUS ONCE
Status: COMPLETED | OUTPATIENT
Start: 2021-02-05 | End: 2021-02-05

## 2021-02-05 RX ORDER — ONDANSETRON 2 MG/ML
4 INJECTION INTRAMUSCULAR; INTRAVENOUS ONCE
Status: COMPLETED | OUTPATIENT
Start: 2021-02-05 | End: 2021-02-05

## 2021-02-05 RX ADMIN — IOPAMIDOL 75 ML: 755 INJECTION, SOLUTION INTRAVENOUS at 11:32

## 2021-02-05 RX ADMIN — SODIUM CHLORIDE 80 ML: 9 INJECTION, SOLUTION INTRAVENOUS at 11:32

## 2021-02-05 RX ADMIN — ONDANSETRON 4 MG: 2 INJECTION INTRAMUSCULAR; INTRAVENOUS at 12:32

## 2021-02-05 RX ADMIN — Medication 10 ML: at 11:32

## 2021-02-05 RX ADMIN — MECLIZINE HYDROCHLORIDE 25 MG: 25 TABLET ORAL at 12:31

## 2021-02-05 RX ADMIN — SODIUM CHLORIDE 1000 ML: 9 INJECTION, SOLUTION INTRAVENOUS at 12:32

## 2021-02-05 NOTE — ED NOTES
Patient states she was dizzy during transition from lying to sitting and sitting to standing. Patient states the dizziness is not as severe as it was while in CT. Patient tolerated orthostatics well.       Angelica Arnold RN  02/05/21 [id number]

## 2021-02-05 NOTE — ED NOTES
Mode of arrival (squad #, walk in, police, etc) : wheelchair with          Chief complaint(s): dizziness         Arrival Note (brief scenario, treatment PTA, etc). : patient states she was working out yesterday when she became dizzy and lightheaded. Patient states she was transitioning to the floor when she became dizzy. Patient states this took place around 1600 yesterday. Patient denies any syncopal episodes or LOC. Patient states she feels dizzy/lightheaded when she looks down. Patient states she did have her ears cleaned out on Wednesday, but denies any complications since then. Patient's gait is unsteady. Patient is alert and oriented x3.         C= \"Have you ever felt that you should Cut down on your drinking? \"  No  A= \"Have people Annoyed you by criticizing your drinking? \"  No  G= \"Have you ever felt bad or Guilty about your drinking? \"  No  E= \"Have you ever had a drink as an Eye-opener first thing in the morning to steady your nerves or to help a hangover? \"  No      Deferred []      Reason for deferring: N/A    *If yes to two or more: probable alcohol abuse. Townsend Cushing, RN  02/05/21 [id number]

## 2021-02-05 NOTE — ED NOTES
Informed patient that orthostatic BP would be taken, patient states when she transitioned from a lying to sitting position she began to experience extreme dizziness and nausea. This RN informed Umesh Montero.       Tray Baum RN  02/05/21 [id number]

## 2021-02-05 NOTE — ED NOTES
Will preform orthostatic BPs once patient's medication has settled in.       Raoul Alves RN  02/05/21 [id number]

## 2021-02-05 NOTE — TELEPHONE ENCOUNTER
Patient called Rosemarie Nettles at [address])  with red flag complaint. Brief description of triage: dizziness that started yesterday around 4pm, patient states difficulty walking as dizziness is severe and she has to hold onto things. Pt reports dizziness increases with turning head and ambulating. Pt denies headache, n/v, syncope, fall. Triage indicates for patient to [address] [address] ED NOW or OFFICE WITH PCP APPROVAL. 2nd level triage completed with Ankit Romo NP; provider recommends patient be seen AT ED NOW. Patient instructed to go to ed now, patient states she has someone who can drive her to ED now. Care advice provided, patient verbalizes understanding; denies any other questions or concerns; instructed to call back for any new or worsening symptoms. Attention Provider: Thank you for allowing me to participate in the care of your patient. The patient was connected to triage in response to information provided to the ECC. Please do not respond through this encounter as the response is not directed to a shared pool. Reason for Disposition   SEVERE dizziness (e.g., unable to stand, requires support to walk, feels like passing out now)    Answer Assessment - Initial Assessment Questions  1. DESCRIPTION: \"Describe your dizziness. \"      Dizziness when turning head and walking     2. LIGHTHEADED: \"Do you feel lightheaded? \" (e.g., somewhat faint, woozy, weak upon standing)      Feels lightheaded with walking    3. VERTIGO: \"Do you feel like either you or the room is spinning or tilting? \" (i.e. vertigo)      No    4. SEVERITY: \"How bad is it? \"  \"Do you feel like you are going to faint? \" \"Can you stand and walk? \"    - MILD - walking normally    - MODERATE - interferes with normal activities (e.g., work, school)     - SEVERE - unable to stand, requires support to walk, feels like passing out now. Severe    5. ONSET:  \"When did the dizziness begin? \"      4pm yesterday    6. AGGRAVATING FACTORS: \"Does anything make it worse? \" (e.g., standing, change in head position)      Turning head and walking    7. HEART RATE: \"Can you tell me your heart rate? \" \"How many beats in 15 seconds? \"  (Note: not all patients can do this)        Unable to check heart rate, denies feeling as if heart is racing    8. CAUSE: \"What do you think is causing the dizziness? \"    Had ears Cleaned out Wednesday, thinks this is causing dizziness    9. RECURRENT SYMPTOM: \"Have you had dizziness before? \" If so, ask: \"When was the last time? \" \"What happened that time? \"      Yes, forgot what it was last time     10. OTHER SYMPTOMS: \"Do you have any other symptoms? \" (e.g., fever, chest pain, vomiting, diarrhea, bleeding)        No    11. PREGNANCY: \"Is there any chance you are pregnant? \" \"When was your last menstrual period? \"        no    Protocols used: [id number]

## 2021-02-05 NOTE — ED PROVIDER NOTES
[address]  EMERGENCY DEPARTMENT ENCOUNTER      Pt Name: Greta Romero  MRN: [id number]  [place] 1940  Date of evaluation: 2/5/21      CHIEF COMPLAINT       Chief Complaint   Patient presents with    Dizziness     Patient reports that she got her ears cleaned out on Wednesday at the [facility] at the [facility] in [facility]. Patient presents to ED with dizziness since 16:00 yesterday. HISTORY OF PRESENT ILLNESS   HPI [de-identified] y.o. female presents with c/o dizziness. Patient reports that she had her ears cleaned out on Wednesday. Yesterday she started to get dizziness. She has a sensation of the room spinning and dizziness if she looks down. She reports that she has a history of vertigo. She feels unsteady when she is walking because of the dizziness. She denies any slurring of her speech, facial droop, weakness in her arms and legs. She did not take any medications prior to presentation. No blood in her stool. Vira Libman REVIEW OF SYSTEMS       Review of Systems   Constitutional: Negative for fever. HENT: Negative for congestion. Eyes: Negative for visual disturbance. Respiratory: Negative for cough and shortness of breath. Cardiovascular: Negative for chest pain. Gastrointestinal: Positive for nausea. Genitourinary: Negative for dysuria. Musculoskeletal: Positive for gait problem. Skin: Negative for rash. Neurological: Positive for dizziness. Negative for headaches.        PAST MEDICAL HISTORY     Past Medical History:   Diagnosis Date    Abdominal pain     Allergic rhinitis     Anxiety     Chronic back pain     Chronic fatigue     Chronic kidney disease     Colon polyp 3/7/2012    TUBULAR ADENOMA    COPD (chronic obstructive pulmonary disease) (HCC)     COPD, 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     Depression     Diverticul disease small and large intestine, no perforati or abscess     Elevated blood pressure reading     Fall 1/12/2017    GERD (gastroesophageal reflux disease)     Glaucoma     Hyperlipidemia     with target LDL less than 100    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[facility] [address]     Hyperthyroidism 2012    Insomnia     Psychophysiological    Neuropathy     Orthostatic dizziness 3/12/2017    Osteoarthritis     Panlobular emphysema (HCC)     PVC (premature ventricular contraction) 1/12/2017    S/P parathyroidectomy     Tubal pregnancy 9028,8906    Vasovagal syncope 1/12/2017       SURGICAL HISTORY       Past Surgical History:   Procedure Laterality Date    APPENDECTOMY      BACK SURGERY      CATARACT REMOVAL WITH IMPLANT Left 04/01/14    [place] [address]    CHOLECYSTECTOMY  2005    COLONOSCOPY      COLONOSCOPY  6/13/2014    Severe sigmoid diverticulosis; due for repeat June 2019    ENDOSCOPY, COLON, DIAGNOSTIC      EYE SURGERY      PARATHYROIDECTOMY  11/05/2018    Dr. Lisa Garcia; Left inferior parathyroid: adenoma    ROTATOR CUFF REPAIR Left     TONSILLECTOMY      TUBAL LIGATION      UPPER GASTROINTESTINAL ENDOSCOPY  6/13/2014    biopsy    UPPER GASTROINTESTINAL ENDOSCOPY  1-26-16    UPPER GASTROINTESTINAL ENDOSCOPY  5/4/16    mild gastritis       CURRENT MEDICATIONS       Discharge Medication List as of 2/5/2021  2:33 PM      CONTINUE these medications which have NOT CHANGED    Details   eszopiclone (ESZOPICLONE) 3 MG TABS Take 1 tablet by mouth nightly as needed (insomnia) for up to 90 days. , Disp-90 tablet, R-0Normal      carbamide peroxide (DEBROX) 6.5 % otic solution Place 5 drops in ear(s) 2 times daily For 5 days, Disp-1 Bottle, R-0Normal      diclofenac sodium (VOLTAREN) 1 % GEL Apply 4 g topically 4 times daily, Topical, 4 TIMES DAILY Starting Tue 10/13/2020, Disp-4 Tube,R-1, Normal      atorvastatin (LIPITOR) 10 MG tablet Take 1 tablet by mouth every evening, Disp-90 tablet,R-3Normal      tiotropium (SPIRIVA RESPIMAT) 2.5 MCG/ACT AERS inhaler Inhale 2 puffs into the lungs daily **stop handinhaler**, Disp-12 g, R-3Normal      Fexofenadine HCl (ALLERGY 24-HR PO) Take by mouthHistorical Med      Oxymetazoline HCl (NASAL SPRAY NA) by Nasal routeHistorical Med      Multiple Vitamins-Minerals (OCUVITE ADULT 50+ PO) Take by mouthHistorical Med      Lactobacillus (PROBIOTIC ACIDOPHILUS PO) Take by mouthHistorical Med      ipratropium (ATROVENT) 0.03 % nasal spray 2 sprays by Nasal route 3 times daily, Disp-1 Bottle, R-0Normal      albuterol sulfate  (90 Base) MCG/ACT inhaler Inhale 2 puffs into the lungs every 6 hours as needed for Wheezing or Shortness of Breath, Disp-18 g, R-3Normal      Spacer/Aero-Holding Chambers (AEROCHAMBER MV) MISC Disp-1 each, R-0, NormalTo be used with inhaler      dorzolamide-timolol (COSOPT) 22.3-6.8 MG/ML ophthalmic solution Place 1 drop into both eyes 2 times daily      ALPHAGAN P 0.1 % SOLN Place 1 drop into both eyes 3 times daily , SEAN      latanoprost (XALATAN) 0.005 % ophthalmic solution Place 1 drop into both eyes nightly              ALLERGIES     is allergic to aspirin. FAMILY HISTORY     She indicated that her mother is . She indicated that her father is . She indicated that only one of her three sisters is alive. She indicated that the status of her maternal grandmother is unknown. SOCIAL HISTORY      reports that she quit smoking about 20 years ago. Her smoking use included cigarettes. She started smoking about 60 years ago. She has a 30.00 pack-year smoking history. She has never used smokeless tobacco. She reports current alcohol use. She reports that she does not use drugs.     PHYSICAL EXAM     INITIAL VITALS: BP (!) 156/78   Pulse 82   Temp 97.5 °F (36.4 °C) (Oral)   Resp 16   Ht 5' 3.5\" (1.613 m)   Wt 130 lb (59 kg)   SpO2 96%   BMI 22.67 kg/m²   Gen: NAD  Head: Normocephalic, atraumatic  Eye: Pupils equal round reactive to light, no conjunctivitis, no nystagmus  ENT: MMM  Neck: No JVD  Heart: Regular rate and rhythm no murmurs  Lungs: Clear to auscultation bilaterally, no respiratory distress  Chest wall: No crepitus, no tenderness palpation  Abdomen: Soft, nontender, nondistended, with no peritoneal signs  Neurologic: Patient is alert and oriented x3, motor and sensation is intact in all 4 extremities, fluent speech, coordination is intact to finger-nose testing heel shin testing  Extremities: No edema, no calf tenderness to palpation    MEDICAL DECISION MAKING:     MDM  [de-identified] y.o. female dizziness. I am suspecting that most likely this is vertigo. Will evaluate for a Posterior circulation infarct. Will make sure she is not anemic, make sure there is no significant electrolyte abnormalities. Will evaluate for any sign of infection. Will evaluate for any congestive heart failure that would put her at risk for an arrhythmia, but this is less likely as she is currently having the symptoms and she has no current arrhythmia on the monitor. Emergency Department course: White blood cell count of 4.1  Hemoglobin is 13.9  BUN and creatinine are unremarkable. Electrolytes are normal  No troponin elevation. The CT scan of the head shows no brain mass or intracranial hemorrhage. CTA shows no large vessel occlusion. Given her age and risk factors, recommended placement in the hospital for further stroke evaluation. Patient declines at this time. She states that she feels better after meclizine. Discussed with the patient the risks, she verbalizes understanding. D/w pt the results, treatment plan, warning precautions for prompt ED return and importance of close OP FU, she verbalizes understanding and agrees with the treatment plan. DIAGNOSTIC RESULTS     EKG: All EKG's are interpreted by the Emergency Department Physician who either signs or Co-signs this chart in the absence of a cardiologist.  EKG shows a sinus rhythm.   HR is 74, , QRS 78, , no UDAY, No STD, No TWI, the axis is normal.          RADIOLOGY:All plain film, CT, MRI, and formal ultrasound images (except ED bedside ultrasound) are read by the radiologist and the images and interpretations are directly viewed by the emergency physician. CT HEAD WO CONTRAST   Final Result   No acute intracranial abnormality. CTA HEAD NECK W CONTRAST   Final Result   Unremarkable CTA of the head and neck. XR CHEST PORTABLE   Final Result   No radiographic evidence of acute cardiopulmonary disease. LABS: All lab results were reviewed by myself, and all abnormals are listed below.   Labs Reviewed   CBC WITH AUTO DIFFERENTIAL - Abnormal; Notable for the following components:       Result Value    Monocytes 11 (*)     All other components within normal limits   COMPREHENSIVE METABOLIC PANEL - Abnormal; Notable for the following components:    CREATININE 1.03 (*)     Anion Gap 8 (*)     Total Protein 6.3 (*)     GFR Non- 52 (*)     All other components within normal limits   TROPONIN   POC GLUCOSE FINGERSTICK       EMERGENCY DEPARTMENT COURSE:   Vitals:    Vitals:    02/05/21 1300 02/05/21 1306 02/05/21 1330 02/05/21 1452   BP: (!) 153/75 (!) 163/81 (!) 151/78 (!) 156/78   Pulse: 71 79 75 82   Resp: 17 17 23 16   Temp:       TempSrc:       SpO2: 99%  97% 96%   Weight:       Height:           The patient was given the following medications while in the emergency department:  Orders Placed This Encounter   Medications    iopamidol (ISOVUE-370) 76 % injection 75 mL    0.9 % sodium chloride bolus    DISCONTD: sodium chloride flush 0.9 % injection 10 mL    ondansetron (ZOFRAN) injection 4 mg    0.9 % sodium chloride bolus    meclizine (ANTIVERT) tablet 25 mg    meclizine (ANTIVERT) 25 MG tablet     Sig: Take 1 tablet by mouth 3 times daily as needed for Dizziness     Dispense:  15 tablet     Refill:  0    aspirin (ASPIRIN CHILDRENS) 81 MG chewable tablet     Sig: Take 1 tablet by mouth daily     Dispense:  30 tablet     Refill:  0 -------------------------  CRITICAL CARE:   CONSULTS: None  PROCEDURES: Procedures     FINAL IMPRESSION      1.  Dizziness          DISPOSITION/PLAN   DISPOSITION Decision To Discharge 02/05/2021 02:32:05 PM      PATIENT REFERRED TO:  Pardeep Jo MD  [address] [facility].  [address] [place] [address]  [phone]    In 3 days      [facility] ED  [facility] [address] [place] [address] [address] [address]  [phone]    If symptoms worsen      DISCHARGE MEDICATIONS:  Discharge Medication List as of 2/5/2021  2:33 PM      START taking these medications    Details   meclizine (ANTIVERT) 25 MG tablet Take 1 tablet by mouth 3 times daily as needed for Dizziness, Disp-15 tablet, R-0Print      aspirin (ASPIRIN CHILDRENS) 81 MG chewable tablet Take 1 tablet by mouth daily, Disp-30 tablet, R-0Print               Falguni Gar MD  Attending Emergency Physician                      Falguni Gar MD  02/07/21 1898

## 2021-02-05 NOTE — ED NOTES
Ambulated with patient to restroom. Patient's gait steady. Patient states she is still experiencing some dizziness, but nothing like before her arrival to ED. Patient states as long as she doesn't tilt her head down she is okay. Dr. Romel Mcgowan notified.       Hyun Yadav RN  02/05/21 3339

## 2021-02-07 ASSESSMENT — ENCOUNTER SYMPTOMS
NAUSEA: 1
COUGH: 0
SHORTNESS OF BREATH: 0

## 2021-02-08 ENCOUNTER — TELEPHONE (OUTPATIENT)
Dept: FAMILY MEDICINE CLINIC | Age: 81
End: 2021-02-08

## 2021-02-08 LAB
EKG ATRIAL RATE: 74 BPM
EKG P AXIS: 70 DEGREES
EKG P-R INTERVAL: 182 MS
EKG Q-T INTERVAL: 374 MS
EKG QRS DURATION: 78 MS
EKG QTC CALCULATION (BAZETT): 415 MS
EKG R AXIS: 72 DEGREES
EKG T AXIS: 72 DEGREES
EKG VENTRICULAR RATE: 74 BPM

## 2021-02-08 NOTE — TELEPHONE ENCOUNTER
[facility]) ED Follow up Call    Reason for ED visit:           Leonardo Lynn , this is Lucy Alex from Dr. Christopher Jacobs office, just calling to see how you are doing after your recent ED visit. Did you receive discharge instructions? Yes  Do you understand the discharge instructions? Yes  Did the ED give you any new prescriptions? Yes  Were you able to fill your prescriptions? Yes      Do you have one of our red, yellow and green  Zone sheets that help you to determine when you should go to the ED? Yes      Do you need or want to make a follow up appt with your PCP? No    Do you have any further needs in the home i.e. Equipment?   No        FU appts/Provider:    Future Appointments   Date Time Provider Shirley Dunne   8/12/2021  1:00 PM Echo Joyner MD fp [facility]

## 2021-03-02 ENCOUNTER — HOSPITAL ENCOUNTER (OUTPATIENT)
Dept: ULTRASOUND IMAGING | Age: 81
Discharge: HOME | End: 2021-03-04
Payer: MEDICARE

## 2021-03-02 ENCOUNTER — HOSPITAL ENCOUNTER (OUTPATIENT)
Age: 81
Discharge: HOME | End: 2021-03-02
Payer: MEDICARE

## 2021-03-02 DIAGNOSIS — E04.2 NONTOXIC MULTINODULAR GOITER: ICD-10-CM

## 2021-03-02 LAB
CALCIUM IONIZED: 1.27 MMOL/L (ref 1.13–1.33)
CALCIUM SERPL-MCNC: 9.1 MG/DL (ref 8.6–10.4)
PTH INTACT: 56.65 PG/ML (ref 15–65)
T3 FREE: 2.85 PG/ML (ref 2.02–4.43)
THYROXINE, FREE: 1.26 NG/DL (ref 0.93–1.7)
TSH SERPL DL<=0.05 MIU/L-ACNC: 2.35 MIU/L (ref 0.3–5)
VITAMIN D 25-HYDROXY: 39.6 NG/ML (ref 30–100)

## 2021-03-02 PROCEDURE — 82306 VITAMIN D 25 HYDROXY: CPT

## 2021-03-02 PROCEDURE — 76536 US EXAM OF HEAD AND NECK: CPT

## 2021-03-02 PROCEDURE — 84443 ASSAY THYROID STIM HORMONE: CPT

## 2021-03-02 PROCEDURE — 82330 ASSAY OF CALCIUM: CPT

## 2021-03-02 PROCEDURE — 83970 ASSAY OF PARATHORMONE: CPT

## 2021-03-02 PROCEDURE — 84439 ASSAY OF FREE THYROXINE: CPT

## 2021-03-02 PROCEDURE — 84481 FREE ASSAY (FT-3): CPT

## 2021-03-02 PROCEDURE — 36415 COLL VENOUS BLD VENIPUNCTURE: CPT

## 2021-03-02 PROCEDURE — 82310 ASSAY OF CALCIUM: CPT

## 2021-04-19 RX ORDER — TIOTROPIUM BROMIDE AND OLODATEROL 3.124; 2.736 UG/1; UG/1
2 SPRAY, METERED RESPIRATORY (INHALATION) DAILY
COMMUNITY
Start: 2020-10-12 | End: 2022-10-14

## 2021-04-20 ENCOUNTER — OFFICE VISIT (OUTPATIENT)
Dept: FAMILY MEDICINE CLINIC | Age: 81
End: 2021-04-20
Payer: MEDICARE

## 2021-04-20 VITALS
WEIGHT: 138 LBS | BODY MASS INDEX: 24.06 KG/M2 | TEMPERATURE: 96.7 F | OXYGEN SATURATION: 97 % | DIASTOLIC BLOOD PRESSURE: 80 MMHG | SYSTOLIC BLOOD PRESSURE: 132 MMHG | HEART RATE: 81 BPM

## 2021-04-20 DIAGNOSIS — F33.42 MDD (MAJOR DEPRESSIVE DISORDER), RECURRENT, IN FULL REMISSION (HCC): ICD-10-CM

## 2021-04-20 DIAGNOSIS — F51.04 PSYCHOPHYSIOLOGICAL INSOMNIA: Primary | ICD-10-CM

## 2021-04-20 DIAGNOSIS — N95.9 POST MENOPAUSAL PROBLEMS: ICD-10-CM

## 2021-04-20 DIAGNOSIS — E78.5 HYPERLIPIDEMIA WITH TARGET LDL LESS THAN 100: ICD-10-CM

## 2021-04-20 PROCEDURE — 99213 OFFICE O/P EST LOW 20 MIN: CPT | Performed by: FAMILY MEDICINE

## 2021-04-20 RX ORDER — CHOLECALCIFEROL (VITAMIN D3) 25 MCG
TABLET ORAL
Qty: 90 TABLET | Refills: 2 | Status: SHIPPED | OUTPATIENT
Start: 2021-04-20 | End: 2021-12-07

## 2021-04-20 RX ORDER — ESZOPICLONE 2 MG/1
2 TABLET, FILM COATED ORAL NIGHTLY PRN
Qty: 30 TABLET | Refills: 1 | Status: SHIPPED | OUTPATIENT
Start: 2021-04-20 | End: 2021-05-20

## 2021-04-20 ASSESSMENT — PATIENT HEALTH QUESTIONNAIRE - PHQ9
SUM OF ALL RESPONSES TO PHQ9 QUESTIONS 1 & 2: 0
SUM OF ALL RESPONSES TO PHQ QUESTIONS 1-9: 0
2. FEELING DOWN, DEPRESSED OR HOPELESS: 0
1. LITTLE INTEREST OR PLEASURE IN DOING THINGS: 0

## 2021-04-20 ASSESSMENT — ENCOUNTER SYMPTOMS
ABDOMINAL PAIN: 0
RESPIRATORY NEGATIVE: 1
SHORTNESS OF BREATH: 0

## 2021-04-20 NOTE — PATIENT INSTRUCTIONS
[facility]  [email or web address]:display_count=[email or web address]&:origin=[email or web address]&:embed=y    [address]  [email or web address]?:toolbar=[email or web address]:display_count=n&:origin=[email or web address]_link&:embed=y    [address]  [email or web address]:display_count=[email or web address]:origin=[email or web address]_link&:embed=y

## 2021-04-20 NOTE — PROGRESS NOTES
[facility] PHYSICIANS  [facility] FAMILY PHYSICIANS [facility]  Alexsander Guevara [facility] [address].  [address] [address]  Dept: [phone]     Emilia Wells (:  1940) is a 80 y.o. female,Established patient, here for evaluation of the following chief complaint(s):  Chief Complaint   Patient presents with    Medication Refill     needs med refill, no other issues        SUBJECTIVE/OBJECTIVE:  HPI   Elnor Priscilla is a 80 y.o. female patient Patient is an established patient of Dr. Rohit Mitchell is a 80 y.o. female who complains of insomnia. This has been going on for awhile. Patient describes his difficulty with staying sleep. Patient is currently on Lunesta. Reports great success with the therapy. Patient denies any adverse reaction to this therapy. However, patient has had a notice from the insurance company and [address] saying that this medication should not be continued to be prescribed. Despite big challenges in finding the right type of therapy for her. Patient is very concerned because she is always had some issues with insomnia and if this medication will be discontinued she will be in more trouble. Especially, since she did have some issues with depression and anxiety which had been exacerbated with not being able to sleep. Patient verbalized that\" My brain stanford snot shut down\"Lunesta is the only medication that helps. Patient was also evaluated for sleep apnea in the past she reports that she was seen by the specialist and was told that she does not need to be evaluated for sleep apnea. PREVIOUS THERAPIES-NO SUCCESS  Rozerem  Remeron  Trazodone  Restoril     POSTMENOPAUSAL PROBLEMS  Patient was on prempro after menopause at the age of 48. Patient have had great success with controlling night sweats, irritability, and also insomnia. Patient had stopped using the Prempro once her- sister had breast cancer.   At the age of 79 when she stopped the Prempro she started to have a recurrence of the symptoms that she has been having before however she still does not want to start on any type of hormonal replacement. We have discussed other herbal products that might help with her current symptoms such as the Amee root and DHEA. DEPRESSION AND ANXIETY  Harrison Wisdom reported some ongoing issues with depression and anxiety. Symptoms includes insomnia and insomnia. she also denies suicidal/homicidal ideation, plan or intent. Patient stays active and has several plans during the summer for vacations. Patient is not having any financial issues or family concerns. PHQ-2 Over the past 2 weeks, how often have you been bothered by any of the following problems? Little interest or pleasure in doing things: Not at all  Feeling down, depressed, or hopeless: Not at all  PHQ-2 Score: 0  PHQ-9 Over the past 2 weeks, how often have you been bothered by any of the following problems?   Thoughts that you would be better off dead, or of hurting yourself in some way: Not at all  PHQ-9 Total Score: 0   CHP DINORA-7 8/14/2017 5/12/2017 3/10/2017   Feeling nervous, anxious, or on edge 1-Several days 1-Several days 1-Several days   Not able to stop or control worrying 0-Not at all sure 0-Not at all sure 1-Several days   Worrying too much about different things 0-Not at all sure 1-Several days 0-Not at all sure   Trouble relaxing 0-Not at all sure 0-Not at all sure 0-Not at all sure   Being so restless that it's hard to sit still 0-Not at all sure 1-Several days 0-Not at all sure   Becoming easily annoyed or irritable 1-Several days 0-Not at all sure 1-Several days   Feeling afraid as if something awful might happen 0-Not at all sure 0-Not at all sure 0-Not at all sure   DINORA-7 Total Score 2 3 3         Vitals:    04/20/21 1016   BP: 132/80   Pulse: 81   Temp: 96.7 °F (35.9 °C)   SpO2: 97%   Weight: 138 lb (62.6 kg)   Estimated body mass index is 24.06 kg/m² as calculated from the following: Height as of 2/5/21: 5' 3.5\" (1.613 m). Weight as of this encounter: 138 lb (62.6 kg). Review of Systems   Constitutional: Negative for chills, fatigue and fever. HENT: Negative. Respiratory: Negative. Negative for shortness of breath. Cardiovascular: Negative. Negative for chest pain and palpitations. Gastrointestinal: Negative for abdominal pain. Endocrine: Negative. Genitourinary: Negative for dysuria. Musculoskeletal: Negative for arthralgias and myalgias. Skin: Negative for rash. Neurological: Negative for headaches. Psychiatric/Behavioral: Positive for dysphoric mood and sleep disturbance. Negative for suicidal ideas. The patient is nervous/anxious. Physical Exam  Vitals signs and nursing note reviewed. Constitutional:       Appearance: She is well-developed. HENT:      Head: Normocephalic. Right Ear: External ear normal. Decreased hearing noted. Left Ear: External ear normal. Decreased hearing noted. Nose: Nose normal.      Mouth/Throat:      Mouth: Mucous membranes are moist.   Eyes:      Pupils: Pupils are equal, round, and reactive to light. Neck:      Musculoskeletal: Normal range of motion and neck supple. Cardiovascular:      Rate and Rhythm: Normal rate and regular rhythm. Pulses: Normal pulses. Heart sounds: Normal heart sounds. Pulmonary:      Effort: Pulmonary effort is normal. No respiratory distress. Breath sounds: Normal breath sounds. Abdominal:      General: Bowel sounds are normal. There is no distension. Palpations: Abdomen is soft. Tenderness: There is no abdominal tenderness. Musculoskeletal: Normal range of motion. Skin:     General: Skin is warm and dry. Capillary Refill: Capillary refill takes less than 2 seconds. Neurological:      Mental Status: She is alert and oriented to person, place, and time. Psychiatric:         Mood and Affect: Mood is anxious.          Speech: Speech is not rapid and pressured. Behavior: Behavior normal.         Thought Content: Thought content does not include homicidal or suicidal ideation. Diagnosis Date    Abdominal pain     Allergic rhinitis     Anxiety     Chronic back pain     Chronic fatigue     Chronic kidney disease     Colon polyp 3/7/2012    TUBULAR ADENOMA    COPD (chronic obstructive pulmonary disease) (HCC)     COPD, 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     Depression     Diverticul disease small and large intestine, no perforati or abscess     Elevated blood pressure reading     Fall 1/12/2017    GERD (gastroesophageal reflux disease)     Glaucoma     Hyperlipidemia     with target LDL less than 100    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[facility] [address]     Hyperthyroidism 2012    Insomnia     Psychophysiological    Neuropathy     Orthostatic dizziness 3/12/2017    Osteoarthritis     Panlobular emphysema (HCC)     PVC (premature ventricular contraction) 1/12/2017    S/P parathyroidectomy     Tubal pregnancy [id number]    Vasovagal syncope 1/12/2017      Prior to Visit Medications    Medication Sig Taking? Authorizing Provider   eszopiclone (LUNESTA) 2 MG TABS Take 1 tablet by mouth nightly as needed (insomnia) for up to 30 days.  Yes BARRERA Allen CNP   Melatonin CR 3 MG TBCR USe nightly Yes BARRERA Allen CNP   Tiotropium Bromide-Olodaterol (STIOLTO RESPIMAT) 2.5-2.5 MCG/ACT AERS Inhale 2 puffs into the lungs daily Yes Historical Provider, MD   aspirin (ASPIRIN CHILDRENS) 81 MG chewable tablet Take 1 tablet by mouth daily Yes Rocco Ganser, MD   diclofenac sodium (VOLTAREN) 1 % GEL Apply 4 g topically 4 times daily Yes Adan Lara DPM   atorvastatin (LIPITOR) 10 MG tablet Take 1 tablet by mouth every evening Yes Joseph Pearl MD   Fexofenadine HCl (ALLERGY 24-HR PO) Take by mouth Yes Historical Provider, MD   Oxymetazoline HCl (NASAL SPRAY NA) by Nasal route Yes Historical Provider, MD   Multiple Vitamins-Minerals (OCUVITE ADULT 50+ PO) Take by mouth Yes Historical Provider, MD   Lactobacillus (PROBIOTIC ACIDOPHILUS PO) Take by mouth Yes Historical Provider, MD   ipratropium (ATROVENT) 0.03 % nasal spray 2 sprays by Nasal route 3 times daily Yes Livan Alaniz MD   albuterol sulfate  (90 Base) MCG/ACT inhaler Inhale 2 puffs into the lungs every 6 hours as needed for Wheezing or Shortness of Breath Yes Livan Alaniz MD   Spacer/Aero-Holding Chambers (AEROCHAMBER MV) MISC To be used with inhaler Yes Livan Alaniz MD   dorzolamide-timolol (COSOPT) 22.3-6.8 MG/ML ophthalmic solution Place 1 drop into both eyes 2 times daily Yes Historical Provider, MD   ALPHAGAN P 0.1 % SOLN Place 1 drop into both eyes 3 times daily  Yes Historical Provider, MD   latanoprost (XALATAN) 0.005 % ophthalmic solution Place 1 drop into both eyes nightly  Yes Historical Provider, MD       ASSESSMENT/PLAN:  1. Psychophysiological insomnia  Worsening  Continue current routine or therapy. DISCUSSED AND ADVISED TO:  Cut down intake of drinks with caffeine, such as coffee or black tea, for 8 hours before bed. Cut down on smoking or use other types of tobacco near bedtime. Cut down on Nicotine and alcohol   Stop eating a big meal close to bedtime. Stop drinking a lot of  fluids close to bedtime.      - eszopiclone (LUNESTA) 2 MG TABS; Take 1 tablet by mouth nightly as needed (insomnia) for up to 30 days. Dispense: 30 tablet; Refill: 1  - Melatonin CR 3 MG TBCR; USe nightly  Dispense: 90 tablet; Refill: 2    2. MDD (major depressive disorder), recurrent, in full remission ([facility] [address]  Stable  Continue current therapy. DISCUSSED and ADVISED TO:  Not stopping medication suddenly. See the specialist as discussed. Report for feelings of SI, HI, and hallucinations. Go to the ER for increasing urge to hurt yourself.     - eszopiclone (LUNESTA) 2 MG TABS; Take 1 tablet by mouth nightly as needed (insomnia) for up to 30 days. Dispense: 30 tablet; Refill: 1    3. Hyperlipidemia with target LDL less than 100  Stable  Continue therapy. Advised to decrease the consumption of red meats, fried foods, trans fats, sweets, sugary beverages. Advised to increase fish, vegetables, and fruits consumption. Advised to add fiber or OTC supplements in diet. Discussed weight loss which will result in improvement of lipids levels. Advised to increase daily physical activities and add regular exercises. - Lipid Panel; Future    4. Post menopausal problems  Worsening  Will try some herbal supplements  Will consider going back to gynecologist       Return in about 3 months (around 7/20/2021) for Chronic conditions, Insomnia- added melatonin cr. On this date 4/20/2021 I have spent 15 minutes reviewing previous notes, test results and face to face with the patient discussing the diagnosis and importance of compliance with the treatment plan as well as documenting on the day of the visit. This note was completed by using the assistance of a speech-recognition program. However, inadvertent computerized transcription errors may be present. Although every effort was made to ensure accuracy, no guarantees can be provided that every mistake has been identified and corrected by editing.   Electronically signed by BARRERA Farmer CNP on 4/19/21 at 8:36 PM EDT     --BARRERA Farmer CNP

## 2021-06-01 ENCOUNTER — HOSPITAL ENCOUNTER (OUTPATIENT)
Age: 81
Discharge: HOME | End: 2021-06-01
Payer: MEDICARE

## 2021-06-01 DIAGNOSIS — R33.9 URINARY RETENTION: ICD-10-CM

## 2021-06-01 DIAGNOSIS — N28.1 CYST OF LEFT KIDNEY: ICD-10-CM

## 2021-06-01 DIAGNOSIS — E78.5 HYPERLIPIDEMIA WITH TARGET LDL LESS THAN 100: ICD-10-CM

## 2021-06-01 DIAGNOSIS — N18.31 STAGE 3A CHRONIC KIDNEY DISEASE (HCC): ICD-10-CM

## 2021-06-01 LAB
-: ABNORMAL
ABSOLUTE EOS #: 0.1 K/UL (ref 0–0.4)
ABSOLUTE IMMATURE GRANULOCYTE: ABNORMAL K/UL (ref 0–0.3)
ABSOLUTE LYMPH #: 1.7 K/UL (ref 1–4.8)
ABSOLUTE MONO #: 0.8 K/UL (ref 0.1–1.3)
ALBUMIN SERPL-MCNC: 4.2 G/DL (ref 3.5–5.2)
ALBUMIN/GLOBULIN RATIO: ABNORMAL (ref 1–2.5)
ALP BLD-CCNC: 90 U/L (ref 35–104)
ALT SERPL-CCNC: 12 U/L (ref 5–33)
AMORPHOUS: ABNORMAL
ANION GAP SERPL CALCULATED.3IONS-SCNC: 9 MMOL/L (ref 9–17)
AST SERPL-CCNC: 18 U/L
BACTERIA: ABNORMAL
BASOPHILS # BLD: 1 % (ref 0–2)
BASOPHILS ABSOLUTE: 0 K/UL (ref 0–0.2)
BILIRUB SERPL-MCNC: 1.22 MG/DL (ref 0.3–1.2)
BILIRUBIN URINE: NEGATIVE
BUN BLDV-MCNC: 14 MG/DL (ref 8–23)
BUN/CREAT BLD: ABNORMAL (ref 9–20)
CALCIUM SERPL-MCNC: 9.7 MG/DL (ref 8.6–10.4)
CASTS UA: ABNORMAL /LPF
CHLORIDE BLD-SCNC: 104 MMOL/L (ref 98–107)
CHOLESTEROL/HDL RATIO: 2.4
CHOLESTEROL: 153 MG/DL
CO2: 27 MMOL/L (ref 20–31)
COLOR: YELLOW
COMMENT UA: ABNORMAL
CREAT SERPL-MCNC: 1.21 MG/DL (ref 0.5–0.9)
CREATININE URINE: 140.6 MG/DL (ref 28–217)
CRYSTALS, UA: ABNORMAL /HPF
DIFFERENTIAL TYPE: ABNORMAL
EOSINOPHILS RELATIVE PERCENT: 2 % (ref 0–4)
EPITHELIAL CELLS UA: ABNORMAL /HPF
GFR AFRICAN AMERICAN: 52 ML/MIN
GFR NON-AFRICAN AMERICAN: 43 ML/MIN
GFR SERPL CREATININE-BSD FRML MDRD: ABNORMAL ML/MIN/{1.73_M2}
GFR SERPL CREATININE-BSD FRML MDRD: ABNORMAL ML/MIN/{1.73_M2}
GLUCOSE BLD-MCNC: 118 MG/DL (ref 70–99)
GLUCOSE URINE: NEGATIVE
HCT VFR BLD CALC: 45.7 % (ref 36–46)
HDLC SERPL-MCNC: 63 MG/DL
HEMOGLOBIN: 15.4 G/DL (ref 12–16)
IMMATURE GRANULOCYTES: ABNORMAL %
KETONES, URINE: NEGATIVE
LDL CHOLESTEROL: 68 MG/DL (ref 0–130)
LEUKOCYTE ESTERASE, URINE: ABNORMAL
LYMPHOCYTES # BLD: 25 % (ref 24–44)
MAGNESIUM: 2.1 MG/DL (ref 1.6–2.6)
MCH RBC QN AUTO: 31.9 PG (ref 26–34)
MCHC RBC AUTO-ENTMCNC: 33.6 G/DL (ref 31–37)
MCV RBC AUTO: 94.7 FL (ref 80–100)
MONOCYTES # BLD: 11 % (ref 1–7)
MUCUS: ABNORMAL
NITRITE, URINE: NEGATIVE
NRBC AUTOMATED: ABNORMAL PER 100 WBC
OTHER OBSERVATIONS UA: ABNORMAL
PDW BLD-RTO: 13.5 % (ref 11.5–14.9)
PH UA: 6 (ref 5–8)
PHOSPHORUS: 3.5 MG/DL (ref 2.6–4.5)
PLATELET # BLD: 207 K/UL (ref 150–450)
PLATELET ESTIMATE: ABNORMAL
PMV BLD AUTO: 7.8 FL (ref 6–12)
POTASSIUM SERPL-SCNC: 4.6 MMOL/L (ref 3.7–5.3)
PROTEIN UA: NEGATIVE
RBC # BLD: 4.83 M/UL (ref 4–5.2)
RBC # BLD: ABNORMAL 10*6/UL
RBC UA: ABNORMAL /HPF
RENAL EPITHELIAL, UA: ABNORMAL /HPF
SEG NEUTROPHILS: 61 % (ref 36–66)
SEGMENTED NEUTROPHILS ABSOLUTE COUNT: 4.4 K/UL (ref 1.3–9.1)
SODIUM BLD-SCNC: 140 MMOL/L (ref 135–144)
SPECIFIC GRAVITY UA: 1.01 (ref 1–1.03)
TOTAL PROTEIN, URINE: 14 MG/DL
TOTAL PROTEIN: 7.2 G/DL (ref 6.4–8.3)
TRICHOMONAS: ABNORMAL
TRIGL SERPL-MCNC: 109 MG/DL
TURBIDITY: CLEAR
URINE HGB: NEGATIVE
URINE TOTAL PROTEIN CREATININE RATIO: 0.1 (ref 0–0.2)
UROBILINOGEN, URINE: NORMAL
VLDLC SERPL CALC-MCNC: NORMAL MG/DL (ref 1–30)
WBC # BLD: 7.1 K/UL (ref 3.5–11)
WBC # BLD: ABNORMAL 10*3/UL
WBC UA: ABNORMAL /HPF
YEAST: ABNORMAL

## 2021-06-01 PROCEDURE — 84100 ASSAY OF PHOSPHORUS: CPT

## 2021-06-01 PROCEDURE — 80061 LIPID PANEL: CPT

## 2021-06-01 PROCEDURE — 84156 ASSAY OF PROTEIN URINE: CPT

## 2021-06-01 PROCEDURE — 36415 COLL VENOUS BLD VENIPUNCTURE: CPT

## 2021-06-01 PROCEDURE — 81001 URINALYSIS AUTO W/SCOPE: CPT

## 2021-06-01 PROCEDURE — 80053 COMPREHEN METABOLIC PANEL: CPT

## 2021-06-01 PROCEDURE — 82570 ASSAY OF URINE CREATININE: CPT

## 2021-06-01 PROCEDURE — 85025 COMPLETE CBC W/AUTO DIFF WBC: CPT

## 2021-06-01 PROCEDURE — 83735 ASSAY OF MAGNESIUM: CPT

## 2021-06-28 ENCOUNTER — PATIENT MESSAGE (OUTPATIENT)
Dept: FAMILY MEDICINE CLINIC | Age: 81
End: 2021-06-28

## 2021-06-28 DIAGNOSIS — F51.04 PSYCHOPHYSIOLOGICAL INSOMNIA: Primary | ICD-10-CM

## 2021-06-28 DIAGNOSIS — F33.42 MDD (MAJOR DEPRESSIVE DISORDER), RECURRENT, IN FULL REMISSION (HCC): ICD-10-CM

## 2021-06-28 RX ORDER — ESZOPICLONE 2 MG/1
2 TABLET, FILM COATED ORAL NIGHTLY PRN
Qty: 30 TABLET | Refills: 0 | Status: SHIPPED | OUTPATIENT
Start: 2021-06-28 | End: 2021-07-28 | Stop reason: SDUPTHER

## 2021-06-28 NOTE — TELEPHONE ENCOUNTER
06/02/2021  1   04/20/2021  Eszopiclone 2 MG Tablet  30.00  30 [address]   [id number]   The ([id number])   1  0.66 LME  Medicare   [place]   04/20/2021  1   04/20/2021  Eszopiclone 2 MG Tablet  30.00  30 Re Gau   [id number]   The ([id number])   0  0.66 LME  Medicare [place]   01/27/2021  1   01/19/2021  Eszopiclone 3 MG Tablet  90.00  [address]

## 2021-06-28 NOTE — TELEPHONE ENCOUNTER
From: Hermelinda Lieberman  To: Michelle Tee MD  Sent: 2021 1:58 PM EDT  Subject: Prescription Question    Dr Irasema Conley would you please write a script for Eszopclone 2mg and send it to [facility] in [place]? When I saw Carley Santiago she prescribed them along with melatonin. The script has . Also would you write it for 90 days so I'll have enough until I visit you in August as I'm going to [place] until . I only have four pills left. POD 1 s/p ex lap and TAC with end ileostomy and significant lysis of adhesion.  Pt critically ill with cachexia secondary to lymphoma acute kidney injury to c. Diff colitis. Pt had TAC secondary hemorrhage resulting from fulminant pseudomembranous colitis    Pt surgery completed at 5 PM yesterday. In surgery significant blood loss (approx 900cc) secondary to severe adhesion and inflammation  He did receive 2u PRBC overnight.  He remains extubated and conversant.  No complaints of pain.  He is weak.   Pt started on mitesh overnight with SBP in 110s at time of my visit.  Mitesh at 3.5 mcg    Awake ,weak, frail  Coarse BS  Sinus HR in 90s  Abd: soft/nd appropriatettp.  Ostomy pink and viable  Akil in place.  Significant output initially.  Has tapered into the morning.  100 cc's of serosanguinous fluid every 2 hours.  ( pt with significant weeping from hypoaluminemia)  2+ pulses B    Lab Results   Component Value Date    WBC 21.70 (H) 11/02/2020    HGB 7.9 (L) 11/02/2020    HCT 22.5 (L) 11/01/2020    MCV 92 11/01/2020     (L) 11/01/2020         CMP  Sodium   Date Value Ref Range Status   11/02/2020 134 (L) 136 - 145 mmol/L Final     Potassium   Date Value Ref Range Status   11/02/2020 5.1 3.5 - 5.1 mmol/L Final     Chloride   Date Value Ref Range Status   11/02/2020 120 (H) 95 - 110 mmol/L Final     CO2   Date Value Ref Range Status   11/02/2020 <5 (LL) 23 - 29 mmol/L Final     Comment:     calcium and CO2  critical result(s) called and verbal readback   obtained from [facility] by FRANCESCA 11/02/2020 06:24       Glucose   Date Value Ref Range Status   11/02/2020 121 (H) 70 - 110 mg/dL Final     BUN   Date Value Ref Range Status   11/02/2020 38 (H) 8 - 23 mg/dL Final     Creatinine   Date Value Ref Range Status   11/02/2020 2.4 (H) 0.5 - 1.4 mg/dL Final     Calcium   Date Value Ref Range Status   11/02/2020 6.7 (LL) 8.7 - 10.5 mg/dL Final     Comment:     calcium and CO2  critical result(s) called and verbal readback    obtained from a[facility] by FRANCESCA 11/02/2020 06:24       Total Protein   Date Value Ref Range Status   11/02/2020 4.3 (L) 6.0 - 8.4 g/dL Final     Albumin   Date Value Ref Range Status   11/02/2020 1.7 (L) 3.5 - 5.2 g/dL Final     Total Bilirubin   Date Value Ref Range Status   11/02/2020 0.8 0.1 - 1.0 mg/dL Final     Comment:     For infants and newborns, interpretation of results should be based  on gestational age, weight and in agreement with clinical  observations.  Premature Infant recommended reference ranges:  Up to 24 hours.............<8.0 mg/dL  Up to 48 hours............<12.0 mg/dL  3-5 days..................<15.0 mg/dL  6-29 days.................<15.0 mg/dL       Alkaline Phosphatase   Date Value Ref Range Status   11/02/2020 41 (L) 55 - 135 U/L Final     AST   Date Value Ref Range Status   11/02/2020 47 (H) 10 - 40 U/L Final     ALT   Date Value Ref Range Status   11/02/2020 14 10 - 44 U/L Final     Anion Gap   Date Value Ref Range Status   11/02/2020 Unable to calculate 8 - 16 mmol/L Final     eGFR if    Date Value Ref Range Status   11/02/2020 31 (A) >60 mL/min/1.73 m^2 Final     eGFR if non    Date Value Ref Range Status   11/02/2020 27 (A) >60 mL/min/1.73 m^2 Final     Comment:     Calculation used to obtain the estimated glomerular filtration  rate (eGFR) is the CKD-EPI equation.          A/P: s/p TAC with end ileostomy  Pt critically ill with poor overall prognosis  Cont abx  Repeat h/h; if hgb less than 7.5 recommend transfusion 2 u PRBC.  I do suspect that he may require another 2 units.  Recommend starting levophed drip and weaning darlene to off

## 2021-07-08 ENCOUNTER — HOSPITAL ENCOUNTER (OUTPATIENT)
Age: 81
Discharge: HOME | End: 2021-07-08
Payer: MEDICARE

## 2021-07-08 DIAGNOSIS — N18.31 STAGE 3A CHRONIC KIDNEY DISEASE (HCC): ICD-10-CM

## 2021-07-08 DIAGNOSIS — N28.1 CYST OF LEFT KIDNEY: ICD-10-CM

## 2021-07-08 LAB
ANION GAP SERPL CALCULATED.3IONS-SCNC: 9 MMOL/L (ref 9–17)
BUN BLDV-MCNC: 13 MG/DL (ref 8–23)
BUN/CREAT BLD: ABNORMAL (ref 9–20)
CALCIUM SERPL-MCNC: 8.5 MG/DL (ref 8.6–10.4)
CHLORIDE BLD-SCNC: 99 MMOL/L (ref 98–107)
CO2: 25 MMOL/L (ref 20–31)
CREAT SERPL-MCNC: 0.99 MG/DL (ref 0.5–0.9)
GFR AFRICAN AMERICAN: >60 ML/MIN
GFR NON-AFRICAN AMERICAN: 54 ML/MIN
GFR SERPL CREATININE-BSD FRML MDRD: ABNORMAL ML/MIN/{1.73_M2}
GFR SERPL CREATININE-BSD FRML MDRD: ABNORMAL ML/MIN/{1.73_M2}
GLUCOSE BLD-MCNC: 129 MG/DL (ref 70–99)
POTASSIUM SERPL-SCNC: 4.8 MMOL/L (ref 3.7–5.3)
SODIUM BLD-SCNC: 133 MMOL/L (ref 135–144)

## 2021-07-08 PROCEDURE — 80048 BASIC METABOLIC PNL TOTAL CA: CPT

## 2021-07-08 PROCEDURE — 36415 COLL VENOUS BLD VENIPUNCTURE: CPT

## 2021-07-28 ENCOUNTER — PATIENT MESSAGE (OUTPATIENT)
Dept: FAMILY MEDICINE CLINIC | Age: 81
End: 2021-07-28

## 2021-07-28 DIAGNOSIS — F51.04 PSYCHOPHYSIOLOGICAL INSOMNIA: ICD-10-CM

## 2021-07-28 DIAGNOSIS — F33.42 MDD (MAJOR DEPRESSIVE DISORDER), RECURRENT, IN FULL REMISSION (HCC): ICD-10-CM

## 2021-07-28 RX ORDER — ESZOPICLONE 2 MG/1
2 TABLET, FILM COATED ORAL NIGHTLY PRN
Qty: 30 TABLET | Refills: 0 | Status: SHIPPED | OUTPATIENT
Start: 2021-07-28 | End: 2021-08-27

## 2021-07-28 NOTE — TELEPHONE ENCOUNTER
Please Approve or Refuse.   Send to Pharmacy per Pt's Request:      Next Visit Date:  8/12/2021   Last Visit Date: 4/20/2021    Hemoglobin A1C (%)   Date Value   03/16/2020 5.4   04/30/2019 5.4   03/12/2018 5.5             ( goal A1C is < 7)   BP Readings from Last 3 Encounters:   06/08/21 134/78   04/20/21 132/80   02/05/21 (!) 156/78          (goal 120/80)  BUN   Date Value Ref Range Status   07/08/2021 13 8 - 23 mg/dL Final     CREATININE   Date Value Ref Range Status   07/08/2021 0.99 (H) 0.50 - 0.90 mg/dL Final     Potassium   Date Value Ref Range Status   07/08/2021 4.8 3.7 - 5.3 mmol/L Final

## 2021-07-28 NOTE — TELEPHONE ENCOUNTER
From: Luis Lozano  To: Jadyn Mcmahon MD  Sent: 7/28/2021 10:19 AM EDT  Subject: Prescription Question    Dr. Reji Webb  Please renew my prescription for Eszopiclone 2mg for 30 days at [facility]. I only have 3 pills left. Thank you.   Luis Lozano

## 2021-07-30 ENCOUNTER — TELEPHONE (OUTPATIENT)
Dept: INFUSION THERAPY | Age: 81
End: 2021-07-30

## 2021-08-03 ENCOUNTER — HOSPITAL ENCOUNTER (OUTPATIENT)
Dept: INFUSION THERAPY | Age: 81
Setting detail: INFUSION SERIES
Discharge: HOME | End: 2021-08-03
Payer: MEDICARE

## 2021-08-03 VITALS
RESPIRATION RATE: 17 BRPM | DIASTOLIC BLOOD PRESSURE: 62 MMHG | OXYGEN SATURATION: 100 % | SYSTOLIC BLOOD PRESSURE: 141 MMHG | HEART RATE: 81 BPM | TEMPERATURE: 96.3 F

## 2021-08-03 DIAGNOSIS — M81.0 OSTEOPOROSIS, UNSPECIFIED OSTEOPOROSIS TYPE, UNSPECIFIED PATHOLOGICAL FRACTURE PRESENCE: Primary | ICD-10-CM

## 2021-08-03 PROCEDURE — 96372 THER/PROPH/DIAG INJ SC/IM: CPT

## 2021-08-03 PROCEDURE — 6360000002 HC RX W HCPCS: Performed by: INTERNAL MEDICINE

## 2021-08-03 RX ORDER — DIPHENHYDRAMINE HYDROCHLORIDE 50 MG/ML
50 INJECTION INTRAMUSCULAR; INTRAVENOUS ONCE
Status: CANCELLED | OUTPATIENT
Start: 2022-02-01 | End: 2022-02-01

## 2021-08-03 RX ORDER — METHYLPREDNISOLONE SODIUM SUCCINATE 125 MG/2ML
125 INJECTION, POWDER, LYOPHILIZED, FOR SOLUTION INTRAMUSCULAR; INTRAVENOUS ONCE
Status: CANCELLED | OUTPATIENT
Start: 2022-02-01 | End: 2022-02-01

## 2021-08-03 RX ORDER — EPINEPHRINE 1 MG/ML
0.3 INJECTION, SOLUTION, CONCENTRATE INTRAVENOUS PRN
Status: CANCELLED | OUTPATIENT
Start: 2022-02-01

## 2021-08-03 RX ORDER — SODIUM CHLORIDE 9 MG/ML
INJECTION, SOLUTION INTRAVENOUS CONTINUOUS
Status: CANCELLED | OUTPATIENT
Start: 2022-02-01

## 2021-08-03 RX ADMIN — DENOSUMAB 60 MG: 60 INJECTION SUBCUTANEOUS at 13:04

## 2021-08-03 NOTE — PROGRESS NOTES
Pt arrived for Prolia injection. Vitals obtained. Labs previously drawn. Labs within written parameters. Injection given in L arm. Pt tolerated well. No s/s adverse reaction noted. Pt discharged home, ambulatory per self.

## 2021-08-12 ENCOUNTER — OFFICE VISIT (OUTPATIENT)
Dept: FAMILY MEDICINE CLINIC | Age: 81
End: 2021-08-12
Payer: MEDICARE

## 2021-08-12 VITALS
WEIGHT: 132.6 LBS | TEMPERATURE: 97.2 F | SYSTOLIC BLOOD PRESSURE: 120 MMHG | BODY MASS INDEX: 23.5 KG/M2 | HEIGHT: 63 IN | DIASTOLIC BLOOD PRESSURE: 78 MMHG | HEART RATE: 76 BPM | OXYGEN SATURATION: 97 %

## 2021-08-12 DIAGNOSIS — E53.8 VITAMIN B 12 DEFICIENCY: ICD-10-CM

## 2021-08-12 DIAGNOSIS — Z00.00 ROUTINE GENERAL MEDICAL EXAMINATION AT A HEALTH CARE FACILITY: Primary | ICD-10-CM

## 2021-08-12 DIAGNOSIS — N18.32 STAGE 3B CHRONIC KIDNEY DISEASE (HCC): ICD-10-CM

## 2021-08-12 DIAGNOSIS — Z51.81 MEDICATION MONITORING ENCOUNTER: ICD-10-CM

## 2021-08-12 DIAGNOSIS — F51.04 PSYCHOPHYSIOLOGICAL INSOMNIA: ICD-10-CM

## 2021-08-12 DIAGNOSIS — E21.3 HYPERPARATHYROIDISM (HCC): ICD-10-CM

## 2021-08-12 PROCEDURE — G0439 PPPS, SUBSEQ VISIT: HCPCS | Performed by: FAMILY MEDICINE

## 2021-08-12 RX ORDER — ZALEPLON 5 MG/1
5 CAPSULE ORAL NIGHTLY
Qty: 7 CAPSULE | Refills: 0 | Status: SHIPPED | OUTPATIENT
Start: 2021-08-12 | End: 2021-08-19

## 2021-08-12 SDOH — ECONOMIC STABILITY: FOOD INSECURITY: WITHIN THE PAST 12 MONTHS, YOU WORRIED THAT YOUR FOOD WOULD RUN OUT BEFORE YOU GOT MONEY TO BUY MORE.: NEVER TRUE

## 2021-08-12 SDOH — ECONOMIC STABILITY: FOOD INSECURITY: WITHIN THE PAST 12 MONTHS, THE FOOD YOU BOUGHT JUST DIDN'T LAST AND YOU DIDN'T HAVE MONEY TO GET MORE.: NEVER TRUE

## 2021-08-12 ASSESSMENT — LIFESTYLE VARIABLES
HOW OFTEN DURING THE LAST YEAR HAVE YOU BEEN UNABLE TO REMEMBER WHAT HAPPENED THE NIGHT BEFORE BECAUSE YOU HAD BEEN DRINKING: 0
HAVE YOU OR SOMEONE ELSE BEEN INJURED AS A RESULT OF YOUR DRINKING: 0
AUDIT-C TOTAL SCORE: 4
HAS A RELATIVE, FRIEND, DOCTOR, OR ANOTHER HEALTH PROFESSIONAL EXPRESSED CONCERN ABOUT YOUR DRINKING OR SUGGESTED YOU CUT DOWN: 0
HOW OFTEN DO YOU HAVE A DRINK CONTAINING ALCOHOL: 4
HOW OFTEN DURING THE LAST YEAR HAVE YOU FAILED TO DO WHAT WAS NORMALLY EXPECTED FROM YOU BECAUSE OF DRINKING: 0
AUDIT TOTAL SCORE: 4
HOW OFTEN DURING THE LAST YEAR HAVE YOU NEEDED AN ALCOHOLIC DRINK FIRST THING IN THE MORNING TO GET YOURSELF GOING AFTER A NIGHT OF HEAVY DRINKING: 0
HOW OFTEN DURING THE LAST YEAR HAVE YOU HAD A FEELING OF GUILT OR REMORSE AFTER DRINKING: 0
HOW OFTEN DO YOU HAVE SIX OR MORE DRINKS ON ONE OCCASION: 0
HOW OFTEN DURING THE LAST YEAR HAVE YOU FOUND THAT YOU WERE NOT ABLE TO STOP DRINKING ONCE YOU HAD STARTED: 0
HOW MANY STANDARD DRINKS CONTAINING ALCOHOL DO YOU HAVE ON A TYPICAL DAY: 0

## 2021-08-12 ASSESSMENT — PATIENT HEALTH QUESTIONNAIRE - PHQ9
SUM OF ALL RESPONSES TO PHQ QUESTIONS 1-9: 0
1. LITTLE INTEREST OR PLEASURE IN DOING THINGS: 0
SUM OF ALL RESPONSES TO PHQ9 QUESTIONS 1 & 2: 0
2. FEELING DOWN, DEPRESSED OR HOPELESS: 0

## 2021-08-12 ASSESSMENT — VISUAL ACUITY
OS_CC: 20/50
OD_CC: 20/40

## 2021-08-12 ASSESSMENT — SOCIAL DETERMINANTS OF HEALTH (SDOH): HOW HARD IS IT FOR YOU TO PAY FOR THE VERY BASICS LIKE FOOD, HOUSING, MEDICAL CARE, AND HEATING?: NOT HARD AT ALL

## 2021-08-12 NOTE — PATIENT INSTRUCTIONS
Personalized Preventive Plan for Jackie Ayala - 8/12/2021  Medicare offers a range of preventive health benefits. Some of the tests and screenings are paid in full while other may be subject to a deductible, co-insurance, and/or copay. Some of these benefits include a comprehensive review of your medical history including lifestyle, illnesses that may run in your family, and various assessments and screenings as appropriate. After reviewing your medical record and screening and assessments performed today your provider may have ordered immunizations, labs, imaging, and/or referrals for you. A list of these orders (if applicable) as well as your Preventive Care list are included within your After Visit Summary for your review. Other Preventive Recommendations:    · A preventive eye exam performed by an eye specialist is recommended every 1-2 years to screen for glaucoma; cataracts, macular degeneration, and other eye disorders. · A preventive dental visit is recommended every 6 months. · Try to get at least 150 minutes of exercise per week or 10,000 steps per day on a pedometer . · Order or download the FREE \"Exercise & Physical Activity: Your Everyday Guide\" from [employer]. Call 6-675.569.2348 or search [employer] online. · You need 2117-9962 mg of calcium and 3128-5303 IU of vitamin D per day. It is possible to meet your calcium requirement with diet alone, but a vitamin D supplement is usually necessary to meet this goal.  · When exposed to the sun, use a sunscreen that protects against both UVA and UVB radiation with an SPF of 30 or greater. Reapply every 2 to 3 hours or after sweating, drying off with a towel, or swimming. · Always wear a seat belt when traveling in a car. Always wear a helmet when riding a bicycle or motorcycle. Heart-Healthy Diet   Sodium, Fat, and Cholesterol Controlled Diet       What Is a Heart Healthy Diet?    A heart-healthy diet is one that limits sodium , certain types of fat , and cholesterol . This type of diet is recommended for:   People with any form of cardiovascular disease (eg, coronary heart disease , peripheral vascular disease , previous heart attack , previous stroke )   People with risk factors for cardiovascular disease, such as high blood pressure , high cholesterol , or diabetes   Anyone who wants to lower their risk of developing cardiovascular disease   Sodium    Sodium is a mineral found in many foods. In general, most people consume much more sodium than they need. Diets high in sodium can increase blood pressure and lead to edema (water retention). On a heart-healthy diet, you should consume no more than 2,300 mg (milligrams) of sodium per dayabout the amount in one teaspoon of table salt. The foods highest in sodium include table salt (about 50% sodium), processed foods, convenience foods, and preserved foods. Cholesterol    Cholesterol is a fat-like, waxy substance in your blood. Our bodies make some cholesterol. It is also found in animal products, with the highest amounts in fatty meat, egg yolks, whole milk, cheese, shellfish, and organ meats. On a heart-healthy diet, you should limit your cholesterol intake to less than 200 mg per day. It is normal and important to have some cholesterol in your bloodstream. But too much cholesterol can cause plaque to build up within your arteries, which can eventually lead to a heart attack or stroke. The two types of cholesterol that are most commonly referred to are:   Low-density lipoprotein (LDL) cholesterol  Also known as bad cholesterol, this is the cholesterol that tends to build up along your arteries. Bad cholesterol levels are increased by eating fats that are saturated or hydrogenated. Optimal level of this cholesterol is less than 100. Over 130 starts to get risky for heart disease.    High-density lipoprotein (HDL) cholesterol  Also known as good cholesterol, this type of cholesterol actually carries cholesterol away from your arteries and may, therefore, help lower your risk of having a heart attack. You want this level to be high (ideally greater than 60). It is a risk to have a level less than 40. You can raise this good cholesterol by eating olive oil, canola oil, avocados, or nuts. Exercise raises this level, too. Fat    Fat is calorie dense and packs a lot of calories into a small amount of food. Even though fats should be limited due to their high calorie content, not all fats are bad. In fact, some fats are quite healthful. Fat can be broken down into four main types. The good-for-you fats are:   Monounsaturated fat  found in oils such as olive and canola, avocados, and nuts and natural nut butters; can decrease cholesterol levels, while keeping levels of HDL cholesterol high   Polyunsaturated fat  found in oils such as safflower, sunflower, soybean, corn, and sesame; can decrease total cholesterol and LDL cholesterol   Omega-3 fatty acids  particularly those found in fatty fish (such as salmon, trout, tuna, mackerel, herring, and sardines); can decrease risk of arrhythmias, decrease triglyceride levels, and slightly lower blood pressure   The fats that you want to limit are:   Saturated fat  found in animal products, many fast foods, and a few vegetables; increases total blood cholesterol, including LDL levels   Animal fats that are saturated include: butter, lard, whole-milk dairy products, meat fat, and poultry skin   Vegetable fats that are saturated include: hydrogenated shortening, palm oil, coconut oil, cocoa butter   Hydrogenated or trans fat  found in margarine and vegetable shortening, most shelf stable snack foods, and fried foods; increases LDL and decreases HDL     It is generally recommended that you limit your total fat for the day to less than 30% of your total calories.  If you follow an 1800-calorie heart healthy diet, for example, this would mean 60 grams of fat or less per day. Saturated fat and trans fat in your diet raises your blood cholesterol the most, much more than dietary cholesterol does. For this reason, on a heart-healthy diet, less than 7% of your calories should come from saturated fat and ideally 0% from trans fat. On an 1800-calorie diet, this translates into less than 14 grams of saturated fat per day, leaving 46 grams of fat to come from mono- and polyunsaturated fats.    Food Choices on a Heart Healthy Diet   Food Category   Foods Recommended   Foods to Avoid   Grains   Breads and rolls without salted tops Most dry and cooked cereals Unsalted crackers and breadsticks Low-sodium or homemade breadcrumbs or stuffing All rice and pastas   Breads, rolls, and crackers with salted tops High-fat baked goods (eg, muffins, donuts, pastries) Quick breads, self-rising flour, and biscuit mixes Regular bread crumbs Instant hot cereals Commercially prepared rice, pasta, or stuffing mixes   Vegetables   Most fresh, frozen, and low-sodium canned vegetables Low-sodium and salt-free vegetable juices Canned vegetables if unsalted or rinsed   Regular canned vegetables and juices, including sauerkraut and pickled vegetables Frozen vegetables with sauces Commercially prepared potato and vegetable mixes   Fruits   Most fresh, frozen, and canned fruits All fruit juices   Fruits processed with salt or sodium   Milk   Nonfat or low-fat (1%) milk Nonfat or low-fat yogurt Cottage cheese, low-fat ricotta, cheeses labeled as low-fat and low-sodium   Whole milk Reduced-fat (2%) milk Malted and chocolate milk Full fat yogurt Most cheeses (unless low-fat and low salt) Buttermilk (no more than 1 cup per week)   Meats and Beans   Lean cuts of fresh or frozen beef, veal, lamb, or pork (look for the word loin) Fresh or frozen poultry without the skin Fresh or frozen fish and some shellfish Egg whites and egg substitutes (Limit whole eggs to three per week) Tofu Nuts or seeds (unsalted, dry-roasted), low-sodium peanut butter Dried peas, beans, and lentils   Any smoked, cured, salted, or canned meat, fish, or poultry (including cook, chipped beef, cold cuts, hot dogs, sausages, sardines, and anchovies) Poultry skins Breaded and/or fried fish or meats Canned peas, beans, and lentils Salted nuts   Fats and Oils   Olive oil and canola oil Low-sodium, low-fat salad dressings and mayonnaise   Butter, margarine, coconut and palm oils, cook fat   Snacks, Sweets, and Condiments   Low-sodium or unsalted versions of broths, soups, soy sauce, and condiments Pepper, herbs, and spices; vinegar, lemon, or lime juice Low-fat frozen desserts (yogurt, sherbet, fruit bars) Sugar, cocoa powder, honey, syrup, jam, and preserves Low-fat, trans-fat free cookies, cakes, and pies David and animal crackers, fig bars, keith snaps   High-fat desserts Broth, soups, gravies, and sauces, made from instant mixes or other high-sodium ingredients Salted snack foods Canned olives Meat tenderizers, seasoning salt, and most flavored vinegars   Beverages   Low-sodium carbonated beverages Tea and coffee in moderation Soy milk   Commercially softened water   Suggestions   Make whole grains, fruits, and vegetables the base of your diet. Choose heart-healthy fats such as canola, olive, and flaxseed oil, and foods high in heart-healthy fats, such as nuts, seeds, soybeans, tofu, and fish. Eat fish at least twice per week; the fish highest in omega-3 fatty acids and lowest in mercury include salmon, herring, mackerel, sardines, and canned chunk light tuna. If you eat fish less than twice per week or have high triglycerides, talk to your doctor about taking fish oil supplements. Read food labels.    For products low in fat and cholesterol, look for fat free, low-fat, cholesterol free, saturated fat free, and trans fat freeAlso scan the Nutrition Facts Label, which lists saturated fat, trans fat, and cholesterol amounts. For products low in sodium, look for sodium free, very low sodium, low sodium, no added salt, and unsalted   Skip the salt when cooking or at the table; if food needs more flavor, get creative and try out different herbs and spices. Garlic and onion also add substantial flavor to foods. Trim any visible fat off meat and poultry before cooking, and drain the fat off after vivar. Use cooking methods that require little or no added fat, such as grilling, boiling, baking, poaching, broiling, roasting, steaming, stir-frying, and sauting. Avoid fast food and convenience food. They tend to be high in saturated and trans fat and have a lot of added salt. Talk to a registered dietitian for individualized diet advice. Last Reviewed: March 2011 Vishnu Lizarraga MS, MPH, RD   Updated: 3/29/2011   ·     Heart-Healthy Diet   Sodium, Fat, and Cholesterol Controlled Diet       What Is a Heart Healthy Diet? A heart-healthy diet is one that limits sodium , certain types of fat , and cholesterol . This type of diet is recommended for:   People with any form of cardiovascular disease (eg, coronary heart disease , peripheral vascular disease , previous heart attack , previous stroke )   People with risk factors for cardiovascular disease, such as high blood pressure , high cholesterol , or diabetes   Anyone who wants to lower their risk of developing cardiovascular disease   Sodium    Sodium is a mineral found in many foods. In general, most people consume much more sodium than they need. Diets high in sodium can increase blood pressure and lead to edema (water retention). On a heart-healthy diet, you should consume no more than 2,300 mg (milligrams) of sodium per dayabout the amount in one teaspoon of table salt. The foods highest in sodium include table salt (about 50% sodium), processed foods, convenience foods, and preserved foods.    Cholesterol    Cholesterol is a fat-like, waxy substance in your blood. Our bodies make some cholesterol. It is also found in animal products, with the highest amounts in fatty meat, egg yolks, whole milk, cheese, shellfish, and organ meats. On a heart-healthy diet, you should limit your cholesterol intake to less than 200 mg per day. It is normal and important to have some cholesterol in your bloodstream. But too much cholesterol can cause plaque to build up within your arteries, which can eventually lead to a heart attack or stroke. The two types of cholesterol that are most commonly referred to are:   Low-density lipoprotein (LDL) cholesterol  Also known as bad cholesterol, this is the cholesterol that tends to build up along your arteries. Bad cholesterol levels are increased by eating fats that are saturated or hydrogenated. Optimal level of this cholesterol is less than 100. Over 130 starts to get risky for heart disease. High-density lipoprotein (HDL) cholesterol  Also known as good cholesterol, this type of cholesterol actually carries cholesterol away from your arteries and may, therefore, help lower your risk of having a heart attack. You want this level to be high (ideally greater than 60). It is a risk to have a level less than 40. You can raise this good cholesterol by eating olive oil, canola oil, avocados, or nuts. Exercise raises this level, too. Fat    Fat is calorie dense and packs a lot of calories into a small amount of food. Even though fats should be limited due to their high calorie content, not all fats are bad. In fact, some fats are quite healthful. Fat can be broken down into four main types.    The good-for-you fats are:   Monounsaturated fat  found in oils such as olive and canola, avocados, and nuts and natural nut butters; can decrease cholesterol levels, while keeping levels of HDL cholesterol high   Polyunsaturated fat  found in oils such as safflower, sunflower, soybean, corn, and sesame; can decrease total cholesterol and LDL cholesterol   Omega-3 fatty acids  particularly those found in fatty fish (such as salmon, trout, tuna, mackerel, herring, and sardines); can decrease risk of arrhythmias, decrease triglyceride levels, and slightly lower blood pressure   The fats that you want to limit are:   Saturated fat  found in animal products, many fast foods, and a few vegetables; increases total blood cholesterol, including LDL levels   Animal fats that are saturated include: butter, lard, whole-milk dairy products, meat fat, and poultry skin   Vegetable fats that are saturated include: hydrogenated shortening, palm oil, coconut oil, cocoa butter   Hydrogenated or trans fat  found in margarine and vegetable shortening, most shelf stable snack foods, and fried foods; increases LDL and decreases HDL     It is generally recommended that you limit your total fat for the day to less than 30% of your total calories. If you follow an 1800-calorie heart healthy diet, for example, this would mean 60 grams of fat or less per day. Saturated fat and trans fat in your diet raises your blood cholesterol the most, much more than dietary cholesterol does. For this reason, on a heart-healthy diet, less than 7% of your calories should come from saturated fat and ideally 0% from trans fat. On an 1800-calorie diet, this translates into less than 14 grams of saturated fat per day, leaving 46 grams of fat to come from mono- and polyunsaturated fats.    Food Choices on a Heart Healthy Diet   Food Category   Foods Recommended   Foods to Avoid   Grains   Breads and rolls without salted tops Most dry and cooked cereals Unsalted crackers and breadsticks Low-sodium or homemade breadcrumbs or stuffing All rice and pastas   Breads, rolls, and crackers with salted tops High-fat baked goods (eg, muffins, donuts, pastries) Quick breads, self-rising flour, and biscuit mixes Regular bread crumbs Instant hot cereals Commercially prepared rice, pasta, or stuffing mixes Vegetables   Most fresh, frozen, and low-sodium canned vegetables Low-sodium and salt-free vegetable juices Canned vegetables if unsalted or rinsed   Regular canned vegetables and juices, including sauerkraut and pickled vegetables Frozen vegetables with sauces Commercially prepared potato and vegetable mixes   Fruits   Most fresh, frozen, and canned fruits All fruit juices   Fruits processed with salt or sodium   Milk   Nonfat or low-fat (1%) milk Nonfat or low-fat yogurt Cottage cheese, low-fat ricotta, cheeses labeled as low-fat and low-sodium   Whole milk Reduced-fat (2%) milk Malted and chocolate milk Full fat yogurt Most cheeses (unless low-fat and low salt) Buttermilk (no more than 1 cup per week)   Meats and Beans   Lean cuts of fresh or frozen beef, veal, lamb, or pork (look for the word loin) Fresh or frozen poultry without the skin Fresh or frozen fish and some shellfish Egg whites and egg substitutes (Limit whole eggs to three per week) Tofu Nuts or seeds (unsalted, dry-roasted), low-sodium peanut butter Dried peas, beans, and lentils   Any smoked, cured, salted, or canned meat, fish, or poultry (including cook, chipped beef, cold cuts, hot dogs, sausages, sardines, and anchovies) Poultry skins Breaded and/or fried fish or meats Canned peas, beans, and lentils Salted nuts   Fats and Oils   Olive oil and canola oil Low-sodium, low-fat salad dressings and mayonnaise   Butter, margarine, coconut and palm oils, cook fat   Snacks, Sweets, and Condiments   Low-sodium or unsalted versions of broths, soups, soy sauce, and condiments Pepper, herbs, and spices; vinegar, lemon, or lime juice Low-fat frozen desserts (yogurt, sherbet, fruit bars) Sugar, cocoa powder, honey, syrup, jam, and preserves Low-fat, trans-fat free cookies, cakes, and pies David and animal crackers, fig bars, keith snaps   High-fat desserts Broth, soups, gravies, and sauces, made from instant mixes or other high-sodium ingredients Salted snack foods Canned olives Meat tenderizers, seasoning salt, and most flavored vinegars   Beverages   Low-sodium carbonated beverages Tea and coffee in moderation Soy milk   Commercially softened water   Suggestions   Make whole grains, fruits, and vegetables the base of your diet. Choose heart-healthy fats such as canola, olive, and flaxseed oil, and foods high in heart-healthy fats, such as nuts, seeds, soybeans, tofu, and fish. Eat fish at least twice per week; the fish highest in omega-3 fatty acids and lowest in mercury include salmon, herring, mackerel, sardines, and canned chunk light tuna. If you eat fish less than twice per week or have high triglycerides, talk to your doctor about taking fish oil supplements. Read food labels. For products low in fat and cholesterol, look for fat free, low-fat, cholesterol free, saturated fat free, and trans fat freeAlso scan the Nutrition Facts Label, which lists saturated fat, trans fat, and cholesterol amounts. For products low in sodium, look for sodium free, very low sodium, low sodium, no added salt, and unsalted   Skip the salt when cooking or at the table; if food needs more flavor, get creative and try out different herbs and spices. Garlic and onion also add substantial flavor to foods. Trim any visible fat off meat and poultry before cooking, and drain the fat off after vivar. Use cooking methods that require little or no added fat, such as grilling, boiling, baking, poaching, broiling, roasting, steaming, stir-frying, and sauting. Avoid fast food and convenience food. They tend to be high in saturated and trans fat and have a lot of added salt. Talk to a registered dietitian for individualized diet advice.       Last Reviewed: March 2011 Kaveh Harley MS, MPH, RD   Updated: 3/29/2011   ·     Preventing Osteoporosis: After Your Visit  Your Care Instructions  Osteoporosis means the bones are weak and thin enough that they can break easily. The older you are, the more likely you are to get osteoporosis. But with plenty of calcium, vitamin D, and exercise, you can help prevent osteoporosis. The preteen and teen years are a key time for bone building. With the help of calcium, vitamin D, and exercise in those early years and beyond, the bones reach their peak density and strength by age 27. After age 27, your bones naturally start to thin and weaken. The stronger your bones are at around age 27, the lower your risk for osteoporosis. But no matter what your age and risk are, your bones still need calcium, vitamin D, and exercise to stay strong. Also avoid smoking, and limit alcohol. Smoking and heavy alcohol use can make your bones thinner. Talk to your doctor about any special risks you might have, such as having a close relative with osteoporosis or taking a medicine that can weaken bones. Your doctor can tell you the best ways to protect your bones from thinning. Follow-up care is a key part of your treatment and safety. Be sure to make and go to all appointments, and call your doctor if you are having problems. It's also a good idea to know your test results and keep a list of the medicines you take. How can you care for yourself at home? Get enough calcium and vitamin D. The [employer] recommends adults younger than age 46 need 1,000 mg of calcium and 600 IU of vitamin D each day. Women ages 46 to 79 need 1,200 mg of calcium and 600 IU of vitamin D each day. Men ages 46 to 79 need 1,000 mg of calcium and 600 IU of vitamin D each day. Adults 71 and older need 1,200 mg of calcium and 800 IU of vitamin D each day. Eat foods rich in calcium, like yogurt, cheese, milk, and dark green vegetables. Eat foods rich in vitamin D, like eggs, fatty fish, cereal, and fortified milk. Get some sunshine. Your body uses sunshine to make its own vitamin D. The safest time to be out in the sun is before 10 a.m. or after 3 p.m.  Avoid getting sunburned. Sunburn can increase your risk of skin cancer. Talk to your doctor about taking a calcium plus vitamin D supplement. Ask about what type of calcium is right for you, and how much to take at a time. Adults ages 23 to 48 should not get more than 2,500 mg of calcium and 4,000 IU of vitamin D each day, whether it is from supplements and/or food. Adults ages 46 and older should not get more than 2,000 mg of calcium and 4,000 IU of vitamin D each day from supplements and/or food. Get regular bone-building exercise. Weight-bearing and resistance exercises keep bones healthy by working the muscles and bones against gravity. Start out at an exercise level that feels right for you. Add a little at a time until you can do the following:  Do 30 minutes of weight-bearing exercise on most days of the week. Walking, jogging, stair climbing, and dancing are good choices. Do resistance exercises with weights or elastic bands 2 to 3 days a week. Limit alcohol. Drink no more than 1 alcohol drink a day if you are a woman. Drink no more than 2 alcohol drinks a day if you are a man. Do not smoke. Smoking can make bones thin faster. If you need help quitting, talk to your doctor about stop-smoking programs and medicines. These can increase your chances of quitting for good. When should you call for help? Watch closely for changes in your health, and be sure to contact your doctor if:  You need help with a healthy eating plan. You need help with an exercise plan    © 4604-9289 [employer]. Care instructions adapted under license by [facility]. This care instruction is for use with your licensed healthcare professional. If you have questions about a medical condition or this instruction, always ask your healthcare professional. [place] [address] any warranty or liability for your use of this information. Content Version: 9.4.19573;  Last Revised: June 20, 2011              ·     DASH Diet: After Your Visit  Your Care Instructions  The DASH diet is an eating plan that can help lower your blood pressure. DASH stands for Dietary Approaches to Stop Hypertension. Hypertension is high blood pressure. The DASH diet focuses on eating foods that are high in calcium, potassium, and magnesium. These nutrients can lower blood pressure. The foods that are highest in these nutrients are fruits, vegetables, low-fat dairy products, nuts, seeds, and legumes. But taking calcium, potassium, and magnesium supplements instead of eating foods that are high in those nutrients does not have the same effect. The DASH diet also includes whole grains, fish, and poultry. The DASH diet is one of several lifestyle changes your doctor may recommend to lower your high blood pressure. Your doctor may also want you to decrease the amount of sodium in your diet. Lowering sodium while following the DASH diet can lower blood pressure even further than just the DASH diet alone. Follow-up care is a key part of your treatment and safety. Be sure to make and go to all appointments, and call your doctor if you are having problems. Its also a good idea to know your test results and keep a list of the medicines you take. How can you care for yourself at home? Following the DASH diet  Eat 4 to 5 servings of fruit each day. A serving is 1 medium-sized piece of fruit, ½ cup chopped or canned fruit, 1/4 cup dried fruit, or 4 ounces (½ cup) of fruit juice. Choose fruit more often than fruit juice. Eat 4 to 5 servings of vegetables each day. A serving is 1 cup of lettuce or raw leafy vegetables, ½ cup of chopped or cooked vegetables, or 4 ounces (½ cup) of vegetable juice. Choose vegetables more often than vegetable juice. Get 2 to 3 servings of low-fat and fat-free dairy each day. A serving is 8 ounces of milk, 1 cup of yogurt, or 1 ½ ounces of cheese. Eat 7 to 8 servings of grains each day.  A serving is 1 slice of bread, 1 ounce of dry cereal, or ½ cup of cooked rice, pasta, or cooked cereal. Try to choose whole-grain products as much as possible. Limit lean meat, poultry, and fish to 6 ounces each day. Six ounces is about the size of two decks of cards. Eat 4 to 5 servings of nuts, seeds, and legumes (cooked dried beans, lentils, and split peas) each week. A serving is 1/3 cup of nuts, 2 tablespoons of seeds, or ½ cup cooked dried beans or peas. Limit sweets and added sugars to 5 servings or less a week. A serving is 1 tablespoon jelly or jam, ½ cup sorbet, or 1 cup of lemonade. Tips for success  Start small. Do not try to make dramatic changes to your diet all at once. You might feel that you are missing out on your favorite foods and then be more likely to not follow the plan. Make small changes, and stick with them. Once those changes become habit, add a few more changes. Try some of the following:  Make it a goal to eat a fruit or vegetable at every meal and at snacks. This will make it easy to get the recommended amount of fruits and vegetables each day. Try yogurt topped with fruit and nuts for a snack or healthy dessert. Add lettuce, tomato, cucumber, and onion to sandwiches. Combine a ready-made pizza crust with low-fat mozzarella cheese and lots of vegetable toppings. Try using tomatoes, squash, spinach, broccoli, carrots, cauliflower, and onions. Have a variety of cut-up vegetables with a low-fat dip as an appetizer instead of chips and dip. Sprinkle sunflower seeds or chopped almonds over salads. Or try adding chopped walnuts or almonds to cooked vegetables. Try some vegetarian meals using beans and peas. Add garbanzo or kidney beans to salads. Make burritos and tacos with mashed hinojosa beans or black beans    © 0216-3913 [employer], [employer]. Care instructions adapted under license by [facility].  This care instruction is for use with your licensed healthcare professional. If you have questions about a medical condition or this instruction, always ask your healthcare professional. [address] any warranty or liability for your use of this information. Content Version: 9.4.32394; Last Revised: March 15, 2012              ·   Patient information: Weight loss treatments    INTRODUCTION  Obesity is a major international problem, and Americans are among the heaviest people in the world. The percentage of obese people in the [place] has risen steadily. Many people find that although they initially lose weight by dieting, they quickly regain the weight after the diet ends. Because it so hard to keep weight off over time, it is important to have as much information and support as possible before starting a diet. You are most likely to be successful in losing weight and keeping it off when you believe that your body weight can be controlled. STARTING A WEIGHT LOSS PROGRAM  Some people like to talk to their doctor or nurse to get help choosing the best plan, monitoring progress, and getting advice and support along the way. To know what treatment (or combination of treatments) will work best, determine your body mass index (BMI) and waist circumference (measurement). The BMI is calculated from your height and weight. A person with a BMI between 25 and 29.9 is considered overweight   A person with a BMI of 30 or greater is considered to be obese  A waist circumference greater than 35 inches (88 cm) in women and 40 inches (102 cm) in men increases the risk of obesity-related complications, such as heart disease and diabetes. People who are obese and who have a larger waist size may need more aggressive weight loss treatment than others. Talk to your doctor or nurse for advice. Types of treatment  Based on your measurements and your medical history, your doctor or nurse can determine what combination of weight loss treatments would work best for you.  Treatments may include changes in lifestyle, exercise, dieting, and, in some cases, weight loss medicines or weight loss surgery. Weight loss surgery, also called bariatric surgery, is reserved for people with severe obesity who have not responded to other weight loss treatments. SETTING A WEIGHT LOSS GOAL  It is important to set a realistic weight loss goal. Your first goal should be to avoid gaining more weight and staying at your current weight (or within 5 percent). Many people have a \"dream\" weight that is difficult or impossible to achieve. People at high risk of developing diabetes who are able to lose 5 percent of their body weight and maintain this weight will reduce their risk of developing diabetes by about 50 percent and reduce their blood pressure. This is a success. Losing more than 15 percent of your body weight and staying at this weight is an extremely good result, even if you never reach your \"dream\" or \"ideal\" weight. LIFESTYLE CHANGES  Programs that help you to change your lifestyle are usually run by psychologists or other professionals. The goals of lifestyle changes are to help you change your eating habits, become more active, and be more aware of how much you eat and exercise, helping you to make healthier choices. This type of treatment can be broken down into three steps: The triggers that make you want to eat   Eating   What happens after you eat  Triggers to eat  Determining what triggers you to eat involves figuring out what foods you eat and where and when you eat. To figure out what triggers you to eat, keep a record for a few days of everything you eat, the places where you eat, how often you eat, and the emotions you were feeling when you ate. For some people, the trigger is related to a certain time of day or night. For others, the trigger is related to a certain place, like sitting at a desk working.   Eating  You can change your eating habits by breaking the chain of events between the trigger for eating and eating itself. There are many ways to do this. For instance, you can:  Limit where you eat to a few places (eg, dining room)   Restrict the number of utensils (eg, only a fork) used for eating   Drink a sip of water between each bite   Chew your food a certain number of times   Get up and stop eating every few minutes  What happens after you eat  Rewarding yourself for good eating behaviors can help you to develop better habits. This is not a reward for weight loss; instead, it is a reward for changing unhealthy behaviors. Do not use food as a reward. Some people find money, clothing, or personal care (eg, a hair cut, manicure, or massage) to be effective rewards. Treat yourself immediately after making better eating choices to reinforce the value of the good behavior. You need to have clear behavior goals, and you must have a time frame for reaching your goals. Reward small changes along the way to your final goal.  Other factors that contribute to successful weight loss  Changing your behavior involves more than just changing unhealthy eating habits; it also involves finding people around you to support your weight loss, reducing stress, and learning to be strong when tempted by food. Establish a \"carl\" system  Having a friend or family member available to provide support and reinforce good behavior is very helpful. The support person needs to understand your goals. Learn to be strong  Learning to be strong when tempted by food is an important part of losing weight. As an example, you will need to learn how to say \"no\" and continue to say no when urged to eat at parties and social gatherings. Develop strategies for events before you go, such as eating before you go or taking low-calorie snacks and drinks with you. Develop a support system  Having a support system is helpful when losing weight. This is why many commercial groups are successful.  Family support is also essential; if your family does not support your efforts to lose weight, this can slow your progress or even keep you from losing weight. Positive thinking  People often have conversations with themselves in their head; these conversations can be positive or negative. If you eat a piece of cake that was not planned, you may respond by thinking, \"Oh, you stupid idiot, you've blown your diet! \" and as a result, you may eat more cake. A positive thought for the same event could be, \"Well, I ate cake when it was not on my plan. Now I should do something to get back on track. \" A positive approach is much more likely to be successful than a negative one. Reduce stress  Although stress is a part of everyday life, it can trigger uncontrolled eating in some people. It is important to find a way to get through these difficult times without eating or by eating low-calorie food, like raw vegetables. It may be helpful to imagine a relaxing place that allows you to temporarily escape from stress. With deep breaths and closed eyes, you can imagine this relaxing place for a few minutes. Self-help programs  Self-help programs like [place] Watchers®, Overeaters Anonymous®, and Take Off [place] (TOPS)© work for some people. As with all weight loss programs, you are most likely to be successful with these plans if you make long-term changes in how you eat. CHOOSING A DIET  A calorie is a unit of energy found in food. Your body needs calories to function. The goal of any diet is to burn up more calories than you eat. How quickly you lose weight depends upon several factors, such as your age, gender, and starting weight. Older people have a slower metabolism than young people, so they lose weight more slowly. Men lose more weight than women of similar height and weight when dieting because they use more energy.    People who are extremely overweight lose weight more quickly than those who are only mildly overweight. Try not to drink alcohol or drinks with added sugar, and most sweets (candy, cakes, cookies), since they rarely contain important nutrients. Portion-controlled diets  One simple way to diet is to buy packaged foods, like frozen low-calorie meals or meal-replacement canned drinks. A typical meal plan for one day may include:  A meal-replacement drink or breakfast bar for breakfast   A meal-replacement drink or a frozen low-calorie (250 to 350 calories) meal for lunch   A frozen low-calorie meal or other prepackaged, calorie-controlled meal, along with extra vegetables for dinner  This would give you 1000 to 1500 calories per day. Low-fat diet  To reduce the amount of fat in your diet, you can:  Eat low-fat foods. Low-fat foods are those that contain less than 30 percent of calories from fat. Fat is listed on the food facts label (figure 1). Count fat grams. For a 1500 calorie diet, this would mean about 45 g or fewer of fat per day. Low-carbohydrate diet  Low- and very-low-carbohydrate diets (eg, Atkins diet, [employer]) have become popular ways to lose weight quickly. With a very-low-carbohydrate diet, you eat between 0 and 60 grams of carbohydrates per day (a standard diet contains 200 to 300 grams of carbohydrates)   With a low-carbohydrate diet, you eat between 60 and 130 grams of carbohydrates per day  Carbohydrates are found in fruits, vegetables, and grains (including breads, rice, pasta, and cereal), alcoholic beverages, and in dairy products. Meat and fish do not contain carbohydrates. Side effects of very-low-carbohydrate diets can include constipation, headache, bad breath, muscle cramps, diarrhea, and weakness. Mediterranean diet  The term \"Mediterranean diet\" refers to a way of eating that is common in [place]growing regions around the [facility]. Although there is some variation in Mediterranean diets, there are some similarities. Most Mediterranean diets include:   A high level of monounsaturated fats (from olive or canola oil, walnuts, pecans, almonds) and a low level of saturated fats (from butter)   A high amount of vegetables, fruits, legumes, and grains (7 to 10 servings of fruits and vegetables per day)   A moderate amount of milk and dairy products, mostly in the form of cheese. Use low-fat dairy products (skim milk, fat-free yogurt, low-fat cheese). A relatively low amount of red meat and meat products. Substitute fish or poultry for red meat. For those who drink alcohol, a modest amount (mainly as red wine) may help to protect against cardiovascular disease. A modest amount is up to one (4 ounce) glass per day for women and up to two glasses per day for men. Which diet is best?  Studies have compared different diets, including:  Very-low-carbohydrate (Atkins)   Macronutrient balance controlling glycemic load (Zone®)   Reduced-calorie (Weight Watchers®)   Very-low-fat (Ornish)  No one diet is \"best\" for weight loss. Any diet will help you to lose weight if you stick with the diet. Therefore, it is important to choose a diet that includes foods you like. Fad diets  Fad diets often promise quick weight loss (more than 1 to 2 pounds per week) and may claim that you do not need to exercise or give up favorite foods. Some fad diets cost a lot of money, because you have to pay for seminars or pills. Fad diets generally lack any scientific evidence that they are safe and effective, but instead rely on \"before\" and \"after\" photos or testimonials. Diets that sound too good to be true usually are. These plans are a waste of time and money and are not recommended. A doctor, nurse, or nutritionist can help you find a safe and effective way to lose weight and keep it off. WEIGHT LOSS [place] a weight loss medicine may be helpful when used in combination with diet, exercise, and lifestyle changes.  However, it is important to understand the risks and benefits of these medicines. It is also important to be realistic about your goal weight using a weight loss medicine; you may not reach your \"dream\" weight, but you may be able to reduce your risk of diabetes or heart disease. Weight loss medicines may be recommended for people who have not been able to lose weight with diet and exercise who have a:  BMI of 30 or more    BMI between 27 and 29.9 and have other medical problems, such as diabetes, high cholesterol, or high blood pressure  Two weight loss medicines are approved in the [facility] for long-term use. These are sibutramine and orlistat. Other weight loss medicines (phentermine, diethylpropion) are available but are only approved for short-term use (up to 12 weeks). Sibutramine  Sibutramine (Meridia®, Reductil®) is a medicine that reduces your appetite. In people who take the medicine for one year, the average weight loss is 10 percent of the initial body weight (5 percent more than those who took a placebo treatment). Side effects of sibutramine include insomnia, dry mouth, and constipation. Increases in blood pressure can occur. Therefore, blood pressure is usually monitored during treatment. There is no evidence that sibutramine causes heart or lung problems (like dexfenfluramine and fenfluramine (Phen/Fen)). However, experts agree that sibutramine should not used by people with coronary heart disease, heart failure, uncontrolled hypertension, stroke, irregular heart rhythms, or peripheral vascular disease (poor circulation in the legs). Orlistat  Orlistat (Xenical® 120 mg capsules) is a medicine that reduces the amount of fat your body absorbs from the foods you eat. A lower-dose version is now available without a prescription (Sj® 60 mg capsules) in many countries, including the [place] [facility]. The medicine is recommended three times per day, taken with a meal; you can skip a dose if you skip a meal or if the meal contains no fat.   After one year of drugs.   Hoodia gordonii is a dietary supplement derived from a plant in [place]. It is not recommended because there is no proof that it is safe or effective. Bitter orange (Citrus aurantium) can increase your heart rate and blood pressure and is not recommended. SHOULD I HAVE SURGERY TO LOSE WEIGHT?  Weight loss surgery is recommended ONLY for people with one of the following:  Severe obesity (body mass index above 40) (calculator 1 and calculator 2) who have not responded to diet, exercise, or weight loss medicines   Body mass index between 35 and 40, along with a serious medical problem (including diabetes, severe joint pain, or sleep apnea) that would improve with weight loss  You should be sure that you understand the potential risks and benefits of weight loss surgery. You must be motivated and willing to make lifelong changes in how you eat to reach and maintain a healthier weight after surgery. You must also be realistic about weight loss after surgery (see 'Effectiveness of weight loss surgery' below). PREPARING FOR WEIGHT LOSS SURGERY  Most people who have weight loss surgery will meet with several specialists before surgery is scheduled. This often includes a dietitian, mental health counselor, a doctor who specializes in care of obese people, and a surgeon who performs weight loss surgery (bariatric surgeon). You may need to work with these providers for several weeks or months before surgery. The nutritionist will explain what and how much you will be able to eat after surgery. You may also need to lose a small amount of weight before surgery. The mental health specialist will help you to cope with stress and other factors that can make it harder to lose weight or trigger you to eat   The medical doctor will determine whether you need other tests, counseling, or treatment before surgery.  He or she might also help you begin a medical weight loss program so that you can lose some weight before surgery. The bariatric surgeon will meet with you to discuss the surgeries available to treat obesity. He or she will also make sure you are a good candidate for surgery. TYPES OF WEIGHT LOSS SURGERY  There are several types of weight loss surgeries, the most common being lap banding, gastric bypass, and gastric sleeve. Lap banding  Laparoscopic adjustable gastric banding (LAGB), or lap banding, is a surgery that uses an adjustable band around the opening to the stomach (figure 1). This reduces the amount of food that you can eat at one time. Lap banding is done through small incisions, with a laparoscope. The band can be adjusted after surgery, allowing you to eat more or less food. Adjustments to the size and tightness of the band are made by using a needle to add or remove fluid from a port (a small container under the skin that is connected to the band). Adding fluid to the band makes it tighter which restricts the amount of food you can eat and may help you to lose more weight. Lap banding is a popular choice because it is relatively simple to perform, can be adjusted or removed, and has a low risk of serious complications immediately after surgery. However, weight loss with the lap band depends on your ability to follow the program closely. You will need to prepare nutritious meals that [facility] with\" the band, not against it. For example, the lap band will not work well if you eat or drink a large amount of liquid calories (like ice cream). The band will not help you to feel full when you eat/drink liquid calories. Weight loss ranges from 45 to 75 percent after two years. As an example, a person who is 120 pounds overweight could expect to lose approximately 54 to 90 pounds in the two years after lap banding.   Gastric bypass  Feng-en-Y gastric bypass, also called gastric bypass, helps you to lose weight by reducing the amount of food you can eat and reducing the number of calories and nutrients you absorb from the food you eat. To perform gastric bypass, a surgeon creates a small stomach pouch by dividing the stomach and attaching it to the small intestine. This helps you to lose weight in two ways: The smaller stomach can hold less food than before surgery. This causes you to feel full after eating a very small amount of food or liquid. Over time, the pouch might stretch, allowing you to eat more food. The body absorbs fewer calories, since food bypasses most of the stomach as well as the upper small intestine. This new arrangement seems to decrease your appetite and change how you break down foods by changing the release of various hormones. Gastric bypass can be performed as open surgery (through an incision on the abdomen) or laparoscopically, which uses smaller incisions and smaller instruments. Both the laparoscopic and open techniques have risks and benefits. You and your surgeon should work together to decide which surgery, if any, is right for you. Gastric bypass has a high success rate, and people lose an average of 62 to 68 percent of their excess body weight in the first year. Weight loss typically levels off after one to two years, with an overall excess weight loss between 50 and 75 percent. For a person who is 120 pounds overweight, an average of 60 to 90 pounds of weight loss would be expected. Gastric sleeve  Gastric sleeve, also known as sleeve gastrectomy, is a surgery that reduces the size of the stomach and makes it into a narrow tube (figure 3). The new stomach is much smaller and produces less of the hormone (ghrelin) that causes hunger, helping you feel satisfied with less food. Sleeve gastrectomy is safer than gastric bypass because the intestines are not rearranged, and there is less chance of malnutrition. It also appears to control hunger better than lap banding. It might be safer than the lap banding because no foreign materials are used.   The gastric sleeve has a good success rate, and people lose an average of 33 percent of their excess body weight in the first year. For a person who is 120 pounds overweight, this would mean losing about 40 pounds in the first year. WEIGHT LOSS SURGERY COMPLICATIONS  A variety of complications can occur with weight loss surgery. The risks of surgery depend upon which surgery you have and any medical problems you had before surgery. Some of the more common early surgical complications (one to six weeks after surgery) include:  Bleeding   Infection   Blockage or tear in the bowels   Need for further surgery  Important medical complications after surgery can include blood clots in the legs or lungs, heart attack, pneumonia, and urinary tract infection. Complications are less likely when surgery is performed in centers that are experienced in weight loss surgery. In general, centers with experience in weight loss surgery have:  Board-certified doctors and surgeons   A team of support staff (dietitians, counselors, nurses)   Long-term follow-up after surgery   Hospital staff experienced with the care of weight loss patients. This includes nurses who are trained in the care of patients immediately after surgery and anesthesiologists who are experienced in caring for the morbidly obese. EFFECTIVENESS OF WEIGHT LOSS SURGERY  The goal of weight loss surgery is to reduce the risk of illness or death associated with obesity. Weight loss surgery can also help you to feel and look better, reduce the amount of money you spend on medicines, and cut down on sick days. As an example, weight loss surgery can improve health problems related to obesity (diabetes, high blood pressure, high cholesterol, sleep apnea) to the point that you need less or no medicine. Finally, weight loss surgery might reduce your risk of developing heart disease, cancer, and certain infections.   AFTER WEIGHT LOSS SURGERY  You will need to stay in the hospital until your team feels that it is safe for you to leave (on average, one to three days). Do not drive if you are taking prescription pain medicine. Begin exercising as soon as possible once you have healed; most weight loss centers will design an exercise program for you. Once you are home, it is important to eat and drink exactly what your doctor and dietitian recommend. You will see your doctor, nurse, and dietitian on a regular basis after surgery to monitor your health, diet, and weight loss. You will be able to slowly increase how much you eat over time, although it will always be important to:  Eat small, frequent meals and not skip meals   Chew your food slowly and completely   Avoid eating while \"distracted\" (such as eating while watching TV)   Stop eating when you feel full   Drink liquids at least 30 minutes before or after eating   Avoid foods high in fat or sugar   Take vitamin supplements, as recommended  It can take several months to learn to listen to your body so that you know when you are hungry and when you are full. You may dislike foods you previously loved, and you may begin to prefer new foods. This can be a frustrating process for some people, so talk to your dietitian if you are having trouble. It usually takes between one and two years to lose weight after surgery. After reaching their goal weight, some people have plastic surgery (called \"body contouring\") to remove excess skin from the body, particularly in the abdominal area. Before you decide to have weight loss surgery, you must commit to staying healthy for life. This includes following up with your healthcare team, exercising most days of the week, and eating a sensible diet every day. It can be difficult to develop new eating and exercise habits after weight loss surgery, and you will have to work hard to stick to your goals.   Recovering from surgery and losing weight can be stressful and emotional, and it is important to have the gas, cramping, bloating, and diarrhea), increase fiber gradually and be sure to drink plenty of fluids every day. Tips for Increasing Fiber Intake   Whenever possible, choose whole grains over refined grains (eg, brown rice instead of white rice, whole-wheat bread instead of white bread). Include a variety of grains in your diet, such as wheat, rye, barley, oats, quinoa, and bulgur. Eat more vegetarian-based meals. Here are some ideas: black bean burgers, eggplant lasagna, and veggie tofu stir-jimenez. Choose high-fiber snacks, such as fruits, popcorn, whole-grain crackers, and nuts. Make whole-grain cereal or whole-grain toast part of your daily breakfast regime. When eating out, whether ordering a sandwich or dinner, ask for extra vegetables. When baking, replace part of the white flour with whole-wheat flour. Whole-wheat flour is particularly easy to incorporate into a recipe. High-Fiber Diet Eating Guide   Food Category   Foods Recommended   Notes   Grains   Whole-grain breads, muffins, bagels, or kathryn bread Rye bread Whole-wheat crackers or crisp breads Whole-grain or bran cereals Oatmeal, oat bran, or grits Wheat germ Whole-wheat pasta and brown rice   Read the ingredients list on food labels. Look for products that list \"whole\" as the first ingredient (eg, whole-wheat, whole oats). Choose cereals with at least 2 grams of fiber per serving.    Vegetables   All vegetables, especially asparagus, bean sprouts, broccoli, [place] sprouts, cabbage, carrots, cauliflower, celery, corn, greens, green beans, green pepper, onions, peas, potatoes (with skin), snow peas, spinach, squash, sweet potatoes, tomatoes, zucchini   For maximum fiber intake, eat the peels of fruits and vegetablesjust be sure to wash them well first.   Fruits   All fruits, especially apples, berries, grapefruits, mangoes, nectarines, oranges, peaches, pears, dried fruits (figs, dates, prunes, raisins)   Choose raw fruits and vegetables over juice, cooked, or cannedraw fruit has more fiber. Dried fruit is also a good source of fiber. Milk   With the exception of yogurt containing inulin (a type of fiber), dairy foods provide little fiber. Add more fiber by topping your yogurt or cottage cheese with fresh fruit, whole grain or bran cereals, nuts, or seeds. Meats and Beans   All beans and peas, especially Garbanzo beans, kidney beans, lentils, lima beans, split peas, and hinojosa beans All nuts and seeds, especially almonds, peanuts, [place] nuts, cashews, peanut butter, walnuts, sesame and sunflower seeds All meat, poultry, fish, and eggs   Increase fiber in meat dishes by adding hinojosa beans, kidney beans, black-eyed peas, bran, or oatmeal. If you are following a low-fat diet, use nuts and seeds only in moderation. Fats and Oils   All in moderation   Fats and oils do not provide fiber   Snacks, Sweets, and Condiments   Fruit Nuts Popcorn, whole-wheat pretzels, or trail mix made with dried fruits, nuts, and seeds Cakes, breads, and cookies made with oatmeal or whole-wheat flour   Most snack foods do not provide much fiber. Choose snacks with at least 2 grams of fiber per serving. Last Reviewed: March 2011 Marci Knight MS, MPH, RD   Updated: 3/29/2011   ·     Safer Sex: After Your Visit  Your Care Instructions  Safer sex is a way to reduce your risk of getting an infection spread through sex. It can also help prevent pregnancy. Most infections that are spread through sex, also called sexually transmitted infections or STIs, can be cured. But some can decrease your chances of getting pregnant if they are not treated early. Others, such as herpes, have no cure. And some, such as HIV, can be deadly. Several products can help you practice safer sex and reduce your chance of STIs. One of the best is a condom. There are condoms for men and for women.  The female condom is a tube of soft plastic with a closed end that is placed deep into the vagina. You can use a special rubber sheet (dental dam) for protection during oral sex. Latex gloves can keep your hands from touching blood, semen, or other body fluids that can carry infections. Remember that birth control methods such as diaphragms, IUDs, foams, and birth control pills do not stop you from getting STIs. Follow-up care is a key part of your treatment and safety. Be sure to make and go to all appointments, and call your doctor if you are having problems. Its also a good idea to know your test results and keep a list of the medicines you take. How can you care for yourself at home? Think about getting shots to prevent hepatitis A and hepatitis B. These two diseases can be spread through sex. You also can get hepatitis A if you eat infected food. Use condoms or female condoms each time and every time you have sex. Learn the right way to use a male condom:  Condoms come in several sizes. Make sure you use the right size. A condom that is too small can break easily. A condom that is too big can slip off during sex. Use a new condom each time you have sex. Be careful not to poke a hole in the condom when you open the wrapper. Squeeze the tip of the condom to keep out air. Pull down the loose skin (foreskin) from the head of an uncircumcised penis. While squeezing the tip of the condom, unroll it all the way down to the base of the firm penis. Never use petroleum jelly (such as Vaseline), grease, hand lotion, baby oil, or anything with oil in it. These products can make holes in the condom. After sex, hold the condom on your penis as you remove your penis from your partner. This will keep semen from spilling out of the condom. Learn to use a female condom:  You can put in a female condom up to 8 hours before sex. Squeeze the smaller ring at the closed end and insert it deep into the vagina. The larger ring at the open end should stay outside the vagina.   During sex, make sure the penis goes into the condom. After the penis is removed, close the open end of the condom by twisting it. Remove the condom. Do not use a female condom and male condom at the same time. Do not have sex with anyone who has symptoms of an STI, such as sores on the genitals or mouth. The herpes virus that causes cold sores can spread to and from the penis and vagina. Do not drink a lot of alcohol or use drugs before sex. This can cause you to let down your guard and not practice safer sex. Having one sex partner (who does not have STIs and does not have sex with anyone else) is a sure way to avoid STIs. Talk to your partner before you have sex. Find out if he or she has or is at risk for any STI. Keep in mind that a person may be able to spread an STI even if he or she does not have symptoms. You and your partner may want to get an HIV test. You should get tested again 6 months later. © 3459-9461 Healthwise, [employer]. Care instructions adapted under license by [facility]. This care instruction is for use with your licensed healthcare professional. If you have questions about a medical condition or this instruction, always ask your healthcare professional. [place] [address] any warranty or liability for your use of this information. Content Version: 9.4.13092; Last Revised: January 19, 2012              ·     Keep Your Memory Donelda Goodpasture       Many factors can affect your ability to remembera hectic lifestyle, aging, stress, chronic disease, and certain medicines. But, there are steps you can take to sharpen your mind and help preserve your memory. Challenge Your Brain   Regularly challenging your mind may help keeps it in top shape. Good mental exercises include:   Crossword puzzlesUse a dictionary if you need it; you will learn more that way. Brainteasers Try some!    Crafts, such as wood working and [facility], such as gardening and building model airplanes   SocializingVisit old friends or join groups to meet new ones. Reading   Learning a new language   Taking a class, whether it be art history or caitlin chi   TravelingExperience the food, history, and culture of your destination   Learning to use a computer   Going to museums, the theater, or thought-provoking movies   Changing things in your daily life, such as reversing your pattern in the grocery store or brushing your teeth using your nondominant hand   Use Memory Aids   There is no need to remember every detail on your own. These memory aids can help:   Calendars and day planners   Electronic organizers to store all sorts of helpful informationThese devices can \"beep\" to remind you of appointments. A book of days to record birthdays, anniversaries, and other occasions that occur on the same date every year   Detailed \"to-do\" lists and strategically placed sticky notes   Quick \"study\" sessionsBefore a gathering, review who will be there so their names will be fresh in your mind. Establish routinesFor example, keep your keys, wallet, and umbrella in the same place all the time or take medicine with your 8:00 AM glass of juice   Live a Healthy Life   Many actions that will keep your body strong will do the same for your mind. For example:   Talk to Your Doctor About Herbs and Supplements    Malnutrition and vitamin deficiencies can impair your mental function. For example, vitamin B12 deficiency can cause a range of symptoms, including confusion. But, what if your nutritional needs are being met? Can herbs and supplements still offer a benefit? Researchers have investigated a range of natural remedies, such as ginkgo , ginseng , and the supplement phosphatidylserine (PS). So far, though, the evidence is inconsistent as to whether these products can improve memory or thinking. If you are interested in taking herbs and supplements, talk to your doctor first because they may interact with other medicines that you are taking. Exercise Regularly    Among the many benefits of regular exercise are increased blood flow to the brain and decreased risk of certain diseases that can interfere with memory function. One study found that even moderate exercise has a beneficial effect. Examples of \"moderate\" exercise include:   Playing 18 holes of golf once a week, without a cart   Playing tennis twice a week   Walking one mile per day   Manage Stress    It can be tough to remember what is important when your mind is cluttered. Make time for relaxation. Choose activities that calm you down, and make it routine. Manage Chronic Conditions    Side effects of high blood pressure , diabetes, and heart disease can interfere with mental function. Many of the lifestyle steps discussed here can help manage these conditions. Strive to eat a healthy diet, exercise regularly, get stress under control, and follow your doctor's advice for your condition. Minimize Medications    Talk to your doctor about the medicines that you take. Some may be unnecessary. Also, healthy lifestyle habits may lower the need for certain drugs. Last Reviewed: April 2010 Kelsi Lara MD   Updated: 4/13/2010   ·     [address] [facility]       As we get older, changes in balance, gait, strength, vision, hearing, and cognition make even the most youthful senior more prone to accidents. Falls are one of the leading health risks for older people. This increased risk of falling is related to:   Aging process (eg, decreased muscle strength, slowed reflexes)   Higher incidence of chronic health problems (eg, arthritis, diabetes) that may limit mobility, agility or sensory awareness   Side effects of medicine (eg, dizziness, blurred vision)especially medicines like prescription pain medicines and drugs used to treat mental health conditions   Depending on the brittleness of your bones, the consequences of a fall can be serious and long lasting.    Home Life   Research by the Association of Aging (AOA) shows that some home accidents among older adults can be prevented by making simple lifestyle changes and basic modifications and repairs to the home environment. Here are some lifestyle changes that experts recommend:   Have your hearing and vision checked regularly. Be sure to wear prescription glasses that are right for you. Speak to your doctor or pharmacist about the possible side effects of your medicines. A number of medicines can cause dizziness. If you have problems with sleep, talk to your doctor. Limit your intake of alcohol. If necessary, use a cane or walker to help maintain your balance. Wear supportive, rubber-soled shoes, even at home. If you live in a region that gets wintry weather, you may want to put special cleats on your shoes to prevent you from slipping on the snow and ice. Exercise regularly to help maintain muscle tone, agility, and balance. Always hold the banister when going up or down stairs. Also, use  bars when getting in or out of the bath or shower, or using the toilet. To avoid dizziness, get up slowly from a lying down position. Sit up first, dangling your legs for a minute or two before rising to a standing position. Overall Home Safety Check   According to the Consumer Product Safety Commision's \"Older Consumer Home Safety Checklist,\" it is important to check for potential hazards in each room. And remember, proper lighting is an essential factor in home safety. If you cannot see clearly, you are more likely to fall. Important questions to ask yourself include:   Are lamp, electric, extension, and telephone cords placed out of the flow of traffic and maintained in good condition? Have frayed cords been replaced? Are all small rugs and runners slip resistant? If not, you can secure them to the floor with a special double-sided carpet tape.    Are smoke detectors properly locatedone on every floor of your home and one outside of every sleeping area? Are they in good working order? Are batteries replaced at least once a year? Do you have a well-maintained carbon monoxide detector outside every sleeping are in your home? Does your furniture layout leave plenty of space to maneuver between and around chairs, tables, beds, and sofas? Are hallways, stairs and passages between rooms well lit? Can you reach a lamp without getting out of bed? Are floor surfaces well maintained? Shag rugs, high-pile carpeting, tile floors, and polished wood floors can be particularly slippery. Stairs should always have handrails and be carpeted or fitted with a non-skid tread. Is your telephone easily reachable. Is the cord safely tucked away? Room by Room   According to the Association of Aging, bathrooms and abhishek are the two most potentially hazardous rooms in your home. In the Kitchen    Be sure your stove is in proper working order and always make sure burners and the oven are off before you go out or go to sleep. Keep pots on the back burners, turn handles away from the front of the stove, and keep stove clean and free of grease build-up. Kitchen ventilation systems and range exhausts should be working properly. Keep flammable objects such as towels and pot holders away from the cooking area except when in use. Make sure kitchen curtains are tied back. Move cords and appliances away from the sink and hot surfaces. If extension cords are needed, install wiring guides so they do not hang over the sink, range, or working areas. Look for coffee pots, kettles and toaster ovens with automatic shut-offs. Keep a mop handy in the kitchen so you can wipe up spills instantly. You should also have a small fire extinguisher. Arrange your kitchen with frequently used items on lower shelves to avoid the need to stand on a stepstool to reach them. Make sure countertops are well-lit to avoid injuries while cutting and preparing food. In the Bathroom    Use a non-slip mat or decals in the tub and shower, since wet, soapy tile or porcelain surfaces are extremely slippery. Make sure bathroom rugs are non-skid or tape them firmly to the floor. Bathtubs should have at least one, preferably two, grab bars, firmly attached to structural supports in the wall. (Do not use built-in soap holders or glass shower doors as grab bars.)    Tub seats fitted with non-slip material on the legs allow you to wash sitting down. For people with limited mobility, bathtub transfer benches allow you to slide safely into the tub. Raised toilet seats and toilet safety rails are helpful for those with knee or hip problems. In the [facility]    Make sure you use a nightlight and that the area around your bed is clear of potential obstacles. Be careful with electric blankets and never go to sleep with a heating pad, which can cause serious burns even if on a low setting. Use fire-resistant mattress covers and pillows, and NEVER smoke in bed. Keep a phone next to the bed that is programmed to dial 911 at the push of a button. If you have a chronic condition, you may want to sign on with an automatic call-in service. Typically the system includes a small pendant that connects directly to an emergency medical voice-response system. You should also make arrangements to stay in contact with someonefriend, neighbor, family memberon a regular schedule. Fire Prevention   According to the [employer]. (Smoke Alarms for Every) [address] [place] [address], senior citizens are one of the two highest risk groups for death and serious injuries due to residential fires. When cooking, wear short-sleeved items, never a bulky long-sleeved robe.     The [facility]'s Safety Checklist for Older Consumers emphasizes the importance of checking basements, garages, workshops and storage areas for fire hazards, such as volatile liquids, piles of old rags or clothing and overloaded circuits. Never smoke in bed or when lying down on a couch or recliner chair. Small portable electric or kerosene heaters are responsible for many home fires and should be used cautiously if at all. If you do use one, be sure to keep them away from flammable materials. In case of fire, make sure you have a pre-established emergency exit plan. Have a professional check your fireplace and other fuel-burning appliances yearly. Helping Hands   Baby boomers entering the vergara years will continue to see the development of new products to help older adults live safely and independently in spite of age-related changes. Making Life More Livable  , by Lin Wallace, lists over 1,000 products for \"living well in the mature years,\" such as bathing and mobility aids, household security devices, ergonomically designed knives and peelers, and faucet valves and knobs for temperature control. Medical supply stores and organizations are good sources of information about products that improve your quality of life and insure your safety. Last Reviewed: November 2009 Micah Palafox MD   Updated: 3/7/2011     ·        Learning About Medical Power of   What is a medical power of ? A medical power of , also called a durable power of  for health care, is one type of the legal forms called advance directives. It lets you name the person you want to make treatment decisions for you if you can't speak or decide for yourself. The person you choose is called your health care agent. This person is also called a health care proxy or health care surrogate. A medical power of  may be called something else in your state. How do you choose a health care agent? Choose your health care agent carefully. This person may or may not be a family member. Talk to the person before you make your final decision. Make sure he or she is comfortable with this responsibility.   It's a good idea to choose someone who:  Is at least 25years old. Knows you well and understands what makes life meaningful for you. Understands your Presy[place]eterian and moral values. Will do what you want, not what he or she wants. Will be able to make difficult choices at a stressful time. Will be able to refuse or stop treatment, if that is what you would want, even if you could die. Will be firm and confident with health professionals if needed. Will ask questions to get needed information. Lives near you or agrees to travel to you if needed. Your family may help you make medical decisions while you can still be part of that process. But it's important to choose one person to be your health care agent in case you aren't able to make decisions for yourself. If you don't fill out the legal form and name a health care agent, the decisions your family can make may be limited. A health care agent may be called something else in your state. Who will make decisions for you if you don't have a health care agent? If you don't have a health care agent or a living will, you may not get the care you want. Decisions may be made by family members who disagree about your medical care. Or decisions may be made by a medical professional who doesn't know you well. In some cases, a  makes the decisions. When you name a health care agent, it is very clear who has the power to make health decisions for you. How do you name a health care agent? You name your health care agent on a legal form. This form is usually called a medical power of . Ask your hospital, state bar association, or office on aging where to find these forms. You must sign the form to make it legal. Some states require you to get the form notarized. This means that a person called a  watches you sign the form and then he or she signs the form. Some states also require that two or more witnesses sign the form.   Be sure to tell your family members and doctors who your health care agent is. Where can you learn more? Go to [email or web address] and sign in to your [employer]vizor account. Enter [id number] in the [place] Hospital box to learn more about \"Learning About [address]. \"     If you do not have an account, please click on the \"Sign Up Now\" link. Current as of: March 17, 2021               Content Version: 12.9  © 3614-1850 Healthwise, Incorporated. Care instructions adapted under license by [facility] [place] ([facility]). If you have questions about a medical condition or this instruction, always ask your healthcare professional. [address] any warranty or liability for your use of this information.     ·

## 2021-08-12 NOTE — PROGRESS NOTES
Mini Cog    Instructions Script   3 Item Recall Test Tell the patient you are going to give them 3 words to remember. Pause after stating each word. \"Ms. Napier, I am going to tell you 3 words that I want you to remember. Apple (pause)  Watch (pause) Berhane Constant (pause)      Make sure the patient heard the words correctly by asking them to repeat the words back to you Ms. Napier, what words did I just tell you?     Allow patient to repeat words back to you. If they did not hear them or understand correctly, repeat the words again and have them repeat the words back to you. When the patient has repeated the words Apple, Watch, Mindy back to you, move on to the clock drawing test    The Clock Drawing Test Hand the patient a piece of paper on which you have drawn a medium size [place]. Tell the patient you would like them to draw a picture of a clock using the exact words in the script. Ms. Napier, I would like you to draw a picture of a clock. Inside the [place], please draw the number hours on a clock as they normally appear    When the patient is done filling in the clock to the best of their ability, tell the patient to draw the hands of the clock at 11:10 using the exact words in the script. Ms. Napier, I want you to draw the hands of the clock at ten minutes after eleven oclock. 3 Item Recall Ask the patient to recall the 3 words you asked them to remember. No prompting is allowed. Ms. Napier, I asked you to remember 3 words. What are the 3 words I asked you to remember?      Recall  The patient receives 1 point for each word remembered. Score a 0 for each word missed.     Apple- Yes  Watch- Yes  Berhane Constant- Yes    Patient refused = No  Recall Score= 3  Clock Draw = normal  MiniCog outcome:  negative    ________________________________________________________________________________________________  Scoring Instructions  Clock Draw Scoring Normal Abnormal   Numbers All present Number(s) missing Number sequence & position Correct sequences & position Out of sequence; spacing abnormal   Hands Display correct time: 11:10 Missing, wrong time     Patient refuses   Score of 3 = Negative Screen  Score of 1 or 2 with normal clock draw = Negative Screen  Score of 1 or 2 with abnormal clock draw = Positive Screen  Score of 0 = Positive Screen regardless of Clock Drawing Score  Patient refuses = Positive Screen  Remember to have nursing scan in the Clock Draw page into the chart. Coding  ICD-9 Code Diagnosis Code To be used for Follow up   V70.5 Health exam of defined subpopulation: >56 y/o adults   SCREENING for cognitive function Screening yearly   V71 Observation and evaluation  for suspected conditions NOT found NEGATIVE SCREENING Repeat screening in 1 year   799.59 Other signs and symptoms involving cognition POSITIVE SCREENING   Refer to  recommended follow-up  process     Visit Information    Have you changed or started any medications since your last visit including any over-the-counter medicines, vitamins, or herbal medicines? no   Are you having any side effects from any of your medications? -  no  Have you stopped taking any of your medications? Is so, why? -  no    Have you seen any other physician or provider since your last visit? Yes - Records Obtained  Have you had any other diagnostic tests since your last visit? Yes - Records Obtained  Have you been seen in the emergency room and/or had an admission to a hospital since we last saw you? Yes - Records Obtained  Have you had your routine dental cleaning in the past 6 months? yes -     Have you activated your [employer] account? If not, what are your barriers?  Yes     Patient Care Team:  Audi Ellis MD as PCP - Justin Colin MD as PCP - [facility] Empaneled Provider  Nathalie Thrasher MD as Surgeon (General Surgery)  Xiang Crawford MD as Surgeon (Ophthalmology)  Sai Pritchard MD as Consulting Physician (Gastroenterology)  Hieu Edwards

## 2021-08-12 NOTE — PROGRESS NOTES
Medicare Annual Wellness Visit  Name: Dwain Gleason Date: 2021   MRN: [id number] Sex: Female   Age: 80 y.o. Ethnicity: Non- / Non    : 1940 Race: White (non-)      Kegean Bridges is here for Medicare AWV    Screenings for behavioral, psychosocial and functional/safety risks, and cognitive dysfunction are all negative except as indicated below. These results, as well as other patient data from the [address] form, are documented in Flowsheets linked to this Encounter. Allergies   Allergen Reactions    Aspirin      GI upset       Prior to Visit Medications    Medication Sig Taking? Authorizing Provider   eszopiclone (LUNESTA) 2 MG TABS Take 1 tablet by mouth nightly as needed (INSOMNIA) for up to 30 days.  Yes Marlin Lipscomb MD   Melatonin CR 3 MG TBCR USe nightly Yes BARRERA Allen - CNP   Tiotropium Bromide-Olodaterol (STIOLTO RESPIMAT) 2.5-2.5 MCG/ACT AERS Inhale 2 puffs into the lungs daily Yes Historical Provider, MD   Fexofenadine HCl (ALLERGY 24-HR PO) Take by mouth Yes Historical Provider, MD   Oxymetazoline HCl (NASAL SPRAY NA) by Nasal route Yes Historical Provider, MD   Multiple Vitamins-Minerals (OCUVITE ADULT 50+ PO) Take by mouth Yes Historical Provider, MD   Lactobacillus (PROBIOTIC ACIDOPHILUS PO) Take by mouth Yes Historical Provider, MD   ipratropium (ATROVENT) 0.03 % nasal spray 2 sprays by Nasal route 3 times daily Yes Marlin Lipscomb MD   albuterol sulfate  (90 Base) MCG/ACT inhaler Inhale 2 puffs into the lungs every 6 hours as needed for Wheezing or Shortness of Breath Yes Marlin Lipscomb MD   Spacer/Aero-Holding Chambers (AEROCHAMBER MV) MISC To be used with inhaler Yes Marlin Lipscomb MD   dorzolamide-timolol (COSOPT) 22.3-6.8 MG/ML ophthalmic solution Place 1 drop into both eyes 2 times daily Yes Historical Provider, MD   ALPHAGAN P 0.1 % SOLN Place 1 drop into both eyes 3 times daily  Yes Relation Age of Onset    Other Father         COPD    Stroke Sister     Heart Attack Sister     Breast Cancer Sister     Cancer Maternal Grandmother         Stomach cancer    Osteoporosis Mother     Other Sister         suicide       CareTeam (Including outside providers/suppliers regularly involved in providing care):   Patient Care Team:  Dot Jo MD as PCP - Wellington Del Rio MD as PCP - [facility] [place] [place] [facility]  Pushpa Wynn MD as Surgeon (General Surgery)  Deepali Lowe MD as Surgeon (Ophthalmology)  Shann Spurling, MD as Consulting Physician (Gastroenterology)  [place] Florentino MD as Consulting Physician (Endocrinology)  Luis Enrique Cuellar MD as Surgeon (Ophthalmology)  Kamilla Abad MD as Consulting Physician (Internal Medicine Cardiovascular Disease)  Sandra Gray MD as Consulting Physician (Cardiology)  Timoteo Tay, PhD (Sleep Medicine)  Arielle Head MD as Consulting Physician (Otolaryngology)  iLon Connolly MD as Consulting Physician (Cardiology)  Collin Weiss MD as Consulting Physician (Pulmonology)  Vanessa Murguia MD as Consulting Physician (Nephrology)  Alvino Nieto MD as Consulting Physician (Pulmonology)    Vital signs within normal limits    Wt Readings from Last 3 Encounters:   08/12/21 132 lb 9.6 oz (60.1 kg)   06/08/21 133 lb (60.3 kg)   04/20/21 138 lb (62.6 kg)     Vitals:    08/12/21 1301   BP: 120/78   Pulse: 76   Temp: 97.2 °F (36.2 °C)   TempSrc: Temporal   SpO2: 97%   Weight: 132 lb 9.6 oz (60.1 kg)   Height: 5' 3\" (1.6 m)     Body mass index is 23.49 kg/m². Based upon direct observation of the patient, evaluation of cognition reveals recent and remote memory intact.     Physical exam  General Appearance: alert and oriented to person, place and time, well-developed and well-nourished, in no acute distress  Head: normocephalic and atraumatic  ENT: bilateral tympanic membrane normal, excess wax, and hearing abnormal- wearing hearing aid in the left ear. Pulmonary/Chest: decreased breath sounds noted- bilateral , sees pulmonology    Cardiovascular: normal rate, regular rhythm and normal S1 and S2  Extremities: no edema bilateral legs      Insomnia and poor sleep is still bad taking Lunesta, tolerated well, she has to take the lower dosage with melatonin in order for her to sleep. She would like to try Sonata as first-line recommended her that. She does not feel Shawna Postal works as well as before. She cannot fall asleep  Has tried trazodone, sleep hygiene, she did see sleep specialist in the past  Patient has been having insomnia for many years, since her sister committed suicide when she was 54-year-old. Has hyperparathyroidism, sees . Reports constipation, fatigue, achines and muscle spasm in her legs and calcium most recently has been going down    Chronic kidney disease 3 improved  Denies frequency, urgency or dysuria. Does not take NSAIDs    Vitamin B12 deficiency   Patrice Huynhocco  is  taking Vitamin B12 supplementation as multivitamin   Brendamaude reports fatigue. Lab Results   Component Value Date    PAOVMCUC15 352 09/17/2019       lipitor gave her cramps in the legs, was waking up with cramps  Insomnia  lunesta and melatonin    Patient's complete Health Risk Assessment and screening values have been reviewed and are found in Flowsheets. The following problems were reviewed today and where indicated follow up appointments were made and/or referrals ordered. Positive Risk Factor Screenings with Interventions:            General Health and ACP:  General  In general, how would you say your health is?: Good  In the past 7 days, have you experienced any of the following?  New or Increased Pain, New or Increased Fatigue, Loneliness, Social Isolation, Stress or Anger?: None of These  Do you get the social and emotional support that you need?: Yes  Do you have a Living Will?: (!) No  Advance Directives     Power of  Living Will ACP-Advance Directive ACP-Power of     Not on File Not on File Not on File Not on File      General Health Risk Interventions:  · No Living Will: has paperwork at home    Health Habits/Nutrition:  Health Habits/Nutrition  Do you exercise for at least 20 minutes 2-3 times per week?: Yes  Have you lost any weight without trying in the past 3 months?: No  Do you eat only one meal per day?: (!) Yes  Have you seen the dentist within the past year?: Yes  Body mass index: 23.49  Health Habits/Nutrition Interventions:  · Nutritional issues:  educational materials for healthy, well-balanced diet provided, advised to get a snack in the morning  Fruit upsets stomach  · Advised to take all pills with food  ·   ·     Hearing/Vision:   Hearing Screening    125Hz 250Hz 500Hz 1000Hz 2000Hz 3000Hz 4000Hz 6000Hz 8000Hz   Right ear:            Left ear:               Visual Acuity Screening    Right eye Left eye Both eyes   Without correction:      With correction: 20/40 20/50 20/40     Hearing/Vision  Do you or your family notice any trouble with your hearing that hasn't been managed with hearing aids?: (!) Yes  Do you have difficulty driving, watching TV, or doing any of your daily activities because of your eyesight?: No  Have you had an eye exam within the past year?: Yes  Hearing/Vision Interventions:  · Hearing concerns:  patient declines any further evaluation/treatment for hearing issues has hearing aids    Safety:  Safety  Do you have working smoke detectors?: Yes  Have all throw rugs been removed or fastened?: Yes  Do you have non-slip mats or surfaces in all bathtubs/showers?: (!) No  Do all of your stairways have a railing or banister?: Yes  Are your doorways, halls and stairs free of clutter?: Yes  Do you always fasten your seatbelt when you are in a car?: Yes  Safety Interventions:  · Home safety tips provided, hasno slippery surfaces           Personalized Preventive Plan   Current Health Maintenance Status  Immunization History   Administered Date(s) Administered    COVID-19, Pfizer, PF, 30mcg/0.3mL 01/28/2021, 02/18/2021    Influenza Virus Vaccine 10/30/2013, 10/13/2015, 10/18/2019    Influenza, High Dose (Fluzone 65 yrs and older) 10/22/2014, 10/24/2016, 10/19/2017, 10/21/2018, 02/03/2021    Influenza, High-dose, Marthaeen Raleigh, 65 yrs +, IM (Fluzone) 10/22/2014    Influenza, Intradermal, Preservative free 10/28/2011    Influenza, Quadv, IM, PF (6 mo and older Fluzone, Flulaval, Fluarix, and 3 yrs and older Afluria) 10/18/2019, 09/17/2020    Influenza, Quadv, adjuvanted, 65 yrs +, IM, PF (Fluad) 09/17/2020    Influenza, Triv, inactivated, subunit, adjuvanted, IM (Fluad 65 yrs and older) 10/21/2018    Pneumococcal Conjugate 13-valent ([id number]) 10/13/2015, 01/12/2017    Pneumococcal Conjugate 7-valent (Prevnar7) 10/13/2015    Pneumococcal Polysaccharide ([id number]) 02/03/2018    Tdap (Boostrix, Adacel) 01/09/2017    Zoster Recombinant (Shingrix) 04/17/2018, 07/24/2018        Health Maintenance   Topic Date Due    Annual Wellness Visit (AWV)  Never done    Flu vaccine (1) 09/01/2021    Breast cancer screen  10/01/2021    Potassium monitoring  07/08/2022    Creatinine monitoring  07/08/2022    Colon cancer screen colonoscopy  10/25/2024    DTaP/Tdap/Td vaccine (2 - Td or Tdap) 01/09/2027    DEXA (modify frequency per FRAX score)  Completed    Shingles Vaccine  Completed    Pneumococcal 65+ years Vaccine  Completed    COVID-19 Vaccine  Completed    Hepatitis A vaccine  Aged Out    Hepatitis B vaccine  Aged Out    Hib vaccine  Aged Out    Meningococcal (ACWY) vaccine  Aged Out     Recommendations for Nexi Due: see orders and patient instructions/AVS.  . Recommended screening schedule for the next 5-10 years is provided to the patient in written form: see Patient Instructions/AVS.    Bernie Vanegas was seen today for medicare awv.     Diagnoses and all orders for this visit:    Routine general medical examination at a health care facility    Psychophysiological insomnia  Stable   Wants to try Sonata, If it helps, will switch to 90 days and stop Lunesta    -     DRUG SCREEN, PAIN; Future  -     zaleplon (SONATA) 5 MG capsule; Take 1 capsule by mouth nightly for 7 days. Stop Lunesta    Medication monitoring encounter  -     DRUG SCREEN, PAIN; Future    Hyperparathyroidism ([facility] [address]  Improved  GFR 54 on 7/8/2021, was 43 on 6/1/2021  Low calcium most recently 8.5  -     PTH, INTACT WITH IONIZED CALCIUM; Future  -     Vitamin D 25 Hydroxy; Future    Stage 3b chronic kidney disease ([facility] [address]  improved  -     Comprehensive Metabolic Panel; Future  -     Magnesium; Future    Vitamin B 12 deficiency  Unsure if improving or not. Will recheck level  Continue supplementation.    -     Vitamin B12 & Folate; Future    Controlled Substance Monitoring:    Acute and Chronic Pain Monitoring:   RX Monitoring 8/12/2021   Attestation -   Periodic Controlled Substance Monitoring Possible medication side effects, risk of tolerance/dependence & alternative treatments discussed. ;No signs of potential drug abuse or diversion identified. ;Assessed functional status.    Chronic Pain > 50 MEDD -                    Lab Results   Component Value Date    WBC 7.1 06/01/2021    HGB 15.4 06/01/2021    HCT 45.7 06/01/2021    MCV 94.7 06/01/2021     06/01/2021       Lab Results   Component Value Date     07/08/2021    K 4.8 07/08/2021    CL 99 07/08/2021    CO2 25 07/08/2021    BUN 13 07/08/2021    CREATININE 0.99 07/08/2021    GLUCOSE 129 07/08/2021    GLUCOSE 104 02/27/2012    CALCIUM 8.5 07/08/2021        Lab Results   Component Value Date    ALT 12 06/01/2021    AST 18 06/01/2021    ALKPHOS 90 06/01/2021    BILITOT 1.22 (H) 06/01/2021       Lab Results   Component Value Date    TSH 2.35 03/02/2021       Lab Results   Component Value Date    CHOL 153 06/01/2021    CHOL 132 03/16/2020    CHOL 160 03/13/2019 Lab Results   Component Value Date    TRIG 109 06/01/2021    TRIG 63 03/16/2020    TRIG 106 03/13/2019     Lab Results   Component Value Date    HDL 63 06/01/2021    HDL 66 03/16/2020    HDL 59 03/13/2019     Lab Results   Component Value Date    LDLCHOLESTEROL 68 06/01/2021    LDLCHOLESTEROL 53 03/16/2020    LDLCHOLESTEROL 80 03/13/2019       Lab Results   Component Value Date    CHOLHDLRATIO 2.4 06/01/2021    CHOLHDLRATIO 2.0 03/16/2020    CHOLHDLRATIO 2.7 03/13/2019       Lab Results   Component Value Date    LABA1C 5.4 03/16/2020       Lab Results   Component Value Date    COCXCFCT07 352 09/17/2019       Lab Results   Component Value Date    FOLATE 9.1 09/17/2019       No results found for: IRON, TIBC, FERRITIN    Lab Results   Component Value Date    VITD25 39.6 03/02/2021     Future Appointments   Date Time Provider [facility]   12/7/2021 11:00 AM Saul Marroquin MD [facility]

## 2021-08-13 ENCOUNTER — HOSPITAL ENCOUNTER (OUTPATIENT)
Age: 81
Discharge: HOME | End: 2021-08-13
Payer: MEDICARE

## 2021-08-13 DIAGNOSIS — F51.04 PSYCHOPHYSIOLOGICAL INSOMNIA: ICD-10-CM

## 2021-08-13 DIAGNOSIS — E53.8 VITAMIN B 12 DEFICIENCY: ICD-10-CM

## 2021-08-13 DIAGNOSIS — E21.3 HYPERPARATHYROIDISM (HCC): ICD-10-CM

## 2021-08-13 DIAGNOSIS — N18.32 STAGE 3B CHRONIC KIDNEY DISEASE (HCC): ICD-10-CM

## 2021-08-13 DIAGNOSIS — Z51.81 MEDICATION MONITORING ENCOUNTER: ICD-10-CM

## 2021-08-13 DIAGNOSIS — N28.1 CYST OF LEFT KIDNEY: ICD-10-CM

## 2021-08-13 DIAGNOSIS — N18.31 STAGE 3A CHRONIC KIDNEY DISEASE (HCC): ICD-10-CM

## 2021-08-13 LAB
ALBUMIN SERPL-MCNC: 4.4 G/DL (ref 3.5–5.2)
ALBUMIN/GLOBULIN RATIO: ABNORMAL (ref 1–2.5)
ALP BLD-CCNC: 94 U/L (ref 35–104)
ALT SERPL-CCNC: 13 U/L (ref 5–33)
ANION GAP SERPL CALCULATED.3IONS-SCNC: 8 MMOL/L (ref 9–17)
AST SERPL-CCNC: 22 U/L
BILIRUB SERPL-MCNC: 0.5 MG/DL (ref 0.3–1.2)
BUN BLDV-MCNC: 13 MG/DL (ref 8–23)
BUN/CREAT BLD: ABNORMAL (ref 9–20)
CALCIUM IONIZED: 1.26 MMOL/L (ref 1.13–1.33)
CALCIUM SERPL-MCNC: 9.3 MG/DL (ref 8.6–10.4)
CHLORIDE BLD-SCNC: 98 MMOL/L (ref 98–107)
CO2: 27 MMOL/L (ref 20–31)
CREAT SERPL-MCNC: 1.06 MG/DL (ref 0.5–0.9)
FOLATE: 4.6 NG/ML
GFR AFRICAN AMERICAN: >60 ML/MIN
GFR NON-AFRICAN AMERICAN: 50 ML/MIN
GFR SERPL CREATININE-BSD FRML MDRD: ABNORMAL ML/MIN/{1.73_M2}
GFR SERPL CREATININE-BSD FRML MDRD: ABNORMAL ML/MIN/{1.73_M2}
GLUCOSE BLD-MCNC: 108 MG/DL (ref 70–99)
MAGNESIUM: 1.9 MG/DL (ref 1.6–2.6)
POTASSIUM SERPL-SCNC: 4.4 MMOL/L (ref 3.7–5.3)
PTH INTACT: 58.99 PG/ML (ref 15–65)
SODIUM BLD-SCNC: 133 MMOL/L (ref 135–144)
TOTAL PROTEIN: 7.1 G/DL (ref 6.4–8.3)
VITAMIN B-12: 450 PG/ML (ref 232–1245)
VITAMIN D 25-HYDROXY: 36.8 NG/ML (ref 30–100)

## 2021-08-13 PROCEDURE — 83970 ASSAY OF PARATHORMONE: CPT

## 2021-08-13 PROCEDURE — 82607 VITAMIN B-12: CPT

## 2021-08-13 PROCEDURE — 83735 ASSAY OF MAGNESIUM: CPT

## 2021-08-13 PROCEDURE — 82306 VITAMIN D 25 HYDROXY: CPT

## 2021-08-13 PROCEDURE — 82746 ASSAY OF FOLIC ACID SERUM: CPT

## 2021-08-13 PROCEDURE — 80053 COMPREHEN METABOLIC PANEL: CPT

## 2021-08-13 PROCEDURE — 80307 DRUG TEST PRSMV CHEM ANLYZR: CPT

## 2021-08-13 PROCEDURE — 36415 COLL VENOUS BLD VENIPUNCTURE: CPT

## 2021-08-13 PROCEDURE — 82330 ASSAY OF CALCIUM: CPT

## 2021-08-16 DIAGNOSIS — N18.31 STAGE 3A CHRONIC KIDNEY DISEASE (HCC): ICD-10-CM

## 2021-08-16 DIAGNOSIS — E87.1 HYPONATREMIA: ICD-10-CM

## 2021-08-16 DIAGNOSIS — E53.8 FOLIC ACID DEFICIENCY: Primary | ICD-10-CM

## 2021-08-16 RX ORDER — FOLIC ACID 1 MG/1
1 TABLET ORAL DAILY
Qty: 90 TABLET | Refills: 1 | Status: SHIPPED | OUTPATIENT
Start: 2021-08-16 | End: 2021-12-07

## 2021-08-16 NOTE — RESULT ENCOUNTER NOTE
Please notify patient. Folic acid is low, to start folic acid 1 mg daily, I will send to the pharmacy  PTH is improved and normal now  Sodium is low same like 1 month ago, 133 normal is above 135. Do not drink more than 6 x 8 ounce glasses of fluids a day, and absolutely avoid any alcoholic beverages which can decrease the sodium level. Chronic kidney disease stage III stable  The calcium is normal now  Otherwise labs within normal limits  Continue current treatment.     To start folic acid 1 mg and repeat basic metabolic profile in a week to recheck sodium, new order placed in the computer    Future Appointments  12/7/2021  11:00 AM   MD eduardo Zamudio sc               [facility]

## 2021-08-18 LAB
6-ACETYLMORPHINE, UR: NOT DETECTED
7-AMINOCLONAZEPAM, URINE: NOT DETECTED
ALPHA-OH-ALPRAZ, URINE: NOT DETECTED
ALPHA-OH-MIDAZOLAM, URINE: NOT DETECTED
ALPRAZOLAM, URINE: NOT DETECTED
AMPHETAMINES, URINE: NOT DETECTED
BARBITURATES, URINE: NOT DETECTED
BENZOYLECGONINE, UR: NOT DETECTED
BUPRENORPHINE URINE: NOT DETECTED
CARISOPRODOL, UR: NOT DETECTED
CLONAZEPAM, URINE: NOT DETECTED
CODEINE, URINE: NOT DETECTED
CREATININE URINE: 28.1 MG/DL (ref 20–400)
DIAZEPAM, URINE: NOT DETECTED
DRUGS EXPECTED, UR: NORMAL
EER HI RES INTERP UR: NORMAL
ETHYL GLUCURONIDE UR: PRESENT
FENTANYL URINE: NOT DETECTED
GABAPENTIN: NOT DETECTED
HYDROCODONE, URINE: NOT DETECTED
HYDROMORPHONE, URINE: NOT DETECTED
LORAZEPAM, URINE: NOT DETECTED
MARIJUANA METAB, UR: NOT DETECTED
MDA, UR: NOT DETECTED
MDEA, EVE, UR: NOT DETECTED
MDMA URINE: NOT DETECTED
MEPERIDINE METAB, UR: NOT DETECTED
METHADONE, URINE: NOT DETECTED
METHAMPHETAMINE, URINE: NOT DETECTED
METHYLPHENIDATE: NOT DETECTED
MIDAZOLAM, URINE: NOT DETECTED
MORPHINE URINE: NOT DETECTED
NALOXONE URINE: NOT DETECTED
NORBUPRENORPHINE, URINE: NOT DETECTED
NORDIAZEPAM, URINE: NOT DETECTED
NORFENTANYL, URINE: NOT DETECTED
NORHYDROCODONE, URINE: NOT DETECTED
NOROXYCODONE, URINE: NOT DETECTED
NOROXYMORPHONE, URINE: NOT DETECTED
OXAZEPAM, URINE: NOT DETECTED
OXYCODONE URINE: NOT DETECTED
OXYMORPHONE, URINE: NOT DETECTED
PAIN MANAGEMENT DRUG PANEL INTERP, URINE: NORMAL
PAIN MGT DRUG PANEL, HI RES, UR: NORMAL
PCP,URINE: NOT DETECTED
PHENTERMINE, UR: NOT DETECTED
PREGABALIN: NOT DETECTED
TAPENTADOL, URINE: NOT DETECTED
TAPENTADOL-O-SULFATE, URINE: NOT DETECTED
TEMAZEPAM, URINE: NOT DETECTED
TRAMADOL, URINE: NOT DETECTED
ZOLPIDEM METABOLITE (ZCA), URINE: NOT DETECTED
ZOLPIDEM, URINE: NOT DETECTED

## 2021-08-23 ENCOUNTER — PATIENT MESSAGE (OUTPATIENT)
Dept: FAMILY MEDICINE CLINIC | Age: 81
End: 2021-08-23

## 2021-08-23 DIAGNOSIS — F51.04 PSYCHOPHYSIOLOGICAL INSOMNIA: Primary | ICD-10-CM

## 2021-08-23 RX ORDER — ZALEPLON 10 MG/1
10 CAPSULE ORAL NIGHTLY PRN
Qty: 7 CAPSULE | Refills: 0 | Status: SHIPPED | OUTPATIENT
Start: 2021-08-23 | End: 2021-08-27 | Stop reason: SDUPTHER

## 2021-08-23 NOTE — RESULT ENCOUNTER NOTE
Mychart sent to patient   Urine drug screen showed alcohol      Otherwise labs within normal limits  Continue current treatment.     Future Appointments  12/7/2021  11:00 AM   MD eduardo Acevedo

## 2021-08-27 DIAGNOSIS — F51.04 PSYCHOPHYSIOLOGICAL INSOMNIA: ICD-10-CM

## 2021-08-27 RX ORDER — ZALEPLON 10 MG/1
10 CAPSULE ORAL NIGHTLY PRN
Qty: 90 CAPSULE | Refills: 0 | Status: SHIPPED | OUTPATIENT
Start: 2021-08-27 | End: 2021-11-11 | Stop reason: SDUPTHER

## 2021-09-13 ENCOUNTER — OFFICE VISIT (OUTPATIENT)
Dept: FAMILY MEDICINE CLINIC | Age: 81
End: 2021-09-13
Payer: MEDICARE

## 2021-09-13 ENCOUNTER — NURSE TRIAGE (OUTPATIENT)
Dept: OTHER | Facility: CLINIC | Age: 81
End: 2021-09-13

## 2021-09-13 VITALS
SYSTOLIC BLOOD PRESSURE: 163 MMHG | DIASTOLIC BLOOD PRESSURE: 89 MMHG | HEART RATE: 74 BPM | OXYGEN SATURATION: 97 % | TEMPERATURE: 98.1 F

## 2021-09-13 DIAGNOSIS — L23.7 POISON IVY: ICD-10-CM

## 2021-09-13 DIAGNOSIS — L30.9 DERMATITIS: Primary | ICD-10-CM

## 2021-09-13 PROCEDURE — 99213 OFFICE O/P EST LOW 20 MIN: CPT | Performed by: NURSE PRACTITIONER

## 2021-09-13 PROCEDURE — 96372 THER/PROPH/DIAG INJ SC/IM: CPT | Performed by: NURSE PRACTITIONER

## 2021-09-13 RX ORDER — PREDNISONE 20 MG/1
TABLET ORAL
Qty: 18 TABLET | Refills: 0 | Status: SHIPPED | OUTPATIENT
Start: 2021-09-13 | End: 2021-09-23

## 2021-09-13 RX ORDER — BUDESONIDE, GLYCOPYRROLATE, AND FORMOTEROL FUMARATE 160; 9; 4.8 UG/1; UG/1; UG/1
AEROSOL, METERED RESPIRATORY (INHALATION)
COMMUNITY
Start: 2021-09-01 | End: 2021-12-07

## 2021-09-13 RX ORDER — TRIAMCINOLONE ACETONIDE 40 MG/ML
40 INJECTION, SUSPENSION INTRA-ARTICULAR; INTRAMUSCULAR ONCE
Status: COMPLETED | OUTPATIENT
Start: 2021-09-13 | End: 2021-09-13

## 2021-09-13 RX ADMIN — TRIAMCINOLONE ACETONIDE 40 MG: 40 INJECTION, SUSPENSION INTRA-ARTICULAR; INTRAMUSCULAR at 15:36

## 2021-09-13 ASSESSMENT — ENCOUNTER SYMPTOMS
VOMITING: 0
CHEST TIGHTNESS: 0
SORE THROAT: 0
DIARRHEA: 0
COUGH: 0
ALLERGIC/IMMUNOLOGIC NEGATIVE: 1
EYE ITCHING: 0
SHORTNESS OF BREATH: 0
ABDOMINAL PAIN: 0
EYES NEGATIVE: 1
EYE DISCHARGE: 0
NAUSEA: 0

## 2021-09-13 NOTE — TELEPHONE ENCOUNTER
Received call from Paris Garza at [facility] with [employer]. Brief description of triage: rash to face and neck    Triage indicates for patient to go to office now. Pt informed if unable to get an appointment to go to urgent care or Emergency Department. Pt agreeable with plan. Care advice provided, patient verbalizes understanding; denies any other questions or concerns; instructed to call back for any new or worsening symptoms. Writer provided warm transfer to Fede Ny at [facility] for appointment scheduling. Attention Provider: Thank you for allowing me to participate in the care of your patient. The patient was connected to triage in response to information provided to the ECC. Please do not respond through this encounter as the response is not directed to a shared pool. Reason for Disposition   Face becomes swollen    Answer Assessment - Initial Assessment Questions  1. APPEARANCE of RASH: \"Describe the rash. \" (e.g., spots, blisters, raised areas, skin peeling, scaly)      Little blisters, redness on left cheek. Above upper lip. 2. SIZE: \"How big are the spots? \" (e.g., tip of pen, eraser, coin; inches, centimeters)      A little bigger than a pin point    3. LOCATION: \"Where is the rash located? \"      Face and neck. Left eyelid, left cheek, left side of neck. 4. COLOR: \"What color is the rash? \" (Note: It is difficult to assess rash color in people with darker-colored skin. When this situation occurs, simply ask the caller to describe what they see. )      white blister    5. ONSET: \"When did the rash begin? \"      Yesterday    6. FEVER: \"Do you have a fever? \" If so, ask: \"What is your temperature, how was it measured, and when did it start? \"      Denies    7. ITCHING: \"Does the rash itch? \" If so, ask: \"How bad is the itch? \" (Scale 1-10; or mild, moderate, severe)      Yes. Has been applying poison ivy cream on it. No pain.     8. CAUSE: \"What do you think is causing the rash?\"      thinks possibly poison ivy but states no exposure she is aware of.     9. MEDICATION FACTORS: \"Have you started any new medications within the last 2 weeks? \" (e.g., antibiotics)       Started a new inhaler. 2 weeks. 10. OTHER SYMPTOMS: \"Do you have any other symptoms? \" (e.g., dizziness, headache, sore throat, joint pain)        Denies other s/s. 11. PREGNANCY: \"Is there any chance you are pregnant? \" \"When was your last menstrual period? \"        >60    Protocols used: RASH OR REDNESS - [facility]

## 2021-09-13 NOTE — PATIENT INSTRUCTIONS
Patient Education        prednisone  Pronunciation:  PRED ni graciela  Brand:  Tea  What is the most important information I should know about prednisone? You should not use prednisone if you have a fungal infection anywhere in your body. You should not stop using prednisone suddenly. Follow your doctor's instructions about tapering your dose. What is prednisone? Prednisone is a steroid that reduces inflammation in the body, and also suppresses your immune system. Prednisone is used to treat many different conditions such as hormonal disorders, skin diseases, arthritis, lupus, psoriasis, allergic conditions, ulcerative colitis, Crohn's disease, eye diseases, lung diseases, asthma, tuberculosis, blood cell disorders, kidney disorders, leukemia, lymphoma, multiple sclerosis, organ transplant rejection, swelling from a brain tumor or injury. Prednisone may also be used for purposes not listed in this medication guide. What should I discuss with my healthcare provider before taking prednisone? You should not use prednisone if you are allergic to it, or if you have a fungal infection anywhere in your body. Steroid medication can weaken your immune system, making it easier for you to get an infection or worsening an infection you already have. Tell your doctor about any illness or infection you've had within the past several weeks.   Tell your doctor if you have ever had:  · heart problems, high blood pressure, or a heart attack;  · glaucoma or cataracts;  · herpes infection of the eyes;  · past or present tuberculosis;  · a parasite infection that causes diarrhea (such as threadworms);  · any illness that causes diarrhea;  · underactive thyroid;  · diabetes;  · a stomach ulcer, diverticulitis;  · a colostomy or ileostomy;  · osteoporosis or low bone mineral density (steroid medication can increase your risk of bone loss);  · low levels of calcium or potassium in your blood;  · cirrhosis or other liver disease;  · mental illness or psychosis; or  · a muscle disorder such as myasthenia gravis. Long-term use of steroids may lead to bone loss (osteoporosis), especially if you smoke or drink alcohol, if you do not exercise, or if you do not get enough vitamin D or calcium in your diet. It is not known whether this medicine will harm an unborn baby. Tell your doctor if you are pregnant or plan to become pregnant. You should not breastfeed while using prednisone. How should I take prednisone? Follow all directions on your prescription label and read all medication guides or instruction sheets. Your doctor may occasionally change your dose. Use the medicine exactly as directed. Prednisone is taken daily or every other day, depending on the condition being treated. You may need to take the medicine at a certain time of day. Follow your doctor's instructions about when and how often to take this medicine. Take with food if prednisone upsets your stomach. Measure liquid medicine carefully. Use the dosing syringe provided, or use a medicine dose-measuring device (not a kitchen spoon). Swallow the delayed-release tablet whole and do not crush, chew, or break it. Prednisone can weaken (suppress) your immune system, and you may get an infection more easily. Call your doctor if you have signs of infection (fever, weakness, cold or flu symptoms, skin sores, diarrhea, frequent or recurring illness). If you have major surgery or a severe injury or infection, your prednisone dose needs may change. Make sure any doctor caring for you knows you are using this medicine. If you use this medicine long-term, you may need medical tests and vision exams. In case of emergency, wear or carry medical identification to let others know you use a steroid. You should not stop using prednisone suddenly. Follow your doctor's instructions about tapering your dose. Store at room temperature away from moisture, heat, and light.   What happens if I miss a dose? Take the medicine as soon as you can, but skip the missed dose if it is almost time for your next dose. Do not take two doses at one time. What happens if I overdose? Seek emergency medical attention or call the Poison Help line at 1-307.175.3936. High doses or long-term use of prednisone can lead to thinning skin, easy bruising, changes in body fat (especially in your face, neck, back, and waist), increased acne or facial hair, menstrual problems, impotence, or loss of interest in sex. What should I avoid while taking prednisone? Do not receive a \"live\" vaccine while using prednisone. The vaccine may not work as well and may not fully protect you from disease. Live vaccines include measles, mumps, rubella (MMR), polio, rotavirus, typhoid, yellow fever, varicella (chickenpox), zoster (shingles), and nasal flu (influenza) vaccine. Avoid being near people who are sick or have infections. Call your doctor for preventive treatment if you are exposed to chickenpox or measles. These conditions can be serious or even fatal in people who are using steroid medicine. Avoid drinking alcohol. What are the possible side effects of prednisone? Get emergency medical help if you have signs of an allergic reaction: hives; difficult breathing; swelling of your face, lips, tongue, or throat.   Call your doctor at once if you have:  · muscle pain or weakness;  · blurred vision, tunnel vision, eye pain, or seeing halos around lights;  · severe depression, changes in personality, unusual thoughts or behavior;  · bloody or tarry stools, coughing up blood or vomit that looks like coffee grounds;  · swelling, rapid weight gain, feeling short of breath;  · irregular heartbeats;  · severe headache, pounding in your neck or ears;  · decreased adrenal gland hormones --muscle weakness, tiredness, diarrhea, nausea, menstrual changes, skin discoloration, craving salty foods, and feeling light-headed; or  · low potassium level --leg cramps, constipation, irregular heartbeats, fluttering in your chest, increased thirst or urination, numbness or tingling, muscle weakness or limp feeling. Prednisone can affect growth in children. Tell your doctor if your child is not growing at a normal rate while using this medicine. Common side effects may include:  · weight gain (especially in your face or your upper back and torso);  · increased appetite;  · mood changes, trouble sleeping;  · changes in your menstrual periods;  · problems with memory or thought;  · muscle or joint pain;  · weakness;  · headache, dizziness, spinning sensation;  · nausea, bloating, loss of appetite;  · slow wound healing; or  · acne, increased sweating, thinning skin, bruising, pinpoint spots under your skin. This is not a complete list of side effects and others may occur. Call your doctor for medical advice about side effects. You may report side effects to FDA at 6-044-FDA-1500. What other drugs will affect prednisone? Sometimes it is not safe to use certain medications at the same time. Some drugs can affect your blood levels of other drugs you take, which may increase side effects or make the medications less effective. Tell your doctor about all your current medicines. Many drugs can affect prednisone, especially:  · bupropion;  · cyclosporine;  · digoxin;  · ketoconazole;  · an antibiotic;  · birth control pills or hormone replacement therapy;  · a diuretic or \"water pill\";  · insulin or oral diabetes medicine;  · a blood thinner --warfarin, Coumadin, Jantoven; or  · NSAIDs (nonsteroidal anti-inflammatory drugs) --aspirin, ibuprofen (Advil, Motrin), naproxen (Aleve), celecoxib, diclofenac, indomethacin, meloxicam, and others. This list is not complete and many other drugs may affect prednisone. This includes prescription and over-the-counter medicines, vitamins, and herbal products. Not all possible drug interactions are listed here.   Where can I get more information? Your pharmacist can provide more information about prednisone. Remember, keep this and all other medicines out of the reach of children, never share your medicines with others, and use this medication only for the indication prescribed. Every effort has been made to ensure that the information provided by [address] is accurate, up-to-date, and complete, but no guarantee is made to that effect. Drug information contained herein may be time sensitive. [facility] information has been compiled for use by healthcare practitioners and consumers in the [place] and therefore [facility] does not warrant that uses outside of the [place] are appropriate, unless specifically indicated otherwise. [facility]'s drug information does not endorse drugs, diagnose patients or recommend therapy. [facility] drug information is an informational resource designed to assist licensed healthcare practitioners in caring for their patients and/or to serve consumers viewing this service as a supplement to, and not a substitute for, the expertise, skill, knowledge and judgment of healthcare practitioners. The absence of a warning for a given drug or drug combination in no way should be construed to indicate that the drug or drug combination is safe, effective or appropriate for any given patient. [facility] does not assume any responsibility for any aspect of healthcare administered with the aid of information [facility] provides. The information contained herein is not intended to cover all possible uses, directions, precautions, warnings, drug interactions, allergic reactions, or adverse effects. If you have questions about the drugs you are taking, check with your doctor, nurse or pharmacist.  Copyright 2587-7788 [address]. Version: 10.01. Revision date: 3/28/2019. Care instructions adapted under license by [facility] ([facility]).  If you have questions about a medical condition or this instruction, always ask your healthcare professional. [place] [address] any warranty or liability for your use of this information.

## 2021-09-13 NOTE — PROGRESS NOTES
[facility]   [facility]   Dept: [phone]  Dept Fax: [phone]    Livan Lovett is a 80 y.o. female who presents today forher medical conditions/complaints as noted below. Livan Lovett is c/o of   Chief Complaint   Patient presents with    Rash     onset since yesterday, left side of face swollen, red and itchy. Took otc allergy meds helped a little     HPI:     Rash  This is a new problem. The current episode started yesterday. The problem is unchanged. The affected locations include the face. The rash is characterized by swelling and itchiness. She was exposed to plant contact. Pertinent negatives include no congestion, cough, diarrhea, fatigue, fever, shortness of breath, sore throat or vomiting.    Reports having contact with poison ivy and developed rash day later   Trying topical anti itch relief- not effective     Past Medical History:   Diagnosis Date    Abdominal pain     Allergic rhinitis     Anxiety     Chronic back pain     Chronic fatigue     Chronic kidney disease     Colon polyp 3/7/2012    TUBULAR ADENOMA    COPD (chronic obstructive pulmonary disease) (HCC)     COPD, 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     Depression     Diverticul disease small and large intestine, no perforati or abscess     Elevated blood pressure reading     Fall 1/12/2017    GERD (gastroesophageal reflux disease)     Glaucoma     Hyperlipidemia     with target LDL less than 100    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[address]     Hyperthyroidism 2012    Insomnia     Psychophysiological    Neuropathy     Orthostatic dizziness 3/12/2017    Osteoarthritis     Panlobular emphysema (HCC)     PVC (premature ventricular contraction) 1/12/2017    S/P parathyroidectomy ([address]     Tubal pregnancy [id number]    Vasovagal syncope 1/12/2017      Past Surgical History:   Procedure Laterality Date    APPENDECTOMY      BACK SURGERY      CATARACT REMOVAL WITH IMPLANT Left [address]    [address]    CHOLECYSTECTOMY  2005    COLONOSCOPY      COLONOSCOPY  2014    Severe sigmoid diverticulosis; due for repeat 2019    ENDOSCOPY, COLON, DIAGNOSTIC      EYE SURGERY      [place] Left inferior parathyroid: adenoma    ROTATOR CUFF REPAIR Left     TONSILLECTOMY      TUBAL LIGATION      UPPER GASTROINTESTINAL ENDOSCOPY  2014    biopsy    UPPER GASTROINTESTINAL ENDOSCOPY  16    UPPER GASTROINTESTINAL ENDOSCOPY  16    mild gastritis       Family History   Problem Relation Age of Onset    Other Father         COPD    Stroke Sister     Heart Attack Sister     Breast Cancer Sister     Cancer Maternal Grandmother         Stomach cancer    Osteoporosis Mother     Other Sister         suicide       Social History     Tobacco Use    Smoking status: Former Smoker     Packs/day: 1.00     Years: 30.00     Pack years: 30.00     Types: Cigarettes     Start date: 1960     Quit date: 2000     Years since quittin.2    Smokeless tobacco: Never Used    Tobacco comment: still uses OTC nicorette gum   Substance Use Topics    Alcohol use: Yes     Alcohol/week: 0.0 standard drinks     Comment: occassional      Current Outpatient Medications   Medication Sig Dispense Refill    BREZTRI AEROSPHERE 160-9-4.8 MCG/ACT AERO       predniSONE (DELTASONE) 20 MG tablet 3 tabs x 3 days, then 2 tabs x 3 days, then 1 tab x 3 days 18 tablet 0    zaleplon (SONATA) 10 MG capsule Take 1 capsule by mouth nightly as needed (Insomnia. REPLACING LUNESTA) for up to 90 days.  Dose increased 2021 90 capsule 0    folic acid (FOLVITE) 1 MG tablet Take 1 tablet by mouth daily 90 tablet 1    Melatonin CR 3 MG TBCR USe nightly 90 tablet 2    Tiotropium Bromide-Olodaterol (STIOLTO RESPIMAT) 2.5-2.5 MCG/ACT AERS Inhale 2 puffs into the lungs daily      Fexofenadine HCl (ALLERGY 24-HR PO) Take by mouth      Oxymetazoline HCl (NASAL SPRAY NA) by Nasal route      Multiple Vitamins-Minerals (OCUVITE ADULT 50+ PO) Take by mouth      Lactobacillus (PROBIOTIC ACIDOPHILUS PO) Take by mouth      ipratropium (ATROVENT) 0.03 % nasal spray 2 sprays by Nasal route 3 times daily 1 Bottle 0    albuterol sulfate  (90 Base) MCG/ACT inhaler Inhale 2 puffs into the lungs every 6 hours as needed for Wheezing or Shortness of Breath 18 g 3    Spacer/Aero-Holding Chambers (AEROCHAMBER MV) MISC To be used with inhaler 1 each 0    dorzolamide-timolol (COSOPT) 22.3-6.8 MG/ML ophthalmic solution Place 1 drop into both eyes 2 times daily      ALPHAGAN P 0.1 % SOLN Place 1 drop into both eyes 3 times daily       latanoprost (XALATAN) 0.005 % ophthalmic solution Place 1 drop into both eyes nightly        No current facility-administered medications for this visit. Allergies   Allergen Reactions    Aspirin      GI upset           Subjective:      Review of Systems   Constitutional: Negative for appetite change, chills, fatigue and fever. HENT: Negative for congestion, postnasal drip and sore throat. Eyes: Negative. Negative for discharge and itching. Respiratory: Negative for cough, chest tightness and shortness of breath. Cardiovascular: Negative for chest pain, palpitations and leg swelling. Gastrointestinal: Negative for abdominal pain, diarrhea, nausea and vomiting. Endocrine: Negative. Genitourinary: Negative for dysuria, frequency and urgency. Musculoskeletal: Negative for neck pain and neck stiffness. Skin: Positive for rash. Rash- face    Allergic/Immunologic: Negative. Neurological: Negative for dizziness, weakness, light-headedness and headaches. Hematological: Negative for adenopathy. Psychiatric/Behavioral: Negative for confusion. Objective:      Physical Exam  Vitals reviewed.    Constitutional:       Appearance: She is well-developed. HENT:      Head: Normocephalic. Right Ear: External ear normal.      Left Ear: External ear normal.      Nose: Nose normal.   Eyes:      Conjunctiva/sclera: Conjunctivae normal.      Pupils: Pupils are equal, round, and reactive to light. Cardiovascular:      Rate and Rhythm: Normal rate and regular rhythm. Heart sounds: Normal heart sounds. No murmur heard. No friction rub. No gallop. Pulmonary:      Effort: Pulmonary effort is normal. No respiratory distress. Breath sounds: Normal breath sounds. Abdominal:      General: Bowel sounds are normal.      Palpations: Abdomen is soft. Musculoskeletal:         General: Normal range of motion. Cervical back: Normal range of motion and neck supple. Lymphadenopathy:      Cervical: No cervical adenopathy. Skin:     General: Skin is warm and dry. Findings: Rash present. Rash is papular and vesicular. Comments: Papular/vesicular rash noted to L side of face and around L eye. Neurological:      Mental Status: She is alert and oriented to person, place, and time. Cranial Nerves: No cranial nerve deficit. Coordination: Coordination normal.      Deep Tendon Reflexes: Reflexes are normal and symmetric. Psychiatric:         Thought Content: Thought content normal.       BP (!) 163/89 (Site: Left Upper Arm, Position: Sitting, Cuff Size: Medium Adult)   Pulse 74   Temp 98.1 °F (36.7 °C) (Infrared)   SpO2 97%     Assessment:       Diagnosis Orders   1. Dermatitis  triamcinolone acetonide (KENALOG-40) injection 40 mg    predniSONE (DELTASONE) 20 MG tablet   2.  Poison ivy  triamcinolone acetonide (KENALOG-40) injection 40 mg    predniSONE (DELTASONE) 20 MG tablet           Plan:     1.) Kenalog IM injection given in office today   2.) Start Prednisone tomorrow   3.) May use OTC allergy meds   4.) Watch for S/S secondary skin infection- if occurs, please RTO   5.) RTO PRN   6.) Follow-up with PCP Problem List     None           Patient given educationalmaterials - see patient instructions. Discussed use, benefit, and side effectsof prescribed medications. All patient questions answered. Pt verbalized understanding. Instructed to continue current medications, diet and exercise. Patient agreedwith treatment plan. Follow up as directed.      Electronically signed by BARRERA Huang CNP on 9/13/2021 at 4:17 PM

## 2021-10-25 ENCOUNTER — PATIENT MESSAGE (OUTPATIENT)
Dept: FAMILY MEDICINE CLINIC | Age: 81
End: 2021-10-25

## 2021-10-25 DIAGNOSIS — J01.90 ACUTE BACTERIAL SINUSITIS: Primary | ICD-10-CM

## 2021-10-25 DIAGNOSIS — B96.89 ACUTE BACTERIAL SINUSITIS: Primary | ICD-10-CM

## 2021-10-25 RX ORDER — AZITHROMYCIN 250 MG/1
TABLET, FILM COATED ORAL
Qty: 6 TABLET | Refills: 0 | Status: SHIPPED | OUTPATIENT
Start: 2021-10-25 | End: 2021-10-30

## 2021-10-25 NOTE — TELEPHONE ENCOUNTER
From: Faye Conroy  To: Terell Julien MD  Sent: 10/25/2021 12:30 PM EDT  Subject: Prescription Question    Dr. Mady Alcazar,  I have had a sinus infection for several weeks. I now have pain and pressure in sinuses under my cheekbones which extends to my left ear. Would you be willing to send an antibiotic prescription to [facility] for this without seeing me? I have tried over the counter medications which seem to make it worse.   Faye Conroy

## 2021-11-02 DIAGNOSIS — J31.0 CHRONIC RHINITIS: Primary | ICD-10-CM

## 2021-11-02 RX ORDER — FLUTICASONE PROPIONATE 50 MCG
1 SPRAY, SUSPENSION (ML) NASAL DAILY
Qty: 16 G | Refills: 1 | Status: SHIPPED | OUTPATIENT
Start: 2021-11-02 | End: 2022-04-20 | Stop reason: ALTCHOICE

## 2021-11-11 DIAGNOSIS — F51.04 PSYCHOPHYSIOLOGICAL INSOMNIA: ICD-10-CM

## 2021-11-11 RX ORDER — ZALEPLON 10 MG/1
10 CAPSULE ORAL NIGHTLY PRN
Qty: 90 CAPSULE | Refills: 0 | Status: SHIPPED | OUTPATIENT
Start: 2021-11-11 | End: 2022-02-15 | Stop reason: SDUPTHER

## 2021-12-07 ENCOUNTER — OFFICE VISIT (OUTPATIENT)
Dept: FAMILY MEDICINE CLINIC | Age: 81
End: 2021-12-07
Payer: MEDICARE

## 2021-12-07 VITALS
BODY MASS INDEX: 22.68 KG/M2 | HEIGHT: 63 IN | WEIGHT: 128 LBS | OXYGEN SATURATION: 98 % | SYSTOLIC BLOOD PRESSURE: 120 MMHG | TEMPERATURE: 97.1 F | HEART RATE: 78 BPM | DIASTOLIC BLOOD PRESSURE: 80 MMHG

## 2021-12-07 DIAGNOSIS — N18.31 STAGE 3A CHRONIC KIDNEY DISEASE (HCC): ICD-10-CM

## 2021-12-07 DIAGNOSIS — Z12.31 ENCOUNTER FOR SCREENING MAMMOGRAM FOR BREAST CANCER: ICD-10-CM

## 2021-12-07 DIAGNOSIS — K29.00 OTHER ACUTE GASTRITIS WITHOUT HEMORRHAGE: Primary | ICD-10-CM

## 2021-12-07 DIAGNOSIS — E53.8 FOLIC ACID DEFICIENCY: ICD-10-CM

## 2021-12-07 DIAGNOSIS — F51.04 PSYCHOPHYSIOLOGICAL INSOMNIA: ICD-10-CM

## 2021-12-07 DIAGNOSIS — J43.1 PANLOBULAR EMPHYSEMA (HCC): ICD-10-CM

## 2021-12-07 DIAGNOSIS — H61.22 IMPACTED CERUMEN OF LEFT EAR: ICD-10-CM

## 2021-12-07 DIAGNOSIS — E53.8 VITAMIN B 12 DEFICIENCY: ICD-10-CM

## 2021-12-07 DIAGNOSIS — R53.82 CHRONIC FATIGUE: ICD-10-CM

## 2021-12-07 PROCEDURE — 99214 OFFICE O/P EST MOD 30 MIN: CPT | Performed by: FAMILY MEDICINE

## 2021-12-07 RX ORDER — OMEPRAZOLE 40 MG/1
40 CAPSULE, DELAYED RELEASE ORAL
Qty: 90 CAPSULE | Refills: 0
Start: 2021-12-07 | End: 2022-08-30

## 2021-12-07 ASSESSMENT — ENCOUNTER SYMPTOMS
ABDOMINAL DISTENTION: 0
SHORTNESS OF BREATH: 1
CHEST TIGHTNESS: 0
COUGH: 0
NAUSEA: 0
VOMITING: 0
ABDOMINAL PAIN: 1
DIARRHEA: 0
CONSTIPATION: 0
WHEEZING: 0

## 2021-12-07 ASSESSMENT — PATIENT HEALTH QUESTIONNAIRE - PHQ9
1. LITTLE INTEREST OR PLEASURE IN DOING THINGS: 0
SUM OF ALL RESPONSES TO PHQ QUESTIONS 1-9: 0
SUM OF ALL RESPONSES TO PHQ9 QUESTIONS 1 & 2: 0
SUM OF ALL RESPONSES TO PHQ QUESTIONS 1-9: 0
2. FEELING DOWN, DEPRESSED OR HOPELESS: 0
SUM OF ALL RESPONSES TO PHQ QUESTIONS 1-9: 0

## 2021-12-07 NOTE — PROGRESS NOTES
Visit Information    Have you changed or started any medications since your last visit including any over-the-counter medicines, vitamins, or herbal medicines? no   Are you having any side effects from any of your medications? -  no  Have you stopped taking any of your medications? Is so, why? -  no    Have you seen any other physician or provider since your last visit? No  Have you had any other diagnostic tests since your last visit? No  Have you been seen in the emergency room and/or had an admission to a hospital since we last saw you? No  Have you had your routine dental cleaning in the past 6 months? no    Have you activated your [employer] account? If not, what are your barriers?  Yes     Patient Care Team:  Angel Quiroz MD as PCP - Rach Rivera MD as PCP - [facility]  Bradley Celaya MD as Surgeon (General Surgery)  Fran Solano MD as Surgeon (Ophthalmology)  Drea Prather MD as Consulting Physician (Gastroenterology)  Jun Rand MD as Consulting Physician (Endocrinology)  Vandana Lara MD as Surgeon (Ophthalmology)  Sandeep Medina MD as Consulting Physician (Internal Medicine Cardiovascular Disease)  Charisma Hui MD as Consulting Physician (Cardiology)  Jeanine Mejia, PhD (Sleep Medicine)  Bridger White MD as Consulting Physician (Otolaryngology)  Irvin Mak MD as Consulting Physician (Cardiology)  Bobby Major MD as Consulting Physician (Pulmonology)  Eunice Infante MD as Consulting Physician (Nephrology)  Fransisca Byrnes MD as Consulting Physician (Pulmonology)    Medical History Review  Past Medical, Family, and Social History reviewed and does contribute to the patient presenting condition    Health Maintenance   Topic Date Due    Breast cancer screen  10/01/2021    Annual Wellness Visit (AWV)  08/13/2022    Potassium monitoring  08/13/2022    Creatinine monitoring  08/13/2022    Colon cancer screen colonoscopy 10/25/2024    DTaP/Tdap/Td vaccine (2 - Td or Tdap) 01/09/2027    DEXA (modify frequency per FRAX score)  Completed    Flu vaccine  Completed    Shingles Vaccine  Completed    Pneumococcal 65+ years Vaccine  Completed    COVID-19 Vaccine  Completed    Hepatitis A vaccine  Aged Out    Hepatitis B vaccine  Aged Out    Hib vaccine  Aged Out    Meningococcal (ACWY) vaccine  Aged Out

## 2021-12-07 NOTE — PROGRESS NOTES
Ness Tanner (:  1940) is a 80 y.o. female,Established patient, here for evaluation of the following chief complaint(s): COPD, Chronic Kidney Disease, Abdominal Pain (when she eats or takes meds anything that she eats hurts her stomach ), and Other (not taking a lot of her meds due to her stomach- has been using her husbands prilosec and digestive gummies )      ASSESSMENT/PLAN:    1. Other acute gastritis without hemorrhage  Failing to change as expected. -     H. pylori antigen; Future  -    Has been taking from over-the-counter omeprazole (PRILOSEC) 40 MG delayed release capsule; Take 1 capsule by mouth every morning (before breakfast), Disp-90 capsule, R-0NO PRINT  -     Lipase; Future to rule out pancreatitis  Advised to stop drinking wine, advised to avoid spicy foods, citrus, and elevate the head of the bed  2. 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 Psychophysiological insomnia  Improved with medications  To continue Sonata 10 mg before bedtime. To stop drinking wine before bedtime  Relaxation techniques, sleep hygiene discussed  4. Stage 3a chronic kidney disease (HCC)  Stable  GFR 50 on 2021, was 54 on 2021  We will recheck her labs  She knows not to take NSAIDs and to avoid dehydration  -     CBC; Future  -     Comprehensive Metabolic Panel; Future  -     Magnesium; Future  -     Phosphorus; Future  5. Chronic fatigue  Failing to change as expected. Recheck labs  -     CBC; Future  -     Comprehensive Metabolic Panel; Future  -     TSH without Reflex; Future  6. Vitamin B 12 deficiency  Previously improved, she has stopped taking supplementation  -     Vitamin B12 & Folate; Future  7. Folic acid deficiency  Unsure if improved or not, she self discontinued folic acid  -     Vitamin B12 & Folate; Future  8. Encounter for screening mammogram for breast cancer  -     [facility] ANA LAURA DIGITAL SCREEN BILATERAL; Future  9. Impacted cerumen of left ear  -     carbamide peroxide (DEBROX) 6.5 % otic solution; Place 5 drops into the left ear 2 times daily for 5 days, Disp-15 mL, R-0NO PRINT        Controlled Substance Monitoring:    Acute and Chronic Pain Monitoring:   RX Monitoring 11/11/2021   Attestation -   Periodic Controlled Substance Monitoring No signs of potential drug abuse or diversion identified. Chronic Pain > 50 MEDD -           Ap received counseling on the following healthy behaviors: nutrition, exercise and medication adherence  Reviewed prior labs and health maintenance  Discussed use, benefit, and side effects of prescribed medications. Barriers to medication compliance addressed. Patient given educational materials - see patient instructions  All patient questions answered. Patient voiced understanding. The patient's past medical,surgical, social, and family history as well as her current medications and allergies were reviewed as documented in today's encounter. Medications, labs, diagnostic studies, consultations and follow-up as documented in this encounter. Return in about 4 months (around 4/7/2022) for ALWAYS NEEDS 30 MIN. APPT, PHQ-9 in EPIC, FATIGUE, insomnia pill. Nurse visit on a Friday 12/17/21 for left wax flushing, after debrox for 5 days, and right wax flushing     Future Appointments   Date Time Provider Shirley Dunne   12/17/2021 10:30 AM SCHEDULE, TAISHA LEMA [facility]   5/12/2022  3:45 PM [facility]         SUBJECTIVE/OBJECTIVE:    HPI    Ap complains of Stomach upset, recurrent  She has been having this issue for several years, it went away for a while, then after the antibiotics for sinus infection which was Zpack in November, symptoms started again for a few weeks.   Patient says she gets stomach upset and stomach pain with everything she eats which includes fruits like citrus  Has been having constipation a lot  Denies nausea and vomiting  Omeprazole helps  She used to see the GI specialist, last EGD was in 2016. COPD:  Current treatment includes short-acting beta agonist inhaler, Stiolto, which has been effective. Residual symptoms: chronic dyspnea feels like it is getting worse. Not going anywhere, she is not going to [facility], she is not exercising anymore  Has appointment with Dr. Juan Villa in February, and will do CXR and PFTs  She denies cough, purulent sputum, wheezing, chest tightness. She requires her rescue inhaler 4 time(s) per week. Nicotine dependence. Smoker, counseling given to quit smoking. Counseling given: Yes  Comment: still uses OTC nicorette gum    CXR 2/5/2021 within normal limits  FINDINGS:   Cardiomediastinal silhouette within normal limits. Lungs and costophrenic   sulci are clear. No pneumothorax or subdiaphragmatic free air. No acute   osseous abnormality identified.  Tendon anchors noted in the left humeral   head from prior rotator cuff repair.           Impression   No radiographic evidence of acute cardiopulmonary disease.         EKG 2/5/2021 was within normal limits. Patient has chronic insomnia for many years, with poor sleep, we switched Lunesta to Aby, tolerated well, denies side effects, she reports she gets more sleep with it, as previously [facility] stopped working. She cannot fall asleep without Sonata. In the past she also tried trazodone, sleep hygiene, has seen sleep specialist.  She reminds me that she has insomnia for many years since her sister committed suicide when she was 15years old. She still drinks a glass of wine in the evening, to relax her, patient says she has been drinking wine for many years.       Patient has hyponatremia and chronic kidney disease stage III, feels tired all the time  She also has hyperparathyroidism and history of parathyroidectomy  She does not take NSAIDs. Denies frequency, urgency or dysuria. Denies fever, chills, night sweats. Lab Results   Component Value Date     08/13/2021    K 4.4 08/13/2021    CL 98 08/13/2021    CO2 27 08/13/2021    BUN 13 08/13/2021    CREATININE 1.06 08/13/2021    GLUCOSE 108 08/13/2021    GLUCOSE 104 02/27/2012    CALCIUM 9.3 08/13/2021        PTH is normal  58.99 on 8/13/2021    Has been losing weight, has lost 4 pounds since the last appointment 4 months ago  Wt Readings from Last 3 Encounters:   12/07/21 128 lb (58.1 kg)   08/12/21 132 lb 9.6 oz (60.1 kg)   06/08/21 133 lb (60.3 kg)         Vitamin B12 deficiency and folic acid deficiency  Ap  is  taking Vitamin B12 supplementation. Ap reports  fatigue. B12 level the lowest was 291 on 3/31/2015  Lab Results   Component Value Date    [id number] 004 08/13/2021     Lab Results   Component Value Date    FOLATE 4.6 (L) 08/13/2021      She is due for Mammogram.   Denies breast pain, lumps or nipple discharge. She declines breast exam today. [x]Negative depression screening. PHQ Scores 12/7/2021 8/12/2021 4/20/2021 1/19/2021 11/11/2020 8/24/2020 8/11/2020   PHQ2 Score 0 0 0 0 0 1 0   PHQ9 Score 0 0 0 0 4 4 0         Prior to Visit Medications    Medication Sig Taking? Authorizing Provider   zaleplon (SONATA) 10 MG capsule Take 1 capsule by mouth nightly as needed (Insomnia. REPLACING LUNESTA) for up to 90 days.  Dose increased 8/23/2021 Yes Felipa Moon MD   zinc 50 MG CAPS Take 50 mg by mouth nightly  Patient not taking: Reported on 12/7/2021  BARRERA Allen CNP   fluticasone (FLONASE) 50 MCG/ACT nasal spray 1 spray by Each Nostril route daily  BARRERA Allen CNP   Tiotropium Bromide-Olodaterol (STIOLTO RESPIMAT) 2.5-2.5 MCG/ACT AERS Inhale 2 puffs into the lungs daily Yes Historical Provider, MD   albuterol sulfate  (90 Base) MCG/ACT inhaler Inhale 2 puffs into the lungs every 6 hours as needed for Wheezing or Shortness of Breath Yes Bee Lima MD   dorzolamide-timolol (COSOPT) 22.3-6.8 MG/ML ophthalmic solution Place 1 drop into both eyes 2 times daily Yes Historical Provider, MD   ALPHAGAN P 0.1 % SOLN Place 1 drop into both eyes 3 times daily  Yes Historical Provider, MD   latanoprost (XALATAN) 0.005 % ophthalmic solution Place 1 drop into both eyes nightly  Yes Historical Provider, MD Ortega Hui 160-9-4.8 MCG/ACT AERO   Historical Provider, MD   folic acid (FOLVITE) 1 MG tablet Take 1 tablet by mouth daily  Patient not taking: Reported on 2021  Bee Lima MD   Melatonin CR 3 MG TBCR USe nightly  Patient not taking: Reported on 2021  Kimberly Hines, APRN - CNP   Fexofenadine HCl (ALLERGY 24-HR PO) Take by mouth  Patient not taking: Reported on 2021  Historical Provider, MD   Oxymetazoline HCl (NASAL SPRAY NA) by Nasal route  Patient not taking: Reported on 2021  Historical Provider, MD   Multiple Vitamins-Minerals ([address] 50+ PO) Take by mouth  Patient not taking: Reported on 2021  Historical Provider, MD   Lactobacillus (PROBIOTIC ACIDOPHILUS PO) Take by mouth  Patient not taking: Reported on 2021  Historical Provider, MD   ipratropium (ATROVENT) 0.03 % nasal spray 2 sprays by Nasal route 3 times daily  Patient not taking: Reported on 2021  Bee Lima MD   Spacer/Aero-Holding Chambers (Sb Box ) MISC To be used with inhaler  Patient not taking: Reported on 2021  Bee Lima MD       Social History     Tobacco Use    Smoking status: Former Smoker     Packs/day: 1.00     Years: 30.00     Pack years: 30.00     Types: Cigarettes     Start date: 1960     Quit date: 2000     Years since quittin.4    Smokeless tobacco: Never Used    Tobacco comment: still uses OTC nicorette gum   Vaping Use    Vaping Use: Not on file   Substance Use Topics    Alcohol use:  Yes     Alcohol/week: 0.0 standard drinks     Comment: occassional    Drug use: No         Review of Systems   Constitutional: Positive for fatigue and unexpected weight change. Negative for activity change, appetite change, chills, diaphoresis and fever. Eyes: Positive for visual disturbance (Stable, chronic). Respiratory: Positive for shortness of breath (OGDEN). Negative for cough, chest tightness and wheezing. Cardiovascular: Negative for chest pain, palpitations and leg swelling. Gastrointestinal: Positive for abdominal pain. Negative for abdominal distention, blood in stool, constipation, diarrhea, nausea and vomiting. Endocrine: Positive for cold intolerance. Negative for heat intolerance, polydipsia, polyphagia and polyuria. Genitourinary: Negative for difficulty urinating, frequency and urgency. Hematological: Does not bruise/bleed easily. Psychiatric/Behavioral: Positive for sleep disturbance. Negative for dysphoric mood, self-injury and suicidal ideas. The patient is nervous/anxious.            -vital signs stable and within normal limits  /80   Pulse 78   Temp 97.1 °F (36.2 °C)   Ht 5' 3\" (1.6 m)   Wt 128 lb (58.1 kg)   SpO2 98%   BMI 22.67 kg/m²      Physical Exam  Vitals and nursing note reviewed. Constitutional:       General: She is not in acute distress. Appearance: Normal appearance. She is well-developed. She is not diaphoretic. HENT:      Head: Normocephalic and atraumatic. Right Ear: External ear normal.      Left Ear: External ear normal. There is impacted cerumen. Mouth/Throat:      Comments: I did not examine the mouth due to coronavirus pandemic and wearing masks    Eyes:      General: Lids are normal. No scleral icterus. Right eye: No discharge. Left eye: No discharge. Extraocular Movements: Extraocular movements intact. Conjunctiva/sclera: Conjunctivae normal.   Neck:      Thyroid: No thyromegaly.    Cardiovascular:      Rate and Rhythm: Normal rate and regular rhythm. Heart sounds: Normal heart sounds. No murmur heard. Pulmonary:      Effort: Pulmonary effort is normal. No respiratory distress. Breath sounds: Examination of the right-middle field reveals decreased breath sounds. Examination of the left-middle field reveals decreased breath sounds. Examination of the right-lower field reveals decreased breath sounds. Examination of the left-lower field reveals decreased breath sounds. Decreased breath sounds present. No wheezing or rales. Chest:      Chest wall: No tenderness. Abdominal:      General: Bowel sounds are normal. There is no distension. Palpations: Abdomen is soft. There is no hepatomegaly or splenomegaly. Tenderness: There is abdominal tenderness in the epigastric area. There is no right CVA tenderness, left CVA tenderness, guarding or rebound. Musculoskeletal:         General: No tenderness. Normal range of motion. Cervical back: Normal range of motion and neck supple. Right lower leg: No edema. Left lower leg: No edema. Skin:     General: Skin is warm and dry. Capillary Refill: Capillary refill takes less than 2 seconds. Findings: No rash. Neurological:      Mental Status: She is alert and oriented to person, place, and time. Cranial Nerves: No cranial nerve deficit. Motor: No abnormal muscle tone. Psychiatric:         Mood and Affect: Mood is anxious. Behavior: Behavior normal.         Thought Content: Thought content normal.         Judgment: Judgment normal.             I personally reviewed testing . Discussed testing with the patient and all questions fully answered.   Chronic kidney disease stage III stable  Hyponatremia  Folic acid deficiency  She admits to drinking wine before bedtime because otherwise she cannot fall asleep, possible side effects and interaction with Sonata discussed with patient, she verbalizes understanding    Otherwise labs within normal [facility] Visit on 08/13/2021   Component Date Value Ref Range Status    Pain Management Drug Panel Interp,* 08/13/2021 See Note   Final    Comment: (NOTE)  ________________________________________________________________  DRUGS EXPECTED:  NO APPLICABLE DRUGS PROVIDED  ________________________________________________________________  INCONSISTENT with medications provided:  Ethyl-glucuronide: based on immunoassay detection  ________________________________________________________________  INTERPRETIVE INFORMATION: Targeted drug profile Interp  Interpretation depends on accuracy and completeness of patient   medication information submitted by client.  6-Acetylmorphine, Ur 08/13/2021 Not Detected   Final    7-Aminoclonazepam, Urine 08/13/2021 Not Detected   Final    Alpha-OH-Alpraz, Urine 08/13/2021 Not Detected   Final    Alprazolam, Urine 08/13/2021 Not Detected   Final    Amphetamines, urine 08/13/2021 Not Detected   Final    Barbiturates, Ur 08/13/2021 Not Detected   Final    Benzoylecgonine, Ur 08/13/2021 Not Detected   Final    Buprenorphine Urine 08/13/2021 Not Detected   Final    Carisoprodol, Ur 08/13/2021 Not Detected   Final    Comment: (NOTE)  The carisoprodol immunoassay has cross-reactivity to carisoprodol   and meprobamate.       Clonazepam, Urine 08/13/2021 Not Detected   Final    Codeine, Urine 08/13/2021 Not Detected   Final    MDA, Ur 08/13/2021 Not Detected   Final    Diazepam, Urine 08/13/2021 Not Detected   Final    Ethyl Glucuronide Ur 08/13/2021 Present   Final    Fentanyl, Ur 08/13/2021 Not Detected   Final    Hydrocodone, Urine 08/13/2021 Not Detected   Final    Hydromorphone, Urine 08/13/2021 Not Detected   Final    Lorazepam, Urine 08/13/2021 Not Detected   Final    Marijuana Metab, Ur 08/13/2021 Not Detected   Final    MDEA, RAMIRO, Ur 08/13/2021 Not Detected   Final    MDMA, Urine 08/13/2021 Not Detected   Final    Meperidine Metab, Ur 08/13/2021 Not Detected   Final    Methadone, Urine 08/13/2021 Not Detected   Final    Methamphetamine, Urine 08/13/2021 Not Detected   Final    Methylphenidate 08/13/2021 Not Detected   Final    Midazolam, Urine 08/13/2021 Not Detected   Final    Morphine Urine 08/13/2021 Not Detected   Final    Norbuprenorphine, Urine 08/13/2021 Not Detected   Final    Nordiazepam, Urine 08/13/2021 Not Detected   Final    Norfentanyl, Urine 08/13/2021 Not Detected   Final    NORHYDROCODONE, URINE 08/13/2021 Not Detected   Final    Noroxycodone, Urine 08/13/2021 Not Detected   Final    NOROXYMORPHONE, URINE 08/13/2021 Not Detected   Final    Oxazepam, Urine 08/13/2021 Not Detected   Final    Oxycodone Urine 08/13/2021 Not Detected   Final    Oxymorphone, Urine 08/13/2021 Not Detected   Final    PCP, Urine 08/13/2021 Not Detected   Final    Phentermine, Ur 08/13/2021 Not Detected   Final    Tapentadol-O-Sulfate, Urine 08/13/2021 Not Detected   Final    Tapentadol, Urine 08/13/2021 Not Detected   Final    Temazepam, Urine 08/13/2021 Not Detected   Final    Tramadol, Urine 08/13/2021 Not Detected   Final    Zolpidem, Urine 08/13/2021 Not Detected   Final    Drugs Expected, Ur 08/13/2021 LUNESTA RX   Final    Creatinine, Ur 08/13/2021 28.1  20.0 - 400.0 mg/dL Final    Pain Mgt Drug Panel, Hi Res, Ur 08/13/2021 See Below   Final    Comment: (NOTE)  Methodology: Qualitative Enzyme Immunoassay and Qualitative Liquid   Chromatography-Tandem Mass Spectrometry, Quantitative   Spectrophotometry  The absence of expected drug(s) and/or drug metabolite(s) may   indicate non-compliance, inappropriate timing of specimen   collection relative to drug administration, poor drug absorption,   diluted/adulterated urine, or limitations of testing. The   concentration must be greater than or equal to the cutoff to be   reported as present.   If specific drug concentrations are   required, contact the laboratory within two weeks of specimen   collection to request quantification by a second analytical   technique. Interpretive questions should be directed to the   laboratory. Results based on immunoassay detection that do not match clinical   expectations should be  interpreted with caution. Confirmatory testing by mass   spectrometry for immunoassay-based results is available, if   ordered within two weeks of specimen collection. Additiona                           l   charges apply. For medical purposes only; not valid for forensic use. This test was developed and its performance characteristics   determined by [facility]. It has not been cleared or   approved by the [employer] and Drug Administration. This test was   performed in a CLIA certified laboratory and is intended for   clinical purposes.  EER Hi Res Interp Ur 08/13/2021 See Note   Final    Comment: (NOTE)  Access mValent Enhanced Report using the link below:    -Direct access: [email or web address]  Performed By: [address]  [place], [address] [facility]  : Janki Edward. Ludwig Paiz MD      Naloxone Urine 08/13/2021 Not Detected   Final    Gabapentin 08/13/2021 Not Detected   Final    Pregabalin 08/13/2021 Not Detected   Final    Alpha-OH-Midazolam, Urine 08/13/2021 Not Detected   Final    Zolpidem Metabolite (ZCA), Urine 08/13/2021 Not Detected   Final    Pth Intact 08/13/2021 58.99  15.0 - 65.0 pg/mL Final    Comment: SAMPLES FROM PATIENTS ROUTINELY RECEIVING HIGH DOSE BIOTIN THERAPY MAY SHOW FALSELY   DEPRESSED RESULTS. ADDITIONAL INFORMATION MAY BE REQUIRED FOR DIAGNOSIS.       Calcium, Ion 08/13/2021 1.26  1.13 - 1.33 mmol/L Final    Glucose 08/13/2021 108* 70 - 99 mg/dL Final    BUN 08/13/2021 13  8 - 23 mg/dL Final    CREATININE 08/13/2021 1.06* 0.50 - 0.90 mg/dL Final    Bun/Cre Ratio 08/13/2021 NOT REPORTED  9 - 20 Final    Calcium 08/13/2021 9.3  8.6 - 10.4 mg/dL Final    Sodium 08/13/2021 133* 135 - 144 mmol/L Final    Potassium 08/13/2021 4.4  3.7 - 5.3 mmol/L Final    Chloride 08/13/2021 98  98 - 107 mmol/L Final    CO2 08/13/2021 27  20 - 31 mmol/L Final    Anion Gap 08/13/2021 8* 9 - 17 mmol/L Final    Alkaline Phosphatase 08/13/2021 94  35 - 104 U/L Final    ALT 08/13/2021 13  5 - 33 U/L Final    AST 08/13/2021 22  <32 U/L Final    Total Bilirubin 08/13/2021 0.50  0.3 - 1.2 mg/dL Final    Total Protein 08/13/2021 7.1  6.4 - 8.3 g/dL Final    Albumin 08/13/2021 4.4  3.5 - 5.2 g/dL Final    Albumin/Globulin Ratio 08/13/2021 NOT REPORTED  1.0 - 2.5 Final    GFR Non- 08/13/2021 50* >60 mL/min Final    GFR  08/13/2021 >60  >60 mL/min Final    GFR Comment 08/13/2021        Final    Comment: Average GFR for 79or more years old:   76 mL/min/1.73sq m  Chronic Kidney Disease:   <60 mL/min/1.73sq m  Kidney failure:   <15 mL/min/1.73sq m              eGFR calculated using average adult body mass.  Additional eGFR calculator available at:        [email or web address]            GFR Staging 08/13/2021 NOT REPORTED   Final    Magnesium 08/13/2021 1.9  1.6 - 2.6 mg/dL Final    Vitamin B-12 08/13/2021 450  232 - 1245 pg/mL Final    Folate 08/13/2021 4.6* >4.8 ng/mL Final    Vit D, 25-Hydroxy 08/13/2021 36.8  30.0 - 100.0 ng/mL Final    Comment:    Reference Range:  Vitamin D status         Range   Deficiency              <20 ng/mL   Mild Deficiency       20-30 ng/mL   Sufficiency           ng/mL   Toxicity               >100 ng/mL           Orders Placed This Encounter   Procedures    BRIDGET ANA LAURA DIGITAL SCREEN BILATERAL     Standing Status:   Future     Standing Expiration Date:   2/7/2023    H. pylori antigen     Standing Status:   Future     Standing Expiration Date:   12/7/2022    CBC     Standing Status:   Future     Standing Expiration Date:   12/7/2022    Comprehensive Metabolic Panel     Standing Status:   Future     Standing Expiration Date:   2/3/2022    Magnesium     Standing Status:   Future     Standing Expiration Date:   12/7/2022    Phosphorus     Standing Status:   Future     Standing Expiration Date:   12/7/2022    TSH without Reflex     Standing Status:   Future     Standing Expiration Date:   12/7/2022    Vitamin B12 & Folate     Standing Status:   Future     Standing Expiration Date:   12/7/2022    Lipase     Standing Status:   Future     Standing Expiration Date:   12/7/2022       Orders Placed This Encounter   Medications    omeprazole (PRILOSEC) 40 MG delayed release capsule     Sig: Take 1 capsule by mouth every morning (before breakfast)     Dispense:  90 capsule     Refill:  0    carbamide peroxide (DEBROX) 6.5 % otic solution     Sig: Place 5 drops into the left ear 2 times daily for 5 days     Dispense:  15 mL     Refill:  0       Medications Discontinued During This Encounter   Medication Reason    folic acid (FOLVITE) 1 MG tablet Patient Choice    Melatonin CR 3 MG TBCR Patient Choice    Fexofenadine HCl (ALLERGY 24-HR PO) Patient Choice    Oxymetazoline HCl (NASAL SPRAY NA) Patient Choice    Multiple Vitamins-Minerals (OCUVITE ADULT 50+ PO) Patient Choice    Lactobacillus (PROBIOTIC ACIDOPHILUS PO) Patient Choice    ipratropium (ATROVENT) 0.03 % nasal spray Patient Choice    zinc 50 MG CAPS Patient Choice    BREZTRI AEROSPHERE 160-9-4.8 MCG/ACT AERO Cost of medication    Spacer/Aero-Holding Chambers (AEROCHAMBER MV) MISC Therapy completed         Return in about 4 months (around 4/7/2022) for ALWAYS NEEDS 30 MIN. APPT, PHQ-9 in EPIC, FATIGUE, insomnia pill. On this date 12/7/2021 I have spent 35 minutes reviewing previous notes, test results and face to face with the patient discussing the diagnosis and importance of compliance with the treatment plan as well as documenting on the day of the visit.       This note was completed by using the assistance of a speech-recognition program. However, inadvertent computerized transcription errors may be present. Although every effort was made to ensure accuracy, no guarantees can be provided that every mistake has been identified and corrected by editing. An electronic signature was used to authenticate this note.   Electronically signed by Rylee Nascimento MD on 12/13/2021  10:06 PM

## 2021-12-13 ASSESSMENT — ENCOUNTER SYMPTOMS: BLOOD IN STOOL: 0

## 2021-12-17 ENCOUNTER — OFFICE VISIT (OUTPATIENT)
Dept: FAMILY MEDICINE CLINIC | Age: 81
End: 2021-12-17
Payer: MEDICARE

## 2021-12-17 ENCOUNTER — HOSPITAL ENCOUNTER (OUTPATIENT)
Age: 81
Discharge: HOME | End: 2021-12-17
Payer: MEDICARE

## 2021-12-17 VITALS
TEMPERATURE: 97.6 F | BODY MASS INDEX: 22.5 KG/M2 | OXYGEN SATURATION: 98 % | SYSTOLIC BLOOD PRESSURE: 110 MMHG | DIASTOLIC BLOOD PRESSURE: 80 MMHG | WEIGHT: 127 LBS | HEIGHT: 63 IN | HEART RATE: 81 BPM

## 2021-12-17 DIAGNOSIS — K29.00 OTHER ACUTE GASTRITIS WITHOUT HEMORRHAGE: ICD-10-CM

## 2021-12-17 DIAGNOSIS — Z97.4 USES HEARING AID: ICD-10-CM

## 2021-12-17 DIAGNOSIS — N18.31 STAGE 3A CHRONIC KIDNEY DISEASE (HCC): ICD-10-CM

## 2021-12-17 DIAGNOSIS — H61.22 IMPACTED CERUMEN OF LEFT EAR: Primary | ICD-10-CM

## 2021-12-17 DIAGNOSIS — H90.6 MIXED CONDUCTIVE AND SENSORINEURAL HEARING LOSS OF BOTH EARS: ICD-10-CM

## 2021-12-17 DIAGNOSIS — R53.82 CHRONIC FATIGUE: ICD-10-CM

## 2021-12-17 LAB
ALBUMIN SERPL-MCNC: 4.2 G/DL (ref 3.5–5.2)
ALBUMIN/GLOBULIN RATIO: ABNORMAL (ref 1–2.5)
ALP BLD-CCNC: 71 U/L (ref 35–104)
ALT SERPL-CCNC: 13 U/L (ref 5–33)
ANION GAP SERPL CALCULATED.3IONS-SCNC: 12 MMOL/L (ref 9–17)
AST SERPL-CCNC: 20 U/L
BILIRUB SERPL-MCNC: 0.8 MG/DL (ref 0.3–1.2)
BUN BLDV-MCNC: 16 MG/DL (ref 8–23)
BUN/CREAT BLD: ABNORMAL (ref 9–20)
CALCIUM SERPL-MCNC: 9.8 MG/DL (ref 8.6–10.4)
CHLORIDE BLD-SCNC: 101 MMOL/L (ref 98–107)
CO2: 26 MMOL/L (ref 20–31)
CREAT SERPL-MCNC: 0.93 MG/DL (ref 0.5–0.9)
GFR AFRICAN AMERICAN: >60 ML/MIN
GFR NON-AFRICAN AMERICAN: 58 ML/MIN
GFR SERPL CREATININE-BSD FRML MDRD: ABNORMAL ML/MIN/{1.73_M2}
GFR SERPL CREATININE-BSD FRML MDRD: ABNORMAL ML/MIN/{1.73_M2}
GLUCOSE BLD-MCNC: 108 MG/DL (ref 70–99)
HCT VFR BLD CALC: 42.1 % (ref 36–46)
HEMOGLOBIN: 14.4 G/DL (ref 12–16)
LIPASE: 18 U/L (ref 13–60)
MAGNESIUM: 1.9 MG/DL (ref 1.6–2.6)
MCH RBC QN AUTO: 32.2 PG (ref 26–34)
MCHC RBC AUTO-ENTMCNC: 34.2 G/DL (ref 31–37)
MCV RBC AUTO: 94.4 FL (ref 80–100)
NRBC AUTOMATED: NORMAL PER 100 WBC
PDW BLD-RTO: 13.5 % (ref 11.5–14.9)
PHOSPHORUS: 3.6 MG/DL (ref 2.6–4.5)
PLATELET # BLD: 219 K/UL (ref 150–450)
PMV BLD AUTO: 7.7 FL (ref 6–12)
POTASSIUM SERPL-SCNC: 4.2 MMOL/L (ref 3.7–5.3)
RBC # BLD: 4.46 M/UL (ref 4–5.2)
SODIUM BLD-SCNC: 139 MMOL/L (ref 135–144)
TOTAL PROTEIN: 6.7 G/DL (ref 6.4–8.3)
TSH SERPL DL<=0.05 MIU/L-ACNC: 1.28 MIU/L (ref 0.3–5)
WBC # BLD: 6 K/UL (ref 3.5–11)

## 2021-12-17 PROCEDURE — 83735 ASSAY OF MAGNESIUM: CPT

## 2021-12-17 PROCEDURE — 83690 ASSAY OF LIPASE: CPT

## 2021-12-17 PROCEDURE — 84443 ASSAY THYROID STIM HORMONE: CPT

## 2021-12-17 PROCEDURE — 80053 COMPREHEN METABOLIC PANEL: CPT

## 2021-12-17 PROCEDURE — 85027 COMPLETE CBC AUTOMATED: CPT

## 2021-12-17 PROCEDURE — 84100 ASSAY OF PHOSPHORUS: CPT

## 2021-12-17 PROCEDURE — 36415 COLL VENOUS BLD VENIPUNCTURE: CPT

## 2021-12-17 PROCEDURE — 99212 OFFICE O/P EST SF 10 MIN: CPT | Performed by: FAMILY MEDICINE

## 2021-12-17 PROCEDURE — 69210 REMOVE IMPACTED EAR WAX UNI: CPT | Performed by: FAMILY MEDICINE

## 2021-12-17 NOTE — PROGRESS NOTES
Surekha Berkowitz (:  1940) is a 80 y.o. female,Established patient, here for evaluation of the following chief complaint(s): Cerumen Impaction (used up the drops )      ASSESSMENT/PLAN:    1. Impacted cerumen of left ear  -     HI REMOVAL IMPACTED CERUMEN INSTRUMENTATION UNILAT  2. Mixed conductive and sensorineural hearing loss of both ears  3. Uses hearing aid      Ear Cerumen Removal Procedure Note  Indication: ear cerumen impaction    Procedure: After placing the patient's head in the appropriate position, the patient's left ear canal was irrigated with the appropriate solution until loosen, then lighted Bionix curette was used to remove the impacted cerumen all cerumen was removed and the ear canal was clear. At this point, the procedure was complete. The patient tolerated the procedure well. Complications: None. Ap received counseling on the following healthy behaviors: nutrition, exercise and medication adherence  Reviewed prior labs and health maintenance  Discussed use, benefit, and side effects of prescribed medications. Barriers to medication compliance addressed. Patient given educational materials - see patient instructions  All patient questions answered. Patient voiced understanding. The patient's past medical,surgical, social, and family history as well as her current medications and allergies were reviewed as documented in today's encounter. Medications, labs, diagnostic studies, consultations and follow-up as documented in this encounter. Return for KEEP APPT. Data Unavailable    Future Appointments   Date Time Provider Shirley Dunne   2022  3:45 PM [facility]       SUBJECTIVE/OBJECTIVE:    Ap complains of WAX IN THE LEFT EAR and hearing loss. Patient used Debrox at home, was not able to remove any of the wax.   She uses hearing aids for hearing loss    Patient reports buildup of wax in the left ear, difficulty atraumatic. Left Ear: Tympanic membrane normal.      Ears:      Comments: Narrow ear canals  advised to avoid Q-tips  Advised to use Debrox every month  She is not wearing the hearing aids at this time     Nose:      Comments: I did not examine the mouth due to coronavirus pandemic and wearing masks . Eyes:      General:         Right eye: No discharge. Left eye: No discharge. Extraocular Movements: Extraocular movements intact. Conjunctiva/sclera: Conjunctivae normal.   Pulmonary:      Effort: Pulmonary effort is normal. No respiratory distress. Musculoskeletal:      Cervical back: Normal range of motion and neck supple. Skin:     General: Skin is warm and dry. Neurological:      Mental Status: She is alert and oriented to person, place, and time. Psychiatric:         Mood and Affect: Mood normal.         Behavior: Behavior normal.         Thought Content: Thought content normal.         Judgment: Judgment normal.         Orders Placed This Encounter   Procedures    CO REMOVAL IMPACTED CERUMEN INSTRUMENTATION UNILAT       No orders of the defined types were placed in this encounter. There are no discontinued medications. On this date 12/17/2021 I have spent 19 minutes reviewing previous notes, test results and face to face with the patient discussing the diagnosis and importance of compliance with the treatment plan as well as documenting on the day of the visit. This note was completed by using the assistance of a speech-recognition program. However, inadvertent computerized transcription errors may be present. Although every effort was made to ensure accuracy, no guarantees can be provided that every mistake has been identified and corrected by editing. An electronic signature was used to authenticate this note.   Electronically signed by Ralph Salas MD on 12/17/2021 at 11:29 PM

## 2021-12-17 NOTE — PROGRESS NOTES
Visit Information    Have you changed or started any medications since your last visit including any over-the-counter medicines, vitamins, or herbal medicines? no   Are you having any side effects from any of your medications? -  no  Have you stopped taking any of your medications? Is so, why? -  no    Have you seen any other physician or provider since your last visit? No  Have you had any other diagnostic tests since your last visit? No  Have you been seen in the emergency room and/or had an admission to a hospital since we last saw you? No  Have you had your routine dental cleaning in the past 6 months? Yes     Have you activated your Loom[employer] account? If not, what are your barriers?  Yes     Patient Care Team:  Jhonny Gross MD as PCP - Dianne Rosa MD as PCP - [facility] Provider  Camille Choudhury MD as Surgeon (General Surgery)  Dinesh Robin MD as Surgeon (Ophthalmology)  Delmy Truong MD as Consulting Physician (Gastroenterology)  Basilia Melo MD as Consulting Physician (Endocrinology)  David Ferguson MD as Surgeon (Ophthalmology)  Belinda Mcclain MD as Consulting Physician (Internal Medicine Cardiovascular Disease)  Kori Molina MD as Consulting Physician (Cardiology)  Krishan Moreno, PhD (Sleep Medicine)  Mary Lou Reina MD as Consulting Physician (Otolaryngology)  Art Yañez MD as Consulting Physician (Cardiology)  Ngoc Emery MD as Consulting Physician (Pulmonology)  Veronica Gtz MD as Consulting Physician (Nephrology)  Janki Moncada MD as Consulting Physician (Pulmonology)    Medical History Review  Past Medical, Family, and Social History reviewed and does contribute to the patient presenting condition    Health Maintenance   Topic Date Due    Breast cancer screen  10/01/2021    Annual Wellness Visit (AWV)  08/13/2022    Potassium monitoring  12/17/2022    Creatinine monitoring  12/17/2022    Colon cancer screen colonoscopy 10/25/2024    DTaP/Tdap/Td vaccine (2 - Td or Tdap) 01/09/2027    DEXA (modify frequency per FRAX score)  Completed    Flu vaccine  Completed    Shingles Vaccine  Completed    Pneumococcal 65+ years Vaccine  Completed    COVID-19 Vaccine  Completed    Hepatitis A vaccine  Aged Out    Hepatitis B vaccine  Aged Out    Hib vaccine  Aged Out    Meningococcal (ACWY) vaccine  Aged Out

## 2021-12-17 NOTE — RESULT ENCOUNTER NOTE
Please notify patient: Chronic kidney disease stage III improved, blood glucose 108 stable just mildly high.   Otherwise labs within normal limits  continue current treatment    Future Appointments  5/12/2022  3:45 PM    Griselda Fermin MD      sc               CAITLIN

## 2021-12-21 LAB
-: NORMAL
REASON FOR REJECTION: NORMAL
ZZ NTE CLEAN UP: ORDERED TEST: NORMAL
ZZ NTE WITH NAME CLEAN UP: SPECIMEN SOURCE: NORMAL

## 2021-12-21 NOTE — RESULT ENCOUNTER NOTE
Noted H76 and folic acid not done specimen not received   Future Appointments  5/12/2022  3:45 PM    MD eduardo Waller

## 2022-01-07 ENCOUNTER — TELEPHONE (OUTPATIENT)
Dept: FAMILY MEDICINE CLINIC | Age: 82
End: 2022-01-07

## 2022-01-07 DIAGNOSIS — R42 DIZZINESS: Primary | ICD-10-CM

## 2022-01-07 NOTE — TELEPHONE ENCOUNTER
Patient called requesting to be seen in office due to her current symptoms, dizziness that have been ongoing for about a week now. Dizziness occurs mostly when she stands up and walks. Patient did schedule a follow up appt for 1/25/21 in office. Please advise what patient should do in the mean time to help with symptoms. Please advise. Thank you!

## 2022-01-08 ENCOUNTER — OFFICE VISIT (OUTPATIENT)
Dept: FAMILY MEDICINE CLINIC | Age: 82
End: 2022-01-08
Payer: MEDICARE

## 2022-01-08 VITALS
HEART RATE: 91 BPM | DIASTOLIC BLOOD PRESSURE: 107 MMHG | OXYGEN SATURATION: 98 % | TEMPERATURE: 97.2 F | SYSTOLIC BLOOD PRESSURE: 173 MMHG

## 2022-01-08 DIAGNOSIS — R03.0 ELEVATED BLOOD PRESSURE READING: ICD-10-CM

## 2022-01-08 DIAGNOSIS — H81.10 BENIGN PAROXYSMAL POSITIONAL VERTIGO, UNSPECIFIED LATERALITY: Primary | ICD-10-CM

## 2022-01-08 PROCEDURE — 99213 OFFICE O/P EST LOW 20 MIN: CPT | Performed by: FAMILY MEDICINE

## 2022-01-08 RX ORDER — MECLIZINE HYDROCHLORIDE 25 MG/1
25 TABLET ORAL 3 TIMES DAILY PRN
Qty: 45 TABLET | Refills: 0 | Status: SHIPPED | OUTPATIENT
Start: 2022-01-08 | End: 2022-01-23

## 2022-01-08 ASSESSMENT — ENCOUNTER SYMPTOMS
VOMITING: 0
PHOTOPHOBIA: 0
NAUSEA: 0

## 2022-01-08 NOTE — PATIENT INSTRUCTIONS
Patient Education        Epley Maneuver at Home for Vertigo: Exercises  Introduction  Vertigo is a spinning or whirling sensation when you move your head. Your doctor may have moved you in different positions to help your vertigo get better faster. This is called the Epley maneuver. Your doctor also may have asked you to do these exercises at home. Do the exercises as often as your doctor recommends. If your vertigo is getting worse, your doctor may have you change the exercise or stop it. Step 1  Step 1    1. Sit on the edge of a bed or sofa. Step 2    1. Turn your head 45 degrees in the direction your doctor told you to. This should be toward the ear that causes the most vertigo for you. In this picture, the woman is turning toward her left ear. Step 3    1. Tilt yourself backward until you are lying on your back. Your head should still be at a 45-degree turn. Your head should be about midway between looking straight ahead and looking out to your side. Hold for 30 seconds. If you have vertigo, stay in this position until it stops. Step 4    1. Turn your head 90 degrees toward the ear that has the least vertigo. In this picture, the woman is turning to the right because she has vertigo on her left side. The point of your chin should be raised and over your shoulder. Hold for 30 seconds. Step 5    1. Roll onto the side with the least vertigo. You should now be looking at the floor. Hold for 30 seconds. Follow-up care is a key part of your treatment and safety. Be sure to make and go to all appointments, and call your doctor if you are having problems. It's also a good idea to know your test results and keep a list of the medicines you take. Where can you learn more? Go to [email or web address].[email or web address]. org and sign in to your SYLOB account. Enter Z211 in the AdYouNet box to learn more about \"Epley Maneuver at Home for Vertigo: Exercises. \"     If you do not have an account, please click on the \"Sign Up Now\" link. Current as of: April 8, 2021               Content Version: 13.1  © 2006-2021 Healthwise, Incorporated. Care instructions adapted under license by [facility] ([facility]). If you have questions about a medical condition or this instruction, always ask your healthcare professional. [place] [address] any warranty or liability for your use of this information.

## 2022-01-08 NOTE — PROGRESS NOTES
Danelle Gale MD  [address] [facility]  [address] [facility] [address]  Dept: [phone]    Joe Pacheco is a 80 y.o. female who presents today for hermedical conditions/complaints as noted below.   Joe Pacheco is here today c/o Dizziness (pt having issues with vertigo. )       HPI:     HPI    Patient presents the walk-in clinic with her  for concerns regarding vertigo  She states she was previously assessed at the ER for similar vertigo symptoms a year ago, at that time head CT was done which was negative, discharged with meclizine and symptoms resolved on their own after a couple of days, she did not see ENT for this episode  Her current symptoms began a couple of days ago, she had some meclizine left from the previous episode and she took 1 pill yesterday which seemed to help but then symptoms returned once the medication wore off  Endorses sensation of room spinning and being on a boat, she has been having sensation of left ear fullness, denies ear pain or ear discharge or tinnitus  Has bilateral hearing loss for which she wears hearing aids, no recent changes to her hearing  She last saw her PCP 12/17 for removal of cerumen from the left ear  On daily Flonase for chronic rhinitis  Her vertigo gets worse with head movements and when she stands up from sitting  No falls but does have access to a walker at home which she has not been using  No fevers, night sweats, or weight loss; no bowel or bladder incontinence; no urinary retention; no numbness/weakness in the legs  No nausea or vomiting, no vision changes    HTN, states she is feeling very stressed right now because of her symptoms which is why she thinks her BP is elevated, was well controlled at the last visit with her PCP a month ago, has access to a BP cuff at home, does not monitor her BP at home regularly  BP Readings from Last 3 Encounters: 01/08/22 (!) 173/107   12/17/21 110/80   12/07/21 120/80         Patient Active Problem List   Diagnosis    Chronic diarrhea    OA (osteoarthritis) of knee    Psychophysiological insomnia    Glaucoma    Vitamin D deficiency    Neuropathy (HCC)    Allergic rhinitis    Hyperparathyroidism ([address]    Osteoporosis    Alternating constipation and diarrhea    GERD (gastroesophageal reflux disease)    Pain of upper abdomen    Constipation    Chronic back pain greater than 3 months duration    COPD, 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    Gastritis    DINORA (generalized anxiety disorder)    History of frequent urinary tract infections    Chronic fatigue    History of colon polyps, tubular adenoma in 2012    MDD (major depressive disorder), recurrent, in full remission ([address]    Hyperlipidemia with target LDL less than 100    Hyperglycemia    Lung nodule, solitary    Family history of breast cancer in sister    Stage 3b chronic kidney disease ([address]    Cyst of left kidney    Hypermetropia    Presbyopia    Primary open angle glaucoma    Pseudophakia    Pain of right sacroiliac joint    Elevated blood pressure reading    S/P parathyroidectomy ([address]    ALIYAH positive, anti-scleroderma    Abnormal serum protein electrophoresis    Burning sensation of feet    Chronic respiratory failure (HCC)    History of tobacco use    Vitamin B 12 deficiency    Hyponatremia    Folic acid deficiency    Mixed conductive and sensorineural hearing loss of both ears    Uses hearing aid       Past Medical History:   Diagnosis Date    Abdominal pain     Allergic rhinitis     Anxiety     Chronic back pain     Chronic fatigue     Chronic kidney disease     Colon polyp 3/7/2012    TUBULAR ADENOMA    COPD (chronic obstructive pulmonary disease) (HCC)     COPD, 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     Depression     Diverticul disease small and large intestine, no perforati or abscess     Elevated blood pressure reading     Fall 2017    GERD (gastroesophageal reflux disease)     Glaucoma     Hyperlipidemia     with target LDL less than 100    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[facility]     Hyperthyroidism     Insomnia     Psychophysiological    Neuropathy     Orthostatic dizziness 3/12/2017    Osteoarthritis     Panlobular emphysema (HCC)     PVC (premature ventricular contraction) 2017    S/P parathyroidectomy ([facility]     Tubal pregnancy [id number]    Vasovagal syncope 2017     Past Surgical History:   Procedure Laterality Date    APPENDECTOMY      BACK SURGERY      CATARACT REMOVAL WITH IMPLANT Left [address]    [address]    CHOLECYSTECTOMY  2005    COLONOSCOPY      COLONOSCOPY  2014    Severe sigmoid diverticulosis; due for repeat 2019    ENDOSCOPY, COLON, DIAGNOSTIC      EYE SURGERY      PARATHYROIDECTOMY  2018    Dr. Erin Barrios; Left inferior parathyroid: adenoma    ROTATOR CUFF REPAIR Left     TONSILLECTOMY      TUBAL LIGATION      UPPER GASTROINTESTINAL ENDOSCOPY  2014    biopsy    UPPER GASTROINTESTINAL ENDOSCOPY  16    UPPER GASTROINTESTINAL ENDOSCOPY  16    mild gastritis     Family History   Problem Relation Age of Onset    Other Father         COPD    Stroke Sister     Heart Attack Sister     Breast Cancer Sister     Cancer Maternal Grandmother         Stomach cancer    Osteoporosis Mother     Other Sister         suicide     Social History     Tobacco Use    Smoking status: Former Smoker     Packs/day: 1.00     Years: 30.00     Pack years: 30.00     Types: Cigarettes     Start date: 1960     Quit date: 2000     Years since quittin.5    Smokeless tobacco: Never Used    Tobacco comment: still uses OTC nicorette gum   Vaping Use    Vaping Use: Not on file   Substance Use Topics    Alcohol use:  Yes     Alcohol/week: 0.0 standard drinks     Comment: occassional    Drug use: No       Current Outpatient Medications:     meclizine (ANTIVERT) 25 MG tablet, Take 1 tablet by mouth 3 times daily as needed for Dizziness, Disp: 45 tablet, Rfl: 0    omeprazole (PRILOSEC) 40 MG delayed release capsule, Take 1 capsule by mouth every morning (before breakfast), Disp: 90 capsule, Rfl: 0    zaleplon (SONATA) 10 MG capsule, Take 1 capsule by mouth nightly as needed (Insomnia. REPLACING LUNESTA) for up to 90 days. Dose increased 8/23/2021, Disp: 90 capsule, Rfl: 0    fluticasone (FLONASE) 50 MCG/ACT nasal spray, 1 spray by Each Nostril route daily, Disp: 16 g, Rfl: 1    Tiotropium Bromide-Olodaterol (STIOLTO RESPIMAT) 2.5-2.5 MCG/ACT AERS, Inhale 2 puffs into the lungs daily, Disp: , Rfl:     albuterol sulfate  (90 Base) MCG/ACT inhaler, Inhale 2 puffs into the lungs every 6 hours as needed for Wheezing or Shortness of Breath, Disp: 18 g, Rfl: 3    dorzolamide-timolol (COSOPT) 22.3-6.8 MG/ML ophthalmic solution, Place 1 drop into both eyes 2 times daily, Disp: , Rfl:     ALPHAGAN P 0.1 % SOLN, Place 1 drop into both eyes 3 times daily , Disp: , Rfl:     latanoprost (XALATAN) 0.005 % ophthalmic solution, Place 1 drop into both eyes nightly , Disp: , Rfl:     Subjective:     Review of Systems   Constitutional: Negative for fever. Eyes: Negative for photophobia and visual disturbance. Gastrointestinal: Negative for nausea and vomiting. Neurological: Positive for dizziness. Negative for tremors, seizures, syncope, speech difficulty, numbness and headaches. Objective:     BP (!) 173/107   Pulse 91   Temp 97.2 °F (36.2 °C)   SpO2 98%     Physical Exam  Constitutional:       Appearance: Normal appearance. She is well-developed. She is not ill-appearing, toxic-appearing or diaphoretic. HENT:      Head: Normocephalic. Right Ear: Tympanic membrane and ear canal normal. There is no impacted cerumen.       Left Ear: Tympanic membrane and ear canal normal. There is no impacted cerumen. Eyes:      General:         Right eye: No discharge. Left eye: No discharge. Conjunctiva/sclera: Conjunctivae normal.      Pupils: Pupils are equal, round, and reactive to light. Comments: Evaluation of EOM movements reveals some horizontal nystagmus in both eyes at the ends of eye movement. Cardiovascular:      Rate and Rhythm: Normal rate and regular rhythm. Heart sounds: Normal heart sounds. No murmur heard. Pulmonary:      Effort: Pulmonary effort is normal. No respiratory distress. Breath sounds: Normal breath sounds. No wheezing. Musculoskeletal:      Cervical back: No rigidity. Lymphadenopathy:      Cervical: No cervical adenopathy. Neurological:      Mental Status: She is alert. Psychiatric:         Behavior: Behavior normal.         Thought Content: Thought content normal.         Judgment: Judgment normal.         Assessment & Plan:      1. Benign paroxysmal positional vertigo, unspecified laterality  Had a long discussion on BPPV. Handout provided for the epley maneuver to do at home. Discussed meclizine prn for symptom relief, advised on risk of drowsiness. Offered referral to ENT for evaluation for possible Ménière's given the aural fullness, hearing loss, recurrent vertigo - she declined for now. Recommended follow-up with PCP in 1 to 2 weeks. - meclizine (ANTIVERT) 25 MG tablet; Take 1 tablet by mouth 3 times daily as needed for Dizziness  Dispense: 45 tablet; Refill: 0    2. Elevated blood pressure reading  Discussed monitoring BP at home twice daily over the coming days and to follow-up with PCP ASAP should home BP readings be elevated. Call or return to clinic prn if these symptoms worsen or fail to improve as anticipated. I have reviewed the instructions with the patient, answering all questions to their satisfaction.     Electronically signed by Fahad James MD on 1/8/2022 at 3:03 PM

## 2022-01-10 NOTE — TELEPHONE ENCOUNTER
Referral placed, please give her contact information to schedule   Diagnosis Orders   1.  Dizziness  [facility] - [place]        Future Appointments   Date Time Provider Shirley Dunne   1/25/2022  2:30 PM Tova Onofre MD fp [facility]arr[facility]   5/12/2022  3:45 PM Brady Roblero MD fp [facility]

## 2022-01-10 NOTE — TELEPHONE ENCOUNTER
Pt was seen last month and seen in [facility]. I am not sure why they send this to me.

## 2022-01-10 NOTE — TELEPHONE ENCOUNTER
Patient already seen at urgent care on 1/8/2022 for vertigo, and meclizine was prescribed    Please check with patient if symptoms still persistent, I will refer her to vestibular therapy

## 2022-01-17 ENCOUNTER — HOSPITAL ENCOUNTER (OUTPATIENT)
Dept: PHYSICAL THERAPY | Age: 82
Setting detail: THERAPIES SERIES
Discharge: HOME | End: 2022-01-17
Payer: MEDICARE

## 2022-01-17 PROCEDURE — 97162 PT EVAL MOD COMPLEX 30 MIN: CPT

## 2022-01-17 NOTE — PROGRESS NOTES
Physical Therapy           [facility]) [facility] DIVISION & Therapy  [address]: [address] [address]     PHYSICAL THERAPY VESTIBULAR EVALUATION    Date:  2022  Patient: Evelina Guillermo  : 1940  MRN: [id number]   Referring Provider:  Rosalia Deleon MD  Insurance: Jailyn Spinwalker, # of visits allowed/remaining: based on medical necessity, Auth: NPRe  Medical Diagnosis/ Rehab Codes:   R42 DIZZINESS  Onset date: 2021  Next 's appt.: PRM    Subjective:   HPI: PATIENT REPORTS 2021 SHE HAD HER EARS CLEANED BY PCP.  2 DAYS LATER, WHEN SHE GO UP IN THE MORNING,  SHE NOTED A SPINNING SENSATION. SHE WAS SEEN IN THE ED, WORK UP FOR STROKE WAS NEGATIVE. SHE WAS GIVEN A PRESCRIPTION FOR MECLIZINE AND A FEW DAYS LATER HER SYMPTOMS SUBSIDED. 2021 SHE HAD HER EARS CLEANED AGAIN BY PCP. AT THAT TIME SHE FELT L EAR PRESSURE, \"LIKE THERE WAS SOMETHING STUCK IN THERE\". 2022 SHE NOTED BOUTS OF A SPINNING SENSATION. WHEN SHE GOT OUT OF BED SHE NOTED SHE WAS VEERING SIDE TO SIDE AND HAD INCREASED IMBALANCE. SHE WAS SEEN IN URGENT CARE 2022 AND GIVEN ANOTHER PRESCRIPTION FOR MECLIZINE. SHE WAS ISSUED AND EXERCISE (PROBABLE HE-DAROFF) TO PERFORM AT HOME. PATIENT STATES SHE TRIED IT A FEW TIMES BUT IT MADE HER FEEL WORSE. CC: PATIENT REPORTS SHE HAS HAD NO SYMPTOMS TODAY OR YESTERDAY. SHE STATES THE LAST 2 DAYS SHE HAS NOT NEEDED AND HAS NOT TAKE THE MECLIZINE. A FEW DAYS AGO SHE WAS HAVING BOUTS OF A SPINNING SENSATION BROUGHT ON BY GETTING OUT OF BED, QUICK HEAD MOVEMENTS AND LOOKING DOWN.  SHE HAD ASSOCIATED IMBALANCE. SHE HAS RESTRICTED HER DRIVING G DUE TO THIS PROBLEM.   PMHx:    [] Unremarkable [] Diabetes  []MI/Heart Problems   [] Pacemaker  [] HTN   [] Cancer   [x] Arthritis:   [] Surgeries:   [x] Other: NEUROPATHY   has a past medical history of Abdominal pain, Allergic rhinitis, Anxiety, Chronic back pain, Chronic fatigue, Chronic kidney disease, Colon polyp, COPD (chronic obstructive pulmonary disease) ([address] COPD, 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, Depression, Diverticul disease small and large intestine, no perforati or abscess, Elevated blood pressure reading, Fall, GERD (gastroesophageal reflux disease), Glaucoma, Hyperlipidemia, 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[address] Hyperthyroidism, Insomnia, Neuropathy, Orthostatic dizziness, Osteoarthritis, Panlobular emphysema ([address] PVC (premature ventricular contraction), S/P parathyroidectomy ([facility] [address] [facility] Tubal pregnancy, and Vasovagal syncope.   has a past surgical history that includes Appendectomy; Tubal ligation; back surgery; eye surgery; Rotator cuff repair (Left); Tonsillectomy; Endoscopy, colon, diagnostic; Cataract removal with implant (Left, 04/01/14); Upper gastrointestinal endoscopy (6/13/2014); Colonoscopy; Colonoscopy (6/13/2014); Cholecystectomy (2005); Upper gastrointestinal endoscopy (1-26-16); Upper gastrointestinal endoscopy (5/4/16); and parathyroidectomy (11/05/2018). Allergies: [x] NKA  [] Refer to intake sheet  Medication: [] Refer to intake sheet  [x] None MECLIZINE PRN, NONE LAST 2 DAYS   Test: [] X-Ray  [] MRI [x] CT Scan   [] Other  CT HEAD 2/5/2021mpression   No acute intracranial abnormality. Pain:    /10  [x] No c/o pain    Pain altered Tx: [x] No  [] Yes Action:  Symptoms: [x] Improving  [] Worsening  [] Same  Sleep: [x] OK  [] Disturbed    Fall History: PATIENT DENIES FALLS IN THE LAST ONE YEAR. SHE DOES HAVE ASSOCIATED INCREASED IMBALANCE BUT SHE DENIES NEAR FALLS.   Associated Ear Symptoms: HISTORY OF HEARING LOSS B EARS, HAS AIDES SHE WEARS SOMETIMES, NEW ONSET LEFT EAR PRESSURE     Function:    Hand Dominance [x] Right  [] Left   Patient lives with: Ryann Peters   In what type of home _ X One story   _ Two story   _ Split level   Number of stairs to enter 4 STEPS TO ENTER WITH RAIL   Washing machine is on [x]  Main BALANCE SCREENING:  TEST EYES OPEN EYES CLOSED   ModCTSIB:  Romberg, Firm Surface  >30\" WITH MILD POSTURAL SWAY >30\" WITH MILD POSTURAL SWAY   Mod CTSIB:  Romberg, Foam Surface >30\" WITH MILD POSTURAL SWAY >30\" WITH MODERATE POSTURAL SWAY   Tandem Romberg NOT TESTED NT   Single Leg Stance NT NT     Gait: NO GAIT DEFICIT OBSERVED. VESTIBULAR OCULOMOTOR SCREENING:  TEST ROOM LIGHT WITHOUT FIXATION-IR GOGGLES   MODIFIED VERTEBRAL ARTERY TEST IN SITTING NO REPORTS OF SYMPTOMS    SPONTANEOUS NYSTAGMUS NEGATIVE NEGATIVE   GAZE HOLDING NYSTAGMUS (L):  NEG (L):  NEG    (R):  NEG (R): NEG    UP: NEG UP: NEG   SMOOTH PURSUIT NEG    SACCADES NEG    VOR CANCELLATION CERVICAL MUSCLE GUARDING    VOR TO SLOW HEAD MOVEMENT CERVICAL MUSCLE GUARDING    VOR TO FAST HEAD MOVEMENT(HEAD THRUST) (L): NOT TESTED     (R):   NT    HEAD SHAKING NYSTAGMUS  NOT TESTED   VALSALVA/  HYPERVENTILATION  NT   RIGHT PARRIS-HALLPIKE  NEG    LEFT PARRIS[place]HALLPIKE  NEG   SUPINE HEAD CENTER  NEG   ROLL TEST (L):   (L):  NEG    (R):   (R):  NOT TESTED   BOW AND LEAN TEST  BOW: NEG  LEAN: NEG       Assessment: PATIENT WITH SYMPTOMS AND TIMING OF BPPV BUT NEGATIVE POSITION TESTS. SHE HAS HAD NO VERTIGO FOR THE LAST 2 DAYS SO PROBABLE SPONTANEOUS RESOLUTION OF BPPV. NO PHYSICAL THERAPY GOALS IDENTIFIED AT THIS TIME. Pt. Education:  DISCUSSED HIGH RATE OF REOCCURRENCE OF BPPV. DISCUSSED ANATOMY OF THE EAR AND BPPV. [x] Results of clinical test/Plans/Goals, Risks/Benefits discussed    [] Home exercise program  [] Fall prevention  Method of Education: [x] Verbal  [x] Demo  [] Written  Comprehension of Education:  [x] Verbalizes understanding. [] Demonstrates understanding. [] Needs Review.       Evaluation Complexity:  History (Personal factors, comorbidities) [x] 0 [] 1-2 [] 3+   Exam (limitations, restrictions) [x] 1-2 [] 3 [] 4+   Clinical presentation (progression) [x] Stable [] Evolving  [] Unstable   Decision Making [x] Low [] Moderate [] High    [x] Low Complexity [] Moderate Complexity [] High Complexity         Treatment Plan:  INITIAL EVALUATION ONLY. NO THERAPY GOALS IDENTIFIED. PATIENT INSTRUCTED TO CALL AND RESCHEDULE IF HER VERTIGO RETURNS IN THE NEXT 90 DAYS. IF HER SYMPTOMS RETURN AFTER THAT SHE WAS ENCOURAGED TO CALL HER PCP AN GET A NEW REFERRAL FOR VESTIBULAR REHAB. Todays Treatment:  Precautions: NONE IDENTIFIED  Exercises: NONE  Other: PATIENT EDUCATION    Today's Treatment Charges        Mins       Units   [x] PT Evaluation- [x] Low; [] Moderate; [] High  65 1   [] Canalith repositioning maneuver/technique (CRM/T)     [] Therapeutic Exercise 15min     [] Therapeutic Activity     [] Neuromuscular Reeducation     TOTAL 65 1       Start Time: 5976             Stop Time: 7897    Electronically signed by: Hubert Salazar PT        Physician Signature:________________________________Date:__________________  By signing above or cosigning this note, I have reviewed this plan of care and certify a need for medically necessary rehabilitation services.

## 2022-01-20 ENCOUNTER — HOSPITAL ENCOUNTER (OUTPATIENT)
Age: 82
Setting detail: SPECIMEN
Discharge: HOME | End: 2022-01-20
Payer: MEDICARE

## 2022-01-20 PROCEDURE — 87338 HPYLORI STOOL AG IA: CPT

## 2022-01-21 LAB
DIRECT EXAM: NEGATIVE
Lab: NORMAL
SPECIMEN DESCRIPTION: NORMAL

## 2022-01-21 NOTE — RESULT ENCOUNTER NOTE
Please notify patient: Negative for H. pylori, normal    Future Appointments  5/12/2022  3:45 PM    Lalito Lagunas MD     [facility]               [address] [facility]

## 2022-02-15 ENCOUNTER — PATIENT MESSAGE (OUTPATIENT)
Dept: FAMILY MEDICINE CLINIC | Age: 82
End: 2022-02-15

## 2022-02-15 DIAGNOSIS — R42 DIZZINESS: ICD-10-CM

## 2022-02-15 DIAGNOSIS — F51.04 PSYCHOPHYSIOLOGICAL INSOMNIA: ICD-10-CM

## 2022-02-15 DIAGNOSIS — H92.02 LEFT EAR PAIN: Primary | ICD-10-CM

## 2022-02-15 RX ORDER — ZALEPLON 10 MG/1
10 CAPSULE ORAL NIGHTLY PRN
Qty: 90 CAPSULE | Refills: 0 | Status: SHIPPED | OUTPATIENT
Start: 2022-02-15 | End: 2022-05-12 | Stop reason: SDUPTHER

## 2022-02-16 RX ORDER — DIPHENHYDRAMINE HYDROCHLORIDE 50 MG/ML
50 INJECTION INTRAMUSCULAR; INTRAVENOUS
Status: CANCELLED | OUTPATIENT
Start: 2022-02-16

## 2022-02-16 RX ORDER — ALBUTEROL SULFATE 90 UG/1
4 AEROSOL, METERED RESPIRATORY (INHALATION) PRN
Status: CANCELLED | OUTPATIENT
Start: 2022-02-16

## 2022-02-16 RX ORDER — EPINEPHRINE 1 MG/ML
0.3 INJECTION, SOLUTION, CONCENTRATE INTRAVENOUS PRN
Status: CANCELLED | OUTPATIENT
Start: 2022-02-16

## 2022-02-16 RX ORDER — SODIUM CHLORIDE 9 MG/ML
INJECTION, SOLUTION INTRAVENOUS CONTINUOUS
Status: CANCELLED | OUTPATIENT
Start: 2022-02-16

## 2022-02-16 RX ORDER — ACETAMINOPHEN 325 MG/1
650 TABLET ORAL
Status: CANCELLED | OUTPATIENT
Start: 2022-02-16

## 2022-02-16 RX ORDER — ONDANSETRON 2 MG/ML
8 INJECTION INTRAMUSCULAR; INTRAVENOUS
Status: CANCELLED | OUTPATIENT
Start: 2022-02-16

## 2022-02-23 ENCOUNTER — TELEPHONE (OUTPATIENT)
Dept: FAMILY MEDICINE CLINIC | Age: 82
End: 2022-02-23

## 2022-02-23 DIAGNOSIS — F51.04 PSYCHOPHYSIOLOGICAL INSOMNIA: Primary | ICD-10-CM

## 2022-02-24 NOTE — TELEPHONE ENCOUNTER
Called patient to schedule visit , states will call back once she is done with her ENT, she will call back to schedule this visit to discuss this matter.

## 2022-02-24 NOTE — TELEPHONE ENCOUNTER
Please schedule patient     I did send patient a [employer] message explaining that message received from pharmacist associated with her insurance requesting cognitive behavioral therapy for insomnia due to prolonged prescription. FYI  Therapy Review: Ongoing Use of Sedative Hypnotics Your patient has been receiving sedative hypnotic medication based on claims records. Cognitive behavioral therapy has been shown to improve hypnotic discontinuation outcomes as well as insomnia symptoms. Sedative hypnotic prescriptions exceeding an extended duration may be subject to additional documentation requirements. Digital CBT may be an included service in this patient's medical benefit. Diagnosis Orders   1.  Psychophysiological insomnia  3 [facility] ([address] [facility])        Future Appointments   Date Time Provider Shirley Dunne   3/1/2022  1:00  [facility] SHORT STAY INFUSION CHAIR 01 [facility] [address]   5/12/2022  3:45 PM MD eduardo Mayes

## 2022-03-01 ENCOUNTER — HOSPITAL ENCOUNTER (OUTPATIENT)
Dept: INFUSION THERAPY | Age: 82
Setting detail: INFUSION SERIES
Discharge: HOME | End: 2022-03-01
Payer: MEDICARE

## 2022-03-01 VITALS
SYSTOLIC BLOOD PRESSURE: 169 MMHG | WEIGHT: 126 LBS | BODY MASS INDEX: 22.32 KG/M2 | TEMPERATURE: 96.8 F | RESPIRATION RATE: 16 BRPM | HEART RATE: 81 BPM | OXYGEN SATURATION: 98 % | DIASTOLIC BLOOD PRESSURE: 94 MMHG

## 2022-03-01 DIAGNOSIS — M81.0 OSTEOPOROSIS, UNSPECIFIED OSTEOPOROSIS TYPE, UNSPECIFIED PATHOLOGICAL FRACTURE PRESENCE: Primary | ICD-10-CM

## 2022-03-01 LAB
CALCIUM SERPL-MCNC: 9.2 MG/DL (ref 8.6–10.4)
CREAT SERPL-MCNC: 1.04 MG/DL (ref 0.5–0.9)
GFR AFRICAN AMERICAN: >60 ML/MIN
GFR NON-AFRICAN AMERICAN: 51 ML/MIN
GFR SERPL CREATININE-BSD FRML MDRD: ABNORMAL ML/MIN/{1.73_M2}

## 2022-03-01 PROCEDURE — 6360000002 HC RX W HCPCS: Performed by: INTERNAL MEDICINE

## 2022-03-01 PROCEDURE — 82310 ASSAY OF CALCIUM: CPT

## 2022-03-01 PROCEDURE — 96372 THER/PROPH/DIAG INJ SC/IM: CPT

## 2022-03-01 PROCEDURE — 82565 ASSAY OF CREATININE: CPT

## 2022-03-01 PROCEDURE — 36415 COLL VENOUS BLD VENIPUNCTURE: CPT

## 2022-03-01 RX ORDER — ACETAMINOPHEN 325 MG/1
650 TABLET ORAL
OUTPATIENT
Start: 2022-08-30

## 2022-03-01 RX ORDER — ALBUTEROL SULFATE 90 UG/1
4 AEROSOL, METERED RESPIRATORY (INHALATION) PRN
OUTPATIENT
Start: 2022-08-30

## 2022-03-01 RX ORDER — EPINEPHRINE 1 MG/ML
0.3 INJECTION, SOLUTION, CONCENTRATE INTRAVENOUS PRN
OUTPATIENT
Start: 2022-08-30

## 2022-03-01 RX ORDER — ONDANSETRON 2 MG/ML
8 INJECTION INTRAMUSCULAR; INTRAVENOUS
OUTPATIENT
Start: 2022-08-30

## 2022-03-01 RX ORDER — DIPHENHYDRAMINE HYDROCHLORIDE 50 MG/ML
50 INJECTION INTRAMUSCULAR; INTRAVENOUS
OUTPATIENT
Start: 2022-08-30

## 2022-03-01 RX ORDER — SODIUM CHLORIDE 9 MG/ML
INJECTION, SOLUTION INTRAVENOUS CONTINUOUS
OUTPATIENT
Start: 2022-08-30

## 2022-03-01 RX ADMIN — DENOSUMAB 60 MG: 60 INJECTION SUBCUTANEOUS at 13:30

## 2022-03-01 NOTE — PROGRESS NOTES
Pt arrived for Prolia injection. Vitals obtained and labs drawn. Labs within written parameters. Creatinine clearance = 38.28 by calculation. Injection given in R arm. Pt tolerated well. No s/s adverse reaction noted. Pt discharged home, ambulatory per self with .

## 2022-03-02 NOTE — RESULT ENCOUNTER NOTE
Please notify patient: Slightly worsening kidney function, chronic kidney disease stage III  To drink her water 8 x 8 ounce glasses of water every day.   I need her to please see nephrologist, , please give her contact information  Future Appointments  5/12/2022  3:45 PM    Khadijah Pathak MD     [facility]               [id number]

## 2022-04-20 ENCOUNTER — OFFICE VISIT (OUTPATIENT)
Dept: FAMILY MEDICINE CLINIC | Age: 82
End: 2022-04-20
Payer: MEDICARE

## 2022-04-20 VITALS
HEART RATE: 97 BPM | DIASTOLIC BLOOD PRESSURE: 77 MMHG | TEMPERATURE: 97.2 F | OXYGEN SATURATION: 100 % | SYSTOLIC BLOOD PRESSURE: 153 MMHG

## 2022-04-20 DIAGNOSIS — R03.0 ELEVATED BLOOD PRESSURE READING: ICD-10-CM

## 2022-04-20 DIAGNOSIS — J01.90 ACUTE BACTERIAL SINUSITIS: Primary | ICD-10-CM

## 2022-04-20 DIAGNOSIS — B96.89 ACUTE BACTERIAL SINUSITIS: Primary | ICD-10-CM

## 2022-04-20 PROBLEM — N18.30 CHRONIC RENAL DISEASE, STAGE III (HCC): Status: ACTIVE | Noted: 2022-04-20

## 2022-04-20 PROCEDURE — 99213 OFFICE O/P EST LOW 20 MIN: CPT

## 2022-04-20 RX ORDER — AZITHROMYCIN 250 MG/1
250 TABLET, FILM COATED ORAL SEE ADMIN INSTRUCTIONS
Qty: 6 TABLET | Refills: 0 | Status: SHIPPED | OUTPATIENT
Start: 2022-04-20 | End: 2022-04-25

## 2022-04-20 RX ORDER — FLUTICASONE PROPIONATE 50 MCG
2 SPRAY, SUSPENSION (ML) NASAL DAILY
Qty: 16 G | Refills: 0 | Status: SHIPPED | OUTPATIENT
Start: 2022-04-20 | End: 2022-08-30 | Stop reason: ALTCHOICE

## 2022-04-20 ASSESSMENT — ENCOUNTER SYMPTOMS
RHINORRHEA: 0
CONSTIPATION: 0
ANAL BLEEDING: 0
APNEA: 0
EYE REDNESS: 0
BLOOD IN STOOL: 0
NAUSEA: 0
EYE ITCHING: 0
BACK PAIN: 0
EYE PAIN: 0
SWOLLEN GLANDS: 0
VOICE CHANGE: 0
TROUBLE SWALLOWING: 0
SHORTNESS OF BREATH: 0
CHOKING: 0
WHEEZING: 0
VOMITING: 0
GASTROINTESTINAL NEGATIVE: 1
RESPIRATORY NEGATIVE: 1
FACIAL SWELLING: 0
HOARSE VOICE: 0
COUGH: 0
SORE THROAT: 0
SINUS PRESSURE: 1
PHOTOPHOBIA: 0
RECTAL PAIN: 0
ABDOMINAL PAIN: 0
CHEST TIGHTNESS: 0
EYE DISCHARGE: 0
COLOR CHANGE: 0
SINUS PAIN: 0
ABDOMINAL DISTENTION: 0
STRIDOR: 0
DIARRHEA: 0
SINUS COMPLAINT: 1
EYES NEGATIVE: 1

## 2022-04-20 NOTE — PATIENT INSTRUCTIONS
Patient Education        Sinusitis: Care Instructions  Your Care Instructions     Sinusitis is an infection of the lining of the sinus cavities in your head. Sinusitis often follows a cold. It causes pain and pressure in your head andface. In most cases, sinusitis gets better on its own in 1 to 2 weeks. But some mildsymptoms may last for several weeks. Sometimes antibiotics are needed. Follow-up care is a key part of your treatment and safety. Be sure to make and go to all appointments, and call your doctor if you are having problems. It's also a good idea to know your test results and keep alist of the medicines you take. How can you care for yourself at home?  Take an over-the-counter pain medicine, such as acetaminophen (Tylenol), ibuprofen (Advil, Motrin), or naproxen (Aleve). Read and follow all instructions on the label.  If the doctor prescribed antibiotics, take them as directed. Do not stop taking them just because you feel better. You need to take the full course of antibiotics.  Be careful when taking over-the-counter cold or flu medicines and Tylenol at the same time. Many of these medicines have acetaminophen, which is Tylenol. Read the labels to make sure that you are not taking more than the recommended dose. Too much acetaminophen (Tylenol) can be harmful.  Breathe warm, moist air from a steamy shower, a hot bath, or a sink filled with hot water. Avoid cold, dry air. Using a humidifier in your home may help. Follow the directions for cleaning the machine.  Use saline (saltwater) nasal washes. This can help keep your nasal passages open and wash out mucus and bacteria. You can buy saline nose drops at a grocery store or drugstore. Or you can make your own at home by adding 1 teaspoon of salt and 1 teaspoon of baking soda to 2 cups of distilled water. If you make your own, fill a bulb syringe with the solution, insert the tip into your nostril, and squeeze gently. Revonda Gallegos your nose.    Put a

## 2022-04-20 NOTE — PROGRESS NOTES
[facility] WALK-IN FAMILY [facility]IC[facility]  [facility] [address]    [facility]  [address] [place] [address] [facility] [address]  Dept: [phone]    Robles Robb is a 80 y.o. female Established patient, who presents to the walk-in clinic today with conditions/complaints as noted below:    Chief Complaint   Patient presents with    Sinus Problem     onset last friday, sinus drainage, facial/eye pain         HPI:     Sinus Problem  This is a new problem. Episode onset: 5 days ago. The problem has been gradually worsening since onset. There has been no fever. The pain is mild. Associated symptoms include congestion, headaches and sinus pressure. Pertinent negatives include no chills, coughing, diaphoresis, ear pain, hoarse voice, neck pain, shortness of breath, sneezing, sore throat or swollen glands. Treatments tried: Tylenol cold and sinus. The treatment provided no relief. Declines COVID testing.    Past Medical History:   Diagnosis Date    Abdominal pain     Allergic rhinitis     Anxiety     Chronic back pain     Chronic fatigue     Chronic kidney disease     Colon polyp 3/7/2012    TUBULAR ADENOMA    COPD (chronic obstructive pulmonary disease) (HCC)     COPD, 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     Depression     Diverticul disease small and large intestine, no perforati or abscess     Elevated blood pressure reading     Fall 1/12/2017    GERD (gastroesophageal reflux disease)     Glaucoma     Hyperlipidemia     with target LDL less than 100    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[address]     Hyperthyroidism 2012    Insomnia     Psychophysiological    Neuropathy     Orthostatic dizziness 3/12/2017    Osteoarthritis     Panlobular emphysema (HCC)     PVC (premature ventricular contraction) 1/12/2017    S/P parathyroidectomy [facility])     Tubal pregnancy 0165,1034    Vasovagal syncope 1/12/2017       Current Outpatient Medications   Medication Sig Dispense Refill    fluticasone (FLONASE) 50 MCG/ACT nasal spray 2 sprays by Nasal route daily 16 g 0    azithromycin (ZITHROMAX) 250 MG tablet Take 1 tablet by mouth See Admin Instructions for 5 days 500mg on day 1 followed by 250mg on days 2 - 5 6 tablet 0    zaleplon (SONATA) 10 MG capsule Take 1 capsule by mouth nightly as needed (Insomnia. REPLACING LUNESTA) for up to 90 days. Dose increased 8/23/2021 90 capsule 0    omeprazole (PRILOSEC) 40 MG delayed release capsule Take 1 capsule by mouth every morning (before breakfast) 90 capsule 0    Tiotropium Bromide-Olodaterol (STIOLTO RESPIMAT) 2.5-2.5 MCG/ACT AERS Inhale 2 puffs into the lungs daily      albuterol sulfate  (90 Base) MCG/ACT inhaler Inhale 2 puffs into the lungs every 6 hours as needed for Wheezing or Shortness of Breath 18 g 3    dorzolamide-timolol (COSOPT) 22.3-6.8 MG/ML ophthalmic solution Place 1 drop into both eyes 2 times daily      ALPHAGAN P 0.1 % SOLN Place 1 drop into both eyes 3 times daily       latanoprost (XALATAN) 0.005 % ophthalmic solution Place 1 drop into both eyes nightly        No current facility-administered medications for this visit. Allergies   Allergen Reactions    Aspirin      GI upset       Review of Systems:     Review of Systems   Constitutional: Negative. Negative for activity change, appetite change, chills, diaphoresis, fatigue, fever and unexpected weight change. HENT: Positive for congestion, postnasal drip and sinus pressure. Negative for dental problem, drooling, ear discharge, ear pain, facial swelling, hearing loss, hoarse voice, mouth sores, nosebleeds, rhinorrhea, sinus pain, sneezing, sore throat, tinnitus, trouble swallowing and voice change. Eyes: Negative. Negative for photophobia, pain, discharge, redness, itching and visual disturbance. Respiratory: Negative. Negative for apnea, cough, choking, chest tightness, shortness of breath, wheezing and stridor. Cardiovascular: Negative. Negative for chest pain, palpitations and leg swelling. Gastrointestinal: Negative. Negative for abdominal distention, abdominal pain, anal bleeding, blood in stool, constipation, diarrhea, nausea, rectal pain and vomiting. Musculoskeletal: Negative. Negative for arthralgias, back pain, gait problem, joint swelling, myalgias, neck pain and neck stiffness. Skin: Negative. Negative for color change, pallor, rash and wound. Neurological: Positive for headaches. Negative for dizziness, tremors, seizures, syncope, facial asymmetry, speech difficulty, weakness, light-headedness and numbness. Physical Exam:      BP (!) 153/77 (Site: Left Upper Arm, Position: Sitting, Cuff Size: Medium Adult)   Pulse 97   Temp 97.2 °F (36.2 °C) (Infrared)   SpO2 100%     Physical Exam  Vitals reviewed. Constitutional:       Appearance: Normal appearance. She is normal weight. HENT:      Head: Normocephalic and atraumatic. Right Ear: Tympanic membrane, ear canal and external ear normal.      Left Ear: Tympanic membrane, ear canal and external ear normal.      Nose: Congestion present. No rhinorrhea. Right Sinus: Maxillary sinus tenderness and frontal sinus tenderness present. Left Sinus: Maxillary sinus tenderness and frontal sinus tenderness present. Mouth/Throat:      Lips: Pink. Mouth: Mucous membranes are moist.      Pharynx: Uvula midline. Oropharyngeal exudate (post nasal drainage) and posterior oropharyngeal erythema present. No pharyngeal swelling or uvula swelling. Tonsils: No tonsillar exudate or tonsillar abscesses. Eyes:      Extraocular Movements: Extraocular movements intact. Conjunctiva/sclera: Conjunctivae normal.      Pupils: Pupils are equal, round, and reactive to light.    Cardiovascular:      Rate and Rhythm: Normal rate and regular rhythm. Pulses: Normal pulses. Heart sounds: Normal heart sounds. Pulmonary:      Effort: Pulmonary effort is normal.      Breath sounds: Normal breath sounds and air entry. Abdominal:      General: Abdomen is flat. Bowel sounds are normal.      Palpations: Abdomen is soft. Musculoskeletal:         General: Normal range of motion. Cervical back: Normal range of motion and neck supple. Skin:     General: Skin is warm and dry. Capillary Refill: Capillary refill takes less than 2 seconds. Neurological:      General: No focal deficit present. Mental Status: She is alert and oriented to person, place, and time. Mental status is at baseline. Psychiatric:         Mood and Affect: Mood normal.         Behavior: Behavior normal.         Plan:          1. Acute bacterial sinusitis  -     fluticasone (FLONASE) 50 MCG/ACT nasal spray; 2 sprays by Nasal route daily, Disp-16 g, R-0Normal  -     azithromycin (ZITHROMAX) 250 MG tablet; Take 1 tablet by mouth See Admin Instructions for 5 days 500mg on day 1 followed by 250mg on days 2 - 5, Disp-6 tablet, R-0Normal  2. Elevated blood pressure reading       Continue over-the-counter medications as needed for symptoms. Use honey to the back of throat, salt water gargle as needed for sore throat, cough. Go to the ER for shortness of breath, chest pain. Discussed that if headaches persist, this may be caused by elevated bp's, I also discussed the increase risk of stroke with undiagnosed HTN. Advised patient to monitor blood pressure, if continues to be elevated-- follow up with PCP for further management. Patient expressed understanding. Follow Up Instructions:      Return if symptoms worsen or fail to improve, for SOB, chest pain go to ER.     Orders Placed This Encounter   Medications    fluticasone (FLONASE) 50 MCG/ACT nasal spray     Si sprays by Nasal route daily     Dispense:  16 g     Refill:  0    azithromycin (ZITHROMAX) 250 MG tablet     Sig: Take 1 tablet by mouth See Admin Instructions for 5 days 500mg on day 1 followed by 250mg on days 2 - 5     Dispense:  6 tablet     Refill:  0             Patient and/or parent given educational materials - see patient instructions. Discussed use, benefit, and side effects of prescribed medications. All patient questions answered. Patient and/or parent voiced understanding.       Electronically signed by BARRERA Smith 4/20/2022 at 11:39 AM

## 2022-05-12 ENCOUNTER — OFFICE VISIT (OUTPATIENT)
Dept: FAMILY MEDICINE CLINIC | Age: 82
End: 2022-05-12
Payer: MEDICARE

## 2022-05-12 VITALS
HEART RATE: 88 BPM | WEIGHT: 130 LBS | BODY MASS INDEX: 23.04 KG/M2 | SYSTOLIC BLOOD PRESSURE: 112 MMHG | HEIGHT: 63 IN | DIASTOLIC BLOOD PRESSURE: 62 MMHG | RESPIRATION RATE: 20 BRPM | TEMPERATURE: 96.6 F | OXYGEN SATURATION: 98 %

## 2022-05-12 DIAGNOSIS — N18.31 STAGE 3A CHRONIC KIDNEY DISEASE (HCC): ICD-10-CM

## 2022-05-12 DIAGNOSIS — K29.50 OTHER CHRONIC GASTRITIS WITHOUT HEMORRHAGE: Primary | ICD-10-CM

## 2022-05-12 DIAGNOSIS — G62.9 NEUROPATHY: ICD-10-CM

## 2022-05-12 DIAGNOSIS — F51.04 PSYCHOPHYSIOLOGICAL INSOMNIA: ICD-10-CM

## 2022-05-12 DIAGNOSIS — R76.8 ANA POSITIVE: ICD-10-CM

## 2022-05-12 DIAGNOSIS — J43.1 PANLOBULAR EMPHYSEMA (HCC): ICD-10-CM

## 2022-05-12 DIAGNOSIS — E89.2 S/P PARATHYROIDECTOMY (HCC): ICD-10-CM

## 2022-05-12 DIAGNOSIS — R73.9 HYPERGLYCEMIA: ICD-10-CM

## 2022-05-12 DIAGNOSIS — F33.42 MDD (MAJOR DEPRESSIVE DISORDER), RECURRENT, IN FULL REMISSION (HCC): ICD-10-CM

## 2022-05-12 PROBLEM — M53.3 PAIN OF RIGHT SACROILIAC JOINT: Status: RESOLVED | Noted: 2018-07-28 | Resolved: 2022-05-12

## 2022-05-12 PROBLEM — N18.30 CHRONIC RENAL DISEASE, STAGE III (HCC): Status: RESOLVED | Noted: 2022-04-20 | Resolved: 2022-05-12

## 2022-05-12 PROBLEM — J96.10 CHRONIC RESPIRATORY FAILURE (HCC): Status: RESOLVED | Noted: 2020-08-11 | Resolved: 2022-05-12

## 2022-05-12 PROCEDURE — 99214 OFFICE O/P EST MOD 30 MIN: CPT | Performed by: FAMILY MEDICINE

## 2022-05-12 RX ORDER — ZALEPLON 10 MG/1
10 CAPSULE ORAL NIGHTLY PRN
Qty: 90 CAPSULE | Refills: 0 | Status: SHIPPED | OUTPATIENT
Start: 2022-05-12 | End: 2022-08-09 | Stop reason: SDUPTHER

## 2022-05-12 ASSESSMENT — PATIENT HEALTH QUESTIONNAIRE - PHQ9
5. POOR APPETITE OR OVEREATING: 0
SUM OF ALL RESPONSES TO PHQ QUESTIONS 1-9: 0
SUM OF ALL RESPONSES TO PHQ9 QUESTIONS 1 & 2: 0
SUM OF ALL RESPONSES TO PHQ QUESTIONS 1-9: 0
3. TROUBLE FALLING OR STAYING ASLEEP: 0
4. FEELING TIRED OR HAVING LITTLE ENERGY: 0
1. LITTLE INTEREST OR PLEASURE IN DOING THINGS: 0
7. TROUBLE CONCENTRATING ON THINGS, SUCH AS READING THE NEWSPAPER OR WATCHING TELEVISION: 0
10. IF YOU CHECKED OFF ANY PROBLEMS, HOW DIFFICULT HAVE THESE PROBLEMS MADE IT FOR YOU TO DO YOUR WORK, TAKE CARE OF THINGS AT HOME, OR GET ALONG WITH OTHER PEOPLE: 0
6. FEELING BAD ABOUT YOURSELF - OR THAT YOU ARE A FAILURE OR HAVE LET YOURSELF OR YOUR FAMILY DOWN: 0
9. THOUGHTS THAT YOU WOULD BE BETTER OFF DEAD, OR OF HURTING YOURSELF: 0
8. MOVING OR SPEAKING SO SLOWLY THAT OTHER PEOPLE COULD HAVE NOTICED. OR THE OPPOSITE, BEING SO FIGETY OR RESTLESS THAT YOU HAVE BEEN MOVING AROUND A LOT MORE THAN USUAL: 0
2. FEELING DOWN, DEPRESSED OR HOPELESS: 0
SUM OF ALL RESPONSES TO PHQ QUESTIONS 1-9: 0
SUM OF ALL RESPONSES TO PHQ QUESTIONS 1-9: 0

## 2022-05-12 ASSESSMENT — ENCOUNTER SYMPTOMS
ABDOMINAL PAIN: 1
SHORTNESS OF BREATH: 0
DIARRHEA: 0
COUGH: 0
CONSTIPATION: 0
CHEST TIGHTNESS: 0
ABDOMINAL DISTENTION: 0
NAUSEA: 1
WHEEZING: 0
VOMITING: 1

## 2022-05-12 ASSESSMENT — ANXIETY QUESTIONNAIRES
2. NOT BEING ABLE TO STOP OR CONTROL WORRYING: 0
1. FEELING NERVOUS, ANXIOUS, OR ON EDGE: 0
6. BECOMING EASILY ANNOYED OR IRRITABLE: 0
5. BEING SO RESTLESS THAT IT IS HARD TO SIT STILL: 0
3. WORRYING TOO MUCH ABOUT DIFFERENT THINGS: 0
4. TROUBLE RELAXING: 0
7. FEELING AFRAID AS IF SOMETHING AWFUL MIGHT HAPPEN: 0
GAD7 TOTAL SCORE: 0
IF YOU CHECKED OFF ANY PROBLEMS ON THIS QUESTIONNAIRE, HOW DIFFICULT HAVE THESE PROBLEMS MADE IT FOR YOU TO DO YOUR WORK, TAKE CARE OF THINGS AT HOME, OR GET ALONG WITH OTHER PEOPLE: NOT DIFFICULT AT ALL

## 2022-05-12 NOTE — PROGRESS NOTES
Visit Information    Have you changed or started any medications since your last visit including any over-the-counter medicines, vitamins, or herbal medicines? no   Have you stopped taking any of your medications? Is so, why? -  no  Are you having any side effects from any of your medications? - no    Have you seen any other physician or provider since your last visit? yes - ENT, recently 05/11/2022   Have you had any other diagnostic tests since your last visit?  no   Have you been seen in the emergency room and/or had an admission in a hospital since we last saw you?  no   Have you had your routine dental cleaning in the past 6 months?  yes -      Do you have an active MyChart account? If no, what is the barrier?   Yes    Patient Care Team:  Adriana Salas MD as PCP - Cristobal Ames MD as PCP - [facility] Provider  Eduard Martinez MD as Surgeon (General Surgery)  Alla Austin MD as Surgeon (Ophthalmology)  Sally Gilmore MD as Consulting Physician (Gastroenterology)  Jackie Cao MD as Consulting Physician (Endocrinology)  Fam Sharif MD as Surgeon (Ophthalmology)  Magali Bravo MD as Consulting Physician (Internal Medicine Cardiovascular Disease)  Katie Salmeron MD as Consulting Physician (Cardiology)  Katelyn Handy, PhD (Sleep Medicine)  Muna Lloyd MD as Consulting Physician (Otolaryngology)  Dania Brooks MD as Consulting Physician (Cardiology)  Penelope House MD as Consulting Physician (Pulmonology)  Victor Hugo Cano MD as Consulting Physician (Nephrology)  Warren Cunningham MD as Consulting Physician (Pulmonology)    Medical History Review  Past Medical, Family, and Social History reviewed and does not contribute to the patient presenting condition    Health Maintenance   Topic Date Due    Breast cancer screen  10/01/2021    Annual Wellness Visit (AWV)  08/13/2022    Depression Monitoring  12/07/2022    Colorectal Cancer Screen  10/25/2024    DTaP/Tdap/Td vaccine (2 - Td or Tdap) 01/09/2027    DEXA (modify frequency per FRAX score)  Completed    Flu vaccine  Completed    Shingles vaccine  Completed    Pneumococcal 65+ years Vaccine  Completed    COVID-19 Vaccine  Completed    Hepatitis A vaccine  Aged Out    Hepatitis B vaccine  Aged Out    Hib vaccine  Aged Out    Meningococcal (ACWY) vaccine  Aged Out

## 2022-05-12 NOTE — PROGRESS NOTES
River Cerda (:  1940) is a 80 y.o. female,Established patient, here for evaluation of the following chief complaint(s): Fatigue (FOLLOW UP ), Insomnia, GI Problem, and COPD      ASSESSMENT/PLAN:    1. Other chronic gastritis without hemorrhage  Failed to resolve  Food avoidance discussed, continue omeprazole daily, refer to GI for another EGD    -     Naaman Primrose, MD, Gastroenterology, [place]  2. Neuropathy  Worsening  We will check labs to rule out vitamin deficiencies, multiple myeloma or MGUS, worsening kidney disease, anemia, autoimmune disorder, thyroid disorder  -     CBC with Auto Differential; Future  -     Comprehensive Metabolic Panel; Future  -     Vitamin B12 & Folate; Future  -     TSH; Future  -     ALIYAH Screen with Reflex; Future  -     Protein Electrophoresis, Urine; Future  -     Electrophoresis Protein, Serum; Future  3. Panlobular emphysema ([facility] [address]  Worsening  Patient declines having a pulmonary function test, she says she will schedule with her pulmonologist in July  Patient to continue with Stiolto 2 puffs daily, and albuterol as needed  She has quit smoking many years ago  4. Psychophysiological insomnia  Improved with medications, denies side effects, sleep hygiene discussed, patient says she will make appointment with a counselor to discuss her sleeping issues as her insurance wants her to do that as well  -     zaleplon (SONATA) 10 MG capsule; Take 1 capsule by mouth nightly as needed (Insomnia.) for up to 90 days. , Disp-90 capsule, R-0Normal  5. Stage 3a chronic kidney disease ([facility] [address]  Slightly worsening, GFR 51 on 3/1/2022, was 58 on 2021  Avoid dehydration  Recheck labs  -     CBC with Auto Differential; Future  -     Comprehensive Metabolic Panel; Future  -     Urinalysis with Reflex to Culture; Future  -     Magnesium; Future  -     Phosphorus; Future  6.  MDD (major depressive disorder), recurrent, in full remission ([facility] [address]  Improved  We will continue to monitor  Sleep hygiene discussed, she says she does have more energy since she has been sleeping better.  -     TSH; Future  7. Hyperglycemia  Low-carb diet discussed  -     Hemoglobin A1C; Future  8. ALIYAH positive, anti-scleroderma  If again ALIYAH if antiscleroderma positive, then will refer to rheumatologist  -     ALIYAH Screen with Reflex; Future  9. S/P parathyroidectomy ([facility] [address]  We will need to monitor labs  -     Vitamin D 25 Hydroxy; Future  -     PTH, Intact with Ionized Calcium; Future  Prolia for osteoporosis through Dr. Alee Barrientos    Controlled Substance Monitoring:    Acute and Chronic Pain Monitoring:   RX Monitoring 5/12/2022   Attestation -   Periodic Controlled Substance Monitoring Possible medication side effects, risk of tolerance/dependence & alternative treatments discussed. ;No signs of potential drug abuse or diversion identified. ;Assessed functional status. Chronic Pain > 50 MEDD -         Marrilee received counseling on the following healthy behaviors: nutrition, exercise and medication adherence  Reviewed prior labs and health maintenance  Discussed use, benefit, and side effects of prescribed medications. Barriers to medication compliance addressed. Patient given educational materials - see patient instructions  All patient questions answered. Patient voiced understanding. The patient's past medical,surgical, social, and family history as well as her current medications and allergies were reviewed as documented in today's encounter. Medications, labs, diagnostic studies, consultations and follow-up as documented in this encounter. Return in about 3 months (around 8/16/2022) for Visit type MEDICARE, [facility], [address] [place] [address] Dianna .     Data Unavailable    Future Appointments   Date Time Provider Shirley Dunne   10/5/2022 11:30 AM [facility]         SUBJECTIVE/OBJECTIVE:    HPI    Patient reports she continues to have abdominal pain and mainly in the stomach after she eats. She says she \"cannot eat\"  \"Stomach hurts all the time. \"  Eating blend foods but still not helping. Taking omeprazole daily but not always helping. Patient reports being under a lot of stress with her  which she thinks might be contributing to her symptoms but she is also very worried about her continuous abdominal pain. We did try in the past antidepressant but did not help. Eating often small amount  Acidic foods makes it worse  Did not lose any weight. She reports occasional constipation and nausea, and sometimes vomiting. Wt Readings from Last 3 Encounters:   05/12/22 130 lb (59 kg)   03/01/22 126 lb (57.2 kg)   12/17/21 127 lb (57.6 kg)         Patient reports burning knees and dorsum of the feet, and pain on the dorsum of the feet with certain shoes. Patient reports the symptoms are getting worse. She has found some loose sandals that she can wear without pain but they are still burning. In the past she was found to have very low folic acid, she also had vitamin B12 borderline low in the past.  Not taking folic acid as it hurts the stomach. She admits she is not taking B12 either  Was referred in 2019 to rheumatology but never went. He did test positive ALIYAH for antiscleroderma antibodies. COPD:  Current treatment includes short-acting beta agonist inhaler, Stiolto and he feels it is not working well. It is just some effective. Residual symptoms: chronic dyspnea after walking at a moderate pace for 45 mins uses Albuterol once a day every day. Will have PFTs at Dr. Iraj Oliver in July, requested new order at this time. She denies cough, purulent sputum, wheezing, chest tightness, chest pain. She requires her rescue inhaler 1 time(s) per day. She feels her COPD is getting worse. Nicotine dependence. She quit smoking.   Counseling given: Yes  Comment: still uses OTC nicorette gum      Insomnia  Has been taking Sonata 10 mg daily, sleeping better, denies side effects. Denies any side effects, she denies being tired in the morning  Patient says she feels refreshed in the morning  Patient tells me that she was recommended by her insurance to have counseling for insomnia and she will speak with someone. Insomnia started since her sister committed suicide when she was just 15years old. She has been struggling with insomnia for many years and she has tried multiple medications: Lunesta, trazodone, sleep hygiene, she also saw sleep specialist.  She also drinks 1 glass of wine most nights to relax herself and she says she has been doing this for many years and she is not willing to stop now. She denies any falls. Patient has chronic kidney disease and also history of hyponatremia. Patient also has hyperparathyroidism and history of parathyroidectomy. She denies being dehydrated. She does not take NSAIDs. Denies frequency, urgency or dysuria. Denies flank pain. Has been seeing nephrologist.  The last PTH level was 58.99 on 8/13/2021, which was normal  On Prolia for osteoporosis from Dr. Omero Jain. Depression is better  PHQ-2 Over the past 2 weeks, how often have you been bothered by any of the following problems? Little interest or pleasure in doing things: Not at all  Feeling down, depressed, or hopeless: Not at all  PHQ-2 Score: 0  PHQ-9 Over the past 2 weeks, how often have you been bothered by any of the following problems? Trouble falling or staying asleep, or sleeping too much: Not at all  Feeling tired or having little energy: Not at all  Poor appetite or overeating: Not at all  Feeling bad about yourself - or that you are a failure or have let yourself or your family down: Not at all  Trouble concentrating on things, such as reading the newspaper or watching television: Not at all  Moving or speaking so slowly that other people could have noticed.  Or the opposite - being so fidgety or restless that you have been moving around a lot more than usual: Not at all  Thoughts that you would be better off dead, or of hurting yourself in some way: Not at all  If you checked off any problems, how difficult have these problems made it for you to do your work, take care of things at home, or get along with other people?: Not difficult at all  PHQ-9 Total Score: 0  PHQ-9 Total Score: 0  She says the reason for which she is depressed, is her   who does not go anywhere anymore due to the pandemic      [facility] Scores 5/12/2022 12/7/2021 8/12/2021 4/20/2021 1/19/2021 11/11/2020 8/24/2020   PHQ2 Score 0 0 0 0 0 0 1   PHQ9 Score 0 0 0 0 0 4 4       Prior A1c normal  Blood glucose mildly increased 108 on 12/17/2021, was 108 on 8/13/2021, was 129 on 7/8/2021  Lab Results   Component Value Date    LABA1C 5.4 03/16/2020    LABA1C 5.4 04/30/2019    LABA1C 5.5 03/12/2018       Lab Results   Component Value Date    ALIYAH POSITIVE (A) 10/08/2020   With antibodies for scleroderma increased twice  We referred her to rheumatologist on 5/9/2019 in the past but she never went  She does have aches and pains in her hands and joints, which she blames on the age. Prior to Visit Medications    Medication Sig Taking? Authorizing Provider   fluticasone (FLONASE) 50 MCG/ACT nasal spray 2 sprays by Nasal route daily Yes BARRERA Cai - CNP   zaleplon (SONATA) 10 MG capsule Take 1 capsule by mouth nightly as needed (Insomnia. REPLACING LUNESTA) for up to 90 days.  Dose increased 8/23/2021 Yes Carlitos Beyer MD   omeprazole (PRILOSEC) 40 MG delayed release capsule Take 1 capsule by mouth every morning (before breakfast) Yes Carlitos Beyer MD   Tiotropium Bromide-Olodaterol (STIOLTO RESPIMAT) 2.5-2.5 MCG/ACT AERS Inhale 2 puffs into the lungs daily Yes Historical Provider, MD   albuterol sulfate  (90 Base) MCG/ACT inhaler Inhale 2 puffs into the lungs every 6 hours as needed for Wheezing or Shortness of Breath Yes Carlitos Beyer MD   dorzolamide-timolol (COSOPT) 22.3-6.8 MG/ML ophthalmic solution Place 1 drop into both eyes 2 times daily Yes Historical Provider, MD   ALPHAGAN P 0.1 % SOLN Place 1 drop into both eyes 3 times daily  Yes Historical Provider, MD   latanoprost (XALATAN) 0.005 % ophthalmic solution Place 1 drop into both eyes nightly  Yes Historical Provider, MD       Social History     Tobacco Use    Smoking status: Former Smoker     Packs/day: 1.00     Years: 30.00     Pack years: 30.00     Types: Cigarettes     Start date: 1960     Quit date: 2000     Years since quittin.9    Smokeless tobacco: Never Used    Tobacco comment: still uses OTC nicorette gum   Vaping Use    Vaping Use: Not on file   Substance Use Topics    Alcohol use: Yes     Alcohol/week: 0.0 standard drinks     Comment: occassional    Drug use: No         Review of Systems   Constitutional: Positive for unexpected weight change (gained). Negative for activity change, appetite change, chills, diaphoresis, fatigue and fever. Respiratory: Negative for cough, chest tightness, shortness of breath and wheezing. Cardiovascular: Negative for chest pain, palpitations and leg swelling. Gastrointestinal: Positive for abdominal pain (Epigastrium, mid abdomen and suprapubic), nausea and vomiting. Negative for abdominal distention, constipation and diarrhea. Endocrine: Positive for cold intolerance. Negative for heat intolerance, polydipsia, polyphagia and polyuria. Genitourinary: Negative for difficulty urinating, dysuria and frequency. Musculoskeletal: Positive for arthralgias. Negative for joint swelling and myalgias. Hematological: Does not bruise/bleed easily. Psychiatric/Behavioral: Negative for dysphoric mood and sleep disturbance (improved).  The patient is nervous/anxious.            -vital signs stable and within normal limits   /62 (Site: Left Upper Arm, Position: Sitting, Cuff Size: Large Adult)   Pulse 88   Temp 96.6 °F (35.9 °C) (Temporal) Resp 20   Ht 5' 3\" (1.6 m)   Wt 130 lb (59 kg)   SpO2 98%   BMI 23.03 kg/m²      Physical Exam  Vitals and nursing note reviewed. Constitutional:       General: She is not in acute distress. Appearance: Normal appearance. She is well-developed. She is not diaphoretic. HENT:      Head: Normocephalic and atraumatic. Right Ear: External ear normal.      Left Ear: External ear normal.      Mouth/Throat:      Comments: I did not examine the mouth due to coronavirus pandemic and wearing masks    Eyes:      General: Lids are normal. No scleral icterus. Right eye: No discharge. Left eye: No discharge. Extraocular Movements: Extraocular movements intact. Conjunctiva/sclera: Conjunctivae normal.   Neck:      Thyroid: No thyromegaly. Cardiovascular:      Rate and Rhythm: Normal rate and regular rhythm. Heart sounds: Normal heart sounds. No murmur heard. Pulmonary:      Effort: Pulmonary effort is normal. No respiratory distress. Breath sounds: Examination of the right-middle field reveals decreased breath sounds. Examination of the left-middle field reveals decreased breath sounds. Examination of the right-lower field reveals decreased breath sounds. Examination of the left-lower field reveals decreased breath sounds. Decreased breath sounds present. No wheezing or rales. Comments: Moderately decreased breath sounds bilaterally  Chest:      Chest wall: No tenderness. Abdominal:      General: Bowel sounds are normal. There is no distension. Palpations: Abdomen is soft. There is no hepatomegaly or splenomegaly. Tenderness: There is abdominal tenderness in the periumbilical area and suprapubic area. There is no right CVA tenderness, left CVA tenderness, guarding or rebound. Musculoskeletal:         General: No tenderness. Normal range of motion. Cervical back: Normal range of motion and neck supple. Right lower leg: No edema.       Left lower leg: No edema. Skin:     General: Skin is warm and dry. Capillary Refill: Capillary refill takes less than 2 seconds. Findings: No rash. Neurological:      Mental Status: She is alert and oriented to person, place, and time. Cranial Nerves: No cranial nerve deficit. Motor: No abnormal muscle tone. Psychiatric:         Mood and Affect: Mood is anxious. Behavior: Behavior normal.         Thought Content: Thought content normal.         Judgment: Judgment normal.             I personally reviewed testing . Discussed testing with the patient and all questions fully answered. Worsening chronic kidney disease stage III  Mild hyperglycemia  Folic acid very low  Borderline low vitamin D  Vitamin B12 towards lower side  Otherwise labs within normal limits    Hospital Outpatient Visit on 03/01/2022   Component Date Value Ref Range Status    Calcium 03/01/2022 9.2  8.6 - 10.4 mg/dL Final    CREATININE 03/01/2022 1.04* 0.50 - 0.90 mg/dL Final    GFR Non- 03/01/2022 51* >60 mL/min Final    GFR  03/01/2022 >60  >60 mL/min Final    GFR Comment 03/01/2022        Final    Comment: Average GFR for 79or more years old:   76 mL/min/1.73sq m  Chronic Kidney Disease:   <60 mL/min/1.73sq m  Kidney failure:   <15 mL/min/1.73sq m              eGFR calculated using average adult body mass.  Additional eGFR calculator available at:        [email or web address]               Lab Results   Component Value Date    WBC 6.0 12/17/2021    HGB 14.4 12/17/2021    HCT 42.1 12/17/2021    MCV 94.4 12/17/2021     12/17/2021       Lab Results   Component Value Date     12/17/2021    K 4.2 12/17/2021     12/17/2021    CO2 26 12/17/2021    BUN 16 12/17/2021    CREATININE 1.04 03/01/2022    GLUCOSE 108 12/17/2021    GLUCOSE 104 02/27/2012    CALCIUM 9.2 03/01/2022        Lab Results   Component Value Date    LABA1C 5.4 03/16/2020    LABA1C 5.4 04/30/2019    LABA1C 5.5 03/12/2018         Lab Results   Component Value Date    ALT 13 12/17/2021    AST 20 12/17/2021    ALKPHOS 71 12/17/2021    BILITOT 0.80 12/17/2021       Lab Results   Component Value Date    TSH 1.28 12/17/2021       Lab Results   Component Value Date    CHOL 153 06/01/2021    CHOL 132 03/16/2020    CHOL 160 03/13/2019     Lab Results   Component Value Date    TRIG 109 06/01/2021    TRIG 63 03/16/2020    TRIG 106 03/13/2019     Lab Results   Component Value Date    HDL 63 06/01/2021    HDL 66 03/16/2020    HDL 59 03/13/2019     Lab Results   Component Value Date    LDLCHOLESTEROL 68 06/01/2021    LDLCHOLESTEROL 53 03/16/2020    LDLCHOLESTEROL 80 03/13/2019     Lab Results   Component Value Date    CHOLHDLRATIO 2.4 06/01/2021    CHOLHDLRATIO 2.0 03/16/2020    CHOLHDLRATIO 2.7 03/13/2019       Lab Results   Component Value Date    LABA1C 5.4 03/16/2020    LABA1C 5.4 04/30/2019    LABA1C 5.5 03/12/2018       Lab Results   Component Value Date    KHEIVBRX17 450 08/13/2021       Lab Results   Component Value Date    FOLATE 4.6 (L) 08/13/2021       Lab Results   Component Value Date    VITD25 36.8 08/13/2021       Orders Placed This Encounter   Procedures    CBC with Auto Differential     Standing Status:   Future     Standing Expiration Date:   5/12/2023    Comprehensive Metabolic Panel     Standing Status:   Future     Standing Expiration Date:   7/9/2022    Hemoglobin A1C     Standing Status:   Future     Standing Expiration Date:   5/12/2023    Vitamin D 25 Hydroxy     Standing Status:   Future     Standing Expiration Date:   5/12/2023    Vitamin B12 & Folate     Standing Status:   Future     Standing Expiration Date:   7/9/2022    TSH     Standing Status:   Future     Standing Expiration Date:   5/12/2023    Urinalysis with Reflex to Culture     Standing Status:   Future     Standing Expiration Date:   5/12/2023     Order Specific Question:   SPECIFY(EX-CATH,MIDSTREAM,CYSTO,ETC)? Answer:   MIDSTREAM    Magnesium     Standing Status:   Future     Standing Expiration Date:   5/12/2023    Phosphorus     Standing Status:   Future     Standing Expiration Date:   5/12/2023    PTH, Intact with Ionized Calcium     Standing Status:   Future     Standing Expiration Date:   5/12/2023    ALIYAH Screen with Reflex     Standing Status:   Future     Standing Expiration Date:   5/12/2023    Protein Electrophoresis, Urine     Standing Status:   Future     Standing Expiration Date:   5/12/2023    Electrophoresis Protein, Serum     Standing Status:   Future     Standing Expiration Date:   5/12/2023   Hima Infante MD, Gastroenterology, [facility]     Referral Priority:   Routine     Referral Type:   Eval and Treat     Referral Reason:   Specialty Services Required     Referred to Provider:   Nori Ramos MD     Requested Specialty:   Gastroenterology     Number of Visits Requested:   1       Orders Placed This Encounter   Medications    zaleplon (SONATA) 10 MG capsule     Sig: Take 1 capsule by mouth nightly as needed (Insomnia.) for up to 90 days. Dispense:  90 capsule     Refill:  0       Medications Discontinued During This Encounter   Medication Reason    zaleplon (SONATA) 10 MG capsule REORDER       Lab Results   Component Value Date    ALIYAH POSITIVE (A) 10/08/2020           On this date 5/12/2022 I have spent 35 minutes reviewing previous notes, test results and face to face with the patient discussing the diagnosis and importance of compliance with the treatment plan as well as documenting on the day of the visit. This note was completed by using the assistance of a speech-recognition program. However, inadvertent computerized transcription errors may be present. Although every effort was made to ensure accuracy, no guarantees can be provided that every mistake has been identified and corrected by editing.       An electronic signature was used to authenticate this note.  Electronically signed by Lynann Apgar, MD on 2022  6:52 PM

## 2022-05-16 ENCOUNTER — HOSPITAL ENCOUNTER (OUTPATIENT)
Age: 82
Discharge: HOME | End: 2022-05-16
Payer: MEDICARE

## 2022-05-16 DIAGNOSIS — E89.2 S/P PARATHYROIDECTOMY (HCC): ICD-10-CM

## 2022-05-16 DIAGNOSIS — R76.8 ANA POSITIVE: ICD-10-CM

## 2022-05-16 DIAGNOSIS — G62.9 NEUROPATHY: ICD-10-CM

## 2022-05-16 DIAGNOSIS — F33.42 MDD (MAJOR DEPRESSIVE DISORDER), RECURRENT, IN FULL REMISSION (HCC): ICD-10-CM

## 2022-05-16 DIAGNOSIS — R73.9 HYPERGLYCEMIA: ICD-10-CM

## 2022-05-16 DIAGNOSIS — N18.31 STAGE 3A CHRONIC KIDNEY DISEASE (HCC): ICD-10-CM

## 2022-05-16 LAB
ABSOLUTE EOS #: 0 K/UL (ref 0–0.4)
ABSOLUTE LYMPH #: 1.7 K/UL (ref 1–4.8)
ABSOLUTE MONO #: 0.5 K/UL (ref 0.1–1.3)
ALBUMIN SERPL-MCNC: 4.1 G/DL (ref 3.5–5.2)
ALP BLD-CCNC: 69 U/L (ref 35–104)
ALT SERPL-CCNC: 10 U/L (ref 5–33)
ANION GAP SERPL CALCULATED.3IONS-SCNC: 9 MMOL/L (ref 9–17)
AST SERPL-CCNC: 17 U/L
BACTERIA: NORMAL
BASOPHILS # BLD: 1 % (ref 0–2)
BASOPHILS ABSOLUTE: 0.1 K/UL (ref 0–0.2)
BILIRUB SERPL-MCNC: 0.79 MG/DL (ref 0.3–1.2)
BILIRUBIN URINE: NEGATIVE
BUN BLDV-MCNC: 18 MG/DL (ref 8–23)
CALCIUM IONIZED: 1.23 MMOL/L (ref 1.13–1.33)
CALCIUM SERPL-MCNC: 9.4 MG/DL (ref 8.6–10.4)
CASTS UA: NORMAL /LPF
CHLORIDE BLD-SCNC: 105 MMOL/L (ref 98–107)
CO2: 27 MMOL/L (ref 20–31)
COLOR: YELLOW
CREAT SERPL-MCNC: 1.06 MG/DL (ref 0.5–0.9)
EOSINOPHILS RELATIVE PERCENT: 1 % (ref 0–4)
EPITHELIAL CELLS UA: NORMAL /HPF
ESTIMATED AVERAGE GLUCOSE: 105 MG/DL
FOLATE: 12.5 NG/ML
GFR AFRICAN AMERICAN: >60 ML/MIN
GFR NON-AFRICAN AMERICAN: 50 ML/MIN
GFR SERPL CREATININE-BSD FRML MDRD: ABNORMAL ML/MIN/{1.73_M2}
GLUCOSE BLD-MCNC: 107 MG/DL (ref 70–99)
GLUCOSE URINE: NEGATIVE
HBA1C MFR BLD: 5.3 % (ref 4–6)
HCT VFR BLD CALC: 45.9 % (ref 36–46)
HEMOGLOBIN: 15.7 G/DL (ref 12–16)
KETONES, URINE: NEGATIVE
LEUKOCYTE ESTERASE, URINE: ABNORMAL
LYMPHOCYTES # BLD: 33 % (ref 24–44)
MAGNESIUM: 1.9 MG/DL (ref 1.6–2.6)
MCH RBC QN AUTO: 32.8 PG (ref 26–34)
MCHC RBC AUTO-ENTMCNC: 34.3 G/DL (ref 31–37)
MCV RBC AUTO: 95.6 FL (ref 80–100)
MONOCYTES # BLD: 10 % (ref 1–7)
NITRITE, URINE: NEGATIVE
PDW BLD-RTO: 13.8 % (ref 11.5–14.9)
PH UA: 5.5 (ref 5–8)
PHOSPHORUS: 3.9 MG/DL (ref 2.6–4.5)
PLATELET # BLD: 236 K/UL (ref 150–450)
PMV BLD AUTO: 7.7 FL (ref 6–12)
POTASSIUM SERPL-SCNC: 4.1 MMOL/L (ref 3.7–5.3)
PROTEIN UA: NEGATIVE
PTH INTACT: 51.26 PG/ML (ref 15–65)
RBC # BLD: 4.8 M/UL (ref 4–5.2)
RBC UA: NORMAL /HPF
REASON FOR REJECTION: NORMAL
SEG NEUTROPHILS: 55 % (ref 36–66)
SEGMENTED NEUTROPHILS ABSOLUTE COUNT: 2.8 K/UL (ref 1.3–9.1)
SODIUM BLD-SCNC: 141 MMOL/L (ref 135–144)
SPECIFIC GRAVITY UA: 1.02 (ref 1–1.03)
TOTAL PROTEIN: 6.7 G/DL (ref 6.4–8.3)
TSH SERPL DL<=0.05 MIU/L-ACNC: 2.2 UIU/ML (ref 0.3–5)
TURBIDITY: CLEAR
URINE HGB: NEGATIVE
UROBILINOGEN, URINE: NORMAL
VITAMIN B-12: 381 PG/ML (ref 232–1245)
VITAMIN D 25-HYDROXY: 27.1 NG/ML
WBC # BLD: 5.1 K/UL (ref 3.5–11)
WBC UA: NORMAL /HPF
ZZ NTE CLEAN UP: ORDERED TEST: NORMAL
ZZ NTE WITH NAME CLEAN UP: SPECIMEN SOURCE: NORMAL

## 2022-05-16 PROCEDURE — 82746 ASSAY OF FOLIC ACID SERUM: CPT

## 2022-05-16 PROCEDURE — 83970 ASSAY OF PARATHORMONE: CPT

## 2022-05-16 PROCEDURE — 82607 VITAMIN B-12: CPT

## 2022-05-16 PROCEDURE — 86038 ANTINUCLEAR ANTIBODIES: CPT

## 2022-05-16 PROCEDURE — 81001 URINALYSIS AUTO W/SCOPE: CPT

## 2022-05-16 PROCEDURE — 84155 ASSAY OF PROTEIN SERUM: CPT

## 2022-05-16 PROCEDURE — 82330 ASSAY OF CALCIUM: CPT

## 2022-05-16 PROCEDURE — 82306 VITAMIN D 25 HYDROXY: CPT

## 2022-05-16 PROCEDURE — 86334 IMMUNOFIX E-PHORESIS SERUM: CPT

## 2022-05-16 PROCEDURE — 80053 COMPREHEN METABOLIC PANEL: CPT

## 2022-05-16 PROCEDURE — 84100 ASSAY OF PHOSPHORUS: CPT

## 2022-05-16 PROCEDURE — 85025 COMPLETE CBC W/AUTO DIFF WBC: CPT

## 2022-05-16 PROCEDURE — 84165 PROTEIN E-PHORESIS SERUM: CPT

## 2022-05-16 PROCEDURE — 36415 COLL VENOUS BLD VENIPUNCTURE: CPT

## 2022-05-16 PROCEDURE — 84443 ASSAY THYROID STIM HORMONE: CPT

## 2022-05-16 PROCEDURE — 83036 HEMOGLOBIN GLYCOSYLATED A1C: CPT

## 2022-05-16 PROCEDURE — 86225 DNA ANTIBODY NATIVE: CPT

## 2022-05-16 PROCEDURE — 83735 ASSAY OF MAGNESIUM: CPT

## 2022-05-18 LAB
ALBUMIN (CALCULATED): 4 G/DL (ref 3.2–5.2)
ALBUMIN PERCENT: 66 % (ref 45–65)
ALPHA 1 PERCENT: 3 % (ref 3–6)
ALPHA 2 PERCENT: 11 % (ref 6–13)
ALPHA-1-GLOBULIN: 0.2 G/DL (ref 0.1–0.4)
ALPHA-2-GLOBULIN: 0.7 G/DL (ref 0.5–0.9)
BETA GLOBULIN: 0.6 G/DL (ref 0.5–1.1)
BETA PERCENT: 10 % (ref 11–19)
GAMMA GLOBULIN %: 10 % (ref 9–20)
GAMMA GLOBULIN: 0.6 G/DL (ref 0.5–1.5)
PATHOLOGIST: ABNORMAL
PATHOLOGIST: NORMAL
PROTEIN ELECTROPHORESIS, SERUM: ABNORMAL
SERUM IFX INTERP: NORMAL
TOTAL PROT. SUM,%: 100 % (ref 98–102)
TOTAL PROT. SUM: 6.1 G/DL (ref 6.3–8.2)
TOTAL PROTEIN: 6.1 G/DL (ref 6.4–8.3)

## 2022-05-19 DIAGNOSIS — D47.2 MGUS (MONOCLONAL GAMMOPATHY OF UNKNOWN SIGNIFICANCE): Primary | ICD-10-CM

## 2022-05-19 LAB
ANTI DNA DOUBLE STRANDED: <0.5 IU/ML
ANTI-NUCLEAR ANTIBODY (ANA): NEGATIVE
ENA ANTIBODIES SCREEN: 0.1 U/ML

## 2022-05-19 NOTE — RESULT ENCOUNTER NOTE
Please notify patient:   Electrophoresis shows some changes which we called monoclonal changes which we do see in conditions similar with multiple myeloma, which is a bone marrow disorder. I will make a referral for her to hematologist oncologist to look at her blood work, she will need more testing to confirm or rule out this. Please give her contact information we do not have a certain diagnosis now the hematologist oncologist will do the bone marrow testing    Proteins low, she needs to increase proteins in her diet, eat more chicken, dairy products. Vitamin B12 is towards the lower side, to start taking vitamin B12 1000 mg daily. Vitamin D was low, to start taking vitamin D 2000 units daily from over-the-counter. No UTI which is good.     Otherwise labs within normal limits  continue current treatment    Future Appointments  10/5/2022  11:30 AM   MD eduardo [facility]

## 2022-06-07 ENCOUNTER — TELEPHONE (OUTPATIENT)
Dept: ONCOLOGY | Age: 82
End: 2022-06-07

## 2022-06-07 ENCOUNTER — HOSPITAL ENCOUNTER (OUTPATIENT)
Age: 82
Discharge: HOME | End: 2022-06-07
Payer: MEDICARE

## 2022-06-07 ENCOUNTER — INITIAL CONSULT (OUTPATIENT)
Dept: ONCOLOGY | Age: 82
End: 2022-06-07
Payer: MEDICARE

## 2022-06-07 VITALS
HEIGHT: 63 IN | SYSTOLIC BLOOD PRESSURE: 170 MMHG | DIASTOLIC BLOOD PRESSURE: 111 MMHG | TEMPERATURE: 97.3 F | WEIGHT: 131.4 LBS | HEART RATE: 87 BPM | BODY MASS INDEX: 23.28 KG/M2

## 2022-06-07 DIAGNOSIS — D47.2 MGUS (MONOCLONAL GAMMOPATHY OF UNKNOWN SIGNIFICANCE): Primary | ICD-10-CM

## 2022-06-07 DIAGNOSIS — E53.8 VITAMIN B 12 DEFICIENCY: ICD-10-CM

## 2022-06-07 DIAGNOSIS — Z80.3 FAMILY HISTORY OF BREAST CANCER IN SISTER: ICD-10-CM

## 2022-06-07 DIAGNOSIS — D47.2 MGUS (MONOCLONAL GAMMOPATHY OF UNKNOWN SIGNIFICANCE): ICD-10-CM

## 2022-06-07 LAB
ALBUMIN SERPL-MCNC: 3.9 G/DL (ref 3.5–5.2)
ALP BLD-CCNC: 72 U/L (ref 35–104)
ALT SERPL-CCNC: 13 U/L (ref 5–33)
ANION GAP SERPL CALCULATED.3IONS-SCNC: 10 MMOL/L (ref 9–17)
AST SERPL-CCNC: 18 U/L
BILIRUB SERPL-MCNC: 0.79 MG/DL (ref 0.3–1.2)
BUN BLDV-MCNC: 20 MG/DL (ref 8–23)
CALCIUM SERPL-MCNC: 9.5 MG/DL (ref 8.6–10.4)
CHLORIDE BLD-SCNC: 100 MMOL/L (ref 98–107)
CO2: 27 MMOL/L (ref 20–31)
CREAT SERPL-MCNC: 0.97 MG/DL (ref 0.5–0.9)
FOLATE: >20 NG/ML
FREE KAPPA/LAMBDA RATIO: 2.6 (ref 0.26–1.65)
GFR AFRICAN AMERICAN: >60 ML/MIN
GFR NON-AFRICAN AMERICAN: 55 ML/MIN
GFR SERPL CREATININE-BSD FRML MDRD: ABNORMAL ML/MIN/{1.73_M2}
GLUCOSE BLD-MCNC: 99 MG/DL (ref 70–99)
IGA: 99 MG/DL (ref 70–400)
IGG: 669 MG/DL (ref 700–1600)
IGM: 312 MG/DL (ref 40–230)
KAPPA FREE LIGHT CHAINS QNT: 3.2 MG/DL (ref 0.37–1.94)
LAMBDA FREE LIGHT CHAINS QNT: 1.23 MG/DL (ref 0.57–2.63)
POTASSIUM SERPL-SCNC: 4.3 MMOL/L (ref 3.7–5.3)
SODIUM BLD-SCNC: 137 MMOL/L (ref 135–144)
TOTAL PROTEIN: 6.2 G/DL (ref 6.4–8.3)
VITAMIN B-12: 403 PG/ML (ref 232–1245)

## 2022-06-07 PROCEDURE — 82607 VITAMIN B-12: CPT

## 2022-06-07 PROCEDURE — 82746 ASSAY OF FOLIC ACID SERUM: CPT

## 2022-06-07 PROCEDURE — 36415 COLL VENOUS BLD VENIPUNCTURE: CPT

## 2022-06-07 PROCEDURE — 80053 COMPREHEN METABOLIC PANEL: CPT

## 2022-06-07 PROCEDURE — 82784 ASSAY IGA/IGD/IGG/IGM EACH: CPT

## 2022-06-07 PROCEDURE — 83883 ASSAY NEPHELOMETRY NOT SPEC: CPT

## 2022-06-07 PROCEDURE — 99204 OFFICE O/P NEW MOD 45 MIN: CPT | Performed by: INTERNAL MEDICINE

## 2022-06-07 PROCEDURE — 1123F ACP DISCUSS/DSCN MKR DOCD: CPT | Performed by: INTERNAL MEDICINE

## 2022-06-07 NOTE — PATIENT INSTRUCTIONS
Free kappa lambda light chain assay    Urine electrophoresis and immunofixation  CMP  V80 and folic acid  RTC in 2 to 3 weeks

## 2022-06-08 ENCOUNTER — HOSPITAL ENCOUNTER (OUTPATIENT)
Age: 82
Discharge: HOME | End: 2022-06-08
Payer: MEDICARE

## 2022-06-08 ENCOUNTER — HOSPITAL ENCOUNTER (OUTPATIENT)
Dept: ULTRASOUND IMAGING | Age: 82
Discharge: HOME | End: 2022-06-10
Payer: MEDICARE

## 2022-06-08 ENCOUNTER — HOSPITAL ENCOUNTER (OUTPATIENT)
Dept: WOMENS IMAGING | Age: 82
Discharge: HOME | End: 2022-06-10
Payer: MEDICARE

## 2022-06-08 DIAGNOSIS — M81.8 IDIOPATHIC OSTEOPOROSIS: ICD-10-CM

## 2022-06-08 DIAGNOSIS — E21.3 HYPERPARATHYROIDISM, UNSPECIFIED (HCC): ICD-10-CM

## 2022-06-08 DIAGNOSIS — E04.2 NONTOXIC MULTINODULAR GOITER: ICD-10-CM

## 2022-06-08 LAB
CALCIUM SERPL-MCNC: 9.4 MG/DL (ref 8.6–10.4)
T3 FREE: 3.03 PG/ML (ref 2.02–4.43)
THYROXINE, FREE: 1.34 NG/DL (ref 0.93–1.7)
TSH SERPL DL<=0.05 MIU/L-ACNC: 3.16 UIU/ML (ref 0.3–5)
VITAMIN D 25-HYDROXY: 31.1 NG/ML

## 2022-06-08 PROCEDURE — 84481 FREE ASSAY (FT-3): CPT

## 2022-06-08 PROCEDURE — 82310 ASSAY OF CALCIUM: CPT

## 2022-06-08 PROCEDURE — 76536 US EXAM OF HEAD AND NECK: CPT

## 2022-06-08 PROCEDURE — 77080 DXA BONE DENSITY AXIAL: CPT

## 2022-06-08 PROCEDURE — 82330 ASSAY OF CALCIUM: CPT

## 2022-06-08 PROCEDURE — 83970 ASSAY OF PARATHORMONE: CPT

## 2022-06-08 PROCEDURE — 82306 VITAMIN D 25 HYDROXY: CPT

## 2022-06-08 PROCEDURE — 36415 COLL VENOUS BLD VENIPUNCTURE: CPT

## 2022-06-08 PROCEDURE — 84439 ASSAY OF FREE THYROXINE: CPT

## 2022-06-08 PROCEDURE — 84443 ASSAY THYROID STIM HORMONE: CPT

## 2022-06-09 LAB
CALCIUM IONIZED: NORMAL MMOL/L (ref 1.13–1.33)
PTH INTACT: 50.22 PG/ML (ref 15–65)
REASON FOR REJECTION: NORMAL
ZZ NTE CLEAN UP: ORDERED TEST: NORMAL
ZZ NTE WITH NAME CLEAN UP: SPECIMEN SOURCE: NORMAL

## 2022-06-10 ENCOUNTER — HOSPITAL ENCOUNTER (OUTPATIENT)
Age: 82
Setting detail: SPECIMEN
Discharge: HOME | End: 2022-06-10
Payer: MEDICARE

## 2022-06-10 DIAGNOSIS — D47.2 MGUS (MONOCLONAL GAMMOPATHY OF UNKNOWN SIGNIFICANCE): ICD-10-CM

## 2022-06-10 PROCEDURE — 86335 IMMUNFIX E-PHORSIS/URINE/CSF: CPT

## 2022-06-10 PROCEDURE — 36415 COLL VENOUS BLD VENIPUNCTURE: CPT

## 2022-06-10 PROCEDURE — 84156 ASSAY OF PROTEIN URINE: CPT

## 2022-06-14 LAB
URINE IFX INTERP: NORMAL
URINE IFX SPECIMEN: 24
URINE TOTAL PROTEIN: <4 MG/DL
VOLUME: 1950 ML

## 2022-06-28 ENCOUNTER — OFFICE VISIT (OUTPATIENT)
Dept: ONCOLOGY | Age: 82
End: 2022-06-28
Payer: MEDICARE

## 2022-06-28 ENCOUNTER — TELEPHONE (OUTPATIENT)
Dept: ONCOLOGY | Age: 82
End: 2022-06-28

## 2022-06-28 VITALS
WEIGHT: 129.4 LBS | HEART RATE: 86 BPM | BODY MASS INDEX: 22.92 KG/M2 | DIASTOLIC BLOOD PRESSURE: 89 MMHG | TEMPERATURE: 96.6 F | SYSTOLIC BLOOD PRESSURE: 164 MMHG

## 2022-06-28 DIAGNOSIS — D47.2 MGUS (MONOCLONAL GAMMOPATHY OF UNKNOWN SIGNIFICANCE): ICD-10-CM

## 2022-06-28 DIAGNOSIS — R77.8 ABNORMAL SERUM PROTEIN ELECTROPHORESIS: Primary | ICD-10-CM

## 2022-06-28 PROCEDURE — 1123F ACP DISCUSS/DSCN MKR DOCD: CPT | Performed by: INTERNAL MEDICINE

## 2022-06-28 PROCEDURE — 99211 OFF/OP EST MAY X REQ PHY/QHP: CPT | Performed by: INTERNAL MEDICINE

## 2022-06-28 PROCEDURE — 99214 OFFICE O/P EST MOD 30 MIN: CPT | Performed by: INTERNAL MEDICINE

## 2022-06-28 NOTE — PROGRESS NOTES
APPENDECTOMY      BACK SURGERY      CATARACT REMOVAL WITH IMPLANT Left 04/01/14    [place] [address]    CHOLECYSTECTOMY  2005    COLONOSCOPY      COLONOSCOPY  6/13/2014    Severe sigmoid diverticulosis; due for repeat June 2019    ENDOSCOPY, COLON, DIAGNOSTIC      EYE SURGERY      PARATHYROIDECTOMY  11/05/2018    Dr. Teri Persaud; Left inferior parathyroid: adenoma    ROTATOR CUFF REPAIR Left     TONSILLECTOMY      TUBAL LIGATION      UPPER GASTROINTESTINAL ENDOSCOPY  6/13/2014    biopsy    UPPER GASTROINTESTINAL ENDOSCOPY  1-26-16    UPPER GASTROINTESTINAL ENDOSCOPY  5/4/16    mild gastritis       Allergies   Allergen Reactions    Aspirin      GI upset    Atorvastatin      Muscle cramps       Current Outpatient Medications   Medication Sig Dispense Refill    zaleplon (SONATA) 10 MG capsule Take 1 capsule by mouth nightly as needed (Insomnia.) for up to 90 days. 90 capsule 0    fluticasone (FLONASE) 50 MCG/ACT nasal spray 2 sprays by Nasal route daily 16 g 0    omeprazole (PRILOSEC) 40 MG delayed release capsule Take 1 capsule by mouth every morning (before breakfast) 90 capsule 0    Tiotropium Bromide-Olodaterol (STIOLTO RESPIMAT) 2.5-2.5 MCG/ACT AERS Inhale 2 puffs into the lungs daily      albuterol sulfate  (90 Base) MCG/ACT inhaler Inhale 2 puffs into the lungs every 6 hours as needed for Wheezing or Shortness of Breath 18 g 3    dorzolamide-timolol (COSOPT) 22.3-6.8 MG/ML ophthalmic solution Place 1 drop into both eyes 2 times daily      ALPHAGAN P 0.1 % SOLN Place 1 drop into both eyes 3 times daily       latanoprost (XALATAN) 0.005 % ophthalmic solution Place 1 drop into both eyes nightly        No current facility-administered medications for this visit.        Social History     Socioeconomic History    Marital status:      Spouse name: Not on file    Number of children: Not on file    Years of education: Not on file    Highest education level: Not on file Occupational History    Not on file   Tobacco Use    Smoking status: Former Smoker     Packs/day: 1.00     Years: 30.00     Pack years: 30.00     Types: Cigarettes     Start date: 1960     Quit date: 2000     Years since quittin.0    Smokeless tobacco: Never Used    Tobacco comment: still uses OTC nicorette gum   Vaping Use    Vaping Use: Not on file   Substance and Sexual Activity    Alcohol use: Yes     Alcohol/week: 0.0 standard drinks     Comment: occassional    Drug use: No    Sexual activity: Not on file   Other Topics Concern    Not on file   Social History Narrative    Not on file     Social Determinants of Health     Financial Resource Strain: Low Risk     Difficulty of Paying Living Expenses: Not hard at all   Food Insecurity: No Food Insecurity    Worried About [employer] [employer] in the Last Year: Never true    [facility] in the Last Year: Never true   Transportation Needs:     Lack of Transportation (Medical): Not on file    Lack of Transportation (Non-Medical):  Not on file   Physical Activity:     Days of Exercise per Week: Not on file    Minutes of Exercise per Session: Not on file   Stress:     Feeling of Stress : Not on file   Social Connections:     Frequency of Communication with Friends and Family: Not on file    Frequency of Social Gatherings with Friends and Family: Not on file    Attends [employer] Services: Not on file    Active Member of [address] [place] [address] or Organizations: Not on file    Attends Club or Organization Meetings: Not on file    Marital Status: Not on file   Intimate Partner Violence:     Fear of Current or Ex-Partner: Not on file    Emotionally Abused: Not on file    Physically Abused: Not on file    Sexually Abused: Not on file   Housing Stability:     Unable to Pay for Housing in the Last Year: Not on file    Number of [place] in the Last Year: Not on file    Unstable Housing in the Last Year: Not on file       Family History Problem Relation Age of Onset    Other Father         COPD    Stroke Sister     Heart Attack Sister     Breast Cancer Sister     Cancer Maternal Grandmother         Stomach cancer    Osteoporosis Mother     Other Sister         suicide        REVIEW OF SYSTEM:     Constitutional: No fever or chills. No night sweats, no weight loss   Eyes: No eye discharge, double vision, or eye pain   HEENT: negative for sore mouth, sore throat, hoarseness and voice change   Respiratory: negative for cough , sputum, dyspnea, wheezing, hemoptysis, chest pain   Cardiovascular: negative for chest pain, dyspnea, palpitations, orthopnea, PND   Gastrointestinal: negative for nausea, vomiting, diarrhea, constipation, abdominal pain, Dysphagia, hematemesis and hematochezia   Genitourinary: negative for frequency, dysuria, nocturia, urinary incontinence, and hematuria   Integument: negative for rash, skin lesions, bruises.    Hematologic/Lymphatic: negative for easy bruising, bleeding, lymphadenopathy, petechiae and swelling/edema   Endocrine: negative for heat or cold intolerance, tremor, weight changes, change in bowel habits and hair loss   Musculoskeletal: negative for myalgias, arthralgias, pain, joint swelling,and bone pain   Neurological: negative for headaches, dizziness, seizures, weakness, numbness       OBJECTIVE:         Vitals:    06/28/22 1025   BP: (!) 164/89   Pulse: 86   Temp: (!) 96.6 °F (35.9 °C)       PHYSICAL EXAM:   General appearance - well appearing, no in pain or distress   Mental status - alert and cooperative   Eyes - pupils equal and reactive, extraocular eye movements intact   Ears - bilateral TM's and external ear canals normal   Mouth - mucous membranes moist, pharynx normal without lesions   Neck - supple, no significant adenopathy   Lymphatics - no palpable lymphadenopathy, no hepatosplenomegaly   Chest - clear to auscultation, no wheezes, rales or rhonchi, symmetric air entry   Heart - normal rate, regular rhythm, normal S1, S2, no murmurs, rubs, clicks or gallops   Abdomen - soft, nontender, nondistended, no masses or organomegaly   Neurological - alert, oriented, normal speech, no focal findings or movement disorder noted   Musculoskeletal - no joint tenderness, deformity or swelling   Extremities - peripheral pulses normal, no pedal edema, no clubbing or cyanosis   Skin - normal coloration and turgor, no rashes, no suspicious skin lesions noted ,      LABORATORY DATA:     Lab Results   Component Value Date    WBC 5.1 05/16/2022    HGB 15.7 05/16/2022    HCT 45.9 05/16/2022    MCV 95.6 05/16/2022     05/16/2022    LYMPHOPCT 33 05/16/2022    RBC 4.80 05/16/2022    MCH 32.8 05/16/2022    MCHC 34.3 05/16/2022    RDW 13.8 05/16/2022    MONOPCT 10 (H) 05/16/2022    BASOPCT 1 05/16/2022    NEUTROABS 2.80 05/16/2022    LYMPHSABS 1.70 05/16/2022    MONOSABS 0.50 05/16/2022    EOSABS 0.00 05/16/2022    BASOSABS 0.10 05/16/2022         Chemistry        Component Value Date/Time     06/07/2022 1453    K 4.3 06/07/2022 1453     06/07/2022 1453    CO2 27 06/07/2022 1453    BUN 20 06/07/2022 1453    CREATININE 0.97 (H) 06/07/2022 1453        Component Value Date/Time    CALCIUM 9.4 06/08/2022 1018    ALKPHOS 72 06/07/2022 1453    AST 18 06/07/2022 1453    ALT 13 06/07/2022 1453    BILITOT 0.79 06/07/2022 1453            PATHOLOGY DATA:         IMAGING DATA:      US HEAD NECK SOFT TISSUE THYROID  Narrative: EXAMINATION:  THYROID ULTRASOUND    6/8/2022    COMPARISON:  March 2, 2021    HISTORY:  ORDERING SYSTEM PROVIDED HISTORY: Nontoxic multinodular goiter    FINDINGS:  Right thyroid lobe:  45 x 13 x 16 mm    Left thyroid lobe:  47 x 12 x 15 mm    Isthmus:  5 mm    Thyroid Gland:  Thyroid is heterogeneous throughout    Nodules:    NODULE: 1    Size: 10 x 7 x 11 mm    Location: Mid right thyroid    1. Composition:  Solid (2)    2. Echogenicity:  Hypoechoic (2)    3. Shape: Wider-than-tall (0)    4. Margins:  Smooth (0)    5. Echogenic foci:  None (0)    ACR TI-RADS total points:  4    ACR TI-RADS risk category: TR4    NODULE: 2    Size: 12 x 6 x 9 mm    Location: Inferior right thyroid    1. Composition:  Mixed cystic and solid (1)    2. Echogenicity:  Hypoechoic (2)    3. Shape: Wider-than-tall (0)    4. Margins:  Smooth (0)    5. Echogenic foci:  None (0)    ACR TI-RADS total points:  3    ACR TI-RADS risk category: TR3    Cervical lymphadenopathy: No abnormal lymph nodes in the imaged portions of  the neck. Impression: Heterogeneous thyroid with a centrally stable nodules right thyroid lobe  meeting TI-RADS criteria for 1 year follow-up    RECOMMENDATIONS:  NODULE 1:  ACR TI-RADS TR4:    Recommend: Follow-up ultrasound in 1 year. NODULE 2:  ACR TI-RADS TR3:    Recommend:  No follow-up. ACR TI-RADS recommendations:    TR5 (>= 7 points):  FNA if >= 1 cm; follow-up if 0.5-0.9 cm in 1, 2, 3, 4,  and 5 years    TR4 (4-6 points):  FNA if >= 1.5 cm; follow-up if 1.0-1.4 cm in 1, 2, 3, and  5 years    TR3 (3 points):  FNA if >= 2.5 cm; follow-up if 1.5-2.4 cm in 1, 3, and 5  years    TR2 (2 points):  No FNA or follow-up    TR1 (0 points):  No FNA or follow-up    ACR TI-RADS recommends that no more than two nodules with the highest ACR  TI-RADS point total should be biopsied and no more than four nodules should  be followed. DEXA BONE DENSITY AXIAL SKELETON  Narrative: EXAMINATION:  BONE DENSITOMETRY    6/8/2022 9:50 am    TECHNIQUE:  A bone density dual x-ray absorptiometry (DEXA) scan was performed of the  lumbar spine and left hip  on a [employer] system. COMPARISON:  Several previous studies with the most recent prior dated March 23, 2018.     HISTORY:  ORDERING SYSTEM PROVIDED HISTORY: Idiopathic osteoporosis    FINDINGS:  LUMBAR SPINE:    The bone mineral density in the lumbar spine including the L1-L4 levels is  measured at 1.085 g/cm2, which corresponds to a T-score of -0.9 and a Z-score  of 1.2. This is within the normal range by WHO criteria. These findings  would suggest improvement in bone mineral density since the previous  comparison study. However, it is noted that there appear to be degenerative  changes in the spine. If there are significant spurs, these could  erroneously elevate the patient's BMD.    LEFT HIP:    The bone mineral density in the total hip is measured at 0.695 g/cm2  corresponding to a T-score of -2.5 and a Z-score of -0.2. This is within the  osteoporosis range by WHO criteria. No evidence of statistically significant  change. The bone mineral density of the femoral neck is measured at 0.648 g/cm2  corresponding to a T-score of -2.8 and a Z-score of -0.4. This is within the  osteoporosis range by WHO criteria. FRAX score for 10 year probability of major osteoporotic fracture is 21.1%,  and in the hip 8.6%. FRAX adjusted for TB S 10 year probability of major osteoporotic fracture is  21.5%, and in the hip 8.7%. Impression: By [facility] criteria the patient's bone mineral density is classified as  osteoporosis. ASSESSMENT:       Diagnosis Orders   1. 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 COPD  3. Stage IIIa chronic kidney disease  4. Previous history of smoking  5. Neuropathy    PLAN:     1. I reviewed the labs available to me,outside records and discussed with the patient. I explained to the patient the nature of this hematologic problem.  I explained the significance of these abnormalities and possible etiology and management options  2. patient had biclonal gammopathy and a very low numbers for M spike however having neuropathy and none IgG MGUS increase risk of MGUS especially abnormal ratio of free kappa lambda light chain assay, the creatinine clearance at this time stable /improving, discussed with the patient about obtaining bone marrow biopsy and aspirate versus short-term follow-up, patient would like to proceed with short-term follow-up and repeat kappa to lambda light chain in 3 months if evidence of progression she would consider bone marrow biopsy and aspirate  3. Patient expressed initially that she does not want any treatment should she had cancer, she had bad experience with chemotherapy from her sisters who  with breast cancer and lung cancer, I explained to her that for myeloma the treatment is very tolerable and can give prolonged remission should she need it in the future      RTC in 3 months    Thank you for the consult                                        [facility]                            This note is created with the assistance of a speech recognition program.  While intending to generate a document that actually reflects the content of the visit, the document can still have some errors including those of syntax and sound a like substitutions which may escape proof reading. It such instances, actual meaning can be extrapolated by contextual diversion.

## 2022-06-28 NOTE — LETTER
[address]  [place] [address]  Phone: [phone]           Maria T Hernandez MD      June 28, 2022     Patient: Ray [place]   MR Number: [id number]   YOB: 1940   Date of Visit: 6/28/2022       Dear Dr. Barb Holder ref. provider found: Thank you for referring Ray [place] to me for evaluation/treatment. Below are the relevant portions of my assessment and plan of care. If you have questions, please do not hesitate to call me. I look forward to following Ap along with you.     Sincerely,        Maria T Hernandez MD    CC providers:  Lauren Atkins MD  [address] [place] [address]  [address] [address]  [place] [place]

## 2022-06-28 NOTE — TELEPHONE ENCOUNTER
Avs from 6/28/22     RTC in 3 months after blood work      rv on 9/30/22 @ 10am   Labs done week prior to appt (cbc,cmp,electrophoresis protein, img panel, kappa/lambda)    PT was given AVS and appt schedule    Electronically signed by Camilo Rico on 6/28/2022 at 11:21 AM

## 2022-06-29 ENCOUNTER — OFFICE VISIT (OUTPATIENT)
Dept: GASTROENTEROLOGY | Age: 82
End: 2022-06-29
Payer: MEDICARE

## 2022-06-29 VITALS
WEIGHT: 129.8 LBS | HEART RATE: 90 BPM | HEIGHT: 63 IN | BODY MASS INDEX: 23 KG/M2 | SYSTOLIC BLOOD PRESSURE: 180 MMHG | DIASTOLIC BLOOD PRESSURE: 100 MMHG

## 2022-06-29 DIAGNOSIS — R10.13 DYSPEPSIA: ICD-10-CM

## 2022-06-29 DIAGNOSIS — R19.7 DIARRHEA, UNSPECIFIED TYPE: Primary | ICD-10-CM

## 2022-06-29 PROCEDURE — 99203 OFFICE O/P NEW LOW 30 MIN: CPT | Performed by: INTERNAL MEDICINE

## 2022-06-29 PROCEDURE — 1123F ACP DISCUSS/DSCN MKR DOCD: CPT | Performed by: INTERNAL MEDICINE

## 2022-06-29 RX ORDER — OMEPRAZOLE 20 MG/1
20 TABLET, DELAYED RELEASE ORAL 2 TIMES DAILY
Qty: 60 TABLET | Refills: 3 | Status: SHIPPED | OUTPATIENT
Start: 2022-06-29 | End: 2022-08-29 | Stop reason: SDUPTHER

## 2022-06-29 ASSESSMENT — ENCOUNTER SYMPTOMS
VOMITING: 0
VOICE CHANGE: 0
ANAL BLEEDING: 0
CHOKING: 0
BLOOD IN STOOL: 0
NAUSEA: 0
ABDOMINAL PAIN: 1
RECTAL PAIN: 0
CONSTIPATION: 1
COUGH: 0
TROUBLE SWALLOWING: 0
WHEEZING: 1
ABDOMINAL DISTENTION: 1
DIARRHEA: 1

## 2022-06-29 NOTE — PROGRESS NOTES
Reason for Referral:  Abdominal pain and diarrhea      Patrcik Ramírez MD  [facility]  [address]  [address] [place] [address],  [address] [place] [address]    Chief Complaint   Patient presents with    New Patient    Abdominal Pain     x 1 year, mid abdominal area with eating           HISTORY OF PRESENT ILLNESS: Zoë Gaxiola is a 80 y.o. female with a past history remarkable for chronic abdominal pain, prior history of tubular adenomas on a colon polyp, COPD, depression, osteoarthritis, hypothyroidism, hyperparathyroid bone disease, status post parathyroidectomy, referred for evaluation of abdominal pain x1 year. Patient reports postprandial dyspepsia and abdominal discomfort. Patient is currently on Prilosec 40 mg daily. Modest relief with current gastric medication. Currently denies any significant NSAID use, no alcohol, no smoking. Denies any food aversion or secondary weight loss. Patient was identified to have diarrhea during questioning during this clinic visit. Loose watery, nonbloody per patient. Smoker: Quit 22 yrs ago   Drinking history: Nightly   Illicit drugs: None    Abdominal surgeries: appendectomy, CCY, tubal ligation. Prior Colonoscopy: Several years ago  Prior EGD: None recent  FH of GI issues: None reported      Past Medical,Family, and Social History reviewed and does contribute to the patient presentingcondition. Patient's PMH/PSH,SH,PSYCH Hx, MEDs, ALLERGIES, and ROS were all reviewed and updated in the appropriate sections.     PAST MEDICAL HISTORY:  Past Medical History:   Diagnosis Date    Abdominal pain     Allergic rhinitis     Anxiety     Chronic back pain     Chronic fatigue     Chronic kidney disease     Chronic renal disease, stage III ([facility] [774748] 4/20/2022    Chronic respiratory failure ([facility] 8/11/2020    Colon polyp 3/7/2012    TUBULAR ADENOMA    COPD (chronic obstructive pulmonary disease) (HCC)     COPD, 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     Depression     Diverticul disease small and large intestine, no perforati or abscess     Elevated blood pressure reading     Fall 1/12/2017    GERD (gastroesophageal reflux disease)     Glaucoma     Hyperlipidemia     with target LDL less than 100    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[facility]     Hyperthyroidism 2012    Insomnia     Psychophysiological    Neuropathy     Orthostatic dizziness 3/12/2017    Osteoarthritis     Panlobular emphysema (HCC)     PVC (premature ventricular contraction) 1/12/2017    S/P parathyroidectomy ([facility]     Tubal pregnancy [id number]    Vasovagal syncope 1/12/2017       Past Surgical History:   Procedure Laterality Date    APPENDECTOMY      BACK SURGERY      CATARACT REMOVAL WITH IMPLANT Left 04/01/14    [facility] [address]    CHOLECYSTECTOMY  2005    COLONOSCOPY      COLONOSCOPY  6/13/2014    Severe sigmoid diverticulosis; due for repeat June 2019    ENDOSCOPY, COLON, DIAGNOSTIC      EYE SURGERY      PARATHYROIDECTOMY  11/05/2018    Dr. Bert Lozada; Left inferior parathyroid: adenoma    ROTATOR CUFF REPAIR Left     TONSILLECTOMY      TUBAL LIGATION      UPPER GASTROINTESTINAL ENDOSCOPY  6/13/2014    biopsy    UPPER GASTROINTESTINAL ENDOSCOPY  1-26-16    UPPER GASTROINTESTINAL ENDOSCOPY  5/4/16    mild gastritis       CURRENT MEDICATIONS:    Current Outpatient Medications:     omeprazole (PRILOSEC OTC) 20 MG tablet, Take 1 tablet by mouth in the morning and at bedtime, Disp: 60 tablet, Rfl: 3    zaleplon (SONATA) 10 MG capsule, Take 1 capsule by mouth nightly as needed (Insomnia.) for up to 90 days. , Disp: 90 capsule, Rfl: 0    fluticasone (FLONASE) 50 MCG/ACT nasal spray, 2 sprays by Nasal route daily, Disp: 16 g, Rfl: 0    omeprazole (PRILOSEC) 40 MG delayed release capsule, Take 1 capsule by mouth every morning (before breakfast), Disp: 90 capsule, Rfl: 0    Tiotropium Bromide-Olodaterol (STIOLTO RESPIMAT) 2.5-2.5 MCG/ACT AERS, Inhale 2 puffs into the lungs Food in the Last Year: Never true    [address] in the Last Year: Never true   Transportation Needs:     Lack of Transportation (Medical): Not on file    Lack of Transportation (Non-Medical): Not on file   Physical Activity:     Days of Exercise per Week: Not on file    Minutes of Exercise per Session: Not on file   Stress:     Feeling of Stress : Not on file   Social Connections:     Frequency of Communication with Friends and Family: Not on file    Frequency of Social Gatherings with Friends and Family: Not on file    Attends Voodoo Services: Not on file    Active Member of [address] [place] [address] or Organizations: Not on file    Attends Club or Organization Meetings: Not on file    Marital Status: Not on file   Intimate Partner Violence:     Fear of Current or Ex-Partner: Not on file    Emotionally Abused: Not on file    Physically Abused: Not on file    Sexually Abused: Not on file   Housing Stability:     Unable to Pay for Housing in the Last Year: Not on file    Number of [place] in the Last Year: Not on file    Unstable Housing in the Last Year: Not on file         REVIEW OF SYSTEMS: A 12-point review of systems was obtained and pertinent positives and negatives were listed below. REVIEW OF SYSTEMS:     Constitutional: No fever, no chills, no lethargy, no weakness. HEENT:  No headache, otalgia, itchy eyes, nasal discharge or sore throat. Cardiac:  No chest pain, dyspnea, orthopnea or PND. Chest:   No cough, phlegm or wheezing. Abdomen:      Detailed by MA   Neuro:  No focal weakness, abnormal movements or seizure like activity. Skin:   No rashes, no itching. :   No hematuria, no pyuria, no dysuria, no flank pain. Extremities:  No swelling or joint pains. ROS was otherwise negative    Review of Systems   Constitutional: Negative for appetite change, fatigue and unexpected weight change. HENT: Negative for trouble swallowing and voice change.     Eyes: Negative for visual disturbance. Respiratory: Positive for wheezing. Negative for cough and choking. Shortness of breath: COPD. Cardiovascular: Negative for chest pain, palpitations and leg swelling. Gastrointestinal: Positive for abdominal distention, abdominal pain, constipation and diarrhea. Negative for anal bleeding, blood in stool, nausea, rectal pain and vomiting. Genitourinary: Negative for difficulty urinating. Neurological: Negative for dizziness, seizures, weakness, numbness and headaches. Hematological: Bruises/bleeds easily. Psychiatric/Behavioral: Positive for confusion and sleep disturbance. The patient is nervous/anxious. PHYSICAL EXAMINATION: Vital signs reviewed per the nursing documentation. BP (!) 180/100   Pulse 90   Ht 5' 3\" (1.6 m)   Wt 129 lb 12.8 oz (58.9 kg)   BMI 22.99 kg/m²   Body mass index is 22.99 kg/m². Physical Exam    Physical Exam   Constitutional: Patient is oriented to person, place, and time. Patient appears well-developed and well-nourished. HENT:   Head: Normocephalic and atraumatic. Eyes: Pupils are equal, round, and reactive to light. EOM are normal.   Neck: Normal range of motion. Neck supple. No JVD present. No tracheal deviation present. No thyromegaly present. Cardiovascular: Normal rate, regular rhythm, normal heart sounds and intact distal pulses. Pulmonary/Chest: Effort normal and breath sounds normal. No stridor. No respiratory distress. He has no wheezes. He has no rales. He exhibits no tenderness. Abdominal: Soft. Bowel sounds are normal. He exhibits no distension and no mass. There is no tenderness. There is no rebound and no guarding. No hernia. Musculoskeletal: Normal range of motion. Lymphadenopathy:    Patient has no cervical adenopathy. Neurological: Patient is alert and oriented to person, place, and time. Psychiatric: Patient has a normal mood and affect.  Patient behavior is normal.       LABORATORY DATA: Reviewed  Lab Results Component Value Date    WBC 5.1 05/16/2022    HGB 15.7 05/16/2022    HCT 45.9 05/16/2022    MCV 95.6 05/16/2022     05/16/2022     06/07/2022    K 4.3 06/07/2022     06/07/2022    CO2 27 06/07/2022    BUN 20 06/07/2022    CREATININE 0.97 (H) 06/07/2022    LABPROT 6.5 02/27/2012    LABALBU 3.9 06/07/2022    BILITOT 0.79 06/07/2022    ALKPHOS 72 06/07/2022    AST 18 06/07/2022    ALT 13 06/07/2022    INR 1.0 05/03/2016         Lab Results   Component Value Date    RBC 4.80 05/16/2022    HGB 15.7 05/16/2022    MCV 95.6 05/16/2022    MCH 32.8 05/16/2022    MCHC 34.3 05/16/2022    RDW 13.8 05/16/2022    MPV 7.7 05/16/2022    BASOPCT 1 05/16/2022    LYMPHSABS 1.70 05/16/2022    MONOSABS 0.50 05/16/2022    NEUTROABS 2.80 05/16/2022    EOSABS 0.00 05/16/2022    BASOSABS 0.10 05/16/2022         DIAGNOSTIC TESTING:     US HEAD NECK SOFT TISSUE THYROID    Result Date: 6/8/2022  EXAMINATION: THYROID ULTRASOUND 6/8/2022 COMPARISON: March 2, 2021 HISTORY: ORDERING SYSTEM PROVIDED HISTORY: Nontoxic multinodular goiter FINDINGS: Right thyroid lobe:  45 x 13 x 16 mm Left thyroid lobe:  47 x 12 x 15 mm Isthmus:  5 mm Thyroid Gland:  Thyroid is heterogeneous throughout Nodules: NODULE: 1 Size: 10 x 7 x 11 mm Location: Mid right thyroid 1. Composition:  Solid (2) 2. Echogenicity:  Hypoechoic (2) 3. Shape: Wider-than-tall (0) 4. Margins:  Smooth (0) 5. Echogenic foci:  None (0) ACR TI-RADS total points:  4 ACR TI-RADS risk category: TR4 NODULE: 2 Size: 12 x 6 x 9 mm Location: Inferior right thyroid 1. Composition:  Mixed cystic and solid (1) 2. Echogenicity:  Hypoechoic (2) 3. Shape: Wider-than-tall (0) 4. Margins:  Smooth (0) 5. Echogenic foci:  None (0) ACR TI-RADS total points:  3 ACR TI-RADS risk category: TR3 Cervical lymphadenopathy: No abnormal lymph nodes in the imaged portions of the neck.      Heterogeneous thyroid with a centrally stable nodules right thyroid lobe meeting TI-RADS criteria for 1 year follow-up RECOMMENDATIONS: NODULE 1:  ACR TI-RADS TR4: Recommend: Follow-up ultrasound in 1 year. NODULE 2:  ACR TI-RADS TR3: Recommend:  No follow-up. ACR TI-RADS recommendations: TR5 (>= 7 points):  FNA if >= 1 cm; follow-up if 0.5-0.9 cm in 1, 2, 3, 4, and 5 years TR4 (4-6 points):  FNA if >= 1.5 cm; follow-up if 1.0-1.4 cm in 1, 2, 3, and 5 years TR3 (3 points):  FNA if >= 2.5 cm; follow-up if 1.5-2.4 cm in 1, 3, and 5 years TR2 (2 points):  No FNA or follow-up TR1 (0 points):  No FNA or follow-up ACR TI-RADS recommends that no more than two nodules with the highest ACR TI-RADS point total should be biopsied and no more than four nodules should be followed. DEXA BONE DENSITY AXIAL SKELETON    Result Date: 6/8/2022  EXAMINATION: BONE DENSITOMETRY 6/8/2022 9:50 am TECHNIQUE: A bone density dual x-ray absorptiometry (DEXA) scan was performed of the lumbar spine and left hip  on a [employer] system. COMPARISON: Several previous studies with the most recent prior dated March 23, 2018. HISTORY: ORDERING SYSTEM PROVIDED HISTORY: Idiopathic osteoporosis FINDINGS: LUMBAR SPINE: The bone mineral density in the lumbar spine including the L1-L4 levels is measured at 1.085 g/cm2, which corresponds to a T-score of -0.9 and a Z-score of 1.2. This is within the normal range by WHO criteria. These findings would suggest improvement in bone mineral density since the previous comparison study. However, it is noted that there appear to be degenerative changes in the spine. If there are significant spurs, these could erroneously elevate the patient's BMD. LEFT HIP: The bone mineral density in the total hip is measured at 0.695 g/cm2 corresponding to a T-score of -2.5 and a Z-score of -0.2. This is within the osteoporosis range by WHO criteria. No evidence of statistically significant change.  The bone mineral density of the femoral neck is measured at 0.648 g/cm2 corresponding to a T-score of -2.8 and a Z-score of -0.4.  This is within the osteoporosis range by WHO criteria. FRAX score for 10 year probability of major osteoporotic fracture is 21.1%, and in the hip 8.6%. FRAX adjusted for TB S 10 year probability of major osteoporotic fracture is 21.5%, and in the hip 8.7%. By [facility] criteria the patient's bone mineral density is classified as osteoporosis. IMPRESSION: Reinaldo Perkins is a 80 y.o. female with a past history remarkable for chronic abdominal pain, prior history of tubular adenomas on a colon polyp, COPD, depression, osteoarthritis, hypothyroidism, hyperparathyroid bone disease, status post parathyroidectomy, referred for evaluation of abdominal pain x1 year. Patient reports postprandial dyspepsia and abdominal discomfort. Patient is currently on Prilosec 40 mg daily. Modest relief with current gastric medication. Currently denies any significant NSAID use, no alcohol, no smoking. Denies any food aversion or secondary weight loss. Patient was identified to have diarrhea during questioning during this clinic visit. Loose watery, nonbloody per patient. Assessment  1. Diarrhea, unspecified type    2. Dyspepsia        Clance Covert was seen today for new patient and abdominal pain. Diagnoses and all orders for this visit:    Diarrhea, unspecified type-we will send for pancreatic elastase, may consider Benefiber or fiber supplementation as a bulk forming agent. Avoid dietary triggers. -     Pancreatic elastase, fecal; Future    Dyspepsia- we will increase patient's of Prilosec to 40 mg in a.m. and 20 mg in evening. May consider endoscopic evaluation if the patient has refractory symptoms despite medication adjustment. Await studies. Strongly advise complete cessation of wine which the patient drinks on a nightly basis. -     Pancreatic elastase, fecal; Future    Other orders  -     omeprazole (PRILOSEC OTC) 20 MG tablet;  Take 1 tablet by mouth in the morning and at bedtime           RTC: 3 months. Additional comments: Thank you for allowing me to participate in the care of Ms. Napier. For any further questions please do not hesitate to contact me. I have reviewed and agree with the MA/LPN ROS please refer to their documentation from today's encounter on a separate note. Anastasia Cole MD, MPH   Board Certified in Gastroenterology  Board Certified in [address] [place] [address] #: [phone]          this note is created with the assistance of a speech recognition program.  While intending to generate a document that actually reflects the content of the visit, the document can still have some errors including those of syntax and sound a like substitutions which may escape proof reading. It such instances, actual meaning can be extrapolated by contextual diversion.

## 2022-08-09 DIAGNOSIS — F51.04 PSYCHOPHYSIOLOGICAL INSOMNIA: ICD-10-CM

## 2022-08-09 RX ORDER — ZALEPLON 10 MG/1
10 CAPSULE ORAL NIGHTLY PRN
Qty: 90 CAPSULE | Refills: 0 | Status: SHIPPED | OUTPATIENT
Start: 2022-08-09 | End: 2022-11-07

## 2022-08-26 ENCOUNTER — TELEPHONE (OUTPATIENT)
Dept: FAMILY MEDICINE CLINIC | Age: 82
End: 2022-08-26

## 2022-08-26 DIAGNOSIS — Z01.818 PREOP TESTING: Primary | ICD-10-CM

## 2022-08-26 DIAGNOSIS — H40.9 GLAUCOMA OF BOTH EYES, UNSPECIFIED GLAUCOMA TYPE: ICD-10-CM

## 2022-08-26 NOTE — TELEPHONE ENCOUNTER
Incoming fax came from  [address] [place] [address] Street , in regard to upcoming procedure on 09/15/22. Will like a medical clearance from provider. Prior to scheduled surgery/Procedure. Please see attachment below. Please advise. Thank you!

## 2022-08-29 RX ORDER — BUDESONIDE, GLYCOPYRROLATE, AND FORMOTEROL FUMARATE 160; 9; 4.8 UG/1; UG/1; UG/1
1 AEROSOL, METERED RESPIRATORY (INHALATION) DAILY
COMMUNITY
Start: 2022-08-05

## 2022-08-29 RX ORDER — OMEPRAZOLE 20 MG/1
20 TABLET, DELAYED RELEASE ORAL 2 TIMES DAILY
Qty: 60 TABLET | Refills: 3 | Status: SHIPPED | OUTPATIENT
Start: 2022-08-29

## 2022-08-29 ASSESSMENT — ENCOUNTER SYMPTOMS
RESPIRATORY NEGATIVE: 1
SHORTNESS OF BREATH: 0
ABDOMINAL PAIN: 0

## 2022-08-29 NOTE — TELEPHONE ENCOUNTER
Spoke with pt scheduled w/[facility] for 8/30/22 @ 9:30 am. Writer adv pt of labs and x-ray to be completed.

## 2022-08-29 NOTE — PROGRESS NOTES
[facility] PHYSICIANS  [facility] FAMILY PHYSICIANS  [facility].  [address] [address]  Dept: [phone]     Bernadette Bay (:  1940) is a 80 y.o. female. Patient is here for evaluation of the following chief complaint(s):  Chief Complaint   Patient presents with    Pre-op Exam     Needs medical clearance         SUBJECTIVE/OBJECTIVE:  HPI  Bernadette Bay is a 80 y.o. female patient. Patient is an established patient of Dr. Jomar Segura . Patient has a known history of sinusitis, glaucoma, emphysema, CKD, allergy, hyperlipoidemia, gastritis, vitamin B12 deficiency, vitamin D deficiency, and depression. GLAUCOMA/ SINUSITIS  Left eye  [facility], DANIA Robert. Pain pressure. Congestion cheeks,  Antibiotics. Eye drops only, not helping, worse. Incoming fax came from  [address] [place] [address] , in regard to upcoming procedure on 09/15/22. Will like a medical clearance from provider. Prior to scheduled surgery/Procedure. Patient needs clearance for glaucoma under general anesthesia surgery scheduled 9/15/2022     Silas Aguero has a known history of COPD. Patient is currently on breztri, stiolto, albuterol. Patient reports fair success with this therapy. Patient denies any adverse reaction to current therapy. Patient is under the care of Dr. Yumiko Carrasco. CHRONIC KIDNEY DISEASE  Sudha Means has a stage 3a CKD. Patient is under the care of DR. Rico Soriano. Lab Results   Component Value Date/Time    K 4.3 2022 02:53 PM    BUN 20 2022 02:53 PM    CREATININE 0.97 (H) 2022 02:53 PM    LABGLOM 55 (L) 2022 02:53 PM    GFRAA >60 2022 02:53 PM      HYPERLIPIDEMIA  Bernadette Bay is currently on OTC Fish Oil- not taking. Patient denies adverse reaction to this medication. Compliance with treatment thus far has been good. The patient is not known to have coexisting coronary artery disease.  The CVD Risk score (CHAYA'Agostino, et al., 2008) failed to calculate for the following reasons: The 2008 CVD risk score is only valid for ages 27 to 76  Lab Results   Component Value Date/Time    CHOL 153 06/01/2021 11:00 AM    HDL 63 06/01/2021 11:00 AM    LDLCHOLESTEROL 68 06/01/2021 11:00 AM    CHOLHDLRATIO 2.4 06/01/2021 11:00 AM    TRIG 109 06/01/2021 11:00 AM    VLDL NOT REPORTED 06/01/2021 11:00 AM     INSOMNIA  Surekha Berkowitz is a 80 y.o. female who complains of insomnia. This has been going on for awhile. Patient describes his difficulty with staying sleep. Patient is currently on Sonata. Reports fair success with the therapy. Patient denies any adverse reaction to this therapy. GERD  Surekha Berkowitz  admits to GERD symptoms of prolonged duration. Reported symptoms include heartburn. The patient denies dysphagia, vomiting. Patient is aware of some food triggers and habits that causes exacerbation of symptoms. Risk factors present for GERD include tight fitting clothing. Current therapy Prilosec. Therapy results in fair relief. Patient is an established patient of  Dr Evelina Watkins. ALIYAH SCLERODERMA  Surekha Berkowitz is a 80 y.o. female has RA. Lab Results   Component Value Date/Time    CRP 2.9 10/08/2020 01:52 PM    ALIYAH NEGATIVE 05/16/2022 10:03 AM      VITAMIN D  Patient has a known Vitamin D Deficiency. she Usually covered with 12 weeks weekly dose then daily. she is due to get levels check. We continue to discuss ways to manage this condition. We also discussed ways to improve the levels. Such as learning food groups that are high in Vitamin D. We also discuss that sun exposure is beneficial however, too much sun and sun damage can potentially result in skin cancer. Lab Results   Component Value Date/Time    VITD25 31.1 06/08/2022 10:18 AM      VITAMIN B 12 /FOLATE DEFICIENCIES  Ap  is  taking Vitamin B12 supplementation. Ap reports no numbness and tingling and fatigue.   Lab Results   Component Value Date mass index is 23.03 kg/m² as calculated from the following:    Height as of this encounter: 5' 3\" (1.6 m). Weight as of this encounter: 130 lb (59 kg). Review of Systems   Constitutional:  Negative for chills and fever. HENT: Negative. Respiratory: Negative. Negative for shortness of breath. Cardiovascular: Negative. Negative for chest pain. Gastrointestinal:  Negative for abdominal pain. Endocrine: Negative. Musculoskeletal:  Negative for arthralgias. Neurological:  Negative for headaches. Psychiatric/Behavioral:  Negative for sleep disturbance. The patient is nervous/anxious. Physical Exam  HENT:      Nose:      Left Sinus: Maxillary sinus tenderness and frontal sinus tenderness present. Eyes:      General:         Left eye: Discharge present. Comments: Left eyes closed/ patch  photophobic   Pulmonary:      Effort: Pulmonary effort is normal.   Lymphadenopathy:      Head:      Left side of head: Submental and submandibular adenopathy present. Neurological:      Mental Status: She is alert and oriented to person, place, and time. Psychiatric:         Mood and Affect: Mood is anxious. Speech: Speech is rapid and pressured. Thought Content: Thought content does not include suicidal ideation.        MEDICAL HISTORY      Diagnosis Date    Abdominal pain     Allergic rhinitis     Anxiety     Chronic back pain     Chronic fatigue     Chronic kidney disease     Chronic renal disease, stage III [facility]) [[id number]] 4/20/2022    Chronic respiratory failure ([facility] 8/11/2020    Colon polyp 3/7/2012    TUBULAR ADENOMA    COPD (chronic obstructive pulmonary disease) (HCC)     COPD, 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     Depression     Diverticul disease small and large intestine, no perforati or abscess     Elevated blood pressure reading     Fall 1/12/2017    GERD (gastroesophageal reflux disease)     Glaucoma     Hyperlipidemia     with target LDL less than 100    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     Hyperthyroidism 2012    Insomnia     Psychophysiological    Neuropathy     Orthostatic dizziness 3/12/2017    Osteoarthritis     Panlobular emphysema (HCC)     PVC (premature ventricular contraction) 1/12/2017    S/P parathyroidectomy ([facility]     Tubal pregnancy [id number]    Vasovagal syncope 1/12/2017      MEDICATIONS  Prior to Visit Medications    Medication Sig Taking? Authorizing Provider   sodium chloride (ALTAMIST SPRAY) 0.65 % nasal spray 1 spray by Nasal route as needed for Congestion Yes BARRERA Allen CNP   cephALEXin (KEFLEX) 250 MG capsule Take 1 capsule by mouth 3 times daily for 7 days Yes BARRERA Allen CNP   omeprazole (PRILOSEC OTC) 20 MG tablet Take 1 tablet by mouth in the morning and at bedtime Yes Jonathan Cortes MD   BREZTRI AEROSPHERE 160-9-4.8 MCG/ACT AERO Inhale 1 inhalation into the lungs daily Yes Historical Provider, MD   zaleplon (SONATA) 10 MG capsule Take 1 capsule by mouth nightly as needed (Insomnia.) for up to 90 days. Yes Riya Hahn MD   Tiotropium Bromide-Olodaterol (STIOLTO RESPIMAT) 2.5-2.5 MCG/ACT AERS Inhale 2 puffs into the lungs daily Yes Historical Provider, MD   albuterol sulfate  (90 Base) MCG/ACT inhaler Inhale 2 puffs into the lungs every 6 hours as needed for Wheezing or Shortness of Breath Yes Riya Hahn MD   dorzolamide-timolol (COSOPT) 22.3-6.8 MG/ML ophthalmic solution Place 1 drop into both eyes 2 times daily Yes Historical Provider, MD   ALPHAGAN P 0.1 % SOLN Place 1 drop into both eyes 3 times daily  Yes Historical Provider, MD   latanoprost (XALATAN) 0.005 % ophthalmic solution Place 1 drop into both eyes nightly  Yes Historical Provider, MD     Controlled Substance Monitoring:  Acute and Chronic Pain Monitoring:   RX Monitoring 8/9/2022   Attestation -   Periodic Controlled Substance Monitoring No signs of potential drug abuse or diversion identified.    Chronic Pain > 50 MEDD -     ASSESSMENT/PLAN:  1. Preop examination  Stable  Clear after labs, EKG and XR results  Encouraged to get them done sooner rather than later  Fairly healthy      2. Acute bacterial sinusitis  Failure to Improve  DISCUSSED AND ADVISED TO:  Rest.  Advised to increase fluid intake. Eat more fruits and vegetables. Take medications as discussed. Report for worsening symptoms. - cephALEXin (KEFLEX) 250 MG capsule; Take 1 capsule by mouth 3 times daily for 7 days  Dispense: 21 capsule; Refill: 0    3. Glaucoma of right eye, unspecified glaucoma type  Failure to Improve  Should be cleared for surgery  Scheduled  Previous surgery did well  Continue to monitor      4. Panlobular emphysema ([facility]  Stable  Current treatment plan is effective, no change in therapy. Reviewed use, techniques, schedule and side effects of all inhaled medications  Critical need for compliance with treatment plan to achieve optimal results  Very strongly urged to quit smoking to reduce pulmonary and CVD risk. 5. Stage 3a chronic kidney disease ([facility]  Stable  Continue renal consult  Advised to increase fluid intake  Report decrease urine output  Decrease salt intake      6. Non-seasonal allergic rhinitis due to other allergic trigger  Stable  Stopped Flonase  Start saline spray  ADVISED TO:  Avoid known allergens/irritants. Stop smoking or avoid second hand smoke. Stay hydrated. Report for worsening symptoms    - sodium chloride (ALTAMIST SPRAY) 0.65 % nasal spray; 1 spray by Nasal route as needed for Congestion  Dispense: 1 each; Refill: 3    7. Hyperlipidemia with target LDL less than 100  Stable  Continue therapy. Advised to decrease the consumption of red meats, fried foods, trans fats, sweets, sugary beverages. Advised to increase fish, vegetables, and fruits consumption. Advised to add fiber or OTC supplements in diet. Discussed weight loss which will result in improvement of lipids levels.   Advised to increase daily physical activities and add regular exercises. 8. Other chronic gastritis without hemorrhage  Stable  Continue therapy  DISCUSSED AND ADVISED TO:  Avoid food triggers. Stop eating large meals close to bedtime  Don't eat meals too close to bedtime  Avoid ASA, NSAID's, caffeine, peppermints, alcohol and tobacco.  Report for worsening symptoms. 9. Vitamin B 12 deficiency  Stable  Continue current therapy  Continue to monitor      10. Vitamin D deficiency  Stable  Continue Vitamin D supplementation  DISCUSSED AND ADVISED TO:  Foods that contain a lot of vitamin D includes [place], tuna, and mackerel. Cheese, egg yolks, and beef liver have small amounts of vit D.  Milk, soy drinks, orange juice, yogurt, margarine, and some kinds of cereal have vitamin D added to them. Continue to use sunblock when out in the sun to prevent skin cancer. 6. MDD (major depressive disorder), recurrent, in full remission ([facility]  Resolved  Continue to monitor     On this date 8/30/2022 I have spent 35 minutes reviewing previous notes, test results and face to face with the patient discussing the diagnosis and importance of compliance with the treatment plan as well as documenting on the day of the visit. Return for Chronic conditions, Appt w/ Dr. Piper Gallo, Keep Prev Appt. This note was completed by using the assistance of a speech-recognition program. However, inadvertent computerized transcription errors may be present. Although every effort was made to ensure accuracy, no guarantees can be provided that every mistake has been identified and corrected by editing.   Electronically signed by BARRERA Bishop CNP on 8/29/22 at 12:40 PM EDT     --BARRERA Bishop CNP

## 2022-08-30 ENCOUNTER — HOSPITAL ENCOUNTER (OUTPATIENT)
Facility: CLINIC | Age: 82
Discharge: HOME | End: 2022-09-01
Payer: MEDICARE

## 2022-08-30 ENCOUNTER — OFFICE VISIT (OUTPATIENT)
Dept: FAMILY MEDICINE CLINIC | Age: 82
End: 2022-08-30
Payer: MEDICARE

## 2022-08-30 ENCOUNTER — HOSPITAL ENCOUNTER (OUTPATIENT)
Dept: GENERAL RADIOLOGY | Facility: CLINIC | Age: 82
Discharge: HOME | End: 2022-09-01
Payer: MEDICARE

## 2022-08-30 ENCOUNTER — HOSPITAL ENCOUNTER (OUTPATIENT)
Age: 82
Setting detail: SPECIMEN
Discharge: HOME | End: 2022-08-30

## 2022-08-30 VITALS
SYSTOLIC BLOOD PRESSURE: 130 MMHG | BODY MASS INDEX: 23.04 KG/M2 | WEIGHT: 130 LBS | TEMPERATURE: 97.7 F | HEART RATE: 68 BPM | DIASTOLIC BLOOD PRESSURE: 86 MMHG | HEIGHT: 63 IN | OXYGEN SATURATION: 98 %

## 2022-08-30 DIAGNOSIS — N18.31 STAGE 3A CHRONIC KIDNEY DISEASE (HCC): ICD-10-CM

## 2022-08-30 DIAGNOSIS — J30.89 NON-SEASONAL ALLERGIC RHINITIS DUE TO OTHER ALLERGIC TRIGGER: ICD-10-CM

## 2022-08-30 DIAGNOSIS — F33.42 MDD (MAJOR DEPRESSIVE DISORDER), RECURRENT, IN FULL REMISSION (HCC): ICD-10-CM

## 2022-08-30 DIAGNOSIS — J43.1 PANLOBULAR EMPHYSEMA (HCC): ICD-10-CM

## 2022-08-30 DIAGNOSIS — K29.50 OTHER CHRONIC GASTRITIS WITHOUT HEMORRHAGE: ICD-10-CM

## 2022-08-30 DIAGNOSIS — H40.9 GLAUCOMA OF RIGHT EYE, UNSPECIFIED GLAUCOMA TYPE: ICD-10-CM

## 2022-08-30 DIAGNOSIS — E53.8 VITAMIN B 12 DEFICIENCY: ICD-10-CM

## 2022-08-30 DIAGNOSIS — H40.9 GLAUCOMA OF BOTH EYES, UNSPECIFIED GLAUCOMA TYPE: ICD-10-CM

## 2022-08-30 DIAGNOSIS — J01.90 ACUTE BACTERIAL SINUSITIS: ICD-10-CM

## 2022-08-30 DIAGNOSIS — E55.9 VITAMIN D DEFICIENCY: ICD-10-CM

## 2022-08-30 DIAGNOSIS — Z01.818 PREOP EXAMINATION: Primary | ICD-10-CM

## 2022-08-30 DIAGNOSIS — Z01.818 PREOP TESTING: ICD-10-CM

## 2022-08-30 DIAGNOSIS — E78.5 HYPERLIPIDEMIA WITH TARGET LDL LESS THAN 100: ICD-10-CM

## 2022-08-30 DIAGNOSIS — B96.89 ACUTE BACTERIAL SINUSITIS: ICD-10-CM

## 2022-08-30 LAB
ANION GAP SERPL CALCULATED.3IONS-SCNC: 17 MMOL/L (ref 9–17)
BUN BLDV-MCNC: 13 MG/DL (ref 8–23)
CALCIUM SERPL-MCNC: 9.8 MG/DL (ref 8.6–10.4)
CHLORIDE BLD-SCNC: 99 MMOL/L (ref 98–107)
CO2: 20 MMOL/L (ref 20–31)
CREAT SERPL-MCNC: 1.08 MG/DL (ref 0.5–0.9)
GFR AFRICAN AMERICAN: 59 ML/MIN
GFR NON-AFRICAN AMERICAN: 49 ML/MIN
GFR SERPL CREATININE-BSD FRML MDRD: ABNORMAL ML/MIN/{1.73_M2}
GLUCOSE BLD-MCNC: 93 MG/DL (ref 70–99)
HCT VFR BLD CALC: 46.7 % (ref 36.3–47.1)
HEMOGLOBIN: 14.8 G/DL (ref 11.9–15.1)
MCH RBC QN AUTO: 31.4 PG (ref 25.2–33.5)
MCHC RBC AUTO-ENTMCNC: 31.7 G/DL (ref 28.4–34.8)
MCV RBC AUTO: 99.2 FL (ref 82.6–102.9)
NRBC AUTOMATED: 0 PER 100 WBC
PDW BLD-RTO: 12.3 % (ref 11.8–14.4)
PLATELET # BLD: 265 K/UL (ref 138–453)
PMV BLD AUTO: 10.1 FL (ref 8.1–13.5)
POTASSIUM SERPL-SCNC: 5.3 MMOL/L (ref 3.7–5.3)
RBC # BLD: 4.71 M/UL (ref 3.95–5.11)
SODIUM BLD-SCNC: 136 MMOL/L (ref 135–144)
WBC # BLD: 7.7 K/UL (ref 3.5–11.3)

## 2022-08-30 PROCEDURE — 71046 X-RAY EXAM CHEST 2 VIEWS: CPT

## 2022-08-30 PROCEDURE — 1123F ACP DISCUSS/DSCN MKR DOCD: CPT | Performed by: FAMILY MEDICINE

## 2022-08-30 PROCEDURE — 99214 OFFICE O/P EST MOD 30 MIN: CPT | Performed by: FAMILY MEDICINE

## 2022-08-30 RX ORDER — ECHINACEA PURPUREA EXTRACT 125 MG
1 TABLET ORAL PRN
Qty: 1 EACH | Refills: 3 | Status: SHIPPED | OUTPATIENT
Start: 2022-08-30

## 2022-08-30 RX ORDER — AZITHROMYCIN 250 MG/1
250 TABLET, FILM COATED ORAL DAILY
Qty: 6 TABLET | Refills: 0 | Status: SHIPPED | OUTPATIENT
Start: 2022-08-30 | End: 2022-08-30

## 2022-08-30 RX ORDER — CEPHALEXIN 250 MG/1
250 CAPSULE ORAL 3 TIMES DAILY
Qty: 21 CAPSULE | Refills: 0 | Status: SHIPPED | OUTPATIENT
Start: 2022-08-30 | End: 2022-09-06

## 2022-08-30 SDOH — ECONOMIC STABILITY: FOOD INSECURITY: WITHIN THE PAST 12 MONTHS, YOU WORRIED THAT YOUR FOOD WOULD RUN OUT BEFORE YOU GOT MONEY TO BUY MORE.: NEVER TRUE

## 2022-08-30 SDOH — ECONOMIC STABILITY: TRANSPORTATION INSECURITY
IN THE PAST 12 MONTHS, HAS THE LACK OF TRANSPORTATION KEPT YOU FROM MEDICAL APPOINTMENTS OR FROM GETTING MEDICATIONS?: NO

## 2022-08-30 SDOH — ECONOMIC STABILITY: TRANSPORTATION INSECURITY
IN THE PAST 12 MONTHS, HAS LACK OF TRANSPORTATION KEPT YOU FROM MEETINGS, WORK, OR FROM GETTING THINGS NEEDED FOR DAILY LIVING?: NO

## 2022-08-30 SDOH — ECONOMIC STABILITY: INCOME INSECURITY: IN THE LAST 12 MONTHS, WAS THERE A TIME WHEN YOU WERE NOT ABLE TO PAY THE MORTGAGE OR RENT ON TIME?: NO

## 2022-08-30 SDOH — ECONOMIC STABILITY: FOOD INSECURITY: WITHIN THE PAST 12 MONTHS, THE FOOD YOU BOUGHT JUST DIDN'T LAST AND YOU DIDN'T HAVE MONEY TO GET MORE.: NEVER TRUE

## 2022-08-30 SDOH — ECONOMIC STABILITY: HOUSING INSECURITY
IN THE LAST 12 MONTHS, WAS THERE A TIME WHEN YOU DID NOT HAVE A STEADY PLACE TO SLEEP OR SLEPT IN A SHELTER (INCLUDING NOW)?: NO

## 2022-08-30 ASSESSMENT — SOCIAL DETERMINANTS OF HEALTH (SDOH): HOW HARD IS IT FOR YOU TO PAY FOR THE VERY BASICS LIKE FOOD, HOUSING, MEDICAL CARE, AND HEATING?: NOT HARD AT ALL

## 2022-08-30 NOTE — RESULT ENCOUNTER NOTE
Please notify patient: Normal chest x-ray  Future Appointments  9/27/2022  3:00 PM    Gayle Ledesma MD           [facility]            [facility]  9/30/2022  10:00 AM   Amelia Arnold MD         [facility]           [facility]  10/5/2022  11:30 AM   Angel Quiroz MD     [facility]

## 2022-08-30 NOTE — PATIENT INSTRUCTIONS
New Updates for [facility] MyChart/ [employer] ([facility]) JORGE    Thank you for choosing US to give you the best care! [employer] ([facility]) is always trying to think of new ways to help their patients. We are asking all patients to try out the new digital registration that is now available through your [facility] account or the new JORGE, [employer] ([facility]). Via the jorge you're now able to update your personal and registration information prior to your upcoming appointment. This will save you time once you arrive at the office to check-in, not to mention your information remains safe!! Many other perks come from signing up for an account, such as:  Requesting refills  Scheduling an appointment  Completing an E-Visit  Sending a message to the office/provider  Having access to your medication list  Paying your bill/copay prior to your appointment  Scheduling your yearly mammogram  Review your test results    If you are not familiar with [facility] or the [employer] ([facility]) JORGE, please ask one of us and we will be happy to answer any questions or help you set-up your account.       Your [facility] office,  Jay

## 2022-09-07 NOTE — TELEPHONE ENCOUNTER
Miguelangel Brasher, you saw this patient back on 08/30/22, she is needing clearance for upcoming surgery on 09/15/22 with [facility]. I have placed a form in your folder to say whether she is cleared or not. Thank you!

## 2022-09-08 ENCOUNTER — TELEPHONE (OUTPATIENT)
Dept: INFUSION THERAPY | Age: 82
End: 2022-09-08

## 2022-09-08 NOTE — TELEPHONE ENCOUNTER
Called pt for her next prolia pt said she is having surgery on September 15th and don't want to get infusion till October.

## 2022-09-22 ENCOUNTER — HOSPITAL ENCOUNTER (OUTPATIENT)
Age: 82
Discharge: HOME | End: 2022-09-22
Payer: MEDICARE

## 2022-09-22 DIAGNOSIS — D47.2 MGUS (MONOCLONAL GAMMOPATHY OF UNKNOWN SIGNIFICANCE): ICD-10-CM

## 2022-09-22 LAB
ABSOLUTE EOS #: 0.1 K/UL (ref 0–0.4)
ABSOLUTE LYMPH #: 1.9 K/UL (ref 1–4.8)
ABSOLUTE MONO #: 0.6 K/UL (ref 0.1–1.3)
ALBUMIN SERPL-MCNC: 3.9 G/DL (ref 3.5–5.2)
ALP BLD-CCNC: 86 U/L (ref 35–104)
ALT SERPL-CCNC: 10 U/L (ref 5–33)
ANION GAP SERPL CALCULATED.3IONS-SCNC: 10 MMOL/L (ref 9–17)
AST SERPL-CCNC: 12 U/L
BASOPHILS # BLD: 1 % (ref 0–2)
BASOPHILS ABSOLUTE: 0.1 K/UL (ref 0–0.2)
BILIRUB SERPL-MCNC: 0.7 MG/DL (ref 0.3–1.2)
BUN BLDV-MCNC: 17 MG/DL (ref 8–23)
CALCIUM SERPL-MCNC: 9.8 MG/DL (ref 8.6–10.4)
CHLORIDE BLD-SCNC: 101 MMOL/L (ref 98–107)
CO2: 27 MMOL/L (ref 20–31)
CREAT SERPL-MCNC: 1.01 MG/DL (ref 0.5–0.9)
EOSINOPHILS RELATIVE PERCENT: 1 % (ref 0–4)
FREE KAPPA/LAMBDA RATIO: 3.04 (ref 0.26–1.65)
GFR AFRICAN AMERICAN: >60 ML/MIN
GFR NON-AFRICAN AMERICAN: 52 ML/MIN
GFR SERPL CREATININE-BSD FRML MDRD: ABNORMAL ML/MIN/{1.73_M2}
GLUCOSE BLD-MCNC: 113 MG/DL (ref 70–99)
HCT VFR BLD CALC: 44.7 % (ref 36–46)
HEMOGLOBIN: 15.1 G/DL (ref 12–16)
IGA: 109 MG/DL (ref 70–400)
IGG: 660 MG/DL (ref 700–1600)
IGM: 322 MG/DL (ref 40–230)
KAPPA FREE LIGHT CHAINS QNT: 3.56 MG/DL (ref 0.37–1.94)
LAMBDA FREE LIGHT CHAINS QNT: 1.17 MG/DL (ref 0.57–2.63)
LYMPHOCYTES # BLD: 30 % (ref 24–44)
MCH RBC QN AUTO: 31.9 PG (ref 26–34)
MCHC RBC AUTO-ENTMCNC: 33.7 G/DL (ref 31–37)
MCV RBC AUTO: 94.5 FL (ref 80–100)
MONOCYTES # BLD: 10 % (ref 1–7)
PDW BLD-RTO: 13.1 % (ref 11.5–14.9)
PLATELET # BLD: 257 K/UL (ref 150–450)
PMV BLD AUTO: 7.7 FL (ref 6–12)
POTASSIUM SERPL-SCNC: 3.8 MMOL/L (ref 3.7–5.3)
RBC # BLD: 4.73 M/UL (ref 4–5.2)
SEG NEUTROPHILS: 58 % (ref 36–66)
SEGMENTED NEUTROPHILS ABSOLUTE COUNT: 3.6 K/UL (ref 1.3–9.1)
SODIUM BLD-SCNC: 138 MMOL/L (ref 135–144)
TOTAL PROTEIN: 6.8 G/DL (ref 6.4–8.3)
WBC # BLD: 6.3 K/UL (ref 3.5–11)

## 2022-09-22 PROCEDURE — 84165 PROTEIN E-PHORESIS SERUM: CPT

## 2022-09-22 PROCEDURE — 83521 IG LIGHT CHAINS FREE EACH: CPT

## 2022-09-22 PROCEDURE — 82784 ASSAY IGA/IGD/IGG/IGM EACH: CPT

## 2022-09-22 PROCEDURE — 85025 COMPLETE CBC W/AUTO DIFF WBC: CPT

## 2022-09-22 PROCEDURE — 80053 COMPREHEN METABOLIC PANEL: CPT

## 2022-09-22 PROCEDURE — 36415 COLL VENOUS BLD VENIPUNCTURE: CPT

## 2022-09-22 PROCEDURE — 84155 ASSAY OF PROTEIN SERUM: CPT

## 2022-09-23 LAB
ALBUMIN (CALCULATED): 4.1 G/DL (ref 3.2–5.2)
ALBUMIN PERCENT: 67 % (ref 45–65)
ALPHA 1 PERCENT: 3 % (ref 3–6)
ALPHA 2 PERCENT: 11 % (ref 6–13)
ALPHA-1-GLOBULIN: 0.2 G/DL (ref 0.1–0.4)
ALPHA-2-GLOBULIN: 0.7 G/DL (ref 0.5–0.9)
BETA GLOBULIN: 0.6 G/DL (ref 0.5–1.1)
BETA PERCENT: 9 % (ref 11–19)
GAMMA GLOBULIN %: 10 % (ref 9–20)
GAMMA GLOBULIN: 0.6 G/DL (ref 0.5–1.5)
PATHOLOGIST: ABNORMAL
PROTEIN ELECTROPHORESIS, SERUM: ABNORMAL
TOTAL PROT. SUM,%: 100 % (ref 98–102)
TOTAL PROT. SUM: 6.2 G/DL (ref 6.3–8.2)
TOTAL PROTEIN: 6.1 G/DL (ref 6.4–8.3)

## 2022-09-30 ENCOUNTER — TELEPHONE (OUTPATIENT)
Dept: ONCOLOGY | Age: 82
End: 2022-09-30

## 2022-09-30 ENCOUNTER — OFFICE VISIT (OUTPATIENT)
Dept: ONCOLOGY | Age: 82
End: 2022-09-30
Payer: MEDICARE

## 2022-09-30 VITALS
TEMPERATURE: 96.8 F | DIASTOLIC BLOOD PRESSURE: 81 MMHG | HEART RATE: 80 BPM | WEIGHT: 128.8 LBS | SYSTOLIC BLOOD PRESSURE: 150 MMHG | BODY MASS INDEX: 22.82 KG/M2

## 2022-09-30 DIAGNOSIS — R76.8 ANA POSITIVE: ICD-10-CM

## 2022-09-30 DIAGNOSIS — D47.2 MGUS (MONOCLONAL GAMMOPATHY OF UNKNOWN SIGNIFICANCE): ICD-10-CM

## 2022-09-30 DIAGNOSIS — R77.8 ABNORMAL SERUM PROTEIN ELECTROPHORESIS: Primary | ICD-10-CM

## 2022-09-30 PROCEDURE — 99214 OFFICE O/P EST MOD 30 MIN: CPT | Performed by: INTERNAL MEDICINE

## 2022-09-30 PROCEDURE — 1123F ACP DISCUSS/DSCN MKR DOCD: CPT | Performed by: INTERNAL MEDICINE

## 2022-09-30 NOTE — PROGRESS NOTES
Patient ID: Artem Vazquez, 1940, [id number], 80 y.o. Referred by :  No ref. provider found   Reason for consultation: Biclonal MGUS      HISTORY OF PRESENT ILLNESS:    Oncologic History:    Artem Vazquez is a very pleasant 80 y.o. female.   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 and subsequently had electrophoresis also did have a chronic kidney disease and there was 2 zones of restriction confirmed by immunofixation to be monoclonal IgM kappa at 0.12 g/dL and monoclonal IgG kappa at 0.02 g/dL,, CBC in the normal limit  Patient had stage IIIa chronic kidney disease and recent GFR slightly worse she also had neuropathy and emphysema, and ALIYAH positive antiscleroderma  IgM elevated and ratio kappa to lambda free light chain mildly elevated      Interim history  Patient had no new symptoms  increase in the ratio of free kappa to lambda from 2.60 to 3.04  CBC within normal limit and creatinine stable    Past Medical History:   Diagnosis Date    Abdominal pain     Allergic rhinitis     Anxiety     Chronic back pain     Chronic fatigue     Chronic kidney disease     Chronic renal disease, stage III [facility]) [306423] 4/20/2022    Chronic respiratory failure ([address] 8/11/2020    Colon polyp 3/7/2012    TUBULAR ADENOMA    COPD (chronic obstructive pulmonary disease) (HCC)     COPD, 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     Depression     Diverticul disease small and large intestine, no perforati or abscess     Elevated blood pressure reading     Fall 1/12/2017    GERD (gastroesophageal reflux disease)     Glaucoma     Hyperlipidemia     with target LDL less than 100    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[address]     Hyperthyroidism 2012    Insomnia     Psychophysiological    Neuropathy     Orthostatic dizziness 3/12/2017    Osteoarthritis     Panlobular emphysema ([address]     PVC (premature ventricular contraction) 1/12/2017    S/P parathyroidectomy ([address]     Tubal pregnancy 6421,2484    Vasovagal syncope 1/12/2017 Past Surgical History:   Procedure Laterality Date    APPENDECTOMY      BACK SURGERY      CATARACT REMOVAL WITH IMPLANT Left 04/01/14    [address]    CHOLECYSTECTOMY  2005    COLONOSCOPY      COLONOSCOPY  6/13/2014    Severe sigmoid diverticulosis; due for repeat June 2019    ENDOSCOPY, COLON, DIAGNOSTIC      EYE SURGERY      PARATHYROIDECTOMY  11/05/2018    Dr. Lily Christian; Left inferior parathyroid: adenoma    ROTATOR CUFF REPAIR Left     TONSILLECTOMY      TUBAL LIGATION      UPPER GASTROINTESTINAL ENDOSCOPY  6/13/2014    biopsy    UPPER GASTROINTESTINAL ENDOSCOPY  1-26-16    UPPER GASTROINTESTINAL ENDOSCOPY  5/4/16    mild gastritis       Allergies   Allergen Reactions    Aspirin      GI upset    Atorvastatin      Muscle cramps       Current Outpatient Medications   Medication Sig Dispense Refill    sodium chloride (ALTAMIST SPRAY) 0.65 % nasal spray 1 spray by Nasal route as needed for Congestion 1 each 3    omeprazole (PRILOSEC OTC) 20 MG tablet Take 1 tablet by mouth in the morning and at bedtime 60 tablet 3    BREZTRI AEROSPHERE 160-9-4.8 MCG/ACT AERO Inhale 1 inhalation into the lungs daily      zaleplon (SONATA) 10 MG capsule Take 1 capsule by mouth nightly as needed (Insomnia.) for up to 90 days. 90 capsule 0    Tiotropium Bromide-Olodaterol (STIOLTO RESPIMAT) 2.5-2.5 MCG/ACT AERS Inhale 2 puffs into the lungs daily      albuterol sulfate  (90 Base) MCG/ACT inhaler Inhale 2 puffs into the lungs every 6 hours as needed for Wheezing or Shortness of Breath 18 g 3    dorzolamide-timolol (COSOPT) 22.3-6.8 MG/ML ophthalmic solution Place 1 drop into both eyes 2 times daily      ALPHAGAN P 0.1 % SOLN Place 1 drop into both eyes 3 times daily       latanoprost (XALATAN) 0.005 % ophthalmic solution Place 1 drop into both eyes nightly        No current facility-administered medications for this visit.        Social History     Socioeconomic History    Marital status:      Spouse name: Not on file    Number of children: Not on file    Years of education: Not on file    Highest education level: Not on file   Occupational History    Not on file   Tobacco Use    Smoking status: Former     Packs/day: 1.00     Years: 30.00     Pack years: 30.00     Types: Cigarettes     Start date: 1960     Quit date: 2000     Years since quittin.2    Smokeless tobacco: Never    Tobacco comments:     still uses OTC nicorette gum   Vaping Use    Vaping Use: Not on file   Substance and Sexual Activity    Alcohol use: Yes     Alcohol/week: 0.0 standard drinks     Comment: occassional    Drug use: No    Sexual activity: Not on file   Other Topics Concern    Not on file   Social History Narrative    Not on file     Social Determinants of Health     Financial Resource Strain: Low Risk     Difficulty of Paying Living Expenses: Not hard at all   Food Insecurity: No Food Insecurity    Worried About Running Out of Food in the Last Year: Never true    [address] in the Last Year: Never true   Transportation Needs: No Transportation Needs    Lack of Transportation (Medical): No    Lack of Transportation (Non-Medical): No   Physical Activity: Not on file   Stress: Not on file   Social Connections: Not on file   Intimate Partner Violence: Not on file   Housing Stability: Unknown    Unable to Pay for Housing in the Last Year: No    Number of Places Lived in the Last Year: Not on file    Unstable Housing in the Last Year: No       Family History   Problem Relation Age of Onset    Other Father         COPD    Stroke Sister     Heart Attack Sister     Breast Cancer Sister     Cancer Maternal Grandmother         Stomach cancer    Osteoporosis Mother     Other Sister         suicide        REVIEW OF SYSTEM:     Constitutional: No fever or chills.  No night sweats, no weight loss   Eyes: No eye discharge, double vision, or eye pain   HEENT: negative for sore mouth, sore throat, hoarseness and voice change   Respiratory: negative for cough , sputum, dyspnea, wheezing, hemoptysis, chest pain   Cardiovascular: negative for chest pain, dyspnea, palpitations, orthopnea, PND   Gastrointestinal: negative for nausea, vomiting, diarrhea, constipation, abdominal pain, Dysphagia, hematemesis and hematochezia   Genitourinary: negative for frequency, dysuria, nocturia, urinary incontinence, and hematuria   Integument: negative for rash, skin lesions, bruises.    Hematologic/Lymphatic: negative for easy bruising, bleeding, lymphadenopathy, petechiae and swelling/edema   Endocrine: negative for heat or cold intolerance, tremor, weight changes, change in bowel habits and hair loss   Musculoskeletal: negative for myalgias, arthralgias, pain, joint swelling,and bone pain   Neurological: negative for headaches, dizziness, seizures, weakness, numbness       OBJECTIVE:         Vitals:    09/30/22 0957   BP: (!) 150/81   Pulse: 80   Temp: 96.8 °F (36 °C)       PHYSICAL EXAM:   General appearance - well appearing, no in pain or distress   Mental status - alert and cooperative   Eyes - pupils equal and reactive, extraocular eye movements intact   Ears - bilateral TM's and external ear canals normal   Mouth - mucous membranes moist, pharynx normal without lesions   Neck - supple, no significant adenopathy   Lymphatics - no palpable lymphadenopathy, no hepatosplenomegaly   Chest - clear to auscultation, no wheezes, rales or rhonchi, symmetric air entry   Heart - normal rate, regular rhythm, normal S1, S2, no murmurs, rubs, clicks or gallops   Abdomen - soft, nontender, nondistended, no masses or organomegaly   Neurological - alert, oriented, normal speech, no focal findings or movement disorder noted   Musculoskeletal - no joint tenderness, deformity or swelling   Extremities - peripheral pulses normal, no pedal edema, no clubbing or cyanosis   Skin - normal coloration and turgor, no rashes, no suspicious skin lesions noted ,      LABORATORY DATA:     Lab Results   Component Value Date    WBC 6.3 09/22/2022    HGB 15.1 09/22/2022    HCT 44.7 09/22/2022    MCV 94.5 09/22/2022     09/22/2022    LYMPHOPCT 30 09/22/2022    RBC 4.73 09/22/2022    MCH 31.9 09/22/2022    MCHC 33.7 09/22/2022    RDW 13.1 09/22/2022    MONOPCT 10 (H) 09/22/2022    BASOPCT 1 09/22/2022    NEUTROABS 3.60 09/22/2022    LYMPHSABS 1.90 09/22/2022    MONOSABS 0.60 09/22/2022    EOSABS 0.10 09/22/2022    BASOSABS 0.10 09/22/2022         Chemistry        Component Value Date/Time     09/22/2022 0926    K 3.8 09/22/2022 0926     09/22/2022 0926    CO2 27 09/22/2022 0926    BUN 17 09/22/2022 0926    CREATININE 1.01 (H) 09/22/2022 0926        Component Value Date/Time    CALCIUM 9.8 09/22/2022 0926    ALKPHOS 86 09/22/2022 0926    AST 12 09/22/2022 0926    ALT 10 09/22/2022 0926    BILITOT 0.7 09/22/2022 0926            PATHOLOGY DATA:         IMAGING DATA:      XR CHEST (2 VW)  Narrative: EXAMINATION:  TWO XRAY VIEWS OF THE CHEST    8/30/2022 10:24 am    COMPARISON:  08/30/2022    HISTORY:  ORDERING SYSTEM PROVIDED HISTORY: Preop testing  TECHNOLOGIST PROVIDED HISTORY:  Preop clearance for general anesthesia  Reason for Exam: Pt states pre op clearance for glaucoma surgery 09/15/2022  pt states hx of COPD    FINDINGS:  The lungs appear clear. The heart and mediastinal structures are  unremarkable. Bony thorax appears normal. Visualized upper abdomen is  unremarkable. Impression: No abnormalities are noted. ASSESSMENT:       Diagnosis Orders   1. Abnormal serum protein electrophoresis        2. ALIYAH positive, anti-scleroderma        3. MGUS (monoclonal gammopathy of unknown significance)             Biclonal MGUS with increased free kappa to lambda light chain  COPD  Stage IIIa chronic kidney disease  Previous history of smoking  Neuropathy    PLAN:     I reviewed the labs available to me,outside records and discussed with the patient.   I explained to the patient the nature of this hematologic problem. I explained the significance of these abnormalities and possible etiology and management options  patient had biclonal gammopathy and a very low numbers for M spike however having neuropathy and none IgG MGUS increase risk of MGUS especially abnormal ratio of free kappa lambda light chain assay, the creatinine clearance at this time stable,patient declined bone marrow biopsy and aspirate   repeat kappa to lambda light chain/quantitative immunoglobulin and serum protein electrophoresis in 4 months if evidence of progression she would consider bone marrow biopsy and aspirate  RTC in 4 months      RTC in 4 months    Thank you for the consult                                        [facility]                            This note is created with the assistance of a speech recognition program.  While intending to generate a document that actually reflects the content of the visit, the document can still have some errors including those of syntax and sound a like substitutions which may escape proof reading. It such instances, actual meaning can be extrapolated by contextual diversion.

## 2022-09-30 NOTE — PATIENT INSTRUCTIONS
Serum protein electrophoresis immunofixation free kappa lambda light chain urine protein electrophoresis in 4 months

## 2022-10-05 ENCOUNTER — OFFICE VISIT (OUTPATIENT)
Dept: FAMILY MEDICINE CLINIC | Age: 82
End: 2022-10-05
Payer: MEDICARE

## 2022-10-05 VITALS
HEART RATE: 84 BPM | SYSTOLIC BLOOD PRESSURE: 110 MMHG | OXYGEN SATURATION: 98 % | WEIGHT: 129 LBS | TEMPERATURE: 97.8 F | BODY MASS INDEX: 22.86 KG/M2 | DIASTOLIC BLOOD PRESSURE: 80 MMHG | HEIGHT: 63 IN

## 2022-10-05 DIAGNOSIS — Z00.00 MEDICARE ANNUAL WELLNESS VISIT, SUBSEQUENT: Primary | ICD-10-CM

## 2022-10-05 DIAGNOSIS — F51.04 PSYCHOPHYSIOLOGICAL INSOMNIA: ICD-10-CM

## 2022-10-05 PROCEDURE — 1123F ACP DISCUSS/DSCN MKR DOCD: CPT | Performed by: FAMILY MEDICINE

## 2022-10-05 PROCEDURE — G0439 PPPS, SUBSEQ VISIT: HCPCS | Performed by: FAMILY MEDICINE

## 2022-10-05 ASSESSMENT — PATIENT HEALTH QUESTIONNAIRE - PHQ9
SUM OF ALL RESPONSES TO PHQ QUESTIONS 1-9: 0
9. THOUGHTS THAT YOU WOULD BE BETTER OFF DEAD, OR OF HURTING YOURSELF: 0
8. MOVING OR SPEAKING SO SLOWLY THAT OTHER PEOPLE COULD HAVE NOTICED. OR THE OPPOSITE, BEING SO FIGETY OR RESTLESS THAT YOU HAVE BEEN MOVING AROUND A LOT MORE THAN USUAL: 0
1. LITTLE INTEREST OR PLEASURE IN DOING THINGS: 0
10. IF YOU CHECKED OFF ANY PROBLEMS, HOW DIFFICULT HAVE THESE PROBLEMS MADE IT FOR YOU TO DO YOUR WORK, TAKE CARE OF THINGS AT HOME, OR GET ALONG WITH OTHER PEOPLE: 0
4. FEELING TIRED OR HAVING LITTLE ENERGY: 0
SUM OF ALL RESPONSES TO PHQ9 QUESTIONS 1 & 2: 0
7. TROUBLE CONCENTRATING ON THINGS, SUCH AS READING THE NEWSPAPER OR WATCHING TELEVISION: 0
5. POOR APPETITE OR OVEREATING: 0
2. FEELING DOWN, DEPRESSED OR HOPELESS: 0
SUM OF ALL RESPONSES TO PHQ QUESTIONS 1-9: 0
6. FEELING BAD ABOUT YOURSELF - OR THAT YOU ARE A FAILURE OR HAVE LET YOURSELF OR YOUR FAMILY DOWN: 0
3. TROUBLE FALLING OR STAYING ASLEEP: 0

## 2022-10-05 ASSESSMENT — LIFESTYLE VARIABLES
HOW OFTEN DURING THE LAST YEAR HAVE YOU NEEDED AN ALCOHOLIC DRINK FIRST THING IN THE MORNING TO GET YOURSELF GOING AFTER A NIGHT OF HEAVY DRINKING: 0
HOW OFTEN DURING THE LAST YEAR HAVE YOU FAILED TO DO WHAT WAS NORMALLY EXPECTED FROM YOU BECAUSE OF DRINKING: 0
HAVE YOU OR SOMEONE ELSE BEEN INJURED AS A RESULT OF YOUR DRINKING: 0
HOW OFTEN DURING THE LAST YEAR HAVE YOU FOUND THAT YOU WERE NOT ABLE TO STOP DRINKING ONCE YOU HAD STARTED: 0
HOW OFTEN DO YOU HAVE A DRINK CONTAINING ALCOHOL: 4 OR MORE TIMES A WEEK
HOW OFTEN DURING THE LAST YEAR HAVE YOU HAD A FEELING OF GUILT OR REMORSE AFTER DRINKING: 0
HOW OFTEN DURING THE LAST YEAR HAVE YOU BEEN UNABLE TO REMEMBER WHAT HAPPENED THE NIGHT BEFORE BECAUSE YOU HAD BEEN DRINKING: 0
HAS A RELATIVE, FRIEND, DOCTOR, OR ANOTHER HEALTH PROFESSIONAL EXPRESSED CONCERN ABOUT YOUR DRINKING OR SUGGESTED YOU CUT DOWN: 0
HOW MANY STANDARD DRINKS CONTAINING ALCOHOL DO YOU HAVE ON A TYPICAL DAY: 1 OR 2

## 2022-10-05 NOTE — PROGRESS NOTES
Medicare Annual Wellness Visit    Chula Lees is here for Medicare AWV    Assessment & Plan   Medicare annual wellness visit, subsequent  Living will address today  She is up-to-date with the labs and immunizations  Follow-up with pulmonologist for COPD    Psychophysiological insomnia  Improved with medications  Denies side effects  Sleep hygiene discussed  Relaxation techniques discussed  Currently on zaleplon, tolerated well, denies side effects  Patient failed multiple other medications and interventions for insomnia    Controlled Substance Monitoring:    Acute and Chronic Pain Monitoring:   RX Monitoring 10/5/2022   Attestation -   Periodic Controlled Substance Monitoring Possible medication side effects, risk of tolerance/dependence & alternative treatments discussed. ;No signs of potential drug abuse or diversion identified. ;Assessed functional status. Chronic Pain > 50 MEDD -           Recommendations for Preventive Services Due: see orders and patient instructions/AVS.  Recommended screening schedule for the next 5-10 years is provided to the patient in written form: see Patient Instructions/AVS.    Controlled Substance Monitoring:    Acute and Chronic Pain Monitoring:   RX Monitoring 10/5/2022   Attestation -   Periodic Controlled Substance Monitoring Possible medication side effects, risk of tolerance/dependence & alternative treatments discussed. ;No signs of potential drug abuse or diversion identified. ;Assessed functional status. Chronic Pain > 50 MEDD -            Return in 1 year (on 10/6/2023) for Medicare Annual Wellness Visit in 1 year, [facility], [address] [place] [address] Agatha Headley.      Subjective         Reports dyspnea on exertion for 2 months  Using Bezri and Albuterol 3 times a day  When walking outside or changing the bedsheets gets shortness of breath  Denies cough, wheezing or waking up shortness of breath at nighttime  Pulse ox is normal, reassuring  She has appointment with pulmonologist  SpO2 Readings from Last 4 Encounters:   10/05/22 98%   08/30/22 98%   05/12/22 98%   04/20/22 100%     Had left eye glaucoma surgery 3 weeks ago, she is afraid of complications, I reassured her, follow-up with ophthalmologist discussed    Patient's complete Health Risk Assessment and screening values have been reviewed and are found in Flowsheets. The following problems were reviewed today and where indicated follow up appointments were made and/or referrals ordered. Positive Risk Factor Screenings with Interventions:             General Health and ACP:  General  In general, how would you say your health is?: Fair  In the past 7 days, have you experienced any of the following: New or Increased Pain, New or Increased Fatigue, Loneliness, Social Isolation, Stress or Anger?: No  Do you get the social and emotional support that you need?: Yes  Do you have a Living Will?: (!) No    Advance Directives       Power of [facility] Will ACP-Advance Directive ACP-Power of     Not on File Not on File Not on File Not on File          General Health Risk Interventions:  No Living Will: Advance Care Planning addressed with patient today, packet and counseling given              Objective   Vitals:    10/05/22 1123   BP: 110/80   Pulse: 84   Temp: 97.8 °F (36.6 °C)   SpO2: 98%   Weight: 129 lb (58.5 kg)   Height: 5' 3\" (1.6 m)      Body mass index is 22.85 kg/m².       General Appearance: alert and oriented to person, place and time, well-developed and well-nourished, in no acute distress  ENT: bilateral tympanic membrane normal and hearing abnormal- has hearing aids    Pulmonary/Chest: no wheezes, rales or rhonchi, no respiratory distress and decreased breath sounds noted- mild decreased    Cardiovascular: normal rate, regular rhythm, and normal S1 and S2  Abdomen: soft, non-tender, non-distended, normal bowel sounds, no masses or organomegaly  Extremities: no edema bilateral legs    Allergies Allergen Reactions    Aspirin      GI upset    Atorvastatin      Muscle cramps     Prior to Visit Medications    Medication Sig Taking? Authorizing Provider   sodium chloride (ALTAMIST SPRAY) 0.65 % nasal spray 1 spray by Nasal route as needed for Congestion Yes BARRERA Allen - CNP   omeprazole (PRILOSEC OTC) 20 MG tablet Take 1 tablet by mouth in the morning and at bedtime Yes Derrick Avelar MD   BREZTRI AEROSPHERE 160-9-4.8 MCG/ACT AERO Inhale 1 inhalation into the lungs daily Yes Historical Provider, MD   zaleplon (SONATA) 10 MG capsule Take 1 capsule by mouth nightly as needed (Insomnia.) for up to 90 days.  Yes Erin Deng MD   albuterol sulfate  (90 Base) MCG/ACT inhaler Inhale 2 puffs into the lungs every 6 hours as needed for Wheezing or Shortness of Breath Yes Erin Deng MD   dorzolamide-timolol (COSOPT) 22.3-6.8 MG/ML ophthalmic solution Place 1 drop into both eyes 2 times daily Yes Historical Provider, MD   ALPHAGAN P 0.1 % SOLN Place 1 drop into both eyes 3 times daily  Yes Historical Provider, MD   latanoprost (XALATAN) 0.005 % ophthalmic solution Place 1 drop into both eyes nightly  Yes Historical Provider, MD   Tiotropium Bromide-Olodaterol (STIOLTO RESPIMAT) 2.5-2.5 MCG/ACT AERS Inhale 2 puffs into the lungs daily  Patient not taking: Reported on 10/5/2022  Historical Provider, MD [place] (Including outside providers/suppliers regularly involved in providing care):   Patient Care Team:  Erin Deng MD as PCP - Kaitlin Agarwal MD as PCP - [facility] Empaneled Provider  Alida Arceo MD as Surgeon (General Surgery)  Conor Meek MD as Surgeon (Ophthalmology)  Sreekanth Martinez MD as Consulting Physician (Gastroenterology)  Darius Muñoz MD as Consulting Physician (Endocrinology)  Lyla Cooper MD as Surgeon (Ophthalmology)  Yossi Domínguez MD as Consulting Physician (Internal Medicine Cardiovascular Disease)  Viviane Celestin MD as Consulting Physician (Cardiology)  Jovana Dean, PhD (Sleep Medicine Family Practice)  José Miguel Islas MD as Consulting Physician (Otolaryngology)  Taylor Bourgeois MD as Consulting Physician (Cardiology)  Fco Whittington MD as Consulting Physician (Pulmonology)  Theresa Oswald MD as Consulting Physician (Nephrology)  Christina Barfield MD as Consulting Physician (Pulmonology)  Tom Vogt MD as Consulting Physician (Gastroenterology)  Silvia Avina MD (Hematology and Oncology)     Reviewed and updated this visit:  Tobacco  Allergies  Meds  Problems  Med Hx  Surg Hx  Soc Hx  Fam Hx            Electronically signed by Ruth Ann Hensley MD on 10/10/22 at 11:06 PM EDT

## 2022-10-05 NOTE — PROGRESS NOTES
Visit Information    Have you changed or started any medications since your last visit including any over-the-counter medicines, vitamins, or herbal medicines? no   Are you having any side effects from any of your medications? -  no  Have you stopped taking any of your medications? Is so, why? -  no    Have you seen any other physician or provider since your last visit? No  Have you had any other diagnostic tests since your last visit? No  Have you been seen in the emergency room and/or had an admission to a hospital since we last saw you? No  Have you had your routine dental cleaning in the past 6 months? yes     Have you activated your [employer] account? If not, what are your barriers?  Yes     Patient Care Team:  Olena Ascencio MD as PCP - Dmitri Rodriguez MD as PCP - [facility]  Monica Crawford MD as Surgeon (General Surgery)  Pallavi Canseco MD as Surgeon (Ophthalmology)  Adrienne Mccord MD as Consulting Physician (Gastroenterology)  Bere Mckeon MD as Consulting Physician (Endocrinology)  Ludwig Miguel MD as Surgeon (Ophthalmology)  Olesya Omer MD as Consulting Physician (Internal Medicine Cardiovascular Disease)  Ria Lopez MD as Consulting Physician (Cardiology)  Amber Noonan, PhD (Sleep Medicine Family Practice)  Hayley Llamas MD as Consulting Physician (Otolaryngology)  Keshia Basilio MD as Consulting Physician (Cardiology)  Benjamin Jaramillo MD as Consulting Physician (Pulmonology)  Gabrielle Monreal MD as Consulting Physician (Nephrology)  Nestor Jose MD as Consulting Physician (Pulmonology)  Sam Carter MD as Consulting Physician (Gastroenterology)    Medical History Review  Past Medical, Family, and Social History reviewed and does contribute to the patient presenting condition    Health Maintenance   Topic Date Due    Breast cancer screen  10/01/2021    Annual Wellness Visit (AWV)  08/13/2022    Depression Monitoring  05/12/2023 DEXA (modify frequency per FRAX score)  06/08/2024    Colorectal Cancer Screen  10/25/2024    DTaP/Tdap/Td vaccine (2 - Td or Tdap) 01/09/2027    Flu vaccine  Completed    Shingles vaccine  Completed    Pneumococcal 65+ years Vaccine  Completed    COVID-19 Vaccine  Completed    Hepatitis A vaccine  Aged Out    Hepatitis B vaccine  Aged Out    Hib vaccine  Aged Out    Meningococcal (ACWY) vaccine  Aged Out

## 2022-10-05 NOTE — PATIENT INSTRUCTIONS
Personalized Preventive Plan for Neeraj Petersen - 10/5/2022  Medicare offers a range of preventive health benefits. Some of the tests and screenings are paid in full while other may be subject to a deductible, co-insurance, and/or copay. Some of these benefits include a comprehensive review of your medical history including lifestyle, illnesses that may run in your family, and various assessments and screenings as appropriate. After reviewing your medical record and screening and assessments performed today your provider may have ordered immunizations, labs, imaging, and/or referrals for you. A list of these orders (if applicable) as well as your Preventive Care list are included within your After Visit Summary for your review. Other Preventive Recommendations:    A preventive eye exam performed by an eye specialist is recommended every 1-2 years to screen for glaucoma; cataracts, macular degeneration, and other eye disorders. A preventive dental visit is recommended every 6 months. Try to get at least 150 minutes of exercise per week or 10,000 steps per day on a pedometer . Order or download the FREE \"Exercise & Physical Activity: Your Everyday Guide\" from [employer]. Call 5-351.326.1113 or search [employer] online. You need 8857-7617 mg of calcium and 9052-8795 IU of vitamin D per day. It is possible to meet your calcium requirement with diet alone, but a vitamin D supplement is usually necessary to meet this goal.  When exposed to the sun, use a sunscreen that protects against both UVA and UVB radiation with an SPF of 30 or greater. Reapply every 2 to 3 hours or after sweating, drying off with a towel, or swimming. Always wear a seat belt when traveling in a car. Always wear a helmet when riding a bicycle or motorcycle.

## 2022-10-14 ENCOUNTER — TELEPHONE (OUTPATIENT)
Dept: FAMILY MEDICINE CLINIC | Age: 82
End: 2022-10-14

## 2022-10-14 ENCOUNTER — TELEMEDICINE (OUTPATIENT)
Dept: FAMILY MEDICINE CLINIC | Age: 82
End: 2022-10-14
Payer: MEDICARE

## 2022-10-14 DIAGNOSIS — U07.1 COVID-19 VIRUS INFECTION: Primary | ICD-10-CM

## 2022-10-14 DIAGNOSIS — J43.1 PANLOBULAR EMPHYSEMA (HCC): ICD-10-CM

## 2022-10-14 PROCEDURE — 1123F ACP DISCUSS/DSCN MKR DOCD: CPT | Performed by: FAMILY MEDICINE

## 2022-10-14 PROCEDURE — 99213 OFFICE O/P EST LOW 20 MIN: CPT | Performed by: FAMILY MEDICINE

## 2022-10-14 RX ORDER — BENZONATATE 100 MG/1
100 CAPSULE ORAL 3 TIMES DAILY PRN
Qty: 21 CAPSULE | Refills: 0 | Status: SHIPPED | OUTPATIENT
Start: 2022-10-14 | End: 2022-10-21

## 2022-10-14 RX ORDER — ALBUTEROL SULFATE 90 UG/1
2 AEROSOL, METERED RESPIRATORY (INHALATION) EVERY 6 HOURS PRN
Qty: 18 G | Refills: 3 | Status: SHIPPED | OUTPATIENT
Start: 2022-10-14

## 2022-10-14 ASSESSMENT — ENCOUNTER SYMPTOMS
SORE THROAT: 1
NAUSEA: 1
ABDOMINAL DISTENTION: 0
SHORTNESS OF BREATH: 1
COUGH: 1
SINUS PRESSURE: 1
WHEEZING: 0
RHINORRHEA: 1
CONSTIPATION: 0
DIARRHEA: 0
SINUS PAIN: 1
CHEST TIGHTNESS: 0
ABDOMINAL PAIN: 0
VOMITING: 0

## 2022-10-14 NOTE — TELEPHONE ENCOUNTER
Incoming call came from Patient, in regard to having onset symptoms of Nasal congestion, coughing and pain in face . That have been ongoing for the past few  days. Due to our availability of schedule, nothing is available today to offer telemedicine visit with provider. Patient does have MY-CHART ACCESS     I did advise patient to submit an e-visit with provider for today. So patient's issues may be addressed. I did explain to patient step by step how to properly submit an e-visit. Along with answering question's related to symptoms. She still denied     I also did offer walk in clinic, urgent care. Patient verbalized  No: she will like me to just send  a message.

## 2022-10-14 NOTE — PROGRESS NOTES
10/14/2022    TELEHEALTH EVALUATION -- Audio/Visual (During  public health emergency)      Rere Limon (:  1940) is a 80 y.o. female,Established patient, here for evaluation of the following chief complaint(s): Concern For COVID-19 and COPD        ASSESSMENT/PLAN:    1. COVID-19 virus infection  Worsening  Increase fluids, rest, self isolate, vitamin C, zinc, increase proteins in diet, Gatorade 0  Patient says her  is already better and he will  the medications  Patient is a very high risk due to her advanced age and COPD, fortunately she has been vaccinated, but not with the updated COVID-19 vaccine  -     nirmatrelvir/ritonavir (PAXLOVID) 20 x 150 MG & 10 x 100MG TBPK; Take 3 tablets (two 150 mg nirmatrelvir and one 100 mg ritonavir tablets) by mouth every 12 hours for 5 days. , Disp-30 tablet, R-0Normal  2. Panlobular emphysema (HCC)  Severe, ongoing, likely worsening  She does not have wheezing at this time  I cannot give her steroids due to glaucoma and recent surgery for glaucoma  She does not have nebulizer machine  Advised to start using albuterol around-the-clock  -     albuterol sulfate HFA (PROVENTIL;VENTOLIN;PROAIR) 108 (90 Base) MCG/ACT inhaler; Inhale 2 puffs into the lungs every 6 hours as needed for Wheezing or Shortness of Breath And when sick, Disp-18 g, R-3Normal  -     benzonatate (TESSALON PERLES) 100 MG capsule; Take 1 capsule by mouth 3 times daily as needed for Cough, Disp-21 capsule, R-0Normal    Continue Breztri    Careful review of urgent symptoms and need for immediate medical attention if condition worsens. Patient expressed understanding. Advised to go to ED if severe symptoms develop. Patient expressed understanding.   Does not have pulse oximeter    Ap received counseling on the following healthy behaviors: nutrition, exercise, and medication adherence  Reviewed prior labs and health maintenance  Discussed use, benefit, and side effects of prescribed medications. Barriers to medication compliance addressed. Patient given educational materials - see patient instructions  All patient questions answered. Patient voiced understanding. The patient's past medical,surgical, social, and family history as well as her current medications and allergies were reviewed as documented in today's encounter. Medications, labs, diagnostic studies, consultations and follow-up as documented in this encounter. Return for KEEP APPT. Data Unavailable    Future Appointments   Date Time Provider Shirley Vibha   2023 10:00 AM Keiry Valdez MD [facility] [place] [facility]   2023 11:00 [facility]   [facility]23 11:00 [facility]         SUBJECTIVE/OBJECTIVE:  Nazario Downey (:  1940) has requested an audio/video evaluation for the following concern(s):Concern For COVID-19 and COPD    Patient called our office, this morning because she tested positive for COVID-19 this morning. Patient says she also get her COVID-19 test yesterday but she was negative  Her  had COVID-19 last week . Patient reports for symptoms started yesterday, but overnight got worse and she retested this morning. Patient reports she feels Plugged up, hard to breath through her nose, has been mouth breathing, feels very congested, postnasal drip, runny nose with clear mucus, sinus pain and sinus pressure, sore throat, decreased appetite. Denies chest pain  Mucus is yellow when waking up, then clear throughout the day. Patient does have severe COPD with recent change in her inhalers. She does not have a nebulizer. She does have Breztri and albuterol but albuterol might be . Denies wheezing at this time. Denies fever, chills yesterday but this morning she feels warm and she has been sweating. Did not take her temperature yet. Denies muscle aches. Denies vomiting, abdominal pain.   She lives only with her . Does not have Pulsoxymeter or nebulizer machine  SpO2 Readings from Last 4 Encounters:   10/05/22 98%   08/30/22 98%   05/12/22 98%   04/20/22 100%                 Review of Systems   Constitutional:  Positive for appetite change, diaphoresis, fatigue and fever. Negative for activity change and chills. HENT:  Positive for congestion, postnasal drip, rhinorrhea, sinus pressure, sinus pain and sore throat. Respiratory:  Positive for cough and shortness of breath ([place]). Negative for chest tightness and wheezing. Cardiovascular:  Negative for chest pain, palpitations and leg swelling. Gastrointestinal:  Positive for nausea. Negative for abdominal distention, abdominal pain, constipation, diarrhea and vomiting. Endocrine: Positive for cold intolerance. Negative for heat intolerance, polydipsia, polyphagia and polyuria. Musculoskeletal:  Positive for myalgias. Prior to Visit Medications    Medication Sig Taking? Authorizing Provider   sodium chloride (ALTAMIST SPRAY) 0.65 % nasal spray 1 spray by Nasal route as needed for Congestion Yes BARRERA Allen - CNP   omeprazole (PRILOSEC OTC) 20 MG tablet Take 1 tablet by mouth in the morning and at bedtime Yes Kolby , MD MORGANTRJUAN AEROSPHERE 160-9-4.8 MCG/ACT AERO Inhale 1 inhalation into the lungs daily Yes Historical Provider, MD   zaleplon (SONATA) 10 MG capsule Take 1 capsule by mouth nightly as needed (Insomnia.) for up to 90 days.  Yes Dari Saab MD   albuterol sulfate  (90 Base) MCG/ACT inhaler Inhale 2 puffs into the lungs every 6 hours as needed for Wheezing or Shortness of Breath Yes Dari Saab MD   dorzolamide-timolol (COSOPT) 22.3-6.8 MG/ML ophthalmic solution Place 1 drop into both eyes 2 times daily Yes Historical Provider, MD   ALPHAGAN P 0.1 % SOLN Place 1 drop into both eyes 3 times daily  Yes Historical Provider, MD   latanoprost (XALATAN) 0.005 % ophthalmic solution Place 1 drop into both eyes nightly  Yes Historical Provider, MD       Social History     Tobacco Use    Smoking status: Former     Packs/day: 1.00     Years: 30.00     Pack years: 30.00     Types: Cigarettes     Start date: 1960     Quit date: 2000     Years since quittin.3    Smokeless tobacco: Never    Tobacco comments:     still uses OTC nicorette gum   Substance Use Topics    Alcohol use: Yes     Alcohol/week: 0.0 standard drinks     Comment: occassional    Drug use: No          PHYSICAL EXAMINATION:    Vital Signs: (As obtained by patient/caregiver or practitioner observation)  -vital signs stable and within normal limits BMI 22.85 kg/M2  Patient-Reported Vitals 10/14/2022   Patient-Reported Weight 129 lbs   Patient-Reported Height 5 ft 3 in           Constitutional: [x] Appears well-developed and well-nourished [x] No apparent distress      [] Abnormal-       Mental status  [x] Alert and awake  [x] Oriented to person/place/time [x]Able to follow commands      Eyes:  EOM    [x]  Normal  [] Abnormal-  Sclera  [x]  Normal  [] Abnormal -         Discharge [x]  None visible  [] Abnormal -    HENT:   [x] Normocephalic, atraumatic.   [] Abnormal   [x] Mouth/Throat: Mucous membranes are moist.     External Ears [x] Normal  [] Abnormal-     Neck: [x] No visualized mass     Pulmonary/Chest: [x] Respiratory effort normal.  [x] No visualized signs of difficulty breathing or respiratory distress        [x] Abnormal sounds congested, mild cough heard       Musculoskeletal:   [x] Normal gait with no signs of ataxia         [x] Normal range of motion of neck        [] Abnormal-       Neurological:        [x] No Facial Asymmetry (Cranial nerve 7 motor function) (limited exam to video visit)          [x] No gaze palsy        [] Abnormal-            Skin:        [x] No significant exanthematous lesions or discoloration noted on facial skin         [] Abnormal-            Psychiatric:      [x] No Hallucinations       [x]Mood is normal      [x]Behavior is normal      [x]Judgment is normal      [x]Thought content is normal       [] Abnormal-     Other pertinent observable physical exam findings- none    Due to this being a TeleHealth encounter, evaluation of the following organ systems is limited: Vitals/Constitutional/EENT/Resp/CV/GI//MS/Neuro/Skin/Heme-Lymph-Imm. Orders Placed This Encounter   Medications    nirmatrelvir/ritonavir (PAXLOVID) 20 x 150 MG & 10 x 100MG TBPK     Sig: Take 3 tablets (two 150 mg nirmatrelvir and one 100 mg ritonavir tablets) by mouth every 12 hours for 5 days. Dispense:  30 tablet     Refill:  0     Order Specific Question:   Does this patient qualify for COVID-19 antIviral therapy based on criteria for treatment? Answer:   Yes    albuterol sulfate HFA (PROVENTIL;VENTOLIN;PROAIR) 108 (90 Base) MCG/ACT inhaler     Sig: Inhale 2 puffs into the lungs every 6 hours as needed for Wheezing or Shortness of Breath And when sick     Dispense:  18 g     Refill:  3    benzonatate (TESSALON PERLES) 100 MG capsule     Sig: Take 1 capsule by mouth 3 times daily as needed for Cough     Dispense:  21 capsule     Refill:  0       No orders of the defined types were placed in this encounter. Medications Discontinued During This Encounter   Medication Reason    Tiotropium Bromide-Olodaterol (STIOLTO RESPIMAT) 2.5-2.5 MCG/ACT AERS Allergic response    albuterol sulfate  (90 Base) MCG/ACT inhaler REORDER              Ap Napier, was evaluated through a synchronous (real-time) audio-video encounter. The patient (or guardian if applicable) is aware that this is a billable service, which includes applicable co-pays. The virtual visit was conducted with patient's (and/or legal guardian consent). Verbal consent to proceed has been obtained within the past 12 months.  The visit was conducted pursuant to the emergency declaration under the [facility], 1135 waiver authority and the [place] Act. Patient identification was verified    Patient was alone   Provider was located at primary practice location. The patient was located at home, in a state where the provider was licensed to provide care. On this date 10/14/2022 I have spent 25 minutes reviewing previous notes, test results and face to face with the patient discussing the diagnosis and importance of compliance with the treatment plan as well as documenting on the day of the visit. --Britta Borrero MD on 10/14/2022 at 9:55 AM    An electronic signature was used to authenticate this note.

## 2022-10-14 NOTE — PATIENT INSTRUCTIONS
Patient Education        Coronavirus ([id number]-93): Care Instructions  Overview  The coronavirus disease (COVID-19) is caused by a virus. Symptoms may include a fever, a cough, and shortness of breath. It can spread through droplets from coughing and sneezing, breathing, and singing. The virus also can spread when people are in close contact with someone who is infected. Most people have mild symptoms and can take care of themselves at home with medicine to reduce symptoms. Talk to your doctor. They might have you take medicine to help prevent serious illness. If your symptoms get worse, you may need care in a hospital. Treatment may include medicines, plus breathing support such as oxygen therapy or a ventilator. It's important to not spread the virus to others. If you have COVID-19, wear a well-fitting mask anytime you are around other people. Isolate yourself while you are sick. Leave your home only if you need to get medical care or testing. Follow-up care is a key part of your treatment and safety. Be sure to make and go to all appointments, and call your doctor if you are having problems. It's also a good idea to know your test results and keep a list of the medicines you take. How can you care for yourself at home? Get extra rest. It can help you feel better. Drink plenty of fluids. This helps replace fluids lost from fever. Fluids may also help ease a scratchy throat. You can take acetaminophen (Tylenol) or ibuprofen (Advil, Motrin) to reduce a fever. It may also help with muscle and body aches. Read and follow all instructions on the label. Use petroleum jelly on sore skin. This can help if the skin around your nose and lips becomes sore from rubbing a lot with tissues. If you use oxygen, use a water-based product instead of petroleum jelly. Keep track of symptoms such as fever and shortness of breath. This can help you know if you need to call your doctor.  It can also help you know when it's safe to be around other people. In some cases, your doctor might suggest that you get a pulse oximeter. How can you self-isolate when you have COVID-19? If you have COVID-19, there are things you can do to help avoid spreading the virus to others. Stay home, and avoid contact with other people. Limit contact with people in your home. If possible, stay in a separate bedroom and use a separate bathroom. Wear a well-fitting mask when you are around other people. Avoid contact with pets and other animals. Cover your mouth and nose with a tissue when you cough or sneeze. Then throw it in the trash right away. Wash your hands often, especially after you cough or sneeze. Use soap and water, and scrub for at least 20 seconds. If soap and water aren't available, use an alcohol-based hand . Don't share personal household items. These include bedding, towels, cups and glasses, and eating utensils. [address] in the warmest water allowed for the fabric type, and dry it completely. It's okay to wash other people's laundry with yours. Clean and disinfect your home. Use household  and disinfectant wipes or sprays. If you have questions, visit cdc.gov to check the Quarantine and Isolation Calculator. When can you end self-isolation for COVID-19? If you know or think that you have the virus, you will need to self-isolate. When you can be around other people you live with and leave home depends on whether you have symptoms. Important: Day 0 is the day your symptoms started or the day you tested positive. Day 1 is the day after your symptoms first started or your test was positive. Isolation starts on Day 0. If you have symptoms, you can end isolation at the end of Day 5 if you haven't had a fever for 24 hours while not taking medicines to lower the fever and your symptoms are getting better. If you tested positive but have NO symptoms, you can end isolation at the end of Day 5.  But if you start to have symptoms, follow the steps above. Use Day 0 as your first day of symptoms. You may take a COVID-19 test at the end of Day 5. If it is positive, continue to isolate until the end of Day 10. If you have a weakened immune system or are seriously ill, you may need to isolate for up to 20 days. After you have ended self-isolation, you still need to wear a well-fitting mask around other people for at least 10 days. Avoid travel and stay away from people at high risk for serious illness for at least 10 days. If you have questions, go to the . [facility] website at cdc.gov to check the Quarantine and Isolation Calculator. When should you call for help? Call 911 anytime you think you may need emergency care. For example, call if you have life-threatening symptoms, such as: You have severe trouble breathing. (You can't talk at all.)     You have constant chest pain or pressure. You are severely dizzy or lightheaded. You are confused or can't think clearly. You have pale, gray, or blue-colored skin or lips. You pass out (lose consciousness) or are very hard to wake up. You have loss of balance or trouble walking. You have trouble seeing out of one or both eyes. You have weakness or drooping on one side of the face. You have weakness or numbness in an arm or a leg. You have trouble speaking. You have a severe headache. You have a seizure. Call your doctor now or seek immediate medical care if:    You have moderate trouble breathing. (You can't speak a full sentence.)     You are coughing up blood. You have signs of low blood pressure. These include feeling lightheaded; being too weak to stand; and having cold, pale, clammy skin. Watch closely for changes in your health, and be sure to contact your doctor if:    Your symptoms get worse. You are not getting better as expected. You have new or worse symptoms of anxiety, depression, nightmares, or flashbacks.    Call before you go to the doctor's office. Follow their instructions. And wear a mask. Where can you learn more? Go to [email or web address] and sign in to your [employer] account. Enter C007 in the [facility] box to learn more about \"Coronavirus (COVID-19): Care Instructions. \"     If you do not have an account, please click on the \"Sign Up Now\" link. Current as of: July 28, 2022               Content Version: 13.4  © 8201-9786 [employer]. Care instructions adapted under license by [facility] ([facility]). If you have questions about a medical condition or this instruction, always ask your healthcare professional. [address] any warranty or liability for your use of this information. Patient Education        Learning About Coronavirus [phone])  What is coronavirus (COVID-19)? COVID-19 is a disease caused by a type of coronavirus. This illness was first found in December 2019. It has since spread worldwide. Coronaviruses are a large group of viruses. They cause the common cold. They also cause more serious illnesses like [place] respiratory syndrome (MERS) and severe acute respiratory syndrome (SARS). COVID-19 is caused by a novel coronavirus. That means it's a new type that has not been seen in people before. What are the symptoms? COVID-19 symptoms may include:  Fever. Cough. Trouble breathing. Chills or repeated shaking with chills. Muscle and body aches. Headache. Sore throat. New loss of taste or smell. Vomiting. Diarrhea. In severe cases, COVID-19 can cause pneumonia and make it hard to breathe without help from a machine. It can cause death. How is it diagnosed? COVID-19 is diagnosed with a viral test. This may also be called a PCR test or antigen test. It looks for evidence of the virus in your breathing passages or lungs (respiratory system). The test is most often done on a sample from the nose, throat, or lungs.  It's sometimes done on a sample of saliva. One way a sample is collected is by putting a long swab into the back of your nose. If you have questions about COVID-19 testing, ask your doctor or go to cdc.gov to use the COVID-19 Viral Testing Tool. How is it treated? Mild cases of COVID-19 can be treated at home. Serious cases need treatment in the hospital. Treatment may include medicines, plus breathing support such as oxygen therapy or a ventilator. Some people may be placed on their belly to help their oxygen levels. Treatments that may help people who have COVID-19 include:  Antiviral medicines. These medicines treat viral infections. Immune-based therapy. These medicines help the immune system fight COVID-19. Examples include monoclonal antibodies. Blood thinners. These medicines help prevent blood clots. People with severe illness are at risk for blood clots. How can you protect yourself and others? Stay up to date on your COVID-19 vaccines. Avoid sick people, and stay away from others if you are sick. Stay at least 6 feet away from other people. Avoid crowds, especially inside. Get tested for COVID-19 before you have an indoor visit with people who don't live with you. Improve the airflow when you spend time indoors with people who don't live with you. If you can, open windows and doors. Or you can use a fan to blow air away from people and out a window. Cover your mouth with a tissue when you cough or sneeze. Wash your hands often, especially after you cough or sneeze. Use soap and water, and scrub for at least 20 seconds. If soap and water aren't available, use an alcohol-based hand . Avoid touching your mouth, nose, and eyes. Check the CDC website at cdc.gov for the most current information on how to protect yourself. And if you have questions, ask your doctor or go to cdc.gov to use the COVID-19 Quarantine and Isolation Calculator. Here are some other steps you may need to take.   If you are not up to date on your COVID-19 vaccines:  Wear a mask with the best fit, protection, and comfort for you. A mask can protect you even when others aren't wearing one. This might be especially important if you:  Have certain health conditions. Live with someone who has a compromised immune system. Live with someone who is not up to date on their COVID-19 vaccines. If you have been exposed to the virus AND are not up to date on your COVID-19 vaccines:  Talk to your doctor as soon as you can. Your doctor might have you take medicine to help prevent serious illness. Get a COVID-19 test. You may need to be tested more than once. And if your test is positive, follow the instructions below. Stay home. Try to separate from other people where you live. Don't go to school, work, or public areas. Wear a well-fitting mask around other people for a full 10 days. Avoid travel, and stay away from people at high risk for serious illness. Watch for symptoms. If you have been exposed AND either tested positive for COVID-19 in the last 90 days and have recovered or you are up to date on your COVID-19 vaccines:  Talk to your doctor as soon as you can. Your doctor may have you take medicine to help prevent serious illness. Get a COVID-19 test. Wait 5 days after you were last exposed. You may need to be tested more than once. And if your test is positive, follow the instructions below. Wear a well-fitting mask around other people for a full 10 days. Avoid travel and stay away from people at high risk for serious illness. Watch for symptoms. If you tested positive for COVID-19 in the last 90 days and have not recovered, another COVID-19 test may not be needed. If you're sick or test positive for COVID-19:  Talk to your doctor as soon as you can. Your doctor may have you take medicine to help prevent serious illness. Get a COVID-19 test unless you have already been tested.  You may need to be tested more than once.  Stay home. Leave only if you need to get medical care. If you were seriously ill or if you have a weakened immune system, you may need to isolate for several weeks. For a full 10 days, wear a well-fitting mask whenever you're around other people. Avoid travel and stay away from people at high risk for serious illness. Limit contact with pets and people in your home. If possible, stay in a separate bedroom and use a separate bathroom. Clean and disinfect your home every day. Use household  and disinfectant wipes or sprays. Take special care to clean things that you touch with your hands. How can you self-isolate when you have COVID-19? If you have COVID-19, there are things you can do to help avoid spreading the virus to others. Stay home, and avoid contact with other people. Limit contact with people in your home. If possible, stay in a separate bedroom and use a separate bathroom. Wear a well-fitting mask when you are around other people. Avoid contact with pets and other animals. Cover your mouth and nose with a tissue when you cough or sneeze. Then throw it in the trash right away. Wash your hands often, especially after you cough or sneeze. Use soap and water, and scrub for at least 20 seconds. If soap and water aren't available, use an alcohol-based hand . Don't share personal household items. These include bedding, towels, cups and glasses, and eating utensils. [address] in the warmest water allowed for the fabric type, and dry it completely. It's okay to wash other people's laundry with yours. Clean and disinfect your home. Use household  and disinfectant wipes or sprays. If you have questions, visit cdc.gov to check the Quarantine and Isolation Calculator. When should you call for help? Call 911 anytime you think you may need emergency care. For example, call if you have life-threatening symptoms, such as: You have severe trouble breathing.  (You can't talk at all.)     You have constant chest pain or pressure. You are severely dizzy or lightheaded. You are confused or can't think clearly. You have pale, gray, or blue-colored skin or lips. You pass out (lose consciousness) or are very hard to wake up. You have loss of balance or trouble walking. You have trouble seeing out of one or both eyes. You have weakness or drooping on one side of the face. You have weakness or numbness in an arm or a leg. You have trouble speaking. You have a severe headache. You have a seizure. Call your doctor now or seek immediate medical care if:    You have moderate trouble breathing. (You can't speak a full sentence.)     You are coughing up blood. You have signs of low blood pressure. These include feeling lightheaded; being too weak to stand; and having cold, pale, clammy skin. Watch closely for changes in your health, and be sure to contact your doctor if:    Your symptoms get worse. You are not getting better as expected. You have new or worse symptoms of anxiety, depression, nightmares, or flashbacks. Call before you go to the doctor's office. Follow their instructions. And wear a mask. Where can you learn more? Go to [email or web address] and sign in to your [employer]oo mobility account. Enter C008 in the [facility] box to learn more about \"Learning About Coronavirus (COVID-19). \"     If you do not have an account, please click on the \"Sign Up Now\" link. Current as of: July 28, 2022               Content Version: 13.4  © 5565-2143 [employer]. Care instructions adapted under license by [facility] ([facility]). If you have questions about a medical condition or this instruction, always ask your healthcare professional. [address] any warranty or liability for your use of this information.

## 2022-10-17 ENCOUNTER — TELEPHONE (OUTPATIENT)
Dept: ONCOLOGY | Age: 82
End: 2022-10-17

## 2022-10-17 NOTE — TELEPHONE ENCOUNTER
WRITER CALLED AND LEFT A VM MESSAGE TO RETURN OUR CALL SO THAT THE PT WILL KNOW WHEN HER APPT IS SCHEDULED FOR AN INJECTION  WAITING ON A CALL BACK

## 2022-11-03 ENCOUNTER — HOSPITAL ENCOUNTER (OUTPATIENT)
Dept: INFUSION THERAPY | Age: 82
Setting detail: INFUSION SERIES
Discharge: HOME | End: 2022-11-03
Payer: MEDICARE

## 2022-11-03 VITALS
HEART RATE: 76 BPM | DIASTOLIC BLOOD PRESSURE: 81 MMHG | SYSTOLIC BLOOD PRESSURE: 151 MMHG | OXYGEN SATURATION: 98 % | TEMPERATURE: 96.8 F | RESPIRATION RATE: 18 BRPM

## 2022-11-03 DIAGNOSIS — M81.0 OSTEOPOROSIS, UNSPECIFIED OSTEOPOROSIS TYPE, UNSPECIFIED PATHOLOGICAL FRACTURE PRESENCE: Primary | ICD-10-CM

## 2022-11-03 LAB
CALCIUM SERPL-MCNC: 9.1 MG/DL (ref 8.6–10.4)
CREAT SERPL-MCNC: 0.92 MG/DL (ref 0.5–0.9)
GFR SERPL CREATININE-BSD FRML MDRD: >60 ML/MIN/1.73M2
MAGNESIUM: 2 MG/DL (ref 1.6–2.6)
PHOSPHORUS: 2.9 MG/DL (ref 2.6–4.5)

## 2022-11-03 PROCEDURE — 82565 ASSAY OF CREATININE: CPT

## 2022-11-03 PROCEDURE — 36415 COLL VENOUS BLD VENIPUNCTURE: CPT

## 2022-11-03 PROCEDURE — 82310 ASSAY OF CALCIUM: CPT

## 2022-11-03 PROCEDURE — 6360000002 HC RX W HCPCS: Performed by: INTERNAL MEDICINE

## 2022-11-03 PROCEDURE — 96372 THER/PROPH/DIAG INJ SC/IM: CPT

## 2022-11-03 PROCEDURE — 83735 ASSAY OF MAGNESIUM: CPT

## 2022-11-03 PROCEDURE — 84100 ASSAY OF PHOSPHORUS: CPT

## 2022-11-03 RX ORDER — SODIUM CHLORIDE 9 MG/ML
INJECTION, SOLUTION INTRAVENOUS CONTINUOUS
OUTPATIENT
Start: 2023-03-02

## 2022-11-03 RX ORDER — EPINEPHRINE 1 MG/ML
0.3 INJECTION, SOLUTION, CONCENTRATE INTRAVENOUS PRN
OUTPATIENT
Start: 2023-03-02

## 2022-11-03 RX ORDER — ONDANSETRON 2 MG/ML
8 INJECTION INTRAMUSCULAR; INTRAVENOUS
OUTPATIENT
Start: 2023-03-02

## 2022-11-03 RX ORDER — ALBUTEROL SULFATE 90 UG/1
4 AEROSOL, METERED RESPIRATORY (INHALATION) PRN
OUTPATIENT
Start: 2023-03-02

## 2022-11-03 RX ORDER — ACETAMINOPHEN 325 MG/1
650 TABLET ORAL
OUTPATIENT
Start: 2023-03-02

## 2022-11-03 RX ORDER — DIPHENHYDRAMINE HYDROCHLORIDE 50 MG/ML
50 INJECTION INTRAMUSCULAR; INTRAVENOUS
OUTPATIENT
Start: 2023-03-02

## 2022-11-03 RX ADMIN — DENOSUMAB 60 MG: 60 INJECTION SUBCUTANEOUS at 13:25

## 2022-11-03 NOTE — PROGRESS NOTES
Pt arrived to outpatient infusion. Vitals completed. Labs drawn, WNL. Per orders injection given to left arm. Pt tolerated well, no adverse reactions noted. Pt discharged with family.

## 2022-11-15 ENCOUNTER — OFFICE VISIT (OUTPATIENT)
Dept: GASTROENTEROLOGY | Age: 82
End: 2022-11-15
Payer: MEDICARE

## 2022-11-15 ENCOUNTER — TELEPHONE (OUTPATIENT)
Dept: GASTROENTEROLOGY | Age: 82
End: 2022-11-15

## 2022-11-15 VITALS
BODY MASS INDEX: 23.39 KG/M2 | HEART RATE: 69 BPM | WEIGHT: 132 LBS | DIASTOLIC BLOOD PRESSURE: 69 MMHG | HEIGHT: 63 IN | SYSTOLIC BLOOD PRESSURE: 139 MMHG

## 2022-11-15 DIAGNOSIS — R10.13 DYSPEPSIA: Primary | ICD-10-CM

## 2022-11-15 PROCEDURE — 1123F ACP DISCUSS/DSCN MKR DOCD: CPT | Performed by: INTERNAL MEDICINE

## 2022-11-15 PROCEDURE — 99214 OFFICE O/P EST MOD 30 MIN: CPT | Performed by: INTERNAL MEDICINE

## 2022-11-15 RX ORDER — OMEPRAZOLE 40 MG/1
40 CAPSULE, DELAYED RELEASE ORAL
Qty: 90 CAPSULE | Refills: 2 | Status: SHIPPED | OUTPATIENT
Start: 2022-11-15

## 2022-11-15 ASSESSMENT — ENCOUNTER SYMPTOMS
CONSTIPATION: 1
RECTAL PAIN: 0
BLOOD IN STOOL: 0
WHEEZING: 1
NAUSEA: 0
ABDOMINAL DISTENTION: 1
ANAL BLEEDING: 0
TROUBLE SWALLOWING: 0
VOICE CHANGE: 0
ABDOMINAL PAIN: 1
CHOKING: 0
DIARRHEA: 1
VOMITING: 0
COUGH: 0

## 2022-11-15 NOTE — PROGRESS NOTES
Reason for Referral:  Abdominal pain and diarrhea      No referring provider defined for this encounter. Chief Complaint   Patient presents with    Dysphagia    Diarrhea    GI Problem     Stomach pain after eating            HISTORY OF PRESENT ILLNESS: Chino Gauthier is a 80 y.o. female with a past history remarkable for chronic abdominal pain, prior history of tubular adenomas on a colon polyp, COPD, depression, osteoarthritis, hypothyroidism, hyperparathyroid bone disease, status post parathyroidectomy, referred for evaluation of abdominal pain x1 year. Patient reports postprandial dyspepsia and abdominal discomfort. Patient is currently on Prilosec 40 mg daily. Modest relief with current gastric medication. Currently denies any significant NSAID use, no alcohol, no smoking. Denies any food aversion or secondary weight loss. Patient was identified to have diarrhea during questioning during this clinic visit. Loose watery, nonbloody per patient. Smoker: Quit 22 yrs ago   Drinking history: Nightly   Illicit drugs: None    Abdominal surgeries: appendectomy, CCY, tubal ligation. Prior Colonoscopy: Several years ago  Prior EGD: None recent  FH of GI issues: None reported      Past Medical,Family, and Social History reviewed and does contribute to the patient presentingcondition. Patient's PMH/PSH,SH,PSYCH Hx, MEDs, ALLERGIES, and ROS were all reviewed and updated in the appropriate sections.     PAST MEDICAL HISTORY:  Past Medical History:   Diagnosis Date    Abdominal pain     Allergic rhinitis     Anxiety     Chronic back pain     Chronic fatigue     Chronic kidney disease     Chronic renal disease, stage III [facility]) [[id number]] 4/20/2022    Chronic respiratory failure ([facility] [address] 8/11/2020    Colon polyp 3/7/2012    TUBULAR ADENOMA    COPD (chronic obstructive pulmonary disease) (HCC)     COPD, 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     Depression     Diverticul disease small and large intestine, no perforati or abscess     Elevated blood pressure reading     Fall 1/12/2017    GERD (gastroesophageal reflux disease)     Glaucoma     Hyperlipidemia     with target LDL less than 100    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[facility] [address]     Hyperthyroidism 2012    Insomnia     Psychophysiological    Neuropathy     Orthostatic dizziness 3/12/2017    Osteoarthritis     Panlobular emphysema ([facility] [address]     PVC (premature ventricular contraction) 1/12/2017    S/P parathyroidectomy ([facility] [address]     Tubal pregnancy 7890,0110    Vasovagal syncope 1/12/2017       Past Surgical History:   Procedure Laterality Date    APPENDECTOMY      BACK SURGERY      CATARACT REMOVAL WITH IMPLANT Left 04/01/14    [place] [address]  2005    COLONOSCOPY      COLONOSCOPY  6/13/2014    Severe sigmoid diverticulosis; due for repeat June 2019    ENDOSCOPY, COLON, DIAGNOSTIC      EYE SURGERY      PARATHYROIDECTOMY  11/05/2018    Dr. Patsy Cornejo; Left inferior parathyroid: adenoma    ROTATOR CUFF REPAIR Left     TONSILLECTOMY      TUBAL LIGATION      UPPER GASTROINTESTINAL ENDOSCOPY  6/13/2014    biopsy    UPPER GASTROINTESTINAL ENDOSCOPY  1-26-16    UPPER GASTROINTESTINAL ENDOSCOPY  5/4/16    mild gastritis       CURRENT MEDICATIONS:    Current Outpatient Medications:     omeprazole (PRILOSEC) 40 MG delayed release capsule, Take 1 capsule by mouth every morning (before breakfast), Disp: 90 capsule, Rfl: 2    albuterol sulfate HFA (PROVENTIL;VENTOLIN;PROAIR) 108 (90 Base) MCG/ACT inhaler, Inhale 2 puffs into the lungs every 6 hours as needed for Wheezing or Shortness of Breath And when sick, Disp: 18 g, Rfl: 3    sodium chloride (ALTAMIST SPRAY) 0.65 % nasal spray, 1 spray by Nasal route as needed for Congestion, Disp: 1 each, Rfl: 3    omeprazole (PRILOSEC OTC) 20 MG tablet, Take 1 tablet by mouth in the morning and at bedtime, Disp: 60 tablet, Rfl: 3    BREZTRI AEROSPHERE 160-9-4.8 MCG/ACT AERO, Inhale 1 inhalation into the lungs daily, Disp: , Rfl:     dorzolamide-timolol (COSOPT) 22.3-6.8 MG/ML ophthalmic solution, Place 1 drop into both eyes 2 times daily, Disp: , Rfl:     ALPHAGAN P 0.1 % SOLN, Place 1 drop into both eyes 3 times daily , Disp: , Rfl:     latanoprost (XALATAN) 0.005 % ophthalmic solution, Place 1 drop into both eyes nightly , Disp: , Rfl:     nirmatrelvir/ritonavir (PAXLOVID) 20 x 150 MG & 10 x 100MG TBPK, Take 3 tablets (two 150 mg nirmatrelvir and one 100 mg ritonavir tablets) by mouth every 12 hours for 5 days. (Patient not taking: Reported on 11/15/2022), Disp: 30 tablet, Rfl: 0    ALLERGIES:   Allergies   Allergen Reactions    Aspirin      GI upset    Atorvastatin      Muscle cramps       FAMILY HISTORY:       Problem Relation Age of Onset    Other Father         COPD    Stroke Sister     Heart Attack Sister     Breast Cancer Sister     Cancer Maternal Grandmother         Stomach cancer    Osteoporosis Mother     Other Sister         suicide         SOCIAL HISTORY:   Social History     Socioeconomic History    Marital status:      Spouse name: Not on file    Number of children: Not on file    Years of education: Not on file    Highest education level: Not on file   Occupational History    Not on file   Tobacco Use    Smoking status: Former     Packs/day: 1.00     Years: 30.00     Pack years: 30.00     Types: Cigarettes     Start date: 1960     Quit date: 2000     Years since quittin.4    Smokeless tobacco: Never    Tobacco comments:     still uses OTC nicorette gum   Vaping Use    Vaping Use: Not on file   Substance and Sexual Activity    Alcohol use:  Yes     Alcohol/week: 0.0 standard drinks     Comment: occassional    Drug use: No    Sexual activity: Not on file   Other Topics Concern    Not on file   Social History Narrative    Not on file     Social Determinants of Health     Financial Resource Strain: Low Risk     Difficulty of Paying Living Expenses: Not hard at all   Food Insecurity: No Food Insecurity    Worried About Running Out of Food in the Last Year: Never true    Ran Out of Food in the Last Year: Never true   Transportation Needs: No Transportation Needs    Lack of Transportation (Medical): No    Lack of Transportation (Non-Medical): No   Physical Activity: Insufficiently Active    Days of Exercise per Week: 7 days    Minutes of Exercise per Session: 20 min   Stress: Not on file   Social Connections: Not on file   Intimate Partner Violence: Not on file   Housing Stability: Unknown    Unable to Pay for Housing in the Last Year: No    Number of Places Lived in the Last Year: Not on file    Unstable Housing in the Last Year: No         REVIEW OF SYSTEMS: A 12-point review of systems was obtained and pertinent positives and negatives were listed below. REVIEW OF SYSTEMS:     Constitutional: No fever, no chills, no lethargy, no weakness. HEENT:  No headache, otalgia, itchy eyes, nasal discharge or sore throat. Cardiac:  No chest pain, dyspnea, orthopnea or PND. Chest:   No cough, phlegm or wheezing. Abdomen:      Detailed by MA   Neuro:  No focal weakness, abnormal movements or seizure like activity. Skin:   No rashes, no itching. :   No hematuria, no pyuria, no dysuria, no flank pain. Extremities:  No swelling or joint pains. ROS was otherwise negative    Review of Systems   Constitutional:  Negative for appetite change, fatigue and unexpected weight change. HENT:  Negative for trouble swallowing and voice change. Eyes:  Negative for visual disturbance. Respiratory:  Positive for wheezing. Negative for cough and choking. Shortness of breath: COPD. Cardiovascular:  Negative for chest pain, palpitations and leg swelling. Gastrointestinal:  Positive for abdominal distention, abdominal pain, constipation and diarrhea. Negative for anal bleeding, blood in stool, nausea, rectal pain and vomiting. Genitourinary:  Negative for difficulty urinating.    Neurological:  Negative for dizziness, seizures, weakness, numbness and headaches. Hematological:  Bruises/bleeds easily. Psychiatric/Behavioral:  Positive for confusion and sleep disturbance. The patient is nervous/anxious. PHYSICAL EXAMINATION: Vital signs reviewed per the nursing documentation. /69 (Site: Left Lower Arm, Position: Sitting, Cuff Size: Small Adult)   Pulse 69   Ht 5' 3\" (1.6 m)   Wt 132 lb (59.9 kg)   BMI 23.38 kg/m²   Body mass index is 23.38 kg/m². Physical Exam    Physical Exam   Constitutional: Patient is oriented to person, place, and time. Patient appears well-developed and well-nourished. HENT:   Head: Normocephalic and atraumatic. Eyes: Pupils are equal, round, and reactive to light. EOM are normal.   Neck: Normal range of motion. Neck supple. No JVD present. No tracheal deviation present. No thyromegaly present. Cardiovascular: Normal rate, regular rhythm, normal heart sounds and intact distal pulses. Pulmonary/Chest: Effort normal and breath sounds normal. No stridor. No respiratory distress. He has no wheezes. He has no rales. He exhibits no tenderness. Abdominal: Soft. Bowel sounds are normal. He exhibits no distension and no mass. There is no tenderness. There is no rebound and no guarding. No hernia. Musculoskeletal: Normal range of motion. Lymphadenopathy:    Patient has no cervical adenopathy. Neurological: Patient is alert and oriented to person, place, and time. Psychiatric: Patient has a normal mood and affect.  Patient behavior is normal.       LABORATORY DATA: Reviewed  Lab Results   Component Value Date    WBC 6.3 09/22/2022    HGB 15.1 09/22/2022    HCT 44.7 09/22/2022    MCV 94.5 09/22/2022     09/22/2022     09/22/2022    K 3.8 09/22/2022     09/22/2022    CO2 27 09/22/2022    BUN 17 09/22/2022    CREATININE 0.92 (H) 11/03/2022    LABPROT 6.5 02/27/2012    LABALBU 3.9 09/22/2022    BILITOT 0.7 09/22/2022    ALKPHOS 86 09/22/2022    AST 12 09/22/2022    ALT 10 09/22/2022    INR 1.0 05/03/2016         Lab Results   Component Value Date    RBC 4.73 09/22/2022    HGB 15.1 09/22/2022    MCV 94.5 09/22/2022    MCH 31.9 09/22/2022    MCHC 33.7 09/22/2022    RDW 13.1 09/22/2022    MPV 7.7 09/22/2022    BASOPCT 1 09/22/2022    LYMPHSABS 1.90 09/22/2022    MONOSABS 0.60 09/22/2022    NEUTROABS 3.60 09/22/2022    EOSABS 0.10 09/22/2022    BASOSABS 0.10 09/22/2022         DIAGNOSTIC TESTING:     US HEAD NECK SOFT TISSUE THYROID    Result Date: 6/8/2022  EXAMINATION: THYROID ULTRASOUND 6/8/2022 COMPARISON: March 2, 2021 HISTORY: ORDERING SYSTEM PROVIDED HISTORY: Nontoxic multinodular goiter FINDINGS: Right thyroid lobe:  45 x 13 x 16 mm Left thyroid lobe:  47 x 12 x 15 mm Isthmus:  5 mm Thyroid Gland:  Thyroid is heterogeneous throughout Nodules: NODULE: 1 Size: 10 x 7 x 11 mm Location: Mid right thyroid 1. Composition:  Solid (2) 2. Echogenicity:  Hypoechoic (2) 3. Shape: Wider-than-tall (0) 4. Margins:  Smooth (0) 5. Echogenic foci:  None (0) ACR TI-RADS total points:  4 ACR TI-RADS risk category: TR4 NODULE: 2 Size: 12 x 6 x 9 mm Location: Inferior right thyroid 1. Composition:  Mixed cystic and solid (1) 2. Echogenicity:  Hypoechoic (2) 3. Shape: Wider-than-tall (0) 4. Margins:  Smooth (0) 5. Echogenic foci:  None (0) ACR TI-RADS total points:  3 ACR TI-RADS risk category: TR3 Cervical lymphadenopathy: No abnormal lymph nodes in the imaged portions of the neck. Heterogeneous thyroid with a centrally stable nodules right thyroid lobe meeting TI-RADS criteria for 1 year follow-up RECOMMENDATIONS: NODULE 1:  ACR TI-RADS TR4: Recommend: Follow-up ultrasound in 1 year. NODULE 2:  ACR TI-RADS TR3: Recommend:  No follow-up.  ACR TI-RADS recommendations: TR5 (>= 7 points):  FNA if >= 1 cm; follow-up if 0.5-0.9 cm in 1, 2, 3, 4, and 5 years TR4 (4-6 points):  FNA if >= 1.5 cm; follow-up if 1.0-1.4 cm in 1, 2, 3, and 5 years TR3 (3 points):  FNA if >= 2.5 cm; follow-up if 1.5-2.4 cm in 1, 3, and 5 years TR2 (2 points):  No FNA or follow-up TR1 (0 points):  No FNA or follow-up ACR TI-RADS recommends that no more than two nodules with the highest ACR TI-RADS point total should be biopsied and no more than four nodules should be followed. DEXA BONE DENSITY AXIAL SKELETON    Result Date: 6/8/2022  EXAMINATION: BONE DENSITOMETRY 6/8/2022 9:50 am TECHNIQUE: A bone density dual x-ray absorptiometry (DEXA) scan was performed of the lumbar spine and left hip  on a [employer] system. COMPARISON: Several previous studies with the most recent prior dated March 23, 2018. HISTORY: ORDERING SYSTEM PROVIDED HISTORY: Idiopathic osteoporosis FINDINGS: LUMBAR SPINE: The bone mineral density in the lumbar spine including the L1-L4 levels is measured at 1.085 g/cm2, which corresponds to a T-score of -0.9 and a Z-score of 1.2. This is within the normal range by WHO criteria. These findings would suggest improvement in bone mineral density since the previous comparison study. However, it is noted that there appear to be degenerative changes in the spine. If there are significant spurs, these could erroneously elevate the patient's BMD. LEFT HIP: The bone mineral density in the total hip is measured at 0.695 g/cm2 corresponding to a T-score of -2.5 and a Z-score of -0.2. This is within the osteoporosis range by WHO criteria. No evidence of statistically significant change. The bone mineral density of the femoral neck is measured at 0.648 g/cm2 corresponding to a T-score of -2.8 and a Z-score of -0.4. This is within the osteoporosis range by WHO criteria. FRAX score for 10 year probability of major osteoporotic fracture is 21.1%, and in the hip 8.6%. FRAX adjusted for TB S 10 year probability of major osteoporotic fracture is 21.5%, and in the hip 8.7%. By [facility] criteria the patient's bone mineral density is classified as osteoporosis.           IMPRESSION: Jennifer Petersen is a 80 y.o. female with a past history remarkable for chronic abdominal pain, prior history of tubular adenomas on a colon polyp, COPD, depression, osteoarthritis, hypothyroidism, hyperparathyroid bone disease, status post parathyroidectomy, referred for evaluation of abdominal pain x1 year. Patient reports postprandial dyspepsia and abdominal discomfort. Patient is currently on Prilosec 40 mg daily. Modest relief with current gastric medication. Currently denies any significant NSAID use, no alcohol, no smoking. Denies any food aversion or secondary weight loss. Patient was identified to have diarrhea during questioning during this clinic visit. Loose watery, nonbloody per patient. Assessment  1. Dyspepsia        Austin Sherman was seen today for new patient and abdominal pain. Diagnoses and all orders for this visit:    Diarrhea, unspecified type-we will send for pancreatic elastase, may consider Benefiber or fiber supplementation as a bulk forming agent. Avoid dietary triggers. Patient continue with antidiarrheal medication over-the-counter      Dyspepsia- we will increase patient's of Prilosec to 40 mg in a.m. and 20 mg in evening. Strongly advise complete cessation of wine which the patient drinks on a nightly basis. Plan for diagnostic upper endoscopy, risk, benefits, terms discussed with patient. She agreed to proceed with procedure. Other orders  -     omeprazole (PRILOSEC OTC) 20 MG tablet; Take 1 tablet by mouth in the morning and at bedtime           RTC: 3 months. Additional comments: Thank you for allowing me to participate in the care of Ms. Napier. For any further questions please do not hesitate to contact me. I have reviewed and agree with the MA/LPN ROS please refer to their documentation from today's encounter on a separate note.      Manuel Ritter MD, MPH   Board Certified in Gastroenterology  Board Certified in [facility] [employer] [place] #: [phone]          this note is created with the assistance of a speech recognition program.  While intending to generate a document that actually reflects the content of the visit, the document can still have some errors including those of syntax and sound a like substitutions which may escape proof reading. It such instances, actual meaning can be extrapolated by contextual diversion.

## 2022-11-30 ENCOUNTER — PATIENT MESSAGE (OUTPATIENT)
Dept: FAMILY MEDICINE CLINIC | Age: 82
End: 2022-11-30

## 2022-11-30 DIAGNOSIS — F51.04 PSYCHOPHYSIOLOGICAL INSOMNIA: ICD-10-CM

## 2022-11-30 RX ORDER — ZALEPLON 10 MG/1
10 CAPSULE ORAL NIGHTLY PRN
Qty: 90 CAPSULE | Refills: 0 | Status: SHIPPED | OUTPATIENT
Start: 2022-11-30 | End: 2023-02-28

## 2022-11-30 NOTE — TELEPHONE ENCOUNTER
From: Sinai Miller  To: Dr. Recinos Blank: 11/30/2022 12:39 PM EST  Subject: prescription refill    Please send in a script for zaleplon 10mg capsules to [facility] I have 5 left Thank you.   Sinai Miller

## 2022-12-15 NOTE — DISCHARGE INSTRUCTIONS
Normal changes you may experience after a EGD:  Activity   You have had anesthesia today  Do not drive, operate heavy equipment, consume alcoholic beverages, or make any important decisions  for 24 hours   Take your time changing positions today. You may feel light headed or dizzy if you move too quickly. Rest for the next 24 hours. Diet   You can eat your normal diet when you feel well. You should start off with bland foods like chicken soup, toast, or yogurt. Then advance as tolerated. Drink plenty of fluids (unless your doctor tells you not to). Your urine should be very lightly colored without a strong odor. Medicines   Continue your home medications as ordered by your physician.      Call your doctor now or seek immediate medical care if:[phone]  You are passing blood rectally or vomiting blood (color of blood may be red or black)  You have coffee ground looking vomit  Severe abdominal pain or tenderness    You have a fever, chills or excessive sweating   You have persistent nausea or vomiting   Redness or swelling at the IV site

## 2022-12-16 ENCOUNTER — ANESTHESIA EVENT (OUTPATIENT)
Dept: OPERATING ROOM | Age: 82
End: 2022-12-16
Payer: MEDICARE

## 2022-12-20 ENCOUNTER — HOSPITAL ENCOUNTER (OUTPATIENT)
Age: 82
Setting detail: OUTPATIENT SURGERY
Discharge: HOME | End: 2022-12-20
Attending: INTERNAL MEDICINE | Admitting: INTERNAL MEDICINE
Payer: MEDICARE

## 2022-12-20 ENCOUNTER — ANESTHESIA (OUTPATIENT)
Dept: OPERATING ROOM | Age: 82
End: 2022-12-20
Payer: MEDICARE

## 2022-12-20 VITALS
DIASTOLIC BLOOD PRESSURE: 85 MMHG | RESPIRATION RATE: 25 BRPM | WEIGHT: 127 LBS | HEIGHT: 62 IN | OXYGEN SATURATION: 100 % | HEART RATE: 82 BPM | TEMPERATURE: 97.4 F | BODY MASS INDEX: 23.37 KG/M2 | SYSTOLIC BLOOD PRESSURE: 131 MMHG

## 2022-12-20 DIAGNOSIS — R10.13 DYSPEPSIA: ICD-10-CM

## 2022-12-20 PROCEDURE — 88305 TISSUE EXAM BY PATHOLOGIST: CPT

## 2022-12-20 PROCEDURE — 7100000010 HC PHASE II RECOVERY - FIRST 15 MIN: Performed by: INTERNAL MEDICINE

## 2022-12-20 PROCEDURE — 43239 EGD BIOPSY SINGLE/MULTIPLE: CPT | Performed by: INTERNAL MEDICINE

## 2022-12-20 PROCEDURE — 2709999900 HC NON-CHARGEABLE SUPPLY: Performed by: INTERNAL MEDICINE

## 2022-12-20 PROCEDURE — 3700000000 HC ANESTHESIA ATTENDED CARE: Performed by: INTERNAL MEDICINE

## 2022-12-20 PROCEDURE — 2580000003 HC RX 258: Performed by: ANESTHESIOLOGY

## 2022-12-20 PROCEDURE — 6360000002 HC RX W HCPCS

## 2022-12-20 PROCEDURE — 3609012400 HC EGD TRANSORAL BIOPSY SINGLE/MULTIPLE: Performed by: INTERNAL MEDICINE

## 2022-12-20 PROCEDURE — 2500000003 HC RX 250 WO HCPCS

## 2022-12-20 PROCEDURE — 7100000011 HC PHASE II RECOVERY - ADDTL 15 MIN: Performed by: INTERNAL MEDICINE

## 2022-12-20 RX ORDER — DIPHENHYDRAMINE HYDROCHLORIDE 50 MG/ML
12.5 INJECTION INTRAMUSCULAR; INTRAVENOUS
Status: DISCONTINUED | OUTPATIENT
Start: 2022-12-20 | End: 2022-12-20 | Stop reason: HOSPADM

## 2022-12-20 RX ORDER — SODIUM CHLORIDE 9 MG/ML
INJECTION, SOLUTION INTRAVENOUS PRN
Status: DISCONTINUED | OUTPATIENT
Start: 2022-12-20 | End: 2022-12-20 | Stop reason: HOSPADM

## 2022-12-20 RX ORDER — MORPHINE SULFATE 2 MG/ML
1 INJECTION, SOLUTION INTRAMUSCULAR; INTRAVENOUS EVERY 5 MIN PRN
Status: DISCONTINUED | OUTPATIENT
Start: 2022-12-20 | End: 2022-12-20 | Stop reason: HOSPADM

## 2022-12-20 RX ORDER — SODIUM CHLORIDE 0.9 % (FLUSH) 0.9 %
5-40 SYRINGE (ML) INJECTION PRN
Status: DISCONTINUED | OUTPATIENT
Start: 2022-12-20 | End: 2022-12-20 | Stop reason: HOSPADM

## 2022-12-20 RX ORDER — SODIUM CHLORIDE 0.9 % (FLUSH) 0.9 %
5-40 SYRINGE (ML) INJECTION EVERY 12 HOURS SCHEDULED
Status: DISCONTINUED | OUTPATIENT
Start: 2022-12-20 | End: 2022-12-20 | Stop reason: HOSPADM

## 2022-12-20 RX ORDER — ONDANSETRON 2 MG/ML
4 INJECTION INTRAMUSCULAR; INTRAVENOUS
Status: DISCONTINUED | OUTPATIENT
Start: 2022-12-20 | End: 2022-12-20 | Stop reason: HOSPADM

## 2022-12-20 RX ORDER — SODIUM CHLORIDE, SODIUM LACTATE, POTASSIUM CHLORIDE, CALCIUM CHLORIDE 600; 310; 30; 20 MG/100ML; MG/100ML; MG/100ML; MG/100ML
INJECTION, SOLUTION INTRAVENOUS CONTINUOUS
Status: DISCONTINUED | OUTPATIENT
Start: 2022-12-20 | End: 2022-12-20 | Stop reason: HOSPADM

## 2022-12-20 RX ORDER — LIDOCAINE HYDROCHLORIDE 10 MG/ML
INJECTION, SOLUTION INFILTRATION; PERINEURAL PRN
Status: DISCONTINUED | OUTPATIENT
Start: 2022-12-20 | End: 2022-12-20 | Stop reason: SDUPTHER

## 2022-12-20 RX ORDER — SODIUM CHLORIDE 9 MG/ML
25 INJECTION, SOLUTION INTRAVENOUS PRN
Status: DISCONTINUED | OUTPATIENT
Start: 2022-12-20 | End: 2022-12-20 | Stop reason: HOSPADM

## 2022-12-20 RX ORDER — SODIUM CHLORIDE 9 MG/ML
INJECTION, SOLUTION INTRAVENOUS CONTINUOUS
Status: DISCONTINUED | OUTPATIENT
Start: 2022-12-20 | End: 2022-12-20 | Stop reason: HOSPADM

## 2022-12-20 RX ORDER — PROPOFOL 10 MG/ML
INJECTION, EMULSION INTRAVENOUS PRN
Status: DISCONTINUED | OUTPATIENT
Start: 2022-12-20 | End: 2022-12-20 | Stop reason: SDUPTHER

## 2022-12-20 RX ADMIN — SODIUM CHLORIDE: 9 INJECTION, SOLUTION INTRAVENOUS at 10:50

## 2022-12-20 RX ADMIN — PROPOFOL 40 MG: 10 INJECTION, EMULSION INTRAVENOUS at 10:58

## 2022-12-20 RX ADMIN — LIDOCAINE HYDROCHLORIDE 40 MG: 10 INJECTION, SOLUTION INFILTRATION; PERINEURAL at 10:56

## 2022-12-20 RX ADMIN — PROPOFOL 50 MG: 10 INJECTION, EMULSION INTRAVENOUS at 10:56

## 2022-12-20 RX ADMIN — PROPOFOL 40 MG: 10 INJECTION, EMULSION INTRAVENOUS at 11:00

## 2022-12-20 ASSESSMENT — PAIN - FUNCTIONAL ASSESSMENT: PAIN_FUNCTIONAL_ASSESSMENT: 0-10

## 2022-12-20 ASSESSMENT — COPD QUESTIONNAIRES: CAT_SEVERITY: NO INTERVAL CHANGE

## 2022-12-20 NOTE — ANESTHESIA PRE PROCEDURE
Department of Anesthesiology  Preprocedure Note       Name:  Reyna Randall   Age:  80 y.o.  :  1940                                          MRN:  [id number]         Date:  2022      Surgeon: Charu Castillo):  Km Alfonso MD    Procedure: Procedure(s):  EGD BIOPSY    Medications prior to admission:   Prior to Admission medications    Medication Sig Start Date End Date Taking? Authorizing Provider   zaleplon (SONATA) 10 MG capsule Take 1 capsule by mouth nightly as needed (Insomnia.) for up to 90 days. 22  Rachell Bassett MD   omeprazole (PRILOSEC) 40 MG delayed release capsule Take 1 capsule by mouth every morning (before breakfast) 11/15/22   Km Alfonso MD   nirmatrelvir/ritonavir (PAXLOVID) 20 x 150 MG & 10 x 100MG TBPK Take 3 tablets (two 150 mg nirmatrelvir and one 100 mg ritonavir tablets) by mouth every 12 hours for 5 days.   Patient not taking: Reported on 11/15/2022 10/14/22   Rachell Bassett MD   albuterol sulfate HFA (PROVENTIL;VENTOLIN;PROAIR) 108 (90 Base) MCG/ACT inhaler Inhale 2 puffs into the lungs every 6 hours as needed for Wheezing or Shortness of Breath And when sick 10/14/22   Rachell Bassett MD   sodium chloride (ALTAMIST SPRAY) 0.65 % nasal spray 1 spray by Nasal route as needed for Congestion 22   Kimberly Hines, APRN - CNP   omeprazole (PRILOSEC OTC) 20 MG tablet Take 1 tablet by mouth in the morning and at bedtime 22   Km Alfonso MD   BREZTRI AEROSPHERE 160-9-4.8 MCG/ACT AERO Inhale 1 inhalation into the lungs daily 22   Historical Provider, MD   dorzolamide-timolol (COSOPT) 22.3-6.8 MG/ML ophthalmic solution Place 1 drop into both eyes 2 times daily    Historical Provider, MD   ALPHAGAN P 0.1 % SOLN Place 1 drop into both eyes 3 times daily  16   Historical Provider, MD   latanoprost (XALATAN) 0.005 % ophthalmic solution Place 1 drop into both eyes nightly  16   Historical Provider, MD       Current medications:    Current Facility-Administered Medications   Medication Dose Route Frequency Provider Last Rate Last Admin    0.9 % sodium chloride infusion   IntraVENous Continuous Gilberto Barrett MD        lactated ringers infusion   IntraVENous Continuous Gilberto Barrett MD        sodium chloride flush 0.9 % injection 5-40 mL  5-40 mL IntraVENous 2 times per day Gilberto Barrett MD        sodium chloride flush 0.9 % injection 5-40 mL  5-40 mL IntraVENous PRN Gilberto Barrett MD        0.9 % sodium chloride infusion   IntraVENous PRN Gilberto Barrett MD           Allergies:     Allergies   Allergen Reactions    Aspirin      GI upset    Atorvastatin      Muscle cramps       Problem List:    Patient Active Problem List   Diagnosis Code    Chronic diarrhea K52.9    OA (osteoarthritis) of knee M17.9    Psychophysiological insomnia F51.04    Glaucoma H40.9    Vitamin D deficiency E55.9    Neuropathy (HCC) G62.9    Allergic rhinitis J30.9    Osteoporosis M81.0    Alternating constipation and diarrhea R19.8    GERD (gastroesophageal reflux disease) K21.9    Constipation K59.00    Chronic back pain greater than 3 months duration M54.9, G89.29    COPD, 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 J43.1    Gastritis K29.70    DINORA (generalized anxiety disorder) F41.1    History of frequent urinary tract infections Z87.440    Chronic fatigue R53.82    History of colon polyps, tubular adenoma in 2012 Z86.010    MDD (major depressive disorder), recurrent, in full remission ([facility] [address] F33.42    Hyperlipidemia with target LDL less than 100 E78.5    Hyperglycemia R73.9    Lung nodule, solitary R91.1    Family history of breast cancer in sister Z80.2    Stage 3b chronic kidney disease (HCC) N18.32    Cyst of left kidney N28.1    Hypermetropia H52.00    Presbyopia H52.4    Primary open angle glaucoma H40.1190    Pseudophakia Z96.1    Elevated blood pressure reading R03.0    S/P parathyroidectomy ([facility]) E89.2    ALIYAH positive, anti-scleroderma R76.8    Abnormal serum protein electrophoresis R77.8    Burning sensation of feet R20.8    History of tobacco use Z87.891    Vitamin B 12 deficiency E53.8    Hyponatremia P36.5    Folic acid deficiency B45.6    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    Uses hearing aid Z97.4    Stage 3a chronic kidney disease (HCC) N18.31    MGUS (monoclonal gammopathy of unknown significance) D47.2       Past Medical History:        Diagnosis Date    Abdominal pain     Allergic rhinitis     Anxiety     Chronic back pain     Chronic fatigue     Chronic kidney disease     Chronic renal disease, stage III ([address] [[id number]] 04/20/2022    Chronic respiratory failure ([address] 08/11/2020    Colon polyp 03/07/2012    TUBULAR ADENOMA    COPD (chronic obstructive pulmonary disease) (HCC)     COPD, 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     COVID 10/2022    mild case states treated with Paxlovid    Depression     Diverticul disease small and large intestine, no perforati or abscess     Elevated blood pressure reading     Fall 01/12/2017    GERD (gastroesophageal reflux disease)     Glaucoma     Hyperlipidemia     with target LDL less than 100    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[address]     Hyperthyroidism 2012    Insomnia     Psychophysiological    Neuropathy     Orthostatic dizziness 03/12/2017    Osteoarthritis     Panlobular emphysema (HCC)     PVC (premature ventricular contraction) 01/12/2017    S/P [facility]ctomy [facility] [address]     Tubal pregnancy 3558,4587    Vasovagal syncope 01/12/2017       Past Surgical History:        Procedure Laterality Date    APPENDECTOMY      BACK SURGERY      CATARACT REMOVAL [facility]PLANT [facility]    CHOLECYSTECTOMY  2005    COLONOSCOPY      COLONOSCOPY  6/13/2014    Severe sigmoid diverticulosis; due for repeat June 2019    ENDOSCOPY, COLON, DIAGNOSTIC      EYE SURGERY      PARATHYROIDECTOMY  11/05/2018     Shelley Sriram; Left inferior parathyroid: adenoma    ROTATOR CUFF REPAIR Left     TONSILLECTOMY      TUBAL LIGATION      UPPER GASTROINTESTINAL ENDOSCOPY  2014    biopsy    UPPER GASTROINTESTINAL ENDOSCOPY  16    UPPER GASTROINTESTINAL ENDOSCOPY  16    mild gastritis       Social History:    Social History     Tobacco Use    Smoking status: Former     Packs/day: 1.00     Years: 30.00     Pack years: 30.00     Types: Cigarettes     Start date: 1960     Quit date: 2000     Years since quittin.5    Smokeless tobacco: Never    Tobacco comments:     still uses OTC nicorette gum   Substance Use Topics    Alcohol use: Yes     Alcohol/week: 0.0 standard drinks     Comment: occassional                                Counseling given: Not Answered  Tobacco comments: still uses OTC nicorette gum      Vital Signs (Current):   Vitals:    22 1021   Temp: (!) 96.4 °F (35.8 °C)   TempSrc: Infrared   Weight: 127 lb (57.6 kg)   Height: 5' 2\" (1.575 m)                                              BP Readings from Last 3 Encounters:   11/15/22 139/69   22 (!) 151/81   10/05/22 110/80       NPO Status:                                                                                 BMI:   Wt Readings from Last 3 Encounters:   22 127 lb (57.6 kg)   11/15/22 132 lb (59.9 kg)   10/05/22 129 lb (58.5 kg)     Body mass index is 23.23 kg/m².     CBC:   Lab Results   Component Value Date/Time    WBC 6.3 2022 09:26 AM    RBC 4.73 2022 09:26 AM    RBC 4.16 2012 09:49 AM    HGB 15.1 2022 09:26 AM    HCT 44.7 2022 09:26 AM    MCV 94.5 2022 09:26 AM    RDW 13.1 2022 09:26 AM     2022 09:26 AM     2012 09:49 AM       CMP:   Lab Results   Component Value Date/Time     2022 09:26 AM    K 3.8 2022 09:26 AM     2022 09:26 AM    CO2 27 2022 09:26 AM    BUN 17 2022 09:26 AM    CREATININE 0.92 2022 01:00 PM    GFRAA >60 09/22/2022 09:26 AM    LABGLOM >60 11/03/2022 01:00 PM    GLUCOSE 113 09/22/2022 09:26 AM    GLUCOSE 104 02/27/2012 09:49 AM    PROT 6.1 09/22/2022 09:26 AM    PROT 6.8 09/22/2022 09:26 AM    CALCIUM 9.1 11/03/2022 01:00 PM    BILITOT 0.7 09/22/2022 09:26 AM    ALKPHOS 86 09/22/2022 09:26 AM    AST 12 09/22/2022 09:26 AM    ALT 10 09/22/2022 09:26 AM       POC Tests: No results for input(s): POCGLU, POCNA, POCK, POCCL, POCBUN, POCHEMO, POCHCT in the last 72 hours. Coags:   Lab Results   Component Value Date/Time    PROTIME 11.1 05/03/2016 06:48 AM    INR 1.0 05/03/2016 06:48 AM       HCG (If Applicable): No results found for: PREGTESTUR, PREGSERUM, HCG, HCGQUANT     ABGs: No results found for: PHART, PO2ART, UTY2UFI, NQM3NBM, BEART, Z1VSCFPP     Type & Screen (If Applicable):  No results found for: LABABO, LABRH    Drug/Infectious Status (If Applicable):  No results found for: HIV, HEPCAB    COVID-19 Screening (If Applicable): No results found for: COVID19        Anesthesia Evaluation  Patient summary reviewed and Nursing notes reviewed no history of anesthetic complications:   Airway: Mallampati: II  TM distance: >3 FB   Neck ROM: full  Mouth opening: > = 3 FB   Dental: normal exam         Pulmonary:normal exam  breath sounds clear to auscultation  (+) COPD: no interval change,                             Cardiovascular:Negative CV ROS            Rhythm: regular  Rate: normal                    Neuro/Psych:   (+) psychiatric history:depression/anxiety              ROS comment: Neuropathy GI/Hepatic/Renal:   (+) GERD: no interval change, renal disease: CRI and no interval change,          ROS comment: Dyspepsia. Endo/Other:    (+) hyperthyroidism: arthritis: OA and no interval change. , . Abdominal:       Abdomen: soft. Vascular: negative vascular ROS.          Other Findings:           Anesthesia Plan      MAC     ASA 3             Anesthetic plan and risks discussed with patient. Plan discussed with CRNA.                     Sarah Reddy MD   12/20/2022

## 2022-12-20 NOTE — H&P
Procedure History and Physical    Pre-Procedural Diagnosis:  Dyspepsia    Indications:  same    Procedure Planned: endoscopy     History Obtained From:  patient    HISTORY OF PRESENT ILLNESS:       The patient is a 80 y.o. female who presents for the above procedure.         Past Medical History:    Past Medical History:   Diagnosis Date    Abdominal pain     Allergic rhinitis     Anxiety     Chronic back pain     Chronic fatigue     Chronic kidney disease     Chronic renal disease, stage III [facility]) [[id number]] 04/20/2022    Chronic respiratory failure ([facility] [address] 08/11/2020    Colon polyp 03/07/2012    TUBULAR ADENOMA    COPD (chronic obstructive pulmonary disease) (HCC)     COPD, 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     COVID 10/2022    mild case states treated with Paxlovid    Depression     Diverticul disease small and large intestine, no perforati or abscess     Elevated blood pressure reading     Fall 01/12/2017    GERD (gastroesophageal reflux disease)     Glaucoma     Hyperlipidemia     with target LDL less than 100    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[address]     Hyperthyroidism 2012    Insomnia     Psychophysiological    Neuropathy     Orthostatic dizziness 03/12/2017    Osteoarthritis     Panlobular emphysema (HCC)     PVC (premature ventricular contraction) 01/12/2017    S/P parathyroidectomy ([facility] [address]     Tubal pregnancy 0658,9944    Vasovagal syncope 01/12/2017       Past Surgical History:    Past Surgical History:   Procedure Laterality Date    APPENDECTOMY      BACK SURGERY      CATARACT REMOVAL WITH IMPLANT Left 04/01/14    [facility]    CHOLECYSTECTOMY  2005    COLONOSCOPY      COLONOSCOPY  6/13/2014    Severe sigmoid diverticulosis; due for repeat June 2019    ENDOSCOPY, COLON, DIAGNOSTIC      EYE SURGERY      PARATHYROIDECTOMY  11/05/2018    Dr. Janneth Villanueva; Left inferior parathyroid: adenoma    ROTATOR CUFF REPAIR Left     TONSILLECTOMY      TUBAL LIGATION      UPPER GASTROINTESTINAL ENDOSCOPY  2014    biopsy    UPPER GASTROINTESTINAL ENDOSCOPY  16    UPPER GASTROINTESTINAL ENDOSCOPY  16    mild gastritis       Medications:  Current Facility-Administered Medications   Medication Dose Route Frequency Provider Last Rate Last Admin    0.9 % sodium chloride infusion   IntraVENous Continuous Jose C Roland MD        lactated ringers infusion   IntraVENous Continuous Jose C Roland MD        sodium chloride flush 0.9 % injection 5-40 mL  5-40 mL IntraVENous 2 times per day Jose C Roland MD        sodium chloride flush 0.9 % injection 5-40 mL  5-40 mL IntraVENous PRN Jose C Roland MD        0.9 % sodium chloride infusion   IntraVENous PRN Jose C Roland MD           Allergies: Allergies   Allergen Reactions    Aspirin      GI upset    Atorvastatin      Muscle cramps                 Social   Social History     Tobacco Use    Smoking status: Former     Packs/day: 1.00     Years: 30.00     Pack years: 30.00     Types: Cigarettes     Start date: 1960     Quit date: 2000     Years since quittin.5    Smokeless tobacco: Never    Tobacco comments:     still uses OTC nicorette gum   Substance Use Topics    Alcohol use: Yes     Alcohol/week: 0.0 standard drinks     Comment: occassional        PSYCH HISTORY:  Depression No  Anxiety No  Suicide No       Family History   Problem Relation Age of Onset    Other Father         COPD    Stroke Sister     Heart Attack Sister     Breast Cancer Sister     Cancer Maternal Grandmother         Stomach cancer    Osteoporosis Mother     Other Sister         suicide      No family history of colon cancer, Crohn's disease, or ulcerative colitis    Problems with Sedation/Anesthesia in the past? no    REVIEW OF SYSTEMS:  12 point review of systems negative other than mentioned above.       PHYSICAL EXAM:    Vitals:  BP (!) 151/72   Pulse 71   Temp (!) 96.4 °F (35.8 °C) (Infrared)   Resp 18   Ht 5' 2\" (1.575 m)   Wt 127 lb (57.6 kg)   SpO2 97%   BMI 23.23 kg/m²     Focused Exam related to procedure:    General appearance: NAD, conversant   Eyes: anicteric sclerae, moist conjunctivae; no lid-lag; PERRLA   Lungs: CTA, with normal respiratory effort and no intercostal retractions   CV: RRR, no MRGs   Abdomen: Soft, non-tender; no masses or HSM   Skin: Normal temperature, turgor and texture; no rash, ulcers or subcutaneous nodules     DATA:  CBC:   Lab Results   Component Value Date    WBC 6.3 09/22/2022    HGB 15.1 09/22/2022    HCT 44.7 09/22/2022    MCV 94.5 09/22/2022     09/22/2022     BUN/Cr:   Lab Results   Component Value Date    BUN 17 09/22/2022   ,   Lab Results   Component Value Date    CREATININE 0.92 (H) 11/03/2022     Potassium:   Lab Results   Component Value Date    K 3.8 09/22/2022     PT/INR:   Lab Results   Component Value Date    INR 1.0 05/03/2016    PROTIME 11.1 05/03/2016       ASSESSMENT AND PLAN:       1. Patient is a 80 y.o. female with above specified procedure planned. Expected Sedation/Anesthesia Type: MAC    2. ASA ([address] Anesthesiology) Anesthesia Status: Class 2 - A normal healthy patient with mild systemic disease    3. Mallampati: II (soft palate, uvula, fauces visible)  4. Procedure options, risks and benefits reviewed with Patient. Patient expresses understanding.     5.  Consent has been signed:  Yes    Shay Strong MD

## 2022-12-20 NOTE — OP NOTE
Operative Note      Patient: Bhaskar Mcfarland  YOB: 1940  MRN: [id number]    Date of Procedure: 12/20/2022    Pre-Op Diagnosis: Dyspepsia [R10.13]    Post-Op Diagnosis: Mild gastritis, gastric erosion. Procedure(s):  EGD BIOPSY    Surgeon(s):  Bear Núñez MD    Assistant:   * No surgical staff found *    Anesthesia: Monitor Anesthesia Care    Estimated Blood Loss (mL): Minimal    Complications: None    Specimens:   ID Type Source Tests Collected by Time Destination   A : STOMACH BIOPSY R/O H. PYLORI  Tissue Stomach SURGICAL PATHOLOGY Bear Núñez MD 12/20/2022 1101        Implants:  * No implants in log *      Drains: * No LDAs found *              [place] ENDOSCOPY    EGD    PROCEDURE DATE: 12/20/22    REFERRING PHYSICIAN: No ref. provider found     PRIMARY CARE PROVIDER: Lynda Martinez MD    ATTENDING PHYSICIAN: Bear Núñez MD     HISTORY: Ms. Bhaskar Mcfarland is a 80 y.o. female who presents to the  Endoscopy unit for upper endoscopy. The patient's clinical history is remarkable for dyspepsia and diarrhea. She is currently medically stable and appropriate for the planned procedure. PREOPERATIVE DIAGNOSIS: Dyspepsia and diarrhea. PROCEDURES:   1) Transoral Upper Endoscopy with cold biopsy. POSTOPERATIVE DIAGNOSIS:     1) Normal appearing esophageal mucosa and GEJ. No evidence of esophagitis. 2) Mild erythema in the gastric body and antrum. Small antral erosion. Cold biopsy of the stomach taken to evaluate for H. Pylori  3) Normal appearing duodenal mucosa. MEDICATIONS:   MAC per anesthesia     EBL:  <10cc    INSTRUMENT: Olympus GIF-H190  flexible Gastroscope. PREPARATION: The nature and character of the procedure as well as risks, benefits, and alternatives were discussed with the patient and informed consent was obtained.  Complications were said to include, but were not limited to: medication allergy, medication reaction, cardiovascular and respiratory problems, bleeding, perforation, infection, and/or missed diagnosis. Following arrival in the endoscopy room, the patient was placed in the left lateral decubitus position and final time-out accomplished in the presence of the nursing staff. Baseline vital signs were obtained and reviewed, and IV sedation was subsequently initiated. FINDINGS:   Esophagus: The esophagus was inspected to the Z-line. The endoscopic exam showed normal appearing mucosa and GEJ. Stomach: The stomach was inspected in both forward and retroflex fashion and was appropriately distensible. The cardia, fundus, incisura, antrum and pylorus were identified via direct visualization. The endoscopic exam showed mild gastritis and antral erosion. Duodenum: The proximal small bowel was inspected through the bulb, sweep, and second portion of the duodenum. The endoscopic exam showed normal appearing duodenal mucosa. IMPRESSION:    1) Normal appearing esophageal mucosa and GEJ. No evidence of esophagitis. 2) Mild erythema in the gastric body and antrum. Small antral erosion. Cold biopsy of the stomach taken to evaluate for H. Pylori  3) Normal appearing duodenal mucosa. RECOMMENDATIONS:   1) Follow up path in GI clinic. Continue with prilosec as previously recommended. Continue with anti-reflux therapy  2) Treat for H. Pylori if positive. [facility]  Gastroenterology   12/20/22    this note is created with the assistance of a speech recognition program.  While intending to generate a document that actually reflects the content of the visit, the document can still have some errors including those of syntax and sound a like substitutions which may escape proof reading. It such instances, actual meaning can be extrapolated by contextual diversion. The patient was counseled at length about the risks of sidney Covid-19 during their perioperative period and any recovery window from their procedure. The patient was made aware that sidney Covid-19  may worsen their prognosis for recovering from their procedure  and lend to a higher morbidity and/or mortality risk. All material risks, benefits, and reasonable alternatives including postponing the procedure were discussed. The patient DOES wish to proceed with the procedure at this time.      Electronically signed by Ag Zee MD on 12/20/2022 at 11:04 AM

## 2022-12-20 NOTE — ANESTHESIA POSTPROCEDURE EVALUATION
Department of Anesthesiology  Postprocedure Note    Patient: Edson Angel  MRN: [id number]  YOB: 1940  Date of evaluation: 12/20/2022      Procedure Summary     Date: 12/20/22 Room / Location: [place] [address] / [facility]    Anesthesia Start: 9946 Anesthesia Stop: 2743    Procedure: EGD BIOPSY Diagnosis:       Dyspepsia      (Dyspepsia [R10.13])    Surgeons: Bobo Jenkins MD Responsible Provider: Jose C Roland MD    Anesthesia Type: MAC ASA Status: 3          Anesthesia Type: No value filed.     Perry Phase I: Perry Score: 10    Perry Phase II: Perry Score: 9      Anesthesia Post Evaluation POST- ANESTHESIA EVALUATION       Pt Name: Edson Angel  MRN: [id number]  YOB: 1940  Date of evaluation: 12/20/2022  Time:  1:55 PM      /85   Pulse 82   Temp 97.4 °F (36.3 °C)   Resp 25   Ht 5' 2\" (1.575 m)   Wt 127 lb (57.6 kg)   SpO2 100%   BMI 23.23 kg/m²      Consciousness Level  Awake  Cardiopulmonary Status  Stable  Pain Adequately Treated YES  Nausea / Vomiting  NO  Adequate Hydration  YES  Anesthesia Related Complications NONE      Electronically signed by Jose C Roland MD on 12/20/2022 at 1:55 PM

## 2022-12-21 LAB — SURGICAL PATHOLOGY REPORT: NORMAL

## 2022-12-21 NOTE — RESULT ENCOUNTER NOTE
Please notify patient: Very mild gastritis on biopsy, to continue omeprazole 40 Mg daily, avoid spicy foods, avoid eating 2 or 3 hours before going to bed or larger meals    Future Appointments  1/24/2023  10:00 AM   Melva Yancey MD         [facility]           [facility]  2/7/2023   11:00 AM   Munira Ball MD     [facility]  10/11/2023 11:00 AM   Munira Ball MD     [facility]

## 2023-01-17 ENCOUNTER — OFFICE VISIT (OUTPATIENT)
Dept: FAMILY MEDICINE CLINIC | Age: 83
End: 2023-01-17
Payer: MEDICARE

## 2023-01-17 VITALS
HEIGHT: 63 IN | SYSTOLIC BLOOD PRESSURE: 144 MMHG | TEMPERATURE: 97.9 F | WEIGHT: 123 LBS | OXYGEN SATURATION: 97 % | DIASTOLIC BLOOD PRESSURE: 84 MMHG | BODY MASS INDEX: 21.79 KG/M2 | HEART RATE: 88 BPM

## 2023-01-17 DIAGNOSIS — J06.9 VIRAL URI: Primary | ICD-10-CM

## 2023-01-17 PROCEDURE — 99213 OFFICE O/P EST LOW 20 MIN: CPT

## 2023-01-17 PROCEDURE — 1123F ACP DISCUSS/DSCN MKR DOCD: CPT

## 2023-01-17 RX ORDER — GUAIFENESIN AND DEXTROMETHORPHAN HYDROBROMIDE 1200; 60 MG/1; MG/1
1 TABLET, EXTENDED RELEASE ORAL 2 TIMES DAILY PRN
Qty: 28 TABLET | Refills: 0 | Status: SHIPPED | OUTPATIENT
Start: 2023-01-17 | End: 2023-01-24

## 2023-01-17 ASSESSMENT — ENCOUNTER SYMPTOMS
SINUS PRESSURE: 1
COUGH: 1
VOMITING: 1
SHORTNESS OF BREATH: 0
RHINORRHEA: 1
EYE ITCHING: 0
NAUSEA: 0
SORE THROAT: 0
HOARSE VOICE: 1
EYE DISCHARGE: 0

## 2023-01-17 NOTE — PROGRESS NOTES
[address] [address]  Dept: [phone]  Dept Fax: [phone]    Deon Villarreal is a 80 y.o. female who presents to the urgent care today for her medical conditions/complaints as notedbelow. Deon Villarreal is c/o of Sinusitis, Chest Congestion, and Cough (SOB. This has been going on since Sunday )      HPI:     Sinusitis  This is a new problem. Episode onset: in the past 2 days. The problem has been gradually worsening since onset. There has been no fever. Associated symptoms include congestion, coughing, ear pain, a hoarse voice and sinus pressure. Pertinent negatives include no chills, diaphoresis, headaches, neck pain, shortness of breath, sneezing or sore throat. Past treatments include nothing. Chest Congestion  This is a new problem. Episode onset: in the past 2 days. The problem has been gradually worsening. Associated symptoms include congestion, coughing, fatigue and vomiting. Pertinent negatives include no chest pain, chills, diaphoresis, fever, headaches, nausea, neck pain, rash or sore throat. Nothing aggravates the symptoms. She has tried nothing for the symptoms. Cough  This is a new problem. Episode onset: in the past 2 days. The problem has been gradually worsening. The cough is Productive of sputum (thick clear). Associated symptoms include ear congestion, ear pain, nasal congestion, postnasal drip and rhinorrhea. Pertinent negatives include no chest pain, chills, fever, headaches, rash, sore throat or shortness of breath. Nothing aggravates the symptoms. She has tried nothing for the symptoms. Her past medical history is significant for bronchitis and COPD. There is no history of asthma, environmental allergies or pneumonia.      Past Medical History:   Diagnosis Date    Abdominal pain     Allergic rhinitis     Anxiety     Chronic back pain     Chronic fatigue Chronic kidney disease     Chronic renal disease, stage III [facility]) [[id number]] 04/20/2022    Chronic respiratory failure ([facility] 08/11/2020    Colon polyp 03/07/2012    TUBULAR ADENOMA    COPD (chronic obstructive pulmonary disease) (HCC)     COPD, 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     COVID 10/2022    mild case states treated with Paxlovid    Depression     Diverticul disease small and large intestine, no perforati or abscess     Elevated blood pressure reading     Fall 01/12/2017    GERD (gastroesophageal reflux disease)     Glaucoma     Hyperlipidemia     with target LDL less than 100    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     Hyperthyroidism 2012    Insomnia     Psychophysiological    Neuropathy     Orthostatic dizziness 03/12/2017    Osteoarthritis     Panlobular emphysema (HCC)     PVC (premature ventricular contraction) 01/12/2017    S/P parathyroidectomy ([facility]     Tubal pregnancy 0037,0991    Vasovagal syncope 01/12/2017        Current Outpatient Medications   Medication Sig Dispense Refill    Dextromethorphan-guaiFENesin (MUCINEX DM MAXIMUM STRENGTH)  MG TB12 Take 1 tablet by mouth 2 times daily as needed (cough/congestion) 28 tablet 0    zaleplon (SONATA) 10 MG capsule Take 1 capsule by mouth nightly as needed (Insomnia.) for up to 90 days.  90 capsule 0    omeprazole (PRILOSEC) 40 MG delayed release capsule Take 1 capsule by mouth every morning (before breakfast) 90 capsule 2    albuterol sulfate HFA (PROVENTIL;VENTOLIN;PROAIR) 108 (90 Base) MCG/ACT inhaler Inhale 2 puffs into the lungs every 6 hours as needed for Wheezing or Shortness of Breath And when sick 18 g 3    omeprazole (PRILOSEC OTC) 20 MG tablet Take 1 tablet by mouth in the morning and at bedtime 60 tablet 3    BREZTRI AEROSPHERE 160-9-4.8 MCG/ACT AERO Inhale 1 inhalation into the lungs daily      dorzolamide-timolol (COSOPT) 22.3-6.8 MG/ML ophthalmic solution Place 1 drop into both eyes 2 times daily      ALPHAGAN P 0.1 % SOLN Place 1 drop into both eyes 3 times daily       latanoprost (XALATAN) 0.005 % ophthalmic solution Place 1 drop into both eyes nightly       sodium chloride (ALTAMIST SPRAY) 0.65 % nasal spray 1 spray by Nasal route as needed for Congestion (Patient not taking: Reported on 1/17/2023) 1 each 3     No current facility-administered medications for this visit. Allergies   Allergen Reactions    Aspirin      GI upset    Atorvastatin      Muscle cramps       Subjective:      Review of Systems   Constitutional:  Positive for fatigue. Negative for chills, diaphoresis and fever. HENT:  Positive for congestion, ear pain, hoarse voice, postnasal drip, rhinorrhea and sinus pressure. Negative for sneezing and sore throat. Eyes:  Negative for discharge and itching. Respiratory:  Positive for cough. Negative for shortness of breath. Cardiovascular:  Negative for chest pain. Gastrointestinal:  Positive for vomiting. Negative for nausea. Musculoskeletal:  Negative for neck pain. Skin:  Negative for rash. Allergic/Immunologic: Negative for environmental allergies. Neurological:  Negative for dizziness and headaches. All other systems reviewed and are negative. 14 systems reviewed and negative except as listed in HPI. Objective:     Physical Exam  Vitals reviewed. Constitutional:       General: She is not in acute distress. Appearance: Normal appearance. She is not ill-appearing, toxic-appearing or diaphoretic. HENT:      Head: Normocephalic and atraumatic. Right Ear: External ear normal. Tenderness present. No swelling. A middle ear effusion is present. Tympanic membrane is not injected or erythematous. Left Ear: External ear normal. Tenderness present. No swelling. A middle ear effusion is present. Tympanic membrane is not injected or erythematous. Nose: Nasal tenderness, congestion and rhinorrhea present. Right Sinus: Maxillary sinus tenderness present.  No frontal sinus tenderness. Left Sinus: Maxillary sinus tenderness present. No frontal sinus tenderness. Mouth/Throat:      Lips: Pink. Mouth: Mucous membranes are moist.      Pharynx: Oropharynx is clear. Posterior oropharyngeal erythema present. No pharyngeal swelling or oropharyngeal exudate. Eyes:      General:         Right eye: No discharge. Left eye: No discharge. Extraocular Movements: Extraocular movements intact. Conjunctiva/sclera: Conjunctivae normal.      Pupils: Pupils are equal, round, and reactive to light. Cardiovascular:      Rate and Rhythm: Normal rate and regular rhythm. Heart sounds: Normal heart sounds. No murmur heard. No friction rub. Pulmonary:      Effort: Pulmonary effort is normal. No respiratory distress. Breath sounds: Normal breath sounds. No stridor. No wheezing, rhonchi or rales. Chest:      Chest wall: No tenderness. Musculoskeletal:         General: No swelling or tenderness. Normal range of motion. Cervical back: Normal range of motion and neck supple. No rigidity or tenderness. Right lower leg: No edema. Left lower leg: No edema. Lymphadenopathy:      Cervical: No cervical adenopathy. Skin:     General: Skin is warm and dry. Capillary Refill: Capillary refill takes less than 2 seconds. Neurological:      Mental Status: She is alert and oriented to person, place, and time. Motor: No weakness. Coordination: Coordination normal.      Gait: Gait normal.   Psychiatric:         Mood and Affect: Mood normal.         Behavior: Behavior normal.         Thought Content: Thought content normal.         Judgment: Judgment normal.     BP (!) 144/84 (Site: Left Upper Arm, Position: Sitting, Cuff Size: Large Adult)   Pulse 88   Temp 97.9 °F (36.6 °C) (Temporal)   Ht 5' 3\" (1.6 m)   Wt 123 lb (55.8 kg)   SpO2 97%   BMI 21.79 kg/m²     Assessment:       Diagnosis Orders   1.  Viral URI Dextromethorphan-guaiFENesin (MUCINEX DM MAXIMUM STRENGTH)  MG TB12          Lab Results   Component Value Date     09/22/2022    K 3.8 09/22/2022     09/22/2022    CO2 27 09/22/2022    BUN 17 09/22/2022    CREATININE 0.92 (H) 11/03/2022    GLUCOSE 113 (H) 09/22/2022    CALCIUM 9.1 11/03/2022    PROT 6.1 (L) 09/22/2022    PROT 6.8 09/22/2022    LABALBU 3.9 09/22/2022    BILITOT 0.7 09/22/2022    ALKPHOS 86 09/22/2022    AST 12 09/22/2022    ALT 10 09/22/2022    LABGLOM >60 11/03/2022    GFRAA >60 09/22/2022         Plan:   -VSS, lung sound clear throughout, no signs of respiratory distress  -Based on Hx and clinical examination, will treat as viral URI at this time  -Mucinex DM  -Patient tested POSITIVE for COVID in October  -Advised to continue with symptomatic treatment.  -Use acetaminophen for fever, sore throat, or muscle aches.  -Recommend using a cool-mist humidifier.  -Instructed to increase fluid intake.   -Suggested humidifier and mist therapy.  -Avoid irritants such as smoke. -Encouraged to drink extra fluids to help loosen secretions and hydrate the body. -Application of warm, moist compresses to the face several times daily will help with discomfort.   -Seek medical attention immediately for increased work of breathing or other concerning symptoms.  -Follow-up with PCP as needed. Return if symptoms worsen or fail to improve. Orders Placed This Encounter   Medications    Dextromethorphan-guaiFENesin (MUCINEX DM MAXIMUM STRENGTH)  MG TB12     Sig: Take 1 tablet by mouth 2 times daily as needed (cough/congestion)     Dispense:  28 tablet     Refill:  0         Patient given educational materials - see patient instructions. Discussed use, benefit, and side effects of prescribed medications. All patient questions answered. Pt voiced understanding.     Electronically signed by BARRERA Hartmann NP on 1/17/2023 at 10:16 AM

## 2023-01-23 ENCOUNTER — HOSPITAL ENCOUNTER (OUTPATIENT)
Age: 83
Discharge: HOME | End: 2023-01-23
Payer: MEDICARE

## 2023-01-23 DIAGNOSIS — D47.2 MGUS (MONOCLONAL GAMMOPATHY OF UNKNOWN SIGNIFICANCE): ICD-10-CM

## 2023-01-23 DIAGNOSIS — R77.8 ABNORMAL SERUM PROTEIN ELECTROPHORESIS: ICD-10-CM

## 2023-01-23 LAB
ALBUMIN SERPL-MCNC: 4 G/DL (ref 3.5–5.2)
ALP BLD-CCNC: 84 U/L (ref 35–104)
ALT SERPL-CCNC: 17 U/L (ref 5–33)
ANION GAP SERPL CALCULATED.3IONS-SCNC: 9 MMOL/L (ref 9–17)
AST SERPL-CCNC: 20 U/L
BILIRUB SERPL-MCNC: 0.5 MG/DL (ref 0.3–1.2)
BUN BLDV-MCNC: 16 MG/DL (ref 8–23)
CALCIUM SERPL-MCNC: 9 MG/DL (ref 8.6–10.4)
CHLORIDE BLD-SCNC: 100 MMOL/L (ref 98–107)
CO2: 26 MMOL/L (ref 20–31)
CREAT SERPL-MCNC: 1.11 MG/DL (ref 0.5–0.9)
GFR SERPL CREATININE-BSD FRML MDRD: 50 ML/MIN/1.73M2
GLUCOSE BLD-MCNC: 110 MG/DL (ref 70–99)
HCT VFR BLD CALC: 45.9 % (ref 36–46)
HEMOGLOBIN: 15.5 G/DL (ref 12–16)
MCH RBC QN AUTO: 31.7 PG (ref 26–34)
MCHC RBC AUTO-ENTMCNC: 33.8 G/DL (ref 31–37)
MCV RBC AUTO: 93.5 FL (ref 80–100)
PDW BLD-RTO: 13 % (ref 11.5–14.9)
PLATELET # BLD: 235 K/UL (ref 150–450)
PMV BLD AUTO: 7.1 FL (ref 6–12)
POTASSIUM SERPL-SCNC: 4.4 MMOL/L (ref 3.7–5.3)
RBC # BLD: 4.91 M/UL (ref 4–5.2)
SODIUM BLD-SCNC: 135 MMOL/L (ref 135–144)
TOTAL PROTEIN: 6.7 G/DL (ref 6.4–8.3)
WBC # BLD: 7.7 K/UL (ref 3.5–11)

## 2023-01-23 PROCEDURE — 83521 IG LIGHT CHAINS FREE EACH: CPT

## 2023-01-23 PROCEDURE — 84156 ASSAY OF PROTEIN URINE: CPT

## 2023-01-23 PROCEDURE — 80053 COMPREHEN METABOLIC PANEL: CPT

## 2023-01-23 PROCEDURE — 84155 ASSAY OF PROTEIN SERUM: CPT

## 2023-01-23 PROCEDURE — 82784 ASSAY IGA/IGD/IGG/IGM EACH: CPT

## 2023-01-23 PROCEDURE — 85027 COMPLETE CBC AUTOMATED: CPT

## 2023-01-23 PROCEDURE — 84166 PROTEIN E-PHORESIS/URINE/CSF: CPT

## 2023-01-23 PROCEDURE — 36415 COLL VENOUS BLD VENIPUNCTURE: CPT

## 2023-01-23 PROCEDURE — 86335 IMMUNFIX E-PHORSIS/URINE/CSF: CPT

## 2023-01-23 PROCEDURE — 84165 PROTEIN E-PHORESIS SERUM: CPT

## 2023-01-24 LAB
FREE KAPPA/LAMBDA RATIO: 2.62 (ref 0.26–1.65)
IGA: 107 MG/DL (ref 70–400)
IGG: 728 MG/DL (ref 700–1600)
IGM: 345 MG/DL (ref 40–230)
KAPPA FREE LIGHT CHAINS QNT: 3.72 MG/DL (ref 0.37–1.94)
LAMBDA FREE LIGHT CHAINS QNT: 1.42 MG/DL (ref 0.57–2.63)

## 2023-01-25 LAB
ALBUMIN (CALCULATED): 4.1 G/DL (ref 3.2–5.2)
ALBUMIN PERCENT: 66 % (ref 45–65)
ALPHA 1 PERCENT: 2 % (ref 3–6)
ALPHA 2 PERCENT: 12 % (ref 6–13)
ALPHA-1-GLOBULIN: 0.2 G/DL (ref 0.1–0.4)
ALPHA-2-GLOBULIN: 0.8 G/DL (ref 0.5–0.9)
BETA GLOBULIN: 0.6 G/DL (ref 0.5–1.1)
BETA PERCENT: 10 % (ref 11–19)
GAMMA GLOBULIN %: 11 % (ref 9–20)
GAMMA GLOBULIN: 0.7 G/DL (ref 0.5–1.5)
P E INTERPRETATION, U: NORMAL
PATHOLOGIST: ABNORMAL
PATHOLOGIST: NORMAL
PROTEIN ELECTROPHORESIS, SERUM: ABNORMAL
SPECIMEN TYPE: NORMAL
TOTAL PROT. SUM,%: 101 % (ref 98–102)
TOTAL PROT. SUM: 6.4 G/DL (ref 6.3–8.2)
TOTAL PROTEIN: 6.3 G/DL (ref 6.4–8.3)
URINE IFX INTERP: NORMAL
URINE IFX SPECIMEN: NORMAL
URINE TOTAL PROTEIN: <4 MG/DL
URINE TOTAL PROTEIN: <4 MG/DL

## 2023-01-27 ENCOUNTER — OFFICE VISIT (OUTPATIENT)
Dept: ONCOLOGY | Age: 83
End: 2023-01-27
Payer: MEDICARE

## 2023-01-27 ENCOUNTER — TELEPHONE (OUTPATIENT)
Dept: ONCOLOGY | Age: 83
End: 2023-01-27

## 2023-01-27 VITALS — DIASTOLIC BLOOD PRESSURE: 95 MMHG | SYSTOLIC BLOOD PRESSURE: 177 MMHG | HEART RATE: 78 BPM | TEMPERATURE: 97.2 F

## 2023-01-27 DIAGNOSIS — R53.82 CHRONIC FATIGUE: ICD-10-CM

## 2023-01-27 DIAGNOSIS — D47.2 MGUS (MONOCLONAL GAMMOPATHY OF UNKNOWN SIGNIFICANCE): Primary | ICD-10-CM

## 2023-01-27 DIAGNOSIS — R91.1 LUNG NODULE, SOLITARY: ICD-10-CM

## 2023-01-27 DIAGNOSIS — N18.31 STAGE 3A CHRONIC KIDNEY DISEASE (HCC): ICD-10-CM

## 2023-01-27 PROCEDURE — 99214 OFFICE O/P EST MOD 30 MIN: CPT | Performed by: INTERNAL MEDICINE

## 2023-01-27 PROCEDURE — 1123F ACP DISCUSS/DSCN MKR DOCD: CPT | Performed by: INTERNAL MEDICINE

## 2023-01-27 NOTE — PROGRESS NOTES
Patient ID: Bernadette Bay, 1940, [id number], 80 y.o. Referred by :  No ref. provider found   Reason for consultation: Biclonal MGUS      HISTORY OF PRESENT ILLNESS:    Oncologic History:    Bernadette Bay is a very pleasant 80 y.o. female.   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 and subsequently had electrophoresis also did have a chronic kidney disease and there was 2 zones of restriction confirmed by immunofixation to be monoclonal IgM kappa at 0.12 g/dL and monoclonal IgG kappa at 0.02 g/dL,, CBC in the normal limit  Patient had stage IIIa chronic kidney disease and recent GFR slightly worse she also had neuropathy and emphysema, and ALIYAH positive antiscleroderma  IgM elevated and ratio kappa to lambda free light chain mildly elevated      Interim history  Patient had no new symptoms  Recently had an upper respiratory infection  Myeloma staging is stable    Past Medical History:   Diagnosis Date    Abdominal pain     Allergic rhinitis     Anxiety     Chronic back pain     Chronic fatigue     Chronic kidney disease     Chronic renal disease, stage III [facility]) [[id number]] 04/20/2022    Chronic respiratory failure ([address] 08/11/2020    Colon polyp 03/07/2012    TUBULAR ADENOMA    COPD (chronic obstructive pulmonary disease) (HCC)     COPD, 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     COVID 10/2022    mild case states treated with Paxlovid    Depression     Diverticul disease small and large intestine, no perforati or abscess     Elevated blood pressure reading     Fall 01/12/2017    GERD (gastroesophageal reflux disease)     Glaucoma     Hyperlipidemia     with target LDL less than 100    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[address]     Hyperthyroidism 2012    Insomnia     Psychophysiological    Neuropathy     Orthostatic dizziness 03/12/2017    Osteoarthritis     Panlobular emphysema ([address]     PVC (premature ventricular contraction) 01/12/2017    S/P parathyroidectomy ([address]     Tubal pregnancy [id number] Vasovagal syncope 01/12/2017       Past Surgical History:   Procedure Laterality Date    APPENDECTOMY      BACK SURGERY      CATARACT REMOVAL WITH IMPLANT Left 04/01/2014    [address]    CHOLECYSTECTOMY  2005    COLONOSCOPY      COLONOSCOPY  06/13/2014    Severe sigmoid diverticulosis; due for repeat June 2019    ENDOSCOPY, COLON, DIAGNOSTIC      ESOPHAGOGASTRODUODENOSCOPY  12/20/2022    EYE SURGERY      PARATHYROIDECTOMY  11/05/2018    Dr. Johanny Balderrama; Left inferior parathyroid: adenoma    ROTATOR CUFF REPAIR Left     TONSILLECTOMY      TUBAL LIGATION      UPPER GASTROINTESTINAL ENDOSCOPY  06/13/2014    biopsy    UPPER GASTROINTESTINAL ENDOSCOPY  01/26/2016    UPPER GASTROINTESTINAL ENDOSCOPY  05/04/2016    mild gastritis    UPPER GASTROINTESTINAL ENDOSCOPY N/A 12/20/2022    EGD BIOPSY performed by Gayle Ledesma MD at [facility]       Allergies   Allergen Reactions    Aspirin      GI upset    Atorvastatin      Muscle cramps       Current Outpatient Medications   Medication Sig Dispense Refill    zaleplon (SONATA) 10 MG capsule Take 1 capsule by mouth nightly as needed (Insomnia.) for up to 90 days.  90 capsule 0    omeprazole (PRILOSEC) 40 MG delayed release capsule Take 1 capsule by mouth every morning (before breakfast) 90 capsule 2    albuterol sulfate HFA (PROVENTIL;VENTOLIN;PROAIR) 108 (90 Base) MCG/ACT inhaler Inhale 2 puffs into the lungs every 6 hours as needed for Wheezing or Shortness of Breath And when sick 18 g 3    omeprazole (PRILOSEC OTC) 20 MG tablet Take 1 tablet by mouth in the morning and at bedtime 60 tablet 3    BREZTRI AEROSPHERE 160-9-4.8 MCG/ACT AERO Inhale 1 inhalation into the lungs daily      dorzolamide-timolol (COSOPT) 22.3-6.8 MG/ML ophthalmic solution Place 1 drop into both eyes 2 times daily      ALPHAGAN P 0.1 % SOLN Place 1 drop into both eyes 3 times daily       latanoprost (XALATAN) 0.005 % ophthalmic solution Place 1 drop into both eyes nightly       sodium chloride (ALTAMIST SPRAY) 0.65 % nasal spray 1 spray by Nasal route as needed for Congestion (Patient not taking: No sig reported) 1 each 3     No current facility-administered medications for this visit. Social History     Socioeconomic History    Marital status:      Spouse name: Not on file    Number of children: Not on file    Years of education: Not on file    Highest education level: Not on file   Occupational History    Not on file   Tobacco Use    Smoking status: Former     Packs/day: 1.00     Years: 30.00     Pack years: 30.00     Types: Cigarettes     Start date: 1960     Quit date: 2000     Years since quittin.6    Smokeless tobacco: Never    Tobacco comments:     still uses OTC nicorette gum   Vaping Use    Vaping Use: Not on file   Substance and Sexual Activity    Alcohol use: Yes     Alcohol/week: 0.0 standard drinks     Comment: occassional    Drug use: No    Sexual activity: Not on file   Other Topics Concern    Not on file   Social History Narrative    Not on file     Social Determinants of Health     Financial Resource Strain: Low Risk     Difficulty of Paying Living Expenses: Not hard at all   Food Insecurity: No Food Insecurity    Worried About Running Out of Food in the Last Year: Never true    [address] in the Last Year: Never true   Transportation Needs: No Transportation Needs    Lack of Transportation (Medical): No    Lack of Transportation (Non-Medical):  No   Physical Activity: Insufficiently Active    Days of Exercise per Week: 7 days    Minutes of Exercise per Session: 20 min   Stress: Not on file   Social Connections: Not on file   Intimate Partner Violence: Not on file   Housing Stability: Unknown    Unable to Pay for Housing in the Last Year: No    Number of Places Lived in the Last Year: Not on file    Unstable Housing in the Last Year: No       Family History   Problem Relation Age of Onset    Other Father         COPD    Stroke Sister     Heart Attack Sister     Breast Cancer Sister     Cancer Maternal Grandmother         Stomach cancer    Osteoporosis Mother     Other Sister         suicide        REVIEW OF SYSTEM:     Constitutional: No fever or chills. No night sweats, no weight loss   Eyes: No eye discharge, double vision, or eye pain   HEENT: negative for sore mouth, sore throat, hoarseness and voice change   Respiratory: negative for cough , sputum, dyspnea, wheezing, hemoptysis, chest pain   Cardiovascular: negative for chest pain, dyspnea, palpitations, orthopnea, PND   Gastrointestinal: negative for nausea, vomiting, diarrhea, constipation, abdominal pain, Dysphagia, hematemesis and hematochezia   Genitourinary: negative for frequency, dysuria, nocturia, urinary incontinence, and hematuria   Integument: negative for rash, skin lesions, bruises.   Hematologic/Lymphatic: negative for easy bruising, bleeding, lymphadenopathy, petechiae and swelling/edema   Endocrine: negative for heat or cold intolerance, tremor, weight changes, change in bowel habits and hair loss   Musculoskeletal: negative for myalgias, arthralgias, pain, joint swelling,and bone pain   Neurological: negative for headaches, dizziness, seizures, weakness, numbness       OBJECTIVE:         Vitals:    01/27/23 1300   BP: (!) 177/95   Pulse: 78   Temp:        PHYSICAL EXAM:   General appearance - well appearing, no in pain or distress   Mental status - alert and cooperative   Eyes - pupils equal and reactive, extraocular eye movements intact   Ears - bilateral TM's and external ear canals normal   Mouth - mucous membranes moist, pharynx normal without lesions   Neck - supple, no significant adenopathy   Lymphatics - no palpable lymphadenopathy, no hepatosplenomegaly   Chest - clear to auscultation, no wheezes, rales or rhonchi, symmetric air entry   Heart - normal rate, regular rhythm, normal S1, S2, no murmurs, rubs, clicks or gallops   Abdomen - soft, nontender, nondistended, no  masses or organomegaly   Neurological - alert, oriented, normal speech, no focal findings or movement disorder noted   Musculoskeletal - no joint tenderness, deformity or swelling   Extremities - peripheral pulses normal, no pedal edema, no clubbing or cyanosis   Skin - normal coloration and turgor, no rashes, no suspicious skin lesions noted ,      LABORATORY DATA:     Lab Results   Component Value Date    WBC 7.7 01/23/2023    HGB 15.5 01/23/2023    HCT 45.9 01/23/2023    MCV 93.5 01/23/2023     01/23/2023    LYMPHOPCT 30 09/22/2022    RBC 4.91 01/23/2023    MCH 31.7 01/23/2023    MCHC 33.8 01/23/2023    RDW 13.0 01/23/2023    MONOPCT 10 (H) 09/22/2022    BASOPCT 1 09/22/2022    NEUTROABS 3.60 09/22/2022    LYMPHSABS 1.90 09/22/2022    MONOSABS 0.60 09/22/2022    EOSABS 0.10 09/22/2022    BASOSABS 0.10 09/22/2022         Chemistry        Component Value Date/Time     01/23/2023 1309    K 4.4 01/23/2023 1309     01/23/2023 1309    CO2 26 01/23/2023 1309    BUN 16 01/23/2023 1309    CREATININE 1.11 (H) 01/23/2023 1309        Component Value Date/Time    CALCIUM 9.0 01/23/2023 1309    ALKPHOS 84 01/23/2023 1309    AST 20 01/23/2023 1309    ALT 17 01/23/2023 1309    BILITOT 0.5 01/23/2023 1309            PATHOLOGY DATA:         IMAGING DATA:      XR CHEST (2 VW)  Narrative: EXAMINATION:  TWO XRAY VIEWS OF THE CHEST    8/30/2022 10:24 am    COMPARISON:  08/30/2022    HISTORY:  ORDERING SYSTEM PROVIDED HISTORY: Preop testing  TECHNOLOGIST PROVIDED HISTORY:  Preop clearance for general anesthesia  Reason for Exam: Pt states pre op clearance for glaucoma surgery 09/15/2022  pt states hx of COPD    FINDINGS:  The lungs appear clear. The heart and mediastinal structures are  unremarkable. Bony thorax appears normal. Visualized upper abdomen is  unremarkable. Impression: No abnormalities are noted. ASSESSMENT:       Diagnosis Orders   1.  MGUS (monoclonal gammopathy of unknown significance) Electrophoresis Protein, Serum    Immunoglobulin Panel (IgG, IgA, IgM)    Kappa/Lambda Quantitative Free Light Chains, Serum    CBC with Auto Differential    Comprehensive Metabolic Panel    Protein / Creatinine Ratio, Urine      2. Stage 3a chronic kidney disease ([address]        3. Chronic fatigue        4. 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 I explained to the patient the nature of this hematologic problem. I explained the significance of these abnormalities and possible etiology and management options  patient had biclonal gammopathy and very low numbers for M spike however having none IgG MGUS increase risk of MGUS, the myeloma staging is stable and no evidence of progression and a high creatinine is chronic and likely not related to gammopathy> monitoring, patient previously refused bone marrow biopsy and aspirate> repeat staging in 6 months  The abnormal light chain ratio likely related to high creatinine rather than gammopathy> follow-up with nephrology and obtain protein creatinine spot  RTC in 6 months with repeated labs                                          [facility]                            This note is created with the assistance of a speech recognition program.  While intending to generate a document that actually reflects the content of the visit, the document can still have some errors including those of syntax and sound a like substitutions which may escape proof reading. It such instances, actual meaning can be extrapolated by contextual diversion.

## 2023-01-27 NOTE — TELEPHONE ENCOUNTER
AVS FROM 1/27/23    Serum protein electrophoresis immunofixation  Labs in 6 months      Rv on 7/21/23    Labs drawn week prior     PT was given AVS and appt schedule    Electronically signed by Manoj Nguyen on 1/27/2023 at 1:39 PM

## 2023-02-07 ENCOUNTER — HOSPITAL ENCOUNTER (OUTPATIENT)
Age: 83
Discharge: HOME | End: 2023-02-07
Payer: MEDICARE

## 2023-02-07 ENCOUNTER — OFFICE VISIT (OUTPATIENT)
Dept: FAMILY MEDICINE CLINIC | Age: 83
End: 2023-02-07
Payer: MEDICARE

## 2023-02-07 VITALS
HEIGHT: 63 IN | HEART RATE: 84 BPM | BODY MASS INDEX: 22.5 KG/M2 | WEIGHT: 127 LBS | TEMPERATURE: 97.7 F | OXYGEN SATURATION: 99 % | SYSTOLIC BLOOD PRESSURE: 142 MMHG | DIASTOLIC BLOOD PRESSURE: 98 MMHG

## 2023-02-07 DIAGNOSIS — I12.9 BENIGN HYPERTENSION WITH CKD (CHRONIC KIDNEY DISEASE) STAGE III (HCC): ICD-10-CM

## 2023-02-07 DIAGNOSIS — E89.2 S/P PARATHYROIDECTOMY (HCC): ICD-10-CM

## 2023-02-07 DIAGNOSIS — M81.0 AGE-RELATED OSTEOPOROSIS WITHOUT CURRENT PATHOLOGICAL FRACTURE: ICD-10-CM

## 2023-02-07 DIAGNOSIS — F33.42 MDD (MAJOR DEPRESSIVE DISORDER), RECURRENT, IN FULL REMISSION (HCC): ICD-10-CM

## 2023-02-07 DIAGNOSIS — F51.04 PSYCHOPHYSIOLOGICAL INSOMNIA: ICD-10-CM

## 2023-02-07 DIAGNOSIS — K21.9 GASTROESOPHAGEAL REFLUX DISEASE WITHOUT ESOPHAGITIS: ICD-10-CM

## 2023-02-07 DIAGNOSIS — N18.30 BENIGN HYPERTENSION WITH CKD (CHRONIC KIDNEY DISEASE) STAGE III (HCC): Primary | ICD-10-CM

## 2023-02-07 DIAGNOSIS — I12.9 BENIGN HYPERTENSION WITH CKD (CHRONIC KIDNEY DISEASE) STAGE III (HCC): Primary | ICD-10-CM

## 2023-02-07 DIAGNOSIS — D47.2 MGUS (MONOCLONAL GAMMOPATHY OF UNKNOWN SIGNIFICANCE): ICD-10-CM

## 2023-02-07 DIAGNOSIS — J43.1 PANLOBULAR EMPHYSEMA (HCC): ICD-10-CM

## 2023-02-07 DIAGNOSIS — N18.30 BENIGN HYPERTENSION WITH CKD (CHRONIC KIDNEY DISEASE) STAGE III (HCC): ICD-10-CM

## 2023-02-07 DIAGNOSIS — M79.672 FOOT PAIN, LEFT: ICD-10-CM

## 2023-02-07 PROBLEM — H92.02 OTALGIA, LEFT: Status: ACTIVE | Noted: 2022-12-05

## 2023-02-07 PROBLEM — J34.89 RHINORRHEA: Status: ACTIVE | Noted: 2022-12-05

## 2023-02-07 PROBLEM — R09.89 CHRONIC THROAT CLEARING: Status: ACTIVE | Noted: 2022-12-05

## 2023-02-07 PROBLEM — J32.9 RECURRENT SINUSITIS: Status: ACTIVE | Noted: 2022-12-05

## 2023-02-07 PROBLEM — H90.3 BILATERAL SENSORINEURAL HEARING LOSS: Status: ACTIVE | Noted: 2022-12-05

## 2023-02-07 PROBLEM — H93.8X2 EAR FULLNESS, LEFT: Status: ACTIVE | Noted: 2022-12-05

## 2023-02-07 LAB
25(OH)D3 SERPL-MCNC: 53.1 NG/ML
ALBUMIN SERPL-MCNC: 4.2 G/DL (ref 3.5–5.2)
ALP SERPL-CCNC: 80 U/L (ref 35–104)
ALT SERPL-CCNC: 14 U/L (ref 5–33)
ANION GAP SERPL CALCULATED.3IONS-SCNC: 9 MMOL/L (ref 9–17)
AST SERPL-CCNC: 17 U/L
BACTERIA: NORMAL
BILIRUB SERPL-MCNC: 0.6 MG/DL (ref 0.3–1.2)
BILIRUBIN URINE: NEGATIVE
BUN SERPL-MCNC: 20 MG/DL (ref 8–23)
CALCIUM SERPL-MCNC: 9.3 MG/DL (ref 8.6–10.4)
CASTS UA: NORMAL /LPF
CHLORIDE SERPL-SCNC: 104 MMOL/L (ref 98–107)
CO2 SERPL-SCNC: 27 MMOL/L (ref 20–31)
COLOR: YELLOW
CREAT SERPL-MCNC: 0.96 MG/DL (ref 0.5–0.9)
EPITHELIAL CELLS UA: NORMAL /HPF
GFR SERPL CREATININE-BSD FRML MDRD: 59 ML/MIN/1.73M2
GLUCOSE SERPL-MCNC: 111 MG/DL (ref 70–99)
GLUCOSE UR STRIP.AUTO-MCNC: NEGATIVE MG/DL
KETONES UR STRIP.AUTO-MCNC: NEGATIVE MG/DL
LEUKOCYTE ESTERASE UR QL STRIP.AUTO: ABNORMAL
MAGNESIUM SERPL-MCNC: 2.1 MG/DL (ref 1.6–2.6)
NITRITE UR QL STRIP.AUTO: NEGATIVE
PHOSPHATE SERPL-MCNC: 3.6 MG/DL (ref 2.6–4.5)
POTASSIUM SERPL-SCNC: 4.8 MMOL/L (ref 3.7–5.3)
PROT SERPL-MCNC: 6.9 G/DL (ref 6.4–8.3)
PROT UR STRIP.AUTO-MCNC: 6 MG/DL (ref 5–8)
PROT UR STRIP.AUTO-MCNC: NEGATIVE MG/DL
RBC CLUMPS #/AREA URNS AUTO: NORMAL /HPF
SODIUM SERPL-SCNC: 140 MMOL/L (ref 135–144)
SPECIFIC GRAVITY UA: 1.01 (ref 1–1.03)
TSH SERPL-ACNC: 1.98 UIU/ML (ref 0.3–5)
TURBIDITY: CLEAR
URATE SERPL-MCNC: 5.6 MG/DL (ref 2.4–5.7)
URINE HGB: NEGATIVE
UROBILINOGEN, URINE: NORMAL
WBC UA: NORMAL /HPF

## 2023-02-07 PROCEDURE — 36415 COLL VENOUS BLD VENIPUNCTURE: CPT

## 2023-02-07 PROCEDURE — 84100 ASSAY OF PHOSPHORUS: CPT

## 2023-02-07 PROCEDURE — 83735 ASSAY OF MAGNESIUM: CPT

## 2023-02-07 PROCEDURE — 84550 ASSAY OF BLOOD/URIC ACID: CPT

## 2023-02-07 PROCEDURE — 82306 VITAMIN D 25 HYDROXY: CPT

## 2023-02-07 PROCEDURE — 81001 URINALYSIS AUTO W/SCOPE: CPT

## 2023-02-07 PROCEDURE — 1123F ACP DISCUSS/DSCN MKR DOCD: CPT | Performed by: FAMILY MEDICINE

## 2023-02-07 PROCEDURE — 82330 ASSAY OF CALCIUM: CPT

## 2023-02-07 PROCEDURE — 99214 OFFICE O/P EST MOD 30 MIN: CPT | Performed by: FAMILY MEDICINE

## 2023-02-07 PROCEDURE — 80053 COMPREHEN METABOLIC PANEL: CPT

## 2023-02-07 PROCEDURE — 84443 ASSAY THYROID STIM HORMONE: CPT

## 2023-02-07 PROCEDURE — 83970 ASSAY OF PARATHORMONE: CPT

## 2023-02-07 RX ORDER — OMEPRAZOLE 20 MG/1
CAPSULE, DELAYED RELEASE ORAL
COMMUNITY
Start: 2023-02-02

## 2023-02-07 RX ORDER — METOPROLOL SUCCINATE 25 MG/1
25 TABLET, EXTENDED RELEASE ORAL DAILY
Qty: 30 TABLET | Refills: 1 | Status: SHIPPED | OUTPATIENT
Start: 2023-02-07

## 2023-02-07 RX ORDER — TRAZODONE HYDROCHLORIDE 50 MG/1
50-100 TABLET ORAL NIGHTLY
Qty: 60 TABLET | Refills: 3 | Status: SHIPPED | OUTPATIENT
Start: 2023-02-07

## 2023-02-07 SDOH — ECONOMIC STABILITY: INCOME INSECURITY: HOW HARD IS IT FOR YOU TO PAY FOR THE VERY BASICS LIKE FOOD, HOUSING, MEDICAL CARE, AND HEATING?: NOT HARD AT ALL

## 2023-02-07 SDOH — ECONOMIC STABILITY: FOOD INSECURITY: WITHIN THE PAST 12 MONTHS, THE FOOD YOU BOUGHT JUST DIDN'T LAST AND YOU DIDN'T HAVE MONEY TO GET MORE.: NEVER TRUE

## 2023-02-07 SDOH — ECONOMIC STABILITY: FOOD INSECURITY: WITHIN THE PAST 12 MONTHS, YOU WORRIED THAT YOUR FOOD WOULD RUN OUT BEFORE YOU GOT MONEY TO BUY MORE.: NEVER TRUE

## 2023-02-07 ASSESSMENT — ENCOUNTER SYMPTOMS
CONSTIPATION: 1
ABDOMINAL DISTENTION: 0
NAUSEA: 0
RHINORRHEA: 1
VOMITING: 0
DIARRHEA: 0
COUGH: 0
ABDOMINAL PAIN: 0
CHEST TIGHTNESS: 0
WHEEZING: 0
SHORTNESS OF BREATH: 1

## 2023-02-07 ASSESSMENT — PATIENT HEALTH QUESTIONNAIRE - PHQ9
8. MOVING OR SPEAKING SO SLOWLY THAT OTHER PEOPLE COULD HAVE NOTICED. OR THE OPPOSITE, BEING SO FIGETY OR RESTLESS THAT YOU HAVE BEEN MOVING AROUND A LOT MORE THAN USUAL: 0
7. TROUBLE CONCENTRATING ON THINGS, SUCH AS READING THE NEWSPAPER OR WATCHING TELEVISION: 0
SUM OF ALL RESPONSES TO PHQ QUESTIONS 1-9: 6
3. TROUBLE FALLING OR STAYING ASLEEP: 3
2. FEELING DOWN, DEPRESSED OR HOPELESS: 0
9. THOUGHTS THAT YOU WOULD BE BETTER OFF DEAD, OR OF HURTING YOURSELF: 0
SUM OF ALL RESPONSES TO PHQ QUESTIONS 1-9: 6
5. POOR APPETITE OR OVEREATING: 0
SUM OF ALL RESPONSES TO PHQ9 QUESTIONS 1 & 2: 0
10. IF YOU CHECKED OFF ANY PROBLEMS, HOW DIFFICULT HAVE THESE PROBLEMS MADE IT FOR YOU TO DO YOUR WORK, TAKE CARE OF THINGS AT HOME, OR GET ALONG WITH OTHER PEOPLE: 0
1. LITTLE INTEREST OR PLEASURE IN DOING THINGS: 0
SUM OF ALL RESPONSES TO PHQ QUESTIONS 1-9: 6
4. FEELING TIRED OR HAVING LITTLE ENERGY: 3
6. FEELING BAD ABOUT YOURSELF - OR THAT YOU ARE A FAILURE OR HAVE LET YOURSELF OR YOUR FAMILY DOWN: 0
SUM OF ALL RESPONSES TO PHQ QUESTIONS 1-9: 6

## 2023-02-07 NOTE — PROGRESS NOTES
Visit Information    Have you changed or started any medications since your last visit including any over-the-counter medicines, vitamins, or herbal medicines? yes -    Have you stopped taking any of your medications? Is so, why? -  no  Are you having any side effects from any of your medications? - no    Have you seen any other physician or provider since your last visit? yes -    Have you had any other diagnostic tests since your last visit? YES   Have you been seen in the emergency room and/or had an admission in a hospital since we last saw you?  no   Have you had your routine dental cleaning in the past 6 months?  yes -      Do you have an active MyChart account? If no, what is the barrier?   Yes    Patient Care Team:  Roge Zheng MD as PCP - Verónica Amador MD as PCP - Empaneled Provider  Shabbir Weston MD as Surgeon (General Surgery)  Jerrell Keene MD as Surgeon (Ophthalmology)  Terese Rose MD as Consulting Physician (Gastroenterology)  Rah Coyne MD as Consulting Physician (Endocrinology)  Cecilio Rene MD as Surgeon (Ophthalmology)  Evelyn Estevez MD as Consulting Physician (Internal Medicine Cardiovascular Disease)  Rebeka Moncada MD as Consulting Physician (Cardiology)  Candace Leong, PhD (Sleep Medicine Family Practice)  Wicho Hathaway MD as Consulting Physician (Otolaryngology)  Nelson Andre MD as Consulting Physician (Cardiology)  Natalya Mckeon MD as Consulting Physician (Pulmonology)  Ladonna Sen MD as Consulting Physician (Nephrology)  Victor Hugo Gray MD as Consulting Physician (Pulmonology)  Mervat Gray MD as Consulting Physician (Gastroenterology)  Stevie Patino MD (Hematology and Oncology)    Medical History Review  Past Medical, Family, and Social History reviewed and does contribute to the patient presenting condition    Health Maintenance   Topic Date Due    Breast cancer screen  10/01/2021    Depression Monitoring 10/05/2023    Annual Wellness Visit (AWV)  10/06/2023    DEXA (modify frequency per FRAX score)  06/08/2024    Colorectal Cancer Screen  10/25/2024    DTaP/Tdap/Td vaccine (2 - Td or Tdap) 01/09/2027    Flu vaccine  Completed    Shingles vaccine  Completed    Pneumococcal 65+ years Vaccine  Completed    COVID-19 Vaccine  Completed    Hepatitis A vaccine  Aged Out    Hib vaccine  Aged Out    Meningococcal (ACWY) vaccine  Aged Out

## 2023-02-07 NOTE — PROGRESS NOTES
Antonella Rodas (:  1940) is a 80 y.o. female,Established patient, here for evaluation of the following chief complaint(s): Chronic Kidney Disease, Discuss Medications, Referral - General ( [facility] ), and Insomnia      ASSESSMENT/PLAN:    1. Benign hypertension with CKD (chronic kidney disease) stage III (HCC)  Stable chronic kidney disease stage III  Worsening hypertension, will start beta-blocker    -  start   metoprolol succinate (TOPROL XL) 25 MG extended release tablet; Take 1 tablet by mouth daily Goal BP bellow 130/80 and above 115/65, heart rate 56 to 90 bpm, Disp-30 tablet, R-1Normal  Discussed low salt diet and BP and pulse monitoring. Recheck labs  Needs nurse visit appointment BP check in 1 week. -     Comprehensive Metabolic Panel; Future  -     Magnesium; Future  -     Phosphorus; Future  -     Uric Acid; Future  -     Urinalysis with Reflex to Culture; Future  2. S/P parathyroidectomy ([facility])  Stable  We will recheck her labs  We will take over the monitoring that she has been doing with Dr. Renetta Capps for the past 1 year, if PTH goes up at any time will refer back to Dr. Renetta Capps at that time    -     PTH, Intact with Ionized Calcium; Future  -     TSH; Future  -     Vitamin D 25 Hydroxy; Future  3. Age-related osteoporosis without current pathological fracture  Likely improving with Prolia, tolerated well, denies side effects  Last Prolia injection 11/3/2022, will take over it when he is due in April  We will check labs for osteoporosis and Prolia monitoring  -     Magnesium; Future  -     Phosphorus; Future  -     PTH, Intact with Ionized Calcium; Future  -     Vitamin D 25 Hydroxy; Future  4.  Psychophysiological insomnia  Failing to improve  Over the years she has tried multiple medications  We discussed risk of Lunesta including cardiorespiratory arrest, which she understands  We discussed to try trazodone, she does not remember when she has taken this the last time.    -     traZODone (DESYREL) 50 MG tablet; Take 1-2 tablets by mouth nightly Stop Zaleplon, Disp-60 tablet, R-3Normal  Continue sleep hygiene  5. Panlobular emphysema ([facility]  Worsening  She quit smoking many years ago  Follow-up with pulmonologist as scheduled  She does have appointment with cardiologist to rule out other causes of being short of breath  Continue Breztri 1 inhalation daily, albuterol as needed    6. MDD (major depressive disorder), recurrent, in full remission ([facility]  Stable  We will continue to monitor  She is agreeable to start trazodone for both insomnia and depression  7. Foot pain, left  Worsening  Try wider shoes, will refer to podiatry, they will do x-rays as medically necessary during the appointment  We will try Tylenol as needed, topical creams from over-the-counter  -     [facility], [facility], [address]  8. MGUS (monoclonal gammopathy of unknown significance)  Stable  Continue to monitor with hematologist oncologist every 6 months, she does have appointment    9. Gastroesophageal reflux disease without esophagitis  Improved with medications  Continue omeprazole 40 Mg in the morning and 20 Mg at night, avoid eating 2 hours before going to bed, avoid spicy foods and tomato sauce    Marrilee received counseling on the following healthy behaviors: nutrition, exercise, and medication adherence  Reviewed prior labs and health maintenance  Discussed use, benefit, and side effects of prescribed medications. Barriers to medication compliance addressed. Patient given educational materials - see patient instructions  Was a self-tracking handout given in paper form or via [employer]? Yes  All patient questions answered. Patient voiced understanding. The patient's past medical,surgical, social, and family history as well as her current medications and allergies were reviewed as documented in today's encounter.     Medications, labs, diagnostic studies, consultations and follow-up as documented in this encounter. Return in about 3 months (around 5/7/2023) for COPD, HTN, LABS F/U , OSTEOPOROSIS, INSOMNIA . Needs nurse visit appointment BP check in 1 week. Future Appointments   Date Time Provider Shirley Vibha   2/23/2023  2:45 PM Agatha Joe, [address]    3/8/2023  1:30 PM Alphonse Hale MD [facility] [address]   5/16/2023  2:00 PM Norma Paul MD [facility] Alaina Cassandra   7/21/2023 12:45 PM Mai Rodas MD [facility]   10/11/2023 11:00 AM [facility]         SUBJECTIVE/OBJECTIVE:    Hypertension, Chronic kidney disease stage III   Patient here for follow-up. She is exercising and is adherent to low salt diet. Blood pressure is well controlled at home. Cardiac symptoms dyspnea and fatigue. Patient denies chest pain, chest pressure/discomfort, claudication, exertional chest pressure/discomfort, irregular heart beat, lower extremity edema, near-syncope, orthopnea, palpitations, paroxysmal nocturnal dyspnea, syncope, and tachypnea. Cardiovascular risk factors: advanced age (older than 54 for men, 72 for women), dyslipidemia, hypertension, and smoking/ tobacco exposure. Use of agents associated with hypertension: none. History of target organ damage: chronic kidney disease.   Last work-up was done in 2015, stress test in epic was negative, Persantine stress test  Has appointment with cardiology this month at [facility] [place], [place]    Not on BP meds    Blood pressure has been high for the past 2 months, getting worse         BP Readings from Last 8 Encounters:   02/07/23 (!) 142/98   01/27/23 (!) 177/95   01/17/23 (!) 144/84   12/20/22 131/85   11/15/22 139/69   11/03/22 (!) 151/81   10/05/22 110/80   09/30/22 (!) 150/81         Pulse is Normal.    Pulse Readings from Last 3 Encounters:   02/07/23 84   01/27/23 78   01/17/23 88       Weight has been fluctuating  Wt Readings from Last 3 Encounters:   02/07/23 127 lb (57.6 kg) 01/17/23 123 lb (55.8 kg)   12/20/22 127 lb (57.6 kg)       Patient has history of parathyroidectomy for hyperparathyroidism, 11/5/2018 left inferior parathyroid removed due to adenoma by Dr. Jordi Velazquez  Patient says she has been seeing Dr. Jai Camarillo only once a year \"for labs and prolia\"  Prior to the surgery she was on Sensipar. Dr. Jai Camarillo is moving across the time, I told her we can take over both Prolia and checking her hormones  She denies muscle cramps. Has constipation. She says she was getting scans every few years, reviewing the chart, it is about DEXA scan which she has been getting every 2 years    DEXA scan 6/8/2022, still showing osteoporosis, with the lowest score -2.8  Taking Prolia injections every 6 months    GERD  Patient says she had EGD recently, told she has an ulcer. Taking omeprazole 40 mg in am just started, on top of  20 mg at nighttime  Cannot eat tomatoes sauce, garlic, triggers heartburn and acid reflux. She denies nausea, vomiting, abdominal pain  Stomach biopsy on 12/20/2022 showing mild chronic inactive gastritis, negative for H. pylori, intestinal metaplasia or dysplasia      Patient has insomnia for many years, since her sister committed suicide when she was 15years old. Patient has tried multiple medications over the years. Currently she is on Zalepton  and wants to stop it due to makes her sweat and pulse goes up to 100's  Wants to go back to Lunesta 3 mg. I explained to her that Lunesta 3 mg is a too high dosage for her especially her COPD and advanced age. We discussed trazodone,, she does not remember when she has tried it last time    PHQ-2 Over the past 2 weeks, how often have you been bothered by any of the following problems? Little interest or pleasure in doing things: Not at all  Feeling down, depressed, or hopeless: Not at all  PHQ-2 Score: 0  PHQ-9 Over the past 2 weeks, how often have you been bothered by any of the following problems?   Trouble falling or staying asleep, or sleeping too much: Nearly every day  Feeling tired or having little energy: Nearly every day  Poor appetite or overeating: Not at all  Feeling bad about yourself - or that you are a failure or have let yourself or your family down: Not at all  Trouble concentrating on things, such as reading the newspaper or watching television: Not at all  Moving or speaking so slowly that other people could have noticed. Or the opposite - being so fidgety or restless that you have been moving around a lot more than usual: Not at all  Thoughts that you would be better off dead, or of hurting yourself in some way: Not at all  If you checked off any problems, how difficult have these problems made it for you to do your work, take care of things at home, or get along with other people?: Not difficult at all  PHQ-9 Total Score: 6  PHQ-9 Total Score: 6    PHQ Scores 2/7/2023 10/5/2022 5/12/2022 12/7/2021 8/12/2021 4/20/2021 1/19/2021   PHQ2 Score 0 0 0 0 0 0 0   PHQ9 Score 6 0 0 0 0 0 0       COPD:  Current treatment includes short-acting beta agonist inhaler, Breztri, which has been somewhat effective. Patient says Spiriva was better, I reminded her that she has glaucoma and that is why it was stopped. Residual symptoms: chronic dyspnea. worsening , denies cough, chest pain or wheezing. .   She requires her rescue inhaler once or twice a day    Nicotine dependence. Quit smoking many years ago. Counseling given: Yes  Tobacco comments: still uses OTC nicorette gum      Will see pulmonology, Dr Iqra Chacon, she says she has appointment      Patient reports left foot bone spur on top , giving her a lot of pain, worse when wearing shoes. She has tried different shoes but does not help. Was found to have MGUS, she does have appointment with hematologist oncologist.              Prior to Visit Medications    Medication Sig Taking?  Authorizing Provider   omeprazole (PRILOSEC) 20 MG delayed release capsule  Yes Historical Provider, MD   zaleplon (SONATA) 10 MG capsule Take 1 capsule by mouth nightly as needed (Insomnia.) for up to 90 days. Yes Shima Leiva MD   omeprazole (PRILOSEC) 40 MG delayed release capsule Take 1 capsule by mouth every morning (before breakfast) Yes Hawk Cardona MD   albuterol sulfate HFA (PROVENTIL;VENTOLIN;PROAIR) 108 (90 Base) MCG/ACT inhaler Inhale 2 puffs into the lungs every 6 hours as needed for Wheezing or Shortness of Breath And when sick Yes Shima Leiva MD   sodium chloride (ALTAMIST SPRAY) 0.65 % nasal spray 1 spray by Nasal route as needed for Congestion Yes Kimberly Hines, APRN - CNP   BREZTRI AEROSPHERE 160-9-4.8 MCG/ACT AERO Inhale 1 inhalation into the lungs daily Yes Historical Provider, MD   dorzolamide-timolol (COSOPT) 2.3-6.8 MG/ML ophthalmic solution Place 1 drop into both eyes 2 times daily Yes Historical Provider, MD   ALPHAGAN P 0.1 % SOLN Place 1 drop into both eyes 3 times daily  Yes Historical Provider, MD   latanoprost (XALATAN) 0.005 % ophthalmic solution Place 1 drop into both eyes nightly  Yes Historical Provider, MD   omeprazole (PRILOSEC OTC) 20 MG tablet Take 1 tablet by mouth in the morning and at bedtime  Patient not taking: Reported on 2023  Hawk Cardona MD       Social History     Tobacco Use    Smoking status: Former     Packs/day: 1.00     Years: 30.00     Pack years: 30.00     Types: Cigarettes     Start date: 1960     Quit date: 2000     Years since quittin.6    Smokeless tobacco: Never    Tobacco comments:     still uses OTC nicorette gum   Substance Use Topics    Alcohol use: Yes     Alcohol/week: 0.0 standard drinks     Comment: occassional    Drug use: No         Review of Systems   Constitutional:  Positive for fatigue. Negative for activity change, appetite change, chills, diaphoresis, fever and unexpected weight change. HENT:  Positive for hearing loss (chronic) and rhinorrhea (clear).     Eyes:  Positive for visual disturbance (glaucoma, seeing ophthalmologist, taking eyedrops). Respiratory:  Positive for shortness of breath ([place]). Negative for cough, chest tightness and wheezing. Cardiovascular:  Negative for chest pain, palpitations and leg swelling. Gastrointestinal:  Positive for constipation. Negative for abdominal distention, abdominal pain, diarrhea, nausea and vomiting. GERD   Endocrine: Positive for cold intolerance. Negative for heat intolerance, polydipsia, polyphagia and polyuria. Genitourinary:  Negative for difficulty urinating, frequency and urgency. Musculoskeletal:  Positive for arthralgias (left foot). Allergic/Immunologic: Negative for environmental allergies (no allergies per ENT). Psychiatric/Behavioral:  Negative for dysphoric mood, self-injury, sleep disturbance and suicidal ideas. The patient is nervous/anxious.        -vital signs stable and within normal limits except high blood pressure    BP (!) 142/98   Pulse 84   Temp 97.7 °F (36.5 °C)   Ht 5' 3\" (1.6 m)   Wt 127 lb (57.6 kg)   SpO2 99%   BMI 22.50 kg/m²        Physical Exam  Vitals and nursing note reviewed. Constitutional:       General: She is not in acute distress. Appearance: Normal appearance. She is well-developed. She is not diaphoretic. HENT:      Head: Normocephalic and atraumatic. Right Ear: External ear normal.      Left Ear: External ear normal.      Mouth/Throat:      Comments: I did not examine the mouth due to coronavirus pandemic and wearing masks    Eyes:      General: Lids are normal. No scleral icterus. Right eye: No discharge. Left eye: No discharge. Extraocular Movements: Extraocular movements intact. Conjunctiva/sclera: Conjunctivae normal.   Neck:      Thyroid: No thyromegaly. Cardiovascular:      Rate and Rhythm: Normal rate and regular rhythm. Heart sounds: Normal heart sounds. No murmur heard.   Pulmonary:      Effort: Pulmonary effort is normal. No respiratory distress. Breath sounds: Examination of the right-middle field reveals decreased breath sounds. Examination of the left-middle field reveals decreased breath sounds. Examination of the right-lower field reveals decreased breath sounds. Examination of the left-lower field reveals decreased breath sounds. Decreased breath sounds present. No wheezing or rales. Comments: Moderately decreased breath sounds bilaterally  Chest:      Chest wall: No tenderness. Abdominal:      General: Bowel sounds are normal. There is no distension. Palpations: Abdomen is soft. There is no hepatomegaly or splenomegaly. Tenderness: There is no abdominal tenderness. Musculoskeletal:         General: Normal range of motion. Cervical back: Normal range of motion and neck supple. Right lower leg: No edema. Left lower leg: No edema. Comments: Left foot swollen, hard, on the dorsum of the foot, tender, decreased range of motion   Skin:     General: Skin is warm and dry. Capillary Refill: Capillary refill takes less than 2 seconds. Findings: No rash. Neurological:      Mental Status: She is alert and oriented to person, place, and time. Cranial Nerves: No cranial nerve deficit. Motor: No abnormal muscle tone. Psychiatric:         Mood and Affect: Mood is anxious. Behavior: Behavior normal.         Thought Content: Thought content normal.         Judgment: Judgment normal.         I personally reviewed testing with patient and all questions fully answered.   MGUS stable  Chronic kidney disease stage III stable  Mild hyperglycemia  Borderline low vitamin D    Otherwise labs within normal limits    Hospital Outpatient Visit on 01/23/2023   Component Date Value Ref Range Status    IgG 01/23/2023 728  700 - 1600 mg/dL Final    IgA 01/23/2023 107  70 - 400 mg/dL Final    IgM 01/23/2023 345 (A)  40 - 230 mg/dL Final    Total Protein 01/23/2023 6.3 (A)  6.4 - 8.3 g/dL Final    Albumin (calculated) 01/23/2023 4.1  3.2 - 5.2 g/dL Final    Albumin % 01/23/2023 66 (A)  45 - 65 % Final    Alpha-1-Globulin 01/23/2023 0.2  0.1 - 0.4 g/dL Final    Alpha 1 % 01/23/2023 2 (A)  3 - 6 % Final    Alpha-2-Globulin 01/23/2023 0.8  0.5 - 0.9 g/dL Final    Alpha 2 % 01/23/2023 12  6 - 13 % Final    Beta Globulin 01/23/2023 0.6  0.5 - 1.1 g/dL Final    Beta Percent 01/23/2023 10 (A)  11 - 19 % Final    Gamma Globulin 01/23/2023 0.7  0.5 - 1.5 g/dL Final    Gamma Globulin % 01/23/2023 11  9 - 20 % Final    Total Prot. Sum 01/23/2023 6.4  6.3 - 8.2 g/dL Final    Total Prot. Sum,% 01/23/2023 101  98 - 102 % Final    Protein Electrophoresis, Serum 01/23/2023 TWO ZONES OF RESTRICTION ARE PRESENT IN THE GAMMAGLOBULIN REGION. PREVIOUSLY CONFIRMED TO BE MONOCLONAL IgM KAPPA AND MONOCLONAL IgG KAPPA. Final    Comment: THE APPROXIMATE DENSITOMETRIC QUANTITATIONS OF THE PARAPROTEINS ARE 0.11 G/DL AND 0.03 G/DL,   RESPECTIVELY. POLYCLONAL IMMUNOGLOBULINS ARE LOW NORMAL. Pathologist 01/23/2023 ELECTRONICALLY SIGNED. Mack Montero MD   Final    Glucose 01/23/2023 110 (A)  70 - 99 mg/dL Final    BUN 01/23/2023 16  8 - 23 mg/dL Final    Creatinine 01/23/2023 1.11 (A)  0.50 - 0.90 mg/dL Final    Est, Glom Filt Rate 01/23/2023 50 (A)  >60 mL/min/1.73m2 Final    Comment:       Effective Oct 3, 2022        These results are not intended for use in patients <25years of age. eGFR results are calculated without a race factor using the 2021 CKD-EPI equation. Careful clinical correlation is recommended, particularly when comparing to results   calculated using previous equations. The CKD-EPI equation is less accurate in patients with extremes of muscle mass, extra-renal   metabolism of creatine, excessive creatine ingestion, or following therapy that affects   renal tubular secretion.       Calcium 01/23/2023 9.0  8.6 - 10.4 mg/dL Final    Sodium 01/23/2023 135  135 - 144 mmol/L Final    Potassium 01/23/2023 4.4  3.7 - 5.3 mmol/L Final    Chloride 01/23/2023 100  98 - 107 mmol/L Final    CO2 01/23/2023 26  20 - 31 mmol/L Final    Anion Gap 01/23/2023 9  9 - 17 mmol/L Final    Alkaline Phosphatase 01/23/2023 84  35 - 104 U/L Final    ALT 01/23/2023 17  5 - 33 U/L Final    AST 01/23/2023 20  <32 U/L Final    Total Bilirubin 01/23/2023 0.5  0.3 - 1.2 mg/dL Final    Total Protein 01/23/2023 6.7  6.4 - 8.3 g/dL Final    Albumin 01/23/2023 4.0  3.5 - 5.2 g/dL Final    WBC 01/23/2023 7.7  3.5 - 11.0 k/uL Final    RBC 01/23/2023 4.91  4.0 - 5.2 m/uL Final    Hemoglobin 01/23/2023 15.5  12.0 - 16.0 g/dL Final    Hematocrit 01/23/2023 45.9  36 - 46 % Final    MCV 01/23/2023 93.5  80 - 100 fL Final    MCH 01/23/2023 31.7  26 - 34 pg Final    MCHC 01/23/2023 33.8  31 - 37 g/dL Final    RDW 01/23/2023 13.0  11.5 - 14.9 % Final    Platelets 63/40/4558 235  150 - 450 k/uL Final    MPV 01/23/2023 7.1  6.0 - 12.0 fL Final    Kappa Free Light Chains QNT 01/23/2023 3.72 (A)  0.37 - 1.94 mg/dL Final    Lambda Free Light Chains QNT 01/23/2023 1.42  0.57 - 2.63 mg/dL Final    Free Kappa/Lambda Ratio 01/23/2023 2.62 (A)  0.26 - 1.65 Final    Specimen Type 01/23/2023 ccuc   Final    Urine Total Protein 01/23/2023 <4  mg/dL Final    P E Interpretation, U 01/23/2023 NORMAL ELECTROPHORETIC PATTERN   Final    IMMUNOFIXATION IS NEGATIVE FOR MONOCLONAL IMMUNOGLOBULIN. Pathologist 01/23/2023 ELECTRONICALLY SIGNED. Jorje Romberg MD   Final    Urine IFX Specimen 01/23/2023 . Random Urine   Final    Urine Total Protein 01/23/2023 <4  mg/dL Final    Urine IFX Interp 01/23/2023 IMMUNOFIXATION IS NEGATIVE FOR MONOCLONAL IMMUNOGLOBULIN.    Final           Lab Results   Component Value Date    ALT 17 01/23/2023    AST 20 01/23/2023    ALKPHOS 84 01/23/2023    BILITOT 0.5 01/23/2023       Lab Results   Component Value Date    TSH 3.16 06/08/2022       Lab Results   Component Value Date    CHOL 153 06/01/2021    CHOL 132 03/16/2020    CHOL 160 03/13/2019     Lab Results   Component Value Date    TRIG 109 06/01/2021    TRIG 63 03/16/2020    TRIG 106 03/13/2019     Lab Results   Component Value Date    HDL 63 06/01/2021    HDL 66 03/16/2020    HDL 59 03/13/2019     Lab Results   Component Value Date    LDLCHOLESTEROL 68 06/01/2021    LDLCHOLESTEROL 53 03/16/2020    LDLCHOLESTEROL 80 03/13/2019     Lab Results   Component Value Date    CHOLHDLRATIO 2.4 06/01/2021    CHOLHDLRATIO 2.0 03/16/2020    CHOLHDLRATIO 2.7 03/13/2019       Lab Results   Component Value Date    [id number] 403 06/07/2022     Lab Results   Component Value Date    FOLATE >20.0 06/07/2022     Lab Results   Component Value Date    VITD25 31.1 06/08/2022         Orders Placed This Encounter   Medications    traZODone (DESYREL) 50 MG tablet     Sig: Take 1-2 tablets by mouth nightly Stop Zaleplon     Dispense:  60 tablet     Refill:  3    metoprolol succinate (TOPROL XL) 25 MG extended release tablet     Sig: Take 1 tablet by mouth daily Goal BP bellow 130/80 and above 115/65, heart rate 56 to 90 bpm     Dispense:  30 tablet     Refill:  1       Orders Placed This Encounter   Procedures    Comprehensive Metabolic Panel     Standing Status:   Future     Standing Expiration Date:   2/7/2024    Magnesium     Standing Status:   Future     Standing Expiration Date:   2/7/2024    Phosphorus     Standing Status:   Future     Standing Expiration Date:   2/7/2024    PTH, Intact with Ionized Calcium     Standing Status:   Future     Standing Expiration Date:   2/7/2024    TSH     Standing Status:   Future     Standing Expiration Date:   2/7/2024    Uric Acid     Standing Status:   Future     Standing Expiration Date:   2/7/2024    Urinalysis with Reflex to Culture     Standing Status:   Future     Standing Expiration Date:   2/7/2024     Order Specific Question:   SPECIFY(EX-CATH,MIDSTREAM,CYSTO,ETC)?      Answer:   MIDSTREAM    Vitamin D 25 Hydroxy     Standing Status:   Future     Standing Expiration Date:   2/7/2024    Toma Monreal DPM, Podiatry, [place]     Referral Priority:   Routine     Referral Type:   Eval and Treat     Referral Reason:   Specialty Services Required     Referred to Provider:   Keila Haddad DPM     Requested Specialty:   Podiatry     Number of Visits Requested:   1       Medications Discontinued During This Encounter   Medication Reason    zaleplon (SONATA) 10 MG capsule Alternate therapy    omeprazole (PRILOSEC OTC) 20 MG tablet DUPLICATE         On this date 2/7/2023 I have spent 35 minutes reviewing previous notes, test results and face to face with the patient discussing the diagnosis and importance of compliance with the treatment plan as well as documenting on the day of the visit. This note was completed by using the assistance of a speech-recognition program. However, inadvertent computerized transcription errors may be present. Although every effort was made to ensure accuracy, no guarantees can be provided that every mistake has been identified and corrected by editing. An electronic signature was used to authenticate this note.   Electronically signed by Governor Latosha MD on 2/16/2023 at 8:17 AM

## 2023-02-08 LAB
CA-I BLD-SCNC: 1.34 MMOL/L (ref 1.13–1.33)
PTH-INTACT SERPL-MCNC: 51.8 PG/ML (ref 14–72)

## 2023-02-09 DIAGNOSIS — Z12.31 ENCOUNTER FOR SCREENING MAMMOGRAM FOR BREAST CANCER: Primary | ICD-10-CM

## 2023-02-09 DIAGNOSIS — E83.52 HYPERCALCEMIA: ICD-10-CM

## 2023-02-09 NOTE — RESULT ENCOUNTER NOTE
Please notify patient: Borderline high calcium, if she takes any multivitamin which has calcium needed or Tums to stop that, increase fluids 6 x 8 ounce glasses of water every day, will recheck calcium in about 1 week  Parathyroid hormone was normal  To stop any vitamin D supplement  Chronic kidney disease stage III improved  Otherwise labs within normal limits  continue current treatment    New order for calcium in 1 week placed  Can I order her mammogram?    Future Appointments  2/17/2023  3:30 PM    SCHEDULE, [facility]  2/23/2023  2:45 PM    SHU [address]  3/8/2023   1:30 PM    MD Radha Edwardsla [address]  5/16/2023  2:00 PM    MD eduardo Sifuentes sc               Pepe Hough  7/21/2023  12:45 PM   Imani Mcfadden MD         [facility]           [facility]  10/11/2023 11:00 AM   MD eduardo Sifuentes sc               Pepe Hough

## 2023-02-09 NOTE — RESULT ENCOUNTER NOTE
Noted, but I already ordered her mammogram, due to high calcium she should do the mammogram please advise the patient  Future Appointments  2/17/2023  3:30 PM    SCHEDULE, [facility] fp [facility]               [facility]  2/23/2023  2:45 PM    Adry Ramirez DPM     [facility]          [facility]  3/8/2023   1:30 PM    Rachael Zeng MD           [place] [id number]  5/16/2023  2:00 PM    MD eduardo Iraheta sc               Alice Marks  7/21/2023  12:45 PM   Kirsten Villa MD         [facility]           [facility]  10/11/2023 11:00 AM   MD eduardo Iraheta sc               Alice Marks

## 2023-02-15 ENCOUNTER — HOSPITAL ENCOUNTER (OUTPATIENT)
Age: 83
Discharge: HOME | End: 2023-02-15
Payer: MEDICARE

## 2023-02-15 ENCOUNTER — NURSE ONLY (OUTPATIENT)
Dept: FAMILY MEDICINE CLINIC | Age: 83
End: 2023-02-15
Payer: MEDICARE

## 2023-02-15 VITALS — OXYGEN SATURATION: 98 % | DIASTOLIC BLOOD PRESSURE: 80 MMHG | HEART RATE: 87 BPM | SYSTOLIC BLOOD PRESSURE: 138 MMHG

## 2023-02-15 DIAGNOSIS — E83.52 HYPERCALCEMIA: ICD-10-CM

## 2023-02-15 DIAGNOSIS — N18.30 BENIGN HYPERTENSION WITH CKD (CHRONIC KIDNEY DISEASE) STAGE III (HCC): Primary | ICD-10-CM

## 2023-02-15 DIAGNOSIS — I12.9 BENIGN HYPERTENSION WITH CKD (CHRONIC KIDNEY DISEASE) STAGE III (HCC): Primary | ICD-10-CM

## 2023-02-15 LAB — CA-I BLD-SCNC: 1.27 MMOL/L (ref 1.1–1.33)

## 2023-02-15 PROCEDURE — 36415 COLL VENOUS BLD VENIPUNCTURE: CPT

## 2023-02-15 PROCEDURE — 99211 OFF/OP EST MAY X REQ PHY/QHP: CPT | Performed by: FAMILY MEDICINE

## 2023-02-15 PROCEDURE — 82330 ASSAY OF CALCIUM: CPT

## 2023-02-15 NOTE — PROGRESS NOTES
Chief Complaint   Patient presents with    Hypertension    Blood Pressure Check        Patient is here for blood pressure recheck. 1. Benign hypertension with CKD (chronic kidney disease) stage III (HCC)        Well controlled BP   Continue current treatment.        BP Readings from Last 3 Encounters:   02/15/23 138/80   02/07/23 (!) 142/98   01/27/23 (!) 177/95       Pulse Readings from Last 3 Encounters:   02/15/23 87   02/07/23 84   01/27/23 78       Future Appointments   Date Time Provider Shirley Dunne   2/23/2023  2:45 PM Agatha Joe, [address]   3/8/2023  1:30 PM [facility] [place] [facility]   5/16/2023  2:00 PM [facility]   7/21/2023 12:45 PM Mai Rodas MD [facility]   10/11/2023 11:00 AM [facility]

## 2023-02-15 NOTE — PROGRESS NOTES
Chief Complaint   Patient presents with    Hypertension    Blood Pressure Check      Antonella Rodas is here for BP check    BP Readings from Last 3 Encounters:   02/15/23 138/80   02/07/23 (!) 142/98   01/27/23 (!) 177/95       BP is 138/80      Future Appointments   Date Time Provider Shirley Dunne   2/23/2023  2:45 PM Harjit Heard, [address]   3/8/2023  1:30 PM Amanuel Chery MD [place] [id number]   5/16/2023  2:00 PM [facility]   7/21/2023 12:45 PM Mary Grace Nielson MD [facility]   10/11/2023 11:00 AM [facility]

## 2023-02-16 PROBLEM — M79.672 FOOT PAIN, LEFT: Status: ACTIVE | Noted: 2023-02-16

## 2023-02-16 NOTE — RESULT ENCOUNTER NOTE
Please notify patient: Improving calcium      Future Appointments  2/23/2023  2:45 PM    Rosales Guerrero DPM     [facility]          [facility]  3/8/2023   1:30 PM    Lazarus Sayers, MD           [place] [id number]  5/16/2023  2:00 PM    Vanessa Sanz MD     [facility]  7/21/2023  12:45 PM   Marsha Tavera MD         [facility]           [facility]  10/11/2023 11:00 AM   Vanessa Sanz MD     [facility]

## 2023-02-23 ENCOUNTER — OFFICE VISIT (OUTPATIENT)
Dept: PODIATRY | Age: 83
End: 2023-02-23

## 2023-02-23 VITALS — WEIGHT: 127 LBS | BODY MASS INDEX: 22.5 KG/M2 | HEIGHT: 63 IN

## 2023-02-23 DIAGNOSIS — M77.52 BONE SPUR OF LEFT FOOT: ICD-10-CM

## 2023-02-23 DIAGNOSIS — M19.072 PRIMARY OSTEOARTHRITIS OF LEFT FOOT: ICD-10-CM

## 2023-02-23 DIAGNOSIS — M79.672 LEFT FOOT PAIN: Primary | ICD-10-CM

## 2023-02-23 NOTE — PROGRESS NOTES
[facility]  New Patient History and Physical    Chief Complaint:   Chief Complaint   Patient presents with    Foot Pain     Pain in the top of the left foot        HPI: Luda Felix is a 80 y.o. female who presents to the office today complaining of left foot pain. Symptoms began 1 year(s) ago. Patient relates pain is Present. Pain is rated 5 out of 10 and is described as intermittent, waxing and waning, moderate, severe. Treatments prior to today's visit include: new shoes from [employer], orthotics. OTC pain relievers. Pain top of left foot, worse at end of day, painful to have anything touch it. Had an injury many years ago. .  Currently denies F/C/N/V.      Allergies   Allergen Reactions    Aspirin      GI upset    Atorvastatin      Muscle cramps       Past Medical History:   Diagnosis Date    Abdominal pain     Allergic rhinitis     Anxiety     Chronic back pain     Chronic fatigue     Chronic kidney disease     Chronic renal disease, stage III [facility]) [[id number]] 04/20/2022    Chronic respiratory failure ([facility] [address] 08/11/2020    Colon polyp 03/07/2012    TUBULAR ADENOMA    COPD (chronic obstructive pulmonary disease) (HCC)     COPD, 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     COVID 10/2022    mild case states treated with Paxlovid    Depression     Diverticul disease small and large intestine, no perforati or abscess     Elevated blood pressure reading     Fall 01/12/2017    GERD (gastroesophageal reflux disease)     Glaucoma     Hyperlipidemia     with target LDL less than 100    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[address]     Hyperthyroidism 2012    Insomnia     Psychophysiological    Neuropathy     Orthostatic dizziness 03/12/2017    Osteoarthritis     Panlobular emphysema (HCC)     PVC (premature ventricular contraction) 01/12/2017    S/P parathyroidectomy ([facility] [address]     Tubal pregnancy [id number]    Vasovagal syncope 01/12/2017       Prior to Admission medications    Medication Sig Start Date End Date Taking?  Authorizing Provider   omeprazole (PRILOSEC) 20 MG delayed release capsule  2/2/23  Yes Historical Provider, MD   traZODone (DESYREL) 50 MG tablet Take 1-2 tablets by mouth nightly Stop Zaleplon 2/7/23  Yes Willie Perales MD   metoprolol succinate (TOPROL XL) 25 MG extended release tablet Take 1 tablet by mouth daily Goal BP bellow 130/80 and above 115/65, heart rate 56 to 90 bpm 2/7/23  Yes Willie Perales MD   omeprazole (PRILOSEC) 40 MG delayed release capsule Take 1 capsule by mouth every morning (before breakfast) 11/15/22  Yes Deion Nair MD   albuterol sulfate HFA (PROVENTIL;VENTOLIN;PROAIR) 108 (90 Base) MCG/ACT inhaler Inhale 2 puffs into the lungs every 6 hours as needed for Wheezing or Shortness of Breath And when sick 10/14/22  Yes Willie Perales MD   sodium chloride (ALTAMIST SPRAY) 0.65 % nasal spray 1 spray by Nasal route as needed for Congestion 8/30/22  Yes Kimberly Hines, APRN - CNP   BREZTRI AEROSPHERE 160-9-4.8 MCG/ACT AERO Inhale 1 inhalation into the lungs daily 8/5/22  Yes Historical Provider, MD   dorzolamide-timolol (COSOPT) 22.3-6.8 MG/ML ophthalmic solution Place 1 drop into both eyes 2 times daily   Yes Historical Provider, MD   ALPHAGAN P 0.1 % SOLN Place 1 drop into both eyes 3 times daily  8/7/16  Yes Historical Provider, MD   latanoprost (XALATAN) 0.005 % ophthalmic solution Place 1 drop into both eyes nightly  8/25/16  Yes Historical Provider, MD       Past Surgical History:   Procedure Laterality Date    APPENDECTOMY      BACK SURGERY      CATARACT REMOVAL WITH IMPLANT Left 04/01/2014    [place] [address]  2005    COLONOSCOPY      COLONOSCOPY  06/13/2014    Severe sigmoid diverticulosis; due for repeat June 2019    ENDOSCOPY, COLON, DIAGNOSTIC      ESOPHAGOGASTRODUODENOSCOPY  12/20/2022    EYE SURGERY      PARATHYROIDECTOMY  11/05/2018    Dr. Patty Gilford; Left inferior parathyroid: adenoma    ROTATOR CUFF REPAIR Left TONSILLECTOMY      TUBAL LIGATION      UPPER GASTROINTESTINAL ENDOSCOPY  2014    biopsy    UPPER GASTROINTESTINAL ENDOSCOPY  2016    UPPER GASTROINTESTINAL ENDOSCOPY  2016    mild gastritis    UPPER GASTROINTESTINAL ENDOSCOPY N/A 2022    EGD BIOPSY performed by Burke Mohan MD at [facility] [address] [address] History   Problem Relation Age of Onset    Other Father         COPD    Stroke Sister     Heart Attack Sister     Breast Cancer Sister     Cancer Maternal Grandmother         Stomach cancer    Osteoporosis Mother     Other Sister         suicide       Social History     Tobacco Use    Smoking status: Former     Packs/day: 1.00     Years: 30.00     Pack years: 30.00     Types: Cigarettes     Start date: 1960     Quit date: 2000     Years since quittin.6    Smokeless tobacco: Never    Tobacco comments:     still uses OTC nicorette gum   Substance Use Topics    Alcohol use: Yes     Alcohol/week: 0.0 standard drinks     Comment: occassional       Review of Systems    Lower Extremity Physical Examination:     Vitals: There were no vitals filed for this visit. General: AAO x 3 in NAD. Vascular: DP and PT pulses palpable 2/4, Bilateral.  CFT <3 seconds, Bilateral.  Hair growth absent to the level of the digits, Bilateral.  Edema absent, Left. Varicosities absent, Bilateral. Erythema absent, Left    Neurological:  Sensation present to light touch to level of digits, Bilateral.    Musculoskeletal: Muscle strength 5/5, Left. Pain present upon palpation of 2nd and 3rd TMTJ, proximal shaft of 2nd metatarsal, slight palpable osteophyte., Left. normal medial longitudinal arch, Left. Ankle ROM normal,Left. 1st MPJ ROM normal, Left. Integument: Warm, dry, supple, Bilateral.  Open lesion absent, Bilateral.      Radiographs:  Three weightbearing views (AP, Oblique, and Lateral) of the left foot were obtained in the office today and reviewed, revealing no acute fracture, dislocation, or radioopaque foreign body/tumor. Overall alignment is satisfactory. Loss of joint space of the second and third tarsal metatarsal joints. Electronically signed by Lissette Ayala DPM    Gait analysis:     Asessment: Patient is a 80 y.o. female with:   1. Left foot pain    2. Primary osteoarthritis of left foot    3. Bone spur of left foot          Plan: Pt was evaluated and examined. Patient was given personalized discharge instructions. Pt will follow up in  as needed or sooner if any problems arise. Diagnosis was discussed with the pt and all of their questions were answered in detail. Proper foot hygiene and care was discussed with the pt. Patient to check feet daily and contact the office with any questions/problems/concerns. Other comorbidity noted and will be managed by PCP. Discussed good shoes  Continue with OTC orthotics from [employer]  Will try Voltaren gel first  Discussed injection  May need custom orthotics  Briefly discussed surgical options. Orders Placed This Encounter   Procedures    XR FOOT LEFT (MIN 3 VIEWS)     No orders of the defined types were placed in this encounter. RTC in as needed.     2/23/2023

## 2023-03-08 ENCOUNTER — OFFICE VISIT (OUTPATIENT)
Dept: GASTROENTEROLOGY | Age: 83
End: 2023-03-08
Payer: MEDICARE

## 2023-03-08 VITALS
WEIGHT: 131.8 LBS | DIASTOLIC BLOOD PRESSURE: 77 MMHG | SYSTOLIC BLOOD PRESSURE: 160 MMHG | HEIGHT: 63 IN | BODY MASS INDEX: 23.35 KG/M2

## 2023-03-08 DIAGNOSIS — K21.9 GASTROESOPHAGEAL REFLUX DISEASE WITHOUT ESOPHAGITIS: Primary | ICD-10-CM

## 2023-03-08 PROCEDURE — 99213 OFFICE O/P EST LOW 20 MIN: CPT | Performed by: INTERNAL MEDICINE

## 2023-03-08 PROCEDURE — 1123F ACP DISCUSS/DSCN MKR DOCD: CPT | Performed by: INTERNAL MEDICINE

## 2023-03-08 RX ORDER — OMEPRAZOLE 40 MG/1
40 CAPSULE, DELAYED RELEASE ORAL 2 TIMES DAILY
Qty: 60 CAPSULE | Refills: 2 | Status: SHIPPED | OUTPATIENT
Start: 2023-03-08

## 2023-03-08 ASSESSMENT — ENCOUNTER SYMPTOMS
CHOKING: 0
VOMITING: 0
COUGH: 0
ABDOMINAL DISTENTION: 1
ANAL BLEEDING: 0
TROUBLE SWALLOWING: 0
WHEEZING: 1
CONSTIPATION: 1
VOICE CHANGE: 0
RECTAL PAIN: 0
ABDOMINAL PAIN: 1
BLOOD IN STOOL: 0
NAUSEA: 0
DIARRHEA: 1

## 2023-03-08 NOTE — PROGRESS NOTES
Reason for Referral:  Abdominal pain and diarrhea      No referring provider defined for this encounter. Chief Complaint   Patient presents with    Follow-up     Dyspepsia           HISTORY OF PRESENT ILLNESS: Selin Ghotra is a 80 y.o. female with a past history remarkable for chronic abdominal pain, prior history of tubular adenomas on a colon polyp, COPD, depression, osteoarthritis, hypothyroidism, hyperparathyroid bone disease, status post parathyroidectomy, referred for evaluation of abdominal pain x1 year. Patient reports postprandial dyspepsia and abdominal discomfort. Patient is currently on Prilosec 40 mg daily. Modest relief with current gastric medication. Currently denies any significant NSAID use, no alcohol, no smoking. Denies any food aversion or secondary weight loss. Patient was identified to have diarrhea during questioning during this clinic visit. Loose watery, nonbloody per patient. Smoker: Quit 22 yrs ago   Drinking history: Nightly   Illicit drugs: None    Abdominal surgeries: appendectomy, CCY, tubal ligation. Prior Colonoscopy: Several years ago  Prior EGD: None recent  FH of GI issues: None reported      Past Medical,Family, and Social History reviewed and does contribute to the patient presentingcondition. Patient's PMH/PSH,SH,PSYCH Hx, MEDs, ALLERGIES, and ROS were all reviewed and updated in the appropriate sections.     PAST MEDICAL HISTORY:  Past Medical History:   Diagnosis Date    Abdominal pain     Allergic rhinitis     Anxiety     Chronic back pain     Chronic fatigue     Chronic kidney disease     Chronic renal disease, stage III [facility]) [[id number]] 04/20/2022    Chronic respiratory failure ([facility] [address] 08/11/2020    Colon polyp 03/07/2012    TUBULAR ADENOMA    COPD (chronic obstructive pulmonary disease) (HCC)     COPD, 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     COVID 10/2022    mild case states treated with Paxlovid    Depression Diverticul disease small and large intestine, no perforati or abscess     Elevated blood pressure reading     Fall 01/12/2017    GERD (gastroesophageal reflux disease)     Glaucoma     Hyperlipidemia     with target LDL less than 100    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[facility] [address]     Hyperthyroidism 2012    Insomnia     Psychophysiological    Neuropathy     Orthostatic dizziness 03/12/2017    Osteoarthritis     Panlobular emphysema ([facility] [address]     PVC (premature ventricular contraction) 01/12/2017    S/P parathyroidectomy ([facility] [address]     Tubal pregnancy [id number]    Vasovagal syncope 01/12/2017       Past Surgical History:   Procedure Laterality Date    APPENDECTOMY      BACK SURGERY      CATARACT REMOVAL WITH IMPLANT Left 04/01/2014    [place] [address]  2005    COLONOSCOPY      COLONOSCOPY  06/13/2014    Severe sigmoid diverticulosis; due for repeat June 2019    ENDOSCOPY, COLON, DIAGNOSTIC      ESOPHAGOGASTRODUODENOSCOPY  12/20/2022    EYE SURGERY      PARATHYROIDECTOMY  11/05/2018    Dr. Manav Weiss; Left inferior parathyroid: adenoma    ROTATOR CUFF REPAIR Left     TONSILLECTOMY      TUBAL LIGATION      UPPER GASTROINTESTINAL ENDOSCOPY  06/13/2014    biopsy    UPPER GASTROINTESTINAL ENDOSCOPY  01/26/2016    UPPER GASTROINTESTINAL ENDOSCOPY  05/04/2016    mild gastritis    UPPER GASTROINTESTINAL ENDOSCOPY N/A 12/20/2022    EGD BIOPSY performed by Lazarus Sayers, MD at [facility] [address]:    Current Outpatient Medications:     omeprazole (PRILOSEC) 40 MG delayed release capsule, Take 1 capsule by mouth in the morning and at bedtime, Disp: 60 capsule, Rfl: 2    traZODone (DESYREL) 50 MG tablet, Take 1-2 tablets by mouth nightly Stop Zaleplon, Disp: 60 tablet, Rfl: 3    metoprolol succinate (TOPROL XL) 25 MG extended release tablet, Take 1 tablet by mouth daily Goal BP bellow 130/80 and above 115/65, heart rate 56 to 90 bpm, Disp: 30 tablet, Rfl: 1    albuterol sulfate HFA (PROVENTIL;VENTOLIN;PROAIR) 108 (90 Base) MCG/ACT inhaler, Inhale 2 puffs into the lungs every 6 hours as needed for Wheezing or Shortness of Breath And when sick, Disp: 18 g, Rfl: 3    sodium chloride (ALTAMIST SPRAY) 0.65 % nasal spray, 1 spray by Nasal route as needed for Congestion, Disp: 1 each, Rfl: 3    BREZTRI AEROSPHERE 160-9-4.8 MCG/ACT AERO, Inhale 1 inhalation into the lungs daily, Disp: , Rfl:     dorzolamide-timolol (COSOPT) 22.3-6.8 MG/ML ophthalmic solution, Place 1 drop into both eyes 2 times daily, Disp: , Rfl:     ALPHAGAN P 0.1 % SOLN, Place 1 drop into both eyes 3 times daily , Disp: , Rfl:     latanoprost (XALATAN) 0.005 % ophthalmic solution, Place 1 drop into both eyes nightly , Disp: , Rfl:     ALLERGIES:   Allergies   Allergen Reactions    Aspirin      GI upset    Atorvastatin      Muscle cramps       FAMILY HISTORY:       Problem Relation Age of Onset    Other Father         COPD    Stroke Sister     Heart Attack Sister     Breast Cancer Sister     Cancer Maternal Grandmother         Stomach cancer    Osteoporosis Mother     Other Sister         suicide         SOCIAL HISTORY:   Social History     Socioeconomic History    Marital status:      Spouse name: Not on file    Number of children: Not on file    Years of education: Not on file    Highest education level: Not on file   Occupational History    Not on file   Tobacco Use    Smoking status: Former     Packs/day: 1.00     Years: 30.00     Pack years: 30.00     Types: Cigarettes     Start date: 1960     Quit date: 2000     Years since quittin.7    Smokeless tobacco: Never    Tobacco comments:     still uses OTC nicorette gum   Vaping Use    Vaping Use: Not on file   Substance and Sexual Activity    Alcohol use:  Yes     Alcohol/week: 0.0 standard drinks     Comment: occassional    Drug use: No    Sexual activity: Not on file   Other Topics Concern    Not on file   Social History Narrative    Not on file     Social Determinants of Health     Financial Resource Strain: Low Risk     Difficulty of Paying Living Expenses: Not hard at all   Food Insecurity: No Food Insecurity    Worried About [address] [employer] in the Last Year: Never true    Ran Out of Food in the Last Year: Never true   Transportation Needs: No Transportation Needs    Lack of Transportation (Medical): No    Lack of Transportation (Non-Medical): No   Physical Activity: Insufficiently Active    Days of Exercise per Week: 7 days    Minutes of Exercise per Session: 20 min   Stress: Not on file   Social Connections: Not on file   Intimate Partner Violence: Not on file   Housing Stability: Unknown    Unable to Pay for Housing in the Last Year: No    Number of Places Lived in the Last Year: Not on file    Unstable Housing in the Last Year: No         REVIEW OF SYSTEMS: A 12-point review of systems was obtained and pertinent positives and negatives were listed below. REVIEW OF SYSTEMS:     Constitutional: No fever, no chills, no lethargy, no weakness. HEENT:  No headache, otalgia, itchy eyes, nasal discharge or sore throat. Cardiac:  No chest pain, dyspnea, orthopnea or PND. Chest:   No cough, phlegm or wheezing. Abdomen:      Detailed by MA   Neuro:  No focal weakness, abnormal movements or seizure like activity. Skin:   No rashes, no itching. :   No hematuria, no pyuria, no dysuria, no flank pain. Extremities:  No swelling or joint pains. ROS was otherwise negative    Review of Systems   Constitutional: Negative. Negative for appetite change, fatigue and unexpected weight change. HENT: Negative. Negative for trouble swallowing and voice change. Eyes:  Positive for visual disturbance (wears glasses). Respiratory:  Positive for wheezing. Negative for cough and choking. Shortness of breath: COPD. Cardiovascular: Negative. Negative for chest pain, palpitations and leg swelling.    Gastrointestinal:  Positive for abdominal distention, abdominal pain, constipation and diarrhea. Negative for anal bleeding, blood in stool, nausea, rectal pain and vomiting. Genitourinary: Negative. Negative for difficulty urinating. Neurological:  Positive for dizziness. Negative for seizures, weakness, numbness and headaches. Hematological:  Bruises/bleeds easily. Psychiatric/Behavioral:  Positive for confusion and sleep disturbance. The patient is nervous/anxious. PHYSICAL EXAMINATION: Vital signs reviewed per the nursing documentation. BP (!) 160/77 (Site: Right Upper Arm, Position: Sitting)   Ht 5' 3\" (1.6 m)   Wt 131 lb 12.8 oz (59.8 kg)   PF 69 L/min   BMI 23.35 kg/m²   Body mass index is 23.35 kg/m². Physical Exam    Physical Exam   Constitutional: Patient is oriented to person, place, and time. Patient appears well-developed and well-nourished. HENT:   Head: Normocephalic and atraumatic. Eyes: Pupils are equal, round, and reactive to light. EOM are normal.   Neck: Normal range of motion. Neck supple. No JVD present. No tracheal deviation present. No thyromegaly present. Cardiovascular: Normal rate, regular rhythm, normal heart sounds and intact distal pulses. Pulmonary/Chest: Effort normal and breath sounds normal. No stridor. No respiratory distress. He has no wheezes. He has no rales. He exhibits no tenderness. Abdominal: Soft. Bowel sounds are normal. He exhibits no distension and no mass. There is no tenderness. There is no rebound and no guarding. No hernia. Musculoskeletal: Normal range of motion. Lymphadenopathy:    Patient has no cervical adenopathy. Neurological: Patient is alert and oriented to person, place, and time. Psychiatric: Patient has a normal mood and affect.  Patient behavior is normal.       LABORATORY DATA: Reviewed  Lab Results   Component Value Date    WBC 7.7 01/23/2023    HGB 15.5 01/23/2023    HCT 45.9 01/23/2023    MCV 93.5 01/23/2023     01/23/2023     02/07/2023    K 4.8 02/07/2023  02/07/2023    CO2 27 02/07/2023    BUN 20 02/07/2023    CREATININE 0.96 (H) 02/07/2023    LABPROT 6.5 02/27/2012    LABALBU 4.2 02/07/2023    BILITOT 0.6 02/07/2023    ALKPHOS 80 02/07/2023    AST 17 02/07/2023    ALT 14 02/07/2023    INR 1.0 05/03/2016         Lab Results   Component Value Date    RBC 4.91 01/23/2023    HGB 15.5 01/23/2023    MCV 93.5 01/23/2023    MCH 31.7 01/23/2023    MCHC 33.8 01/23/2023    RDW 13.0 01/23/2023    MPV 7.1 01/23/2023    BASOPCT 1 09/22/2022    LYMPHSABS 1.90 09/22/2022    MONOSABS 0.60 09/22/2022    NEUTROABS 3.60 09/22/2022    EOSABS 0.10 09/22/2022    BASOSABS 0.10 09/22/2022         DIAGNOSTIC TESTING:     US HEAD NECK SOFT TISSUE THYROID    Result Date: 6/8/2022  EXAMINATION: THYROID ULTRASOUND 6/8/2022 COMPARISON: March 2, 2021 HISTORY: ORDERING SYSTEM PROVIDED HISTORY: Nontoxic multinodular goiter FINDINGS: Right thyroid lobe:  45 x 13 x 16 mm Left thyroid lobe:  47 x 12 x 15 mm Isthmus:  5 mm Thyroid Gland:  Thyroid is heterogeneous throughout Nodules: NODULE: 1 Size: 10 x 7 x 11 mm Location: Mid right thyroid 1. Composition:  Solid (2) 2. Echogenicity:  Hypoechoic (2) 3. Shape: Wider-than-tall (0) 4. Margins:  Smooth (0) 5. Echogenic foci:  None (0) ACR TI-RADS total points:  4 ACR TI-RADS risk category: TR4 NODULE: 2 Size: 12 x 6 x 9 mm Location: Inferior right thyroid 1. Composition:  Mixed cystic and solid (1) 2. Echogenicity:  Hypoechoic (2) 3. Shape: Wider-than-tall (0) 4. Margins:  Smooth (0) 5. Echogenic foci:  None (0) ACR TI-RADS total points:  3 ACR TI-RADS risk category: TR3 Cervical lymphadenopathy: No abnormal lymph nodes in the imaged portions of the neck. Heterogeneous thyroid with a centrally stable nodules right thyroid lobe meeting TI-RADS criteria for 1 year follow-up RECOMMENDATIONS: NODULE 1:  ACR TI-RADS TR4: Recommend: Follow-up ultrasound in 1 year. NODULE 2:  ACR TI-RADS TR3: Recommend:  No follow-up.  ACR TI-RADS recommendations: TR5 (>= 7 points):  FNA if >= 1 cm; follow-up if 0.5-0.9 cm in 1, 2, 3, 4, and 5 years TR4 (4-6 points):  FNA if >= 1.5 cm; follow-up if 1.0-1.4 cm in 1, 2, 3, and 5 years TR3 (3 points):  FNA if >= 2.5 cm; follow-up if 1.5-2.4 cm in 1, 3, and 5 years TR2 (2 points):  No FNA or follow-up TR1 (0 points):  No FNA or follow-up ACR TI-RADS recommends that no more than two nodules with the highest ACR TI-RADS point total should be biopsied and no more than four nodules should be followed. DEXA BONE DENSITY AXIAL SKELETON    Result Date: 6/8/2022  EXAMINATION: BONE DENSITOMETRY 6/8/2022 9:50 am TECHNIQUE: A bone density dual x-ray absorptiometry (DEXA) scan was performed of the lumbar spine and left hip  on a [employer] system. COMPARISON: Several previous studies with the most recent prior dated March 23, 2018. HISTORY: ORDERING SYSTEM PROVIDED HISTORY: Idiopathic osteoporosis FINDINGS: LUMBAR SPINE: The bone mineral density in the lumbar spine including the L1-L4 levels is measured at 1.085 g/cm2, which corresponds to a T-score of -0.9 and a Z-score of 1.2. This is within the normal range by WHO criteria. These findings would suggest improvement in bone mineral density since the previous comparison study. However, it is noted that there appear to be degenerative changes in the spine. If there are significant spurs, these could erroneously elevate the patient's BMD. LEFT HIP: The bone mineral density in the total hip is measured at 0.695 g/cm2 corresponding to a T-score of -2.5 and a Z-score of -0.2. This is within the osteoporosis range by WHO criteria. No evidence of statistically significant change. The bone mineral density of the femoral neck is measured at 0.648 g/cm2 corresponding to a T-score of -2.8 and a Z-score of -0.4. This is within the osteoporosis range by WHO criteria. FRAX score for 10 year probability of major osteoporotic fracture is 21.1%, and in the hip 8.6%.  FRAX adjusted for TB S 10 year probability of major osteoporotic fracture is 21.5%, and in the hip 8.7%. By [facility] criteria the patient's bone mineral density is classified as osteoporosis. IMPRESSION: Tala Cantrell is a 80 y.o. female with a past history remarkable for chronic abdominal pain, prior history of tubular adenomas on a colon polyp, COPD, depression, osteoarthritis, hypothyroidism, hyperparathyroid bone disease, status post parathyroidectomy, referred for evaluation of abdominal pain x1 year. Patient reports postprandial dyspepsia and abdominal discomfort. Patient is currently on Prilosec 40 mg daily. Modest relief with current gastric medication. Currently denies any significant NSAID use, no alcohol, no smoking. Denies any food aversion or secondary weight loss. Patient was identified to have diarrhea during questioning during this clinic visit. Loose watery, nonbloody per patient. Assessment  1. Gastroesophageal reflux disease without esophagitis        Eunice Gonzalez was seen today for new patient and abdominal pain. Diagnoses and all orders for this visit:    Diarrhea, unspecified type-we will send for pancreatic elastase, may consider Benefiber or fiber supplementation as a bulk forming agent. Avoid dietary triggers. Patient continue with antidiarrheal medication over-the-counter      Dyspepsia- we will increase patient's of Prilosec to 40 mg in a.m. and 20 mg in evening. Strongly advise complete cessation of wine which the patient drinks on a nightly basis. Upper endoscopy revealed a gastric erosion and mild gastritis. Biopsies were negative for H. pylori. Patient is symptomatically improved    Other orders  -     omeprazole (PRILOSEC OTC) 20 MG tablet; Take 1 tablet by mouth in the morning and at bedtime          RTC: 3 months. Additional comments: Thank you for allowing me to participate in the care of Ms. Napier.  For any further questions please do not hesitate to contact me.      I have reviewed and agree with the MA/LPN ROS please refer to their documentation from today's encounter on a separate note. Amanuel Chery MD, MPH   Board Certified in Gastroenterology  Board Certified in [address] [place] [address] #: [phone]          this note is created with the assistance of a speech recognition program.  While intending to generate a document that actually reflects the content of the visit, the document can still have some errors including those of syntax and sound a like substitutions which may escape proof reading. It such instances, actual meaning can be extrapolated by contextual diversion.

## 2023-03-20 RX ORDER — OMEPRAZOLE 20 MG/1
CAPSULE, DELAYED RELEASE ORAL
Qty: 60 CAPSULE | Refills: 2 | Status: SHIPPED | OUTPATIENT
Start: 2023-03-20

## 2023-03-26 DIAGNOSIS — I12.9 BENIGN HYPERTENSION WITH CKD (CHRONIC KIDNEY DISEASE) STAGE III (HCC): ICD-10-CM

## 2023-03-26 DIAGNOSIS — N18.30 BENIGN HYPERTENSION WITH CKD (CHRONIC KIDNEY DISEASE) STAGE III (HCC): ICD-10-CM

## 2023-03-27 ENCOUNTER — TELEPHONE (OUTPATIENT)
Dept: FAMILY MEDICINE CLINIC | Age: 83
End: 2023-03-27

## 2023-03-27 RX ORDER — METOPROLOL SUCCINATE 25 MG/1
25 TABLET, EXTENDED RELEASE ORAL DAILY
Qty: 30 TABLET | Refills: 1 | Status: SHIPPED | OUTPATIENT
Start: 2023-03-27

## 2023-03-27 NOTE — TELEPHONE ENCOUNTER
Please Approve or Refuse.   Send to Pharmacy per Pt's Request:      Next Visit Date:  5/16/2023   Last Visit Date: 2/7/2023    Hemoglobin A1C (%)   Date Value   05/16/2022 5.3   03/16/2020 5.4   04/30/2019 5.4             ( goal A1C is < 7)   BP Readings from Last 3 Encounters:   03/08/23 (!) 160/77   02/15/23 138/80   02/07/23 (!) 142/98          (goal 120/80)  BUN   Date Value Ref Range Status   02/07/2023 20 8 - 23 mg/dL Final     Creatinine   Date Value Ref Range Status   02/07/2023 0.96 (H) 0.50 - 0.90 mg/dL Final     Potassium   Date Value Ref Range Status   02/07/2023 4.8 3.7 - 5.3 mmol/L Final

## 2023-03-27 NOTE — TELEPHONE ENCOUNTER
You routed conversation to Belen Mancilla Just now (8:27 AM)     You Just now (8:27 AM)     [facility]  Needs nurse visit for blood pressure check         Note

## 2023-03-28 NOTE — TELEPHONE ENCOUNTER
CALLED PT AND SCHEDULED HER BP CHECK.        Future Appointments   Date Time Provider Shirley Dunne   3/29/2023 10:15 AM SCHEDULE, [facility]   5/16/2023  2:00 PM [facility]   6/8/2023  1:30 PM Amanuel Chery MD [facility]   7/21/2023 12:45 PM Mary Grace Nielson MD [facility]   10/11/2023 11:00 AM Madison Hatchet, MD [facility]

## 2023-03-29 ENCOUNTER — NURSE ONLY (OUTPATIENT)
Dept: FAMILY MEDICINE CLINIC | Age: 83
End: 2023-03-29
Payer: MEDICARE

## 2023-03-29 VITALS — OXYGEN SATURATION: 96 % | DIASTOLIC BLOOD PRESSURE: 80 MMHG | SYSTOLIC BLOOD PRESSURE: 130 MMHG | HEART RATE: 82 BPM

## 2023-03-29 DIAGNOSIS — I12.9 BENIGN HYPERTENSION WITH CKD (CHRONIC KIDNEY DISEASE) STAGE III (HCC): Primary | ICD-10-CM

## 2023-03-29 DIAGNOSIS — N18.30 BENIGN HYPERTENSION WITH CKD (CHRONIC KIDNEY DISEASE) STAGE III (HCC): Primary | ICD-10-CM

## 2023-03-29 PROCEDURE — 99211 OFF/OP EST MAY X REQ PHY/QHP: CPT | Performed by: FAMILY MEDICINE

## 2023-03-29 NOTE — PROGRESS NOTES
Chief Complaint   Patient presents with    Hypertension    Blood Pressure Check      Walt Perkins is here for BP check    BP Readings from Last 3 Encounters:   03/29/23 130/80   03/08/23 (!) 160/77   02/15/23 138/80       BP is 130/80      Future Appointments   Date Time Provider Shirley Dunne   5/16/2023  2:00 PM Khadijah Shields MD [facility]   6/8/2023  1:30 PM Mohan Fernandez MD [facility]   7/21/2023 12:45 PM Emilia Magdaleno MD [facility]   10/11/2023 11:00 AM Khadijah Shields MD [facility]

## 2023-03-29 NOTE — PROGRESS NOTES
Chief Complaint   Patient presents with    Hypertension    Blood Pressure Check        Patient is here for blood pressure recheck. 1. Benign hypertension with CKD (chronic kidney disease) stage III (HCC)        Well controlled BP   Continue current treatment.        BP Readings from Last 3 Encounters:   03/29/23 130/80   03/08/23 (!) 160/77   02/15/23 138/80       Pulse Readings from Last 3 Encounters:   03/29/23 82   02/15/23 87   02/07/23 84       Future Appointments   Date Time Provider Shirley Dunne   5/16/2023  2:00 PM [facility]   6/8/2023  1:30 PM Norberto Severs, MD [facility]   7/21/2023 12:45 PM Valery Marsh MD [facility]   10/11/2023 11:00 AM Rasta Khan MD [facility]

## 2023-05-03 DIAGNOSIS — M81.8 IDIOPATHIC OSTEOPOROSIS: Primary | ICD-10-CM

## 2023-05-03 RX ORDER — ALBUTEROL SULFATE 90 UG/1
4 AEROSOL, METERED RESPIRATORY (INHALATION) PRN
OUTPATIENT
Start: 2023-05-03

## 2023-05-03 RX ORDER — EPINEPHRINE 1 MG/ML
0.3 INJECTION, SOLUTION, CONCENTRATE INTRAVENOUS PRN
OUTPATIENT
Start: 2023-05-03

## 2023-05-03 RX ORDER — ACETAMINOPHEN 325 MG/1
650 TABLET ORAL
OUTPATIENT
Start: 2023-05-03

## 2023-05-03 RX ORDER — ONDANSETRON 2 MG/ML
8 INJECTION INTRAMUSCULAR; INTRAVENOUS
OUTPATIENT
Start: 2023-05-03

## 2023-05-03 RX ORDER — SODIUM CHLORIDE 9 MG/ML
INJECTION, SOLUTION INTRAVENOUS CONTINUOUS
OUTPATIENT
Start: 2023-05-03

## 2023-05-03 RX ORDER — DIPHENHYDRAMINE HYDROCHLORIDE 50 MG/ML
50 INJECTION INTRAMUSCULAR; INTRAVENOUS
OUTPATIENT
Start: 2023-05-03

## 2023-05-10 ENCOUNTER — HOSPITAL ENCOUNTER (OUTPATIENT)
Dept: INFUSION THERAPY | Age: 83
Setting detail: INFUSION SERIES
Discharge: HOME | End: 2023-05-10
Payer: MEDICARE

## 2023-05-10 VITALS
SYSTOLIC BLOOD PRESSURE: 139 MMHG | RESPIRATION RATE: 16 BRPM | TEMPERATURE: 97.2 F | HEART RATE: 61 BPM | OXYGEN SATURATION: 98 % | DIASTOLIC BLOOD PRESSURE: 80 MMHG

## 2023-05-10 DIAGNOSIS — M81.8 IDIOPATHIC OSTEOPOROSIS: Primary | ICD-10-CM

## 2023-05-10 LAB
CALCIUM SERPL-MCNC: 9.1 MG/DL (ref 8.6–10.4)
CREAT SERPL-MCNC: 1.14 MG/DL (ref 0.5–0.9)
GFR SERPL CREATININE-BSD FRML MDRD: 48 ML/MIN/1.73M2
MAGNESIUM SERPL-MCNC: 2 MG/DL (ref 1.6–2.6)
PHOSPHATE SERPL-MCNC: 3.5 MG/DL (ref 2.6–4.5)

## 2023-05-10 PROCEDURE — 6360000002 HC RX W HCPCS: Performed by: INTERNAL MEDICINE

## 2023-05-10 PROCEDURE — 83735 ASSAY OF MAGNESIUM: CPT

## 2023-05-10 PROCEDURE — 96372 THER/PROPH/DIAG INJ SC/IM: CPT

## 2023-05-10 PROCEDURE — 36415 COLL VENOUS BLD VENIPUNCTURE: CPT

## 2023-05-10 PROCEDURE — 82565 ASSAY OF CREATININE: CPT

## 2023-05-10 PROCEDURE — 82310 ASSAY OF CALCIUM: CPT

## 2023-05-10 PROCEDURE — 84100 ASSAY OF PHOSPHORUS: CPT

## 2023-05-10 RX ORDER — EPINEPHRINE 1 MG/ML
0.3 INJECTION, SOLUTION, CONCENTRATE INTRAVENOUS PRN
OUTPATIENT
Start: 2023-11-05

## 2023-05-10 RX ORDER — ACETAMINOPHEN 325 MG/1
650 TABLET ORAL
OUTPATIENT
Start: 2023-11-05

## 2023-05-10 RX ORDER — AMLODIPINE BESYLATE 2.5 MG/1
2.5 TABLET ORAL EVERY MORNING
COMMUNITY
Start: 2023-04-08

## 2023-05-10 RX ORDER — SODIUM CHLORIDE 9 MG/ML
INJECTION, SOLUTION INTRAVENOUS CONTINUOUS
OUTPATIENT
Start: 2023-11-05

## 2023-05-10 RX ORDER — ALBUTEROL SULFATE 90 UG/1
4 AEROSOL, METERED RESPIRATORY (INHALATION) PRN
OUTPATIENT
Start: 2023-11-05

## 2023-05-10 RX ORDER — DIPHENHYDRAMINE HYDROCHLORIDE 50 MG/ML
50 INJECTION INTRAMUSCULAR; INTRAVENOUS
OUTPATIENT
Start: 2023-11-05

## 2023-05-10 RX ORDER — ONDANSETRON 2 MG/ML
8 INJECTION INTRAMUSCULAR; INTRAVENOUS
OUTPATIENT
Start: 2023-11-05

## 2023-05-10 RX ADMIN — DENOSUMAB 60 MG: 60 INJECTION SUBCUTANEOUS at 14:35

## 2023-05-16 ENCOUNTER — OFFICE VISIT (OUTPATIENT)
Dept: FAMILY MEDICINE CLINIC | Age: 83
End: 2023-05-16
Payer: MEDICARE

## 2023-05-16 DIAGNOSIS — N18.30 BENIGN HYPERTENSION WITH CKD (CHRONIC KIDNEY DISEASE) STAGE III (HCC): ICD-10-CM

## 2023-05-16 DIAGNOSIS — H81.13 BENIGN PAROXYSMAL POSITIONAL VERTIGO DUE TO BILATERAL VESTIBULAR DISORDER: ICD-10-CM

## 2023-05-16 DIAGNOSIS — M53.3 SACROILIAC JOINT PAIN: ICD-10-CM

## 2023-05-16 DIAGNOSIS — I12.9 BENIGN HYPERTENSION WITH CKD (CHRONIC KIDNEY DISEASE) STAGE III (HCC): ICD-10-CM

## 2023-05-16 DIAGNOSIS — R73.9 HYPERGLYCEMIA: ICD-10-CM

## 2023-05-16 DIAGNOSIS — G89.29 CHRONIC PAIN OF LEFT KNEE: ICD-10-CM

## 2023-05-16 DIAGNOSIS — E89.2 S/P PARATHYROIDECTOMY (HCC): ICD-10-CM

## 2023-05-16 DIAGNOSIS — J43.1 PANLOBULAR EMPHYSEMA (HCC): Primary | ICD-10-CM

## 2023-05-16 DIAGNOSIS — M81.0 AGE-RELATED OSTEOPOROSIS WITHOUT CURRENT PATHOLOGICAL FRACTURE: ICD-10-CM

## 2023-05-16 DIAGNOSIS — E78.5 HYPERLIPIDEMIA WITH TARGET LDL LESS THAN 100: ICD-10-CM

## 2023-05-16 DIAGNOSIS — M25.562 CHRONIC PAIN OF LEFT KNEE: ICD-10-CM

## 2023-05-16 PROBLEM — I10 PRIMARY HYPERTENSION: Status: ACTIVE | Noted: 2023-02-20

## 2023-05-16 PROBLEM — R42 DIZZINESS: Status: ACTIVE | Noted: 2023-02-20

## 2023-05-16 PROBLEM — R06.02 SHORTNESS OF BREATH: Status: ACTIVE | Noted: 2023-05-16

## 2023-05-16 PROCEDURE — 99214 OFFICE O/P EST MOD 30 MIN: CPT | Performed by: FAMILY MEDICINE

## 2023-05-16 PROCEDURE — 3074F SYST BP LT 130 MM HG: CPT | Performed by: FAMILY MEDICINE

## 2023-05-16 PROCEDURE — 3078F DIAST BP <80 MM HG: CPT | Performed by: FAMILY MEDICINE

## 2023-05-16 PROCEDURE — 1123F ACP DISCUSS/DSCN MKR DOCD: CPT | Performed by: FAMILY MEDICINE

## 2023-05-16 RX ORDER — MECLIZINE HYDROCHLORIDE 25 MG/1
25 TABLET ORAL 3 TIMES DAILY PRN
Qty: 90 TABLET | Refills: 0 | Status: SHIPPED | OUTPATIENT
Start: 2023-05-16

## 2023-05-16 ASSESSMENT — ENCOUNTER SYMPTOMS
DIARRHEA: 0
VOMITING: 0
CONSTIPATION: 0
ABDOMINAL PAIN: 0
ABDOMINAL DISTENTION: 0
COUGH: 0
BACK PAIN: 1
WHEEZING: 0
NAUSEA: 0
SHORTNESS OF BREATH: 1
CHEST TIGHTNESS: 0

## 2023-05-19 DIAGNOSIS — I12.9 BENIGN HYPERTENSION WITH CKD (CHRONIC KIDNEY DISEASE) STAGE III (HCC): ICD-10-CM

## 2023-05-19 DIAGNOSIS — N18.30 BENIGN HYPERTENSION WITH CKD (CHRONIC KIDNEY DISEASE) STAGE III (HCC): ICD-10-CM

## 2023-05-19 RX ORDER — METOPROLOL SUCCINATE 25 MG/1
TABLET, EXTENDED RELEASE ORAL
Qty: 30 TABLET | Refills: 1 | Status: SHIPPED | OUTPATIENT
Start: 2023-05-19

## 2023-05-19 NOTE — TELEPHONE ENCOUNTER
Please Approve or Refuse.   Send to Pharmacy per Pt's Request: [facility]     Next Visit Date:  10/11/2023   Last Visit Date: 5/16/2023    Hemoglobin A1C (%)   Date Value   05/16/2022 5.3   03/16/2020 5.4   04/30/2019 5.4             ( goal A1C is < 7)   BP Readings from Last 3 Encounters:   05/16/23 122/80   05/10/23 139/80   03/29/23 130/80          (goal 120/80)  BUN   Date Value Ref Range Status   02/07/2023 20 8 - 23 mg/dL Final     Creatinine   Date Value Ref Range Status   05/10/2023 1.14 (H) 0.50 - 0.90 mg/dL Final     Potassium   Date Value Ref Range Status   02/07/2023 4.8 3.7 - 5.3 mmol/L Final

## 2023-05-23 VITALS
WEIGHT: 132.6 LBS | TEMPERATURE: 97.6 F | HEIGHT: 63 IN | BODY MASS INDEX: 23.5 KG/M2 | OXYGEN SATURATION: 97 % | DIASTOLIC BLOOD PRESSURE: 80 MMHG | HEART RATE: 70 BPM | SYSTOLIC BLOOD PRESSURE: 122 MMHG

## 2023-05-24 ENCOUNTER — OFFICE VISIT (OUTPATIENT)
Dept: FAMILY MEDICINE CLINIC | Age: 83
End: 2023-05-24

## 2023-05-24 ENCOUNTER — HOSPITAL ENCOUNTER (OUTPATIENT)
Age: 83
Discharge: HOME | End: 2023-05-24
Payer: MEDICARE

## 2023-05-24 VITALS
OXYGEN SATURATION: 97 % | HEART RATE: 71 BPM | SYSTOLIC BLOOD PRESSURE: 136 MMHG | TEMPERATURE: 97.7 F | DIASTOLIC BLOOD PRESSURE: 66 MMHG

## 2023-05-24 DIAGNOSIS — E89.2 S/P PARATHYROIDECTOMY (HCC): ICD-10-CM

## 2023-05-24 DIAGNOSIS — N18.30 BENIGN HYPERTENSION WITH CKD (CHRONIC KIDNEY DISEASE) STAGE III (HCC): ICD-10-CM

## 2023-05-24 DIAGNOSIS — L25.5 PLANT DERMATITIS: Primary | ICD-10-CM

## 2023-05-24 DIAGNOSIS — E78.5 HYPERLIPIDEMIA WITH TARGET LDL LESS THAN 100: ICD-10-CM

## 2023-05-24 DIAGNOSIS — I12.9 BENIGN HYPERTENSION WITH CKD (CHRONIC KIDNEY DISEASE) STAGE III (HCC): ICD-10-CM

## 2023-05-24 DIAGNOSIS — M81.0 AGE-RELATED OSTEOPOROSIS WITHOUT CURRENT PATHOLOGICAL FRACTURE: ICD-10-CM

## 2023-05-24 DIAGNOSIS — R73.9 HYPERGLYCEMIA: ICD-10-CM

## 2023-05-24 LAB
25(OH)D3 SERPL-MCNC: 41.1 NG/ML
ALBUMIN SERPL-MCNC: 4 G/DL (ref 3.5–5.2)
ALP SERPL-CCNC: 76 U/L (ref 35–104)
ALT SERPL-CCNC: 9 U/L (ref 5–33)
ANION GAP SERPL CALCULATED.3IONS-SCNC: 10 MMOL/L (ref 9–17)
AST SERPL-CCNC: 17 U/L
BILIRUB SERPL-MCNC: 0.6 MG/DL (ref 0.3–1.2)
BUN SERPL-MCNC: 15 MG/DL (ref 8–23)
CA-I BLD-SCNC: 1.28 MMOL/L (ref 1.13–1.33)
CALCIUM SERPL-MCNC: 9.3 MG/DL (ref 8.6–10.4)
CHLORIDE SERPL-SCNC: 106 MMOL/L (ref 98–107)
CHOLEST SERPL-MCNC: 200 MG/DL
CHOLESTEROL/HDL RATIO: 3.2
CO2 SERPL-SCNC: 27 MMOL/L (ref 20–31)
CREAT SERPL-MCNC: 1.08 MG/DL (ref 0.5–0.9)
ERYTHROCYTE [DISTWIDTH] IN BLOOD BY AUTOMATED COUNT: 13.7 % (ref 11.5–14.9)
EST. AVERAGE GLUCOSE BLD GHB EST-MCNC: 108 MG/DL
GFR SERPL CREATININE-BSD FRML MDRD: 51 ML/MIN/1.73M2
GLUCOSE SERPL-MCNC: 110 MG/DL (ref 70–99)
HBA1C MFR BLD: 5.4 % (ref 4–6)
HCT VFR BLD AUTO: 42.4 % (ref 36–46)
HDLC SERPL-MCNC: 62 MG/DL
HGB BLD-MCNC: 14.4 G/DL (ref 12–16)
LDLC SERPL CALC-MCNC: 118 MG/DL (ref 0–130)
MAGNESIUM SERPL-MCNC: 1.9 MG/DL (ref 1.6–2.6)
MCH RBC QN AUTO: 31.9 PG (ref 26–34)
MCHC RBC AUTO-ENTMCNC: 34 G/DL (ref 31–37)
MCV RBC AUTO: 93.8 FL (ref 80–100)
PHOSPHATE SERPL-MCNC: 3.3 MG/DL (ref 2.6–4.5)
PLATELET # BLD AUTO: 205 K/UL (ref 150–450)
PMV BLD AUTO: 6.9 FL (ref 6–12)
POTASSIUM SERPL-SCNC: 4.3 MMOL/L (ref 3.7–5.3)
PROT SERPL-MCNC: 6.8 G/DL (ref 6.4–8.3)
PTH-INTACT SERPL-MCNC: 42.5 PG/ML (ref 14–72)
RBC # BLD AUTO: 4.52 M/UL (ref 4–5.2)
SODIUM SERPL-SCNC: 143 MMOL/L (ref 135–144)
TRIGL SERPL-MCNC: 102 MG/DL
TSH SERPL-MCNC: 2.94 UIU/ML (ref 0.3–5)
WBC OTHER # BLD: 5.1 K/UL (ref 3.5–11)

## 2023-05-24 PROCEDURE — 83735 ASSAY OF MAGNESIUM: CPT

## 2023-05-24 PROCEDURE — 85027 COMPLETE CBC AUTOMATED: CPT

## 2023-05-24 PROCEDURE — 36415 COLL VENOUS BLD VENIPUNCTURE: CPT

## 2023-05-24 PROCEDURE — 82306 VITAMIN D 25 HYDROXY: CPT

## 2023-05-24 PROCEDURE — 84100 ASSAY OF PHOSPHORUS: CPT

## 2023-05-24 PROCEDURE — 80061 LIPID PANEL: CPT

## 2023-05-24 PROCEDURE — 84443 ASSAY THYROID STIM HORMONE: CPT

## 2023-05-24 PROCEDURE — 83036 HEMOGLOBIN GLYCOSYLATED A1C: CPT

## 2023-05-24 PROCEDURE — 80053 COMPREHEN METABOLIC PANEL: CPT

## 2023-05-24 PROCEDURE — 82330 ASSAY OF CALCIUM: CPT

## 2023-05-24 PROCEDURE — 83970 ASSAY OF PARATHORMONE: CPT

## 2023-05-24 RX ORDER — FLUTICASONE FUROATE, UMECLIDINIUM BROMIDE AND VILANTEROL TRIFENATATE 100; 62.5; 25 UG/1; UG/1; UG/1
POWDER RESPIRATORY (INHALATION)
COMMUNITY
Start: 2023-05-22

## 2023-05-24 RX ORDER — TRIAMCINOLONE ACETONIDE 40 MG/ML
40 INJECTION, SUSPENSION INTRA-ARTICULAR; INTRAMUSCULAR ONCE
Status: COMPLETED | OUTPATIENT
Start: 2023-05-24 | End: 2023-05-24

## 2023-05-24 RX ADMIN — TRIAMCINOLONE ACETONIDE 40 MG: 40 INJECTION, SUSPENSION INTRA-ARTICULAR; INTRAMUSCULAR at 12:13

## 2023-05-24 ASSESSMENT — ENCOUNTER SYMPTOMS
VOMITING: 0
SHORTNESS OF BREATH: 0
SORE THROAT: 0
CHEST TIGHTNESS: 0
WHEEZING: 0
CHOKING: 0
COUGH: 0
STRIDOR: 0
EYE PAIN: 0
NAIL CHANGES: 0
DIARRHEA: 0
RHINORRHEA: 0

## 2023-05-24 NOTE — PROGRESS NOTES
AEPB inhaler inhale 1 puff by mouth and INTO THE LUNGS once daily       No current facility-administered medications for this visit. Allergies   Allergen Reactions    Aspirin      GI upset    Atorvastatin      Muscle cramps       Subjective:      Review of Systems   Constitutional:  Negative for fatigue and fever. HENT:  Negative for congestion, rhinorrhea and sore throat. Eyes:  Negative for pain. Respiratory:  Negative for cough, choking, chest tightness, shortness of breath, wheezing and stridor. Gastrointestinal:  Negative for anorexia, diarrhea and vomiting. Musculoskeletal:  Negative for joint pain. Skin:  Positive for rash. Negative for nail changes. All other systems reviewed and are negative. 14 systems reviewed and negative except as listed in HPI. Objective:     Physical Exam  Vitals and nursing note reviewed. Constitutional:       General: She is not in acute distress. Appearance: Normal appearance. She is not ill-appearing, toxic-appearing or diaphoretic. HENT:      Head: Normocephalic and atraumatic. Right Ear: External ear normal.      Left Ear: External ear normal.      Mouth/Throat:      Mouth: Mucous membranes are moist.      Pharynx: Oropharynx is clear. No oropharyngeal exudate or posterior oropharyngeal erythema. Comments: No tongue elevation  Swallows without difficulty  Uvula midline no edema  No oropharyngeal swelling  Eyes:      General: No scleral icterus. Right eye: No discharge. Left eye: No discharge. Extraocular Movements: Extraocular movements intact. Conjunctiva/sclera: Conjunctivae normal.      Pupils: Pupils are equal, round, and reactive to light. Cardiovascular:      Rate and Rhythm: Normal rate and regular rhythm. Pulses: Normal pulses. Heart sounds: Normal heart sounds. Pulmonary:      Effort: Pulmonary effort is normal. No respiratory distress. Breath sounds: Normal breath sounds. No stridor.

## 2023-05-24 NOTE — RESULT ENCOUNTER NOTE
Please notify patient: Lipids are getting worse--I would highly recommend for her to start taking a statin like pravastatin, in the past Lipitor gave her cramps I do suggest a milder statin, to prevent strokes and heart attacks, let me know if she agrees for pravastatin    Kidney function is stable, to make sure she drinks 6 x 8 ounce glasses of water every day  Blood glucose mildly high, to increase proteins in diet and cut down sweets, no prediabetes.   Parathyroid hormone and calcium normal now    Otherwise labs within normal limits  continue current treatment    Future Appointments  6/8/2023   1:30 PM    Cyrus Farfan MD           [facility]            [facility]  6/21/2023  1:00 PM    [address] [facility] DIGITAL RM             [facility]          [address] [facility]  7/21/2023  12:45 PM   Rosa Uriarte MD         [facility]           [facility]  10/11/2023 11:00 AM   Kayleen Li MD     [facility]

## 2023-06-02 DIAGNOSIS — F51.04 PSYCHOPHYSIOLOGICAL INSOMNIA: ICD-10-CM

## 2023-06-02 DIAGNOSIS — F33.42 MDD (MAJOR DEPRESSIVE DISORDER), RECURRENT, IN FULL REMISSION (HCC): ICD-10-CM

## 2023-06-02 RX ORDER — TRAZODONE HYDROCHLORIDE 50 MG/1
50-100 TABLET ORAL NIGHTLY
Qty: 60 TABLET | Refills: 3 | Status: SHIPPED | OUTPATIENT
Start: 2023-06-02

## 2023-06-02 NOTE — TELEPHONE ENCOUNTER
Please Approve or Refuse.   Send to Pharmacy per Pt's Request:      Next Visit Date:  10/11/2023   Last Visit Date: 5/16/2023    Hemoglobin A1C (%)   Date Value   05/24/2023 5.4   05/16/2022 5.3   03/16/2020 5.4             ( goal A1C is < 7)   BP Readings from Last 3 Encounters:   05/24/23 136/66   05/16/23 122/80   05/10/23 139/80          (goal 120/80)  BUN   Date Value Ref Range Status   05/24/2023 15 8 - 23 mg/dL Final     Creatinine   Date Value Ref Range Status   05/24/2023 1.08 (H) 0.50 - 0.90 mg/dL Final     Potassium   Date Value Ref Range Status   05/24/2023 4.3 3.7 - 5.3 mmol/L Final

## 2023-06-06 DIAGNOSIS — I12.9 BENIGN HYPERTENSION WITH CKD (CHRONIC KIDNEY DISEASE) STAGE III (HCC): ICD-10-CM

## 2023-06-06 DIAGNOSIS — N18.30 BENIGN HYPERTENSION WITH CKD (CHRONIC KIDNEY DISEASE) STAGE III (HCC): ICD-10-CM

## 2023-06-06 RX ORDER — METOPROLOL SUCCINATE 25 MG/1
25 TABLET, EXTENDED RELEASE ORAL DAILY
Qty: 90 TABLET | Refills: 3 | Status: SHIPPED | OUTPATIENT
Start: 2023-06-06

## 2023-06-08 ENCOUNTER — OFFICE VISIT (OUTPATIENT)
Dept: GASTROENTEROLOGY | Age: 83
End: 2023-06-08
Payer: MEDICARE

## 2023-06-08 ENCOUNTER — HOSPITAL ENCOUNTER (OUTPATIENT)
Age: 83
Setting detail: SPECIMEN
Discharge: HOME | End: 2023-06-08

## 2023-06-08 VITALS
WEIGHT: 127 LBS | BODY MASS INDEX: 22.5 KG/M2 | DIASTOLIC BLOOD PRESSURE: 80 MMHG | HEART RATE: 72 BPM | OXYGEN SATURATION: 100 % | HEIGHT: 63 IN | RESPIRATION RATE: 16 BRPM | SYSTOLIC BLOOD PRESSURE: 153 MMHG

## 2023-06-08 DIAGNOSIS — R10.13 DYSPEPSIA: Primary | ICD-10-CM

## 2023-06-08 DIAGNOSIS — M81.8 IDIOPATHIC OSTEOPOROSIS: ICD-10-CM

## 2023-06-08 LAB
MAGNESIUM SERPL-MCNC: 2 MG/DL (ref 1.6–2.6)
PHOSPHATE SERPL-MCNC: 4.2 MG/DL (ref 2.6–4.5)

## 2023-06-08 PROCEDURE — 99213 OFFICE O/P EST LOW 20 MIN: CPT | Performed by: INTERNAL MEDICINE

## 2023-06-08 PROCEDURE — 1123F ACP DISCUSS/DSCN MKR DOCD: CPT | Performed by: INTERNAL MEDICINE

## 2023-06-08 PROCEDURE — 3079F DIAST BP 80-89 MM HG: CPT | Performed by: INTERNAL MEDICINE

## 2023-06-08 PROCEDURE — 3077F SYST BP >= 140 MM HG: CPT | Performed by: INTERNAL MEDICINE

## 2023-06-08 RX ORDER — OMEPRAZOLE 20 MG/1
20 TABLET, DELAYED RELEASE ORAL 2 TIMES DAILY
Qty: 60 TABLET | Refills: 3 | Status: SHIPPED | OUTPATIENT
Start: 2023-06-08

## 2023-06-08 RX ORDER — SUCRALFATE 1 G/1
1 TABLET ORAL 2 TIMES DAILY
Qty: 120 TABLET | Refills: 3 | Status: SHIPPED | OUTPATIENT
Start: 2023-06-08

## 2023-06-08 RX ORDER — WHEAT DEXTRIN 3 G/3.8 G
4 POWDER (GRAM) ORAL
Qty: 245 G | Refills: 0 | Status: SHIPPED | OUTPATIENT
Start: 2023-06-08

## 2023-06-08 ASSESSMENT — ENCOUNTER SYMPTOMS
VOMITING: 0
DIARRHEA: 1
SHORTNESS OF BREATH: 0
ABDOMINAL PAIN: 0
NAUSEA: 0
BLOOD IN STOOL: 0
CONSTIPATION: 1
ABDOMINAL DISTENTION: 0
BACK PAIN: 0
WHEEZING: 0
COUGH: 0
CHEST TIGHTNESS: 0

## 2023-06-08 NOTE — PROGRESS NOTES
/                                                                                                                GI FOLLOW UP    INTERVAL HISTORY:     Mild recurrent dyspepsia    Chief Complaint   Patient presents with    Follow-up     Dyspepsia EGD 12/20/222 revealed a gastric erosion and mild gastritis. Biopsies were negative for H. pylori       1. Dyspepsia          HISTORY OF PRESENT ILLNESS: Edil Mcallister is a 80 y.o. female with a past history remarkable for chronic abdominal pain, prior history of tubular adenomas on a colon polyp, COPD, depression, osteoarthritis, hypothyroidism, hyperparathyroid bone disease, status post parathyroidectomy, referred for evaluation of abdominal pain x1 year. Patient reports postprandial dyspepsia and abdominal discomfort. Patient is currently on Prilosec 40 mg daily. Modest relief with current gastric medication. Currently denies any significant NSAID use, no alcohol, no smoking. Denies any food aversion or secondary weight loss. Patient was identified to have diarrhea during questioning during this clinic visit. Loose watery, nonbloody per patient. Smoker: Quit 22 yrs ago   Drinking history: Nightly   Illicit drugs: None    Abdominal surgeries: appendectomy, CCY, tubal ligation. Prior Colonoscopy: Several years ago  Prior EGD: None recent  FH of GI issues: None reported    Past Medical,Family, and Social History reviewed and does contribute to the patient presenting condition. Patient's PMH/PSH,SH,PSYCH Hx, MEDs, ALLERGIES, and ROS were all reviewed and updated in the appropriate sections.     PAST MEDICAL HISTORY:  Past Medical History:   Diagnosis Date    Abdominal pain     Allergic rhinitis     Anxiety     Chronic back pain     Chronic fatigue     Chronic kidney disease     Chronic renal disease, stage III [facility]) [[id number]] 04/20/2022    Chronic respiratory failure ([facility] [address] 08/11/2020    Colon polyp 03/07/2012    TUBULAR ADENOMA    COPD (chronic

## 2023-06-21 ENCOUNTER — HOSPITAL ENCOUNTER (OUTPATIENT)
Dept: WOMENS IMAGING | Age: 83
Discharge: HOME | End: 2023-06-23
Payer: MEDICARE

## 2023-06-21 DIAGNOSIS — Z12.31 ENCOUNTER FOR SCREENING MAMMOGRAM FOR BREAST CANCER: ICD-10-CM

## 2023-06-21 PROCEDURE — 77063 BREAST TOMOSYNTHESIS BI: CPT

## 2023-07-19 DIAGNOSIS — E55.9 VITAMIN D DEFICIENCY: Primary | ICD-10-CM

## 2023-08-02 ENCOUNTER — HOSPITAL ENCOUNTER (OUTPATIENT)
Age: 83
Discharge: HOME | End: 2023-08-02
Payer: MEDICARE

## 2023-08-02 DIAGNOSIS — E55.9 VITAMIN D DEFICIENCY: ICD-10-CM

## 2023-08-02 LAB
ALBUMIN SERPL-MCNC: 4.3 G/DL (ref 3.5–5.2)
ALP SERPL-CCNC: 67 U/L (ref 35–104)
ALT SERPL-CCNC: 10 U/L (ref 5–33)
ANION GAP SERPL CALCULATED.3IONS-SCNC: 12 MMOL/L (ref 9–17)
AST SERPL-CCNC: 15 U/L
BASOPHILS # BLD: 0 K/UL (ref 0–0.2)
BASOPHILS NFR BLD: 1 % (ref 0–2)
BILIRUB SERPL-MCNC: 0.7 MG/DL (ref 0.3–1.2)
BUN SERPL-MCNC: 11 MG/DL (ref 8–23)
CALCIUM SERPL-MCNC: 9.1 MG/DL (ref 8.6–10.4)
CHLORIDE SERPL-SCNC: 100 MMOL/L (ref 98–107)
CO2 SERPL-SCNC: 27 MMOL/L (ref 20–31)
CREAT SERPL-MCNC: 0.9 MG/DL (ref 0.5–0.9)
CREAT UR-MCNC: 24 MG/DL (ref 28–217)
EOSINOPHIL # BLD: 0 K/UL (ref 0–0.4)
EOSINOPHILS RELATIVE PERCENT: 1 % (ref 0–4)
ERYTHROCYTE [DISTWIDTH] IN BLOOD BY AUTOMATED COUNT: 13.9 % (ref 11.5–14.9)
FREE KAPPA/LAMBDA RATIO: 3.13 (ref 0.26–1.65)
GFR SERPL CREATININE-BSD FRML MDRD: >60 ML/MIN/1.73M2
GLUCOSE SERPL-MCNC: 115 MG/DL (ref 70–99)
HCT VFR BLD AUTO: 44.1 % (ref 36–46)
HGB BLD-MCNC: 14.7 G/DL (ref 12–16)
IGA SERPL-MCNC: 96 MG/DL (ref 70–400)
IGG SERPL-MCNC: 626 MG/DL (ref 700–1600)
IGM SERPL-MCNC: 307 MG/DL (ref 40–230)
KAPPA LC FREE SER-MCNC: 41.6 MG/L (ref 3.7–19.4)
LAMBDA LC FREE SERPL-MCNC: 13.3 MG/L (ref 5.7–26.3)
LYMPHOCYTES NFR BLD: 1.4 K/UL (ref 1–4.8)
LYMPHOCYTES RELATIVE PERCENT: 25 % (ref 24–44)
MCH RBC QN AUTO: 30.9 PG (ref 26–34)
MCHC RBC AUTO-ENTMCNC: 33.2 G/DL (ref 31–37)
MCV RBC AUTO: 93.2 FL (ref 80–100)
MONOCYTES NFR BLD: 0.5 K/UL (ref 0.1–1.3)
MONOCYTES NFR BLD: 9 % (ref 1–7)
NEUTROPHILS NFR BLD: 64 % (ref 36–66)
NEUTS SEG NFR BLD: 3.7 K/UL (ref 1.3–9.1)
PLATELET # BLD AUTO: 194 K/UL (ref 150–450)
PMV BLD AUTO: 7.3 FL (ref 6–12)
POTASSIUM SERPL-SCNC: 3.7 MMOL/L (ref 3.7–5.3)
PROT SERPL-MCNC: 6.7 G/DL (ref 6.4–8.3)
RBC # BLD AUTO: 4.74 M/UL (ref 4–5.2)
SODIUM SERPL-SCNC: 139 MMOL/L (ref 135–144)
TOTAL PROTEIN, URINE: <4 MG/DL
URINE TOTAL PROTEIN CREATININE RATIO: ABNORMAL (ref 0–0.2)
WBC OTHER # BLD: 5.7 K/UL (ref 3.5–11)

## 2023-08-02 PROCEDURE — 84156 ASSAY OF PROTEIN URINE: CPT

## 2023-08-02 PROCEDURE — 84155 ASSAY OF PROTEIN SERUM: CPT

## 2023-08-02 PROCEDURE — 82570 ASSAY OF URINE CREATININE: CPT

## 2023-08-02 PROCEDURE — 85025 COMPLETE CBC W/AUTO DIFF WBC: CPT

## 2023-08-02 PROCEDURE — 80053 COMPREHEN METABOLIC PANEL: CPT

## 2023-08-02 PROCEDURE — 84165 PROTEIN E-PHORESIS SERUM: CPT

## 2023-08-02 PROCEDURE — 82784 ASSAY IGA/IGD/IGG/IGM EACH: CPT

## 2023-08-02 PROCEDURE — 83521 IG LIGHT CHAINS FREE EACH: CPT

## 2023-08-02 PROCEDURE — 36415 COLL VENOUS BLD VENIPUNCTURE: CPT

## 2023-08-04 LAB
ALBUMIN PERCENT: 64 % (ref 45–65)
ALBUMIN SERPL-MCNC: 4.2 G/DL (ref 3.2–5.2)
ALPHA 2 PERCENT: 11 % (ref 6–13)
ALPHA1 GLOB SERPL ELPH-MCNC: 0.2 G/DL (ref 0.1–0.4)
ALPHA1 GLOB SERPL ELPH-MCNC: 3 % (ref 3–6)
ALPHA2 GLOB SERPL ELPH-MCNC: 0.7 G/DL (ref 0.5–0.9)
B-GLOBULIN SERPL ELPH-MCNC: 0.7 G/DL (ref 0.5–1.1)
B-GLOBULIN SERPL ELPH-MCNC: 10 % (ref 11–19)
GAMMA GLOB SERPL ELPH-MCNC: 0.7 G/DL (ref 0.5–1.5)
GAMMA GLOBULIN %: 11 % (ref 9–20)
PATHOLOGIST: ABNORMAL
PROT PATTERN SERPL ELPH-IMP: ABNORMAL
PROT SERPL-MCNC: 6.5 G/DL (ref 6.4–8.3)
TOTAL PROT. SUM,%: 99 % (ref 98–102)
TOTAL PROT. SUM: 6.5 G/DL (ref 6.3–8.2)

## 2023-08-11 ENCOUNTER — OFFICE VISIT (OUTPATIENT)
Dept: ONCOLOGY | Age: 83
End: 2023-08-11
Payer: MEDICARE

## 2023-08-11 ENCOUNTER — TELEPHONE (OUTPATIENT)
Dept: ONCOLOGY | Age: 83
End: 2023-08-11

## 2023-08-11 VITALS
TEMPERATURE: 96.8 F | SYSTOLIC BLOOD PRESSURE: 168 MMHG | HEART RATE: 75 BPM | BODY MASS INDEX: 22.28 KG/M2 | WEIGHT: 125.8 LBS | DIASTOLIC BLOOD PRESSURE: 83 MMHG

## 2023-08-11 DIAGNOSIS — N18.32 STAGE 3B CHRONIC KIDNEY DISEASE (HCC): Primary | ICD-10-CM

## 2023-08-11 DIAGNOSIS — E53.8 VITAMIN B 12 DEFICIENCY: ICD-10-CM

## 2023-08-11 DIAGNOSIS — D47.2 MGUS (MONOCLONAL GAMMOPATHY OF UNKNOWN SIGNIFICANCE): ICD-10-CM

## 2023-08-11 PROCEDURE — 3077F SYST BP >= 140 MM HG: CPT | Performed by: INTERNAL MEDICINE

## 2023-08-11 PROCEDURE — 3079F DIAST BP 80-89 MM HG: CPT | Performed by: INTERNAL MEDICINE

## 2023-08-11 PROCEDURE — 1123F ACP DISCUSS/DSCN MKR DOCD: CPT | Performed by: INTERNAL MEDICINE

## 2023-08-11 PROCEDURE — 99214 OFFICE O/P EST MOD 30 MIN: CPT | Performed by: INTERNAL MEDICINE

## 2023-08-11 NOTE — PROGRESS NOTES
Patient ID: Chaparrita Goldsmith, 1940, [id number], 80 y.o. Referred by :  No ref. provider found   Reason for consultation: Biclonal MGUS      HISTORY OF PRESENT ILLNESS:    Oncologic History:    Chaparrita Goldsmith is a very pleasant 80 y.o. female.   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 and subsequently had electrophoresis also did have a chronic kidney disease and there was 2 zones of restriction confirmed by immunofixation to be monoclonal IgM kappa at 0.12 g/dL and monoclonal IgG kappa at 0.02 g/dL,, CBC in the normal limit  Patient had stage IIIa chronic kidney disease and recent GFR slightly worse she also had neuropathy and emphysema, and ALIYAH positive antiscleroderma  IgM elevated and ratio kappa to lambda free light chain mildly elevated      Interim history  Patient had no new symptoms  Recently had an upper respiratory infection  Myeloma staging is stable    Past Medical History:   Diagnosis Date    Abdominal pain     Allergic rhinitis     Anxiety     Chronic back pain     Chronic fatigue     Chronic kidney disease     Chronic renal disease, stage III [facility]) [[id number]] 04/20/2022    Chronic respiratory failure ([address]) 08/11/2020    Colon polyp 03/07/2012    TUBULAR ADENOMA    COPD (chronic obstructive pulmonary disease) (HCC)     COPD, 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     COVID 10/2022    mild case states treated with Paxlovid    Depression     Diverticul disease small and large intestine, no perforati or abscess     Elevated blood pressure reading     Emphysema of lung ([address])     Fall 01/12/2017    GERD (gastroesophageal reflux disease)     Glaucoma     Hyperlipidemia     with target LDL less than 100    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[address])     Hyperthyroidism 2012    Insomnia     Psychophysiological    Neuropathy     Orthostatic dizziness 03/12/2017    Osteoarthritis     Panlobular emphysema ([address])     PVC (premature ventricular contraction) 01/12/2017    S/P parathyroidectomy ([address])

## 2023-08-11 NOTE — TELEPHONE ENCOUNTER
AVS FROM 8/11/23    Rtc in 6 months with lab            Labs ordered today    Protein Electrophoresis, Urine  Please complete by 8/11/2023  CBC with Auto Differential  Please complete by or around 8/18/2023  Comprehensive Metabolic Panel  Please complete by or around 8/18/2023  Electrophoresis Protein, Serum  Please complete by or around 8/18/2023  Immunoglobulin Panel (IgG, IgA, IgM)  Please complete by or around 8/18/2023  Kappa/Lambda Quantitative Free Light Chains, Serum  Please complete by or around 8/18/2023      RV ON 2/13/24    PT was given AVS and appt schedule    Electronically signed by Oswald Gonzalez on 8/11/2023 at 1:12 PM

## 2023-09-12 ENCOUNTER — OFFICE VISIT (OUTPATIENT)
Dept: GASTROENTEROLOGY | Age: 83
End: 2023-09-12
Payer: MEDICARE

## 2023-09-12 VITALS
TEMPERATURE: 97.2 F | SYSTOLIC BLOOD PRESSURE: 130 MMHG | HEART RATE: 68 BPM | HEIGHT: 63 IN | WEIGHT: 127.8 LBS | DIASTOLIC BLOOD PRESSURE: 82 MMHG | OXYGEN SATURATION: 96 % | BODY MASS INDEX: 22.64 KG/M2

## 2023-09-12 DIAGNOSIS — R10.13 DYSPEPSIA: Primary | ICD-10-CM

## 2023-09-12 PROCEDURE — 3079F DIAST BP 80-89 MM HG: CPT | Performed by: INTERNAL MEDICINE

## 2023-09-12 PROCEDURE — 99213 OFFICE O/P EST LOW 20 MIN: CPT | Performed by: INTERNAL MEDICINE

## 2023-09-12 PROCEDURE — 1123F ACP DISCUSS/DSCN MKR DOCD: CPT | Performed by: INTERNAL MEDICINE

## 2023-09-12 PROCEDURE — 3075F SYST BP GE 130 - 139MM HG: CPT | Performed by: INTERNAL MEDICINE

## 2023-09-12 ASSESSMENT — ENCOUNTER SYMPTOMS
BLOOD IN STOOL: 0
BACK PAIN: 0
DIARRHEA: 1
ABDOMINAL DISTENTION: 0
NAUSEA: 0
COUGH: 0
CONSTIPATION: 1
WHEEZING: 0
ABDOMINAL PAIN: 0
SHORTNESS OF BREATH: 0
CHEST TIGHTNESS: 0
VOMITING: 0

## 2023-09-12 NOTE — PROGRESS NOTES
03/07/2012    TUBULAR ADENOMA    COPD (chronic obstructive pulmonary disease) (HCC)     COPD, 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     COVID 10/2022    mild case states treated with Paxlovid    Depression     Diverticul disease small and large intestine, no perforati or abscess     Elevated blood pressure reading     Emphysema of lung ([address])     Fall 01/12/2017    GERD (gastroesophageal reflux disease)     Glaucoma     Hyperlipidemia     with target LDL less than 100    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[address])     Hyperthyroidism 2012    Insomnia     Psychophysiological    Neuropathy     Orthostatic dizziness 03/12/2017    Osteoarthritis     Panlobular emphysema ([address])     PVC (premature ventricular contraction) 01/12/2017    S/P parathyroidectomy ([address])     Tubal pregnancy [id number]    Vasovagal syncope 01/12/2017       Past Surgical History:   Procedure Laterality Date    APPENDECTOMY      BACK SURGERY      CATARACT REMOVAL WITH IMPLANT Left [facility]01/2014    [facility]    CHOLECYSTECTOMY  2005    COLONOSCOPY      COLONOSCOPY  06/13/2014    Severe sigmoid diverticulosis; due for repeat June 2019    ENDOSCOPY, COLON, DIAGNOSTIC      ESOPHAGOGASTRODUODENOSCOPY  12/20/2022    EYE SURGERY      PARATHYROIDECTOMY  11/05/2018    Dr. Manisha Roberts; Left inferior parathyroid: adenoma    ROTATOR CUFF REPAIR Left     TONSILLECTOMY      TUBAL LIGATION      UPPER GASTROINTESTINAL ENDOSCOPY  06/13/2014    biopsy    UPPER GASTROINTESTINAL ENDOSCOPY  01/26/2016    UPPER GASTROINTESTINAL ENDOSCOPY  05/04/2016    mild gastritis    UPPER GASTROINTESTINAL ENDOSCOPY N/A 12/20/2022    EGD BIOPSY performed by Sylvester Fajardo MD at [facility] [address]:    Current Outpatient Medications:     sucralfate (CARAFATE) 1 GM tablet, Take 1 tablet by mouth in the morning and at bedtime, Disp: 120 tablet, Rfl: 3    omeprazole (PRILOSEC OTC) 20 MG tablet, Take 1 tablet by mouth in the morning and at bedtime, Disp:

## 2023-10-02 DIAGNOSIS — F33.42 MDD (MAJOR DEPRESSIVE DISORDER), RECURRENT, IN FULL REMISSION (HCC): ICD-10-CM

## 2023-10-02 DIAGNOSIS — F51.04 PSYCHOPHYSIOLOGICAL INSOMNIA: ICD-10-CM

## 2023-10-02 RX ORDER — TRAZODONE HYDROCHLORIDE 50 MG/1
TABLET ORAL
Qty: 60 TABLET | Refills: 11 | Status: SHIPPED | OUTPATIENT
Start: 2023-10-02

## 2023-10-07 ENCOUNTER — HOSPITAL ENCOUNTER (EMERGENCY)
Age: 83
Discharge: HOME | End: 2023-10-07
Attending: EMERGENCY MEDICINE
Payer: MEDICARE

## 2023-10-07 VITALS
DIASTOLIC BLOOD PRESSURE: 80 MMHG | RESPIRATION RATE: 16 BRPM | OXYGEN SATURATION: 96 % | HEART RATE: 78 BPM | HEIGHT: 63 IN | SYSTOLIC BLOOD PRESSURE: 160 MMHG | BODY MASS INDEX: 21.97 KG/M2 | WEIGHT: 124 LBS | TEMPERATURE: 97.7 F

## 2023-10-07 DIAGNOSIS — N39.0 URINARY TRACT INFECTION WITH HEMATURIA, SITE UNSPECIFIED: Primary | ICD-10-CM

## 2023-10-07 DIAGNOSIS — R31.9 URINARY TRACT INFECTION WITH HEMATURIA, SITE UNSPECIFIED: Primary | ICD-10-CM

## 2023-10-07 LAB
BACTERIA URNS QL MICRO: ABNORMAL
BILIRUB UR QL STRIP: NEGATIVE
CASTS #/AREA URNS LPF: ABNORMAL /LPF
CLARITY UR: ABNORMAL
COLOR UR: YELLOW
EPI CELLS #/AREA URNS HPF: ABNORMAL /HPF
GLUCOSE UR STRIP-MCNC: NEGATIVE MG/DL
HGB UR QL STRIP.AUTO: ABNORMAL
KETONES UR STRIP-MCNC: NEGATIVE MG/DL
LEUKOCYTE ESTERASE UR QL STRIP: ABNORMAL
NITRITE UR QL STRIP: NEGATIVE
PH UR STRIP: 6.5 [PH] (ref 5–8)
PROT UR STRIP-MCNC: ABNORMAL MG/DL
RBC #/AREA URNS HPF: ABNORMAL /HPF
SP GR UR STRIP: 1.01 (ref 1–1.03)
UROBILINOGEN UR STRIP-ACNC: NORMAL EU/DL (ref 0–1)
WBC #/AREA URNS HPF: ABNORMAL /HPF

## 2023-10-07 PROCEDURE — 87086 URINE CULTURE/COLONY COUNT: CPT

## 2023-10-07 PROCEDURE — 6370000000 HC RX 637 (ALT 250 FOR IP): Performed by: EMERGENCY MEDICINE

## 2023-10-07 PROCEDURE — 99283 EMERGENCY DEPT VISIT LOW MDM: CPT

## 2023-10-07 PROCEDURE — 87186 SC STD MICRODIL/AGAR DIL: CPT

## 2023-10-07 PROCEDURE — 87077 CULTURE AEROBIC IDENTIFY: CPT

## 2023-10-07 PROCEDURE — 81001 URINALYSIS AUTO W/SCOPE: CPT

## 2023-10-07 RX ORDER — CEPHALEXIN 250 MG/1
500 CAPSULE ORAL ONCE
Status: COMPLETED | OUTPATIENT
Start: 2023-10-07 | End: 2023-10-07

## 2023-10-07 RX ORDER — PHENAZOPYRIDINE HYDROCHLORIDE 200 MG/1
200 TABLET, FILM COATED ORAL 3 TIMES DAILY PRN
Qty: 6 TABLET | Refills: 0 | Status: SHIPPED | OUTPATIENT
Start: 2023-10-07 | End: 2023-10-09

## 2023-10-07 RX ORDER — CEPHALEXIN 500 MG/1
500 CAPSULE ORAL 2 TIMES DAILY
Qty: 14 CAPSULE | Refills: 0 | Status: SHIPPED | OUTPATIENT
Start: 2023-10-07 | End: 2023-10-14

## 2023-10-07 RX ORDER — PHENAZOPYRIDINE HYDROCHLORIDE 200 MG/1
200 TABLET, FILM COATED ORAL ONCE
Status: COMPLETED | OUTPATIENT
Start: 2023-10-07 | End: 2023-10-07

## 2023-10-07 RX ADMIN — CEPHALEXIN 500 MG: 250 CAPSULE ORAL at 16:54

## 2023-10-07 RX ADMIN — PHENAZOPYRIDINE 200 MG: 200 TABLET ORAL at 16:54

## 2023-10-07 ASSESSMENT — ENCOUNTER SYMPTOMS
SHORTNESS OF BREATH: 0
NAUSEA: 0
VOMITING: 0
ABDOMINAL PAIN: 1

## 2023-10-07 ASSESSMENT — LIFESTYLE VARIABLES
HOW MANY STANDARD DRINKS CONTAINING ALCOHOL DO YOU HAVE ON A TYPICAL DAY: 1 OR 2
HOW OFTEN DO YOU HAVE A DRINK CONTAINING ALCOHOL: 4 OR MORE TIMES A WEEK

## 2023-10-07 ASSESSMENT — PAIN DESCRIPTION - DESCRIPTORS: DESCRIPTORS: BURNING

## 2023-10-07 ASSESSMENT — PAIN - FUNCTIONAL ASSESSMENT: PAIN_FUNCTIONAL_ASSESSMENT: 0-10

## 2023-10-07 ASSESSMENT — PAIN SCALES - GENERAL: PAINLEVEL_OUTOF10: 8

## 2023-10-07 NOTE — ED TRIAGE NOTES
Mode of arrival (squad #, walk in, police, etc) : walk-in         Chief complaint(s): urinary frequency and dysuria        Arrival Note (brief scenario, treatment PTA, etc). : patient states the above symptoms for a few days, with mild back pain and chills         C= \"Have you ever felt that you should Cut down on your drinking? \"  No  A= \"Have people Annoyed you by criticizing your drinking? \"  No  G= \"Have you ever felt bad or Guilty about your drinking? \"  No  E= \"Have you ever had a drink as an Eye-opener first thing in the morning to steady your nerves or to help a hangover? \"  No      Deferred []      Reason for deferring: N/A    *If yes to two or more: probable alcohol abuse. *

## 2023-10-07 NOTE — ED PROVIDER NOTES
[address] [facility] ED  Emergency Department Encounter  Emergency Medicine Resident     Pt Villa Tamayo  MRN: [id number]  [address] 1940  Date of evaluation: 10/7/23  PCP:  Comfort Neves MD  Note Started: 4:02 PM EDT      CHIEF COMPLAINT       Chief Complaint   Patient presents with    Urinary Frequency    Dysuria       HISTORY OF PRESENT ILLNESS  (Location/Symptom, Timing/Onset, Context/Setting, Quality, Duration, Modifying Factors, Severity.)      Sunita Martini is a 80 y.o. female who presents with concern for urinary tract infection. Patient stated that starting yesterday she has noticed dysuria, urinary frequency, and today she noticed some blood in her urine. Patient states that the symptoms feel like a UTI. Patient states that she has had UTIs in the past however not that frequently. Denies any underlying urological abnormalities. Patient complains of some mild suprapubic tenderness. Patient denies any fevers, chest pain, shortness of breath, vomiting. PAST MEDICAL / SURGICAL / SOCIAL / FAMILY HISTORY      has a past medical history of Abdominal pain, Allergic rhinitis, Anxiety, Chronic back pain, Chronic fatigue, Chronic kidney disease, Chronic renal disease, stage III (HCC) [041647], Chronic respiratory failure (HCC), Colon polyp, COPD (chronic obstructive pulmonary disease) ([address]), COPD, 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, COVID, Depression, Diverticul disease small and large intestine, no perforati or abscess, Elevated blood pressure reading, Emphysema of lung ([address]), Fall, GERD (gastroesophageal reflux disease), Glaucoma, Hyperlipidemia, 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[address]), Hyperthyroidism, Insomnia, Neuropathy, Orthostatic dizziness, Osteoarthritis, Panlobular emphysema (HCC), PVC (premature ventricular contraction), S/P parathyroidectomy ([address]), Tubal pregnancy, and Vasovagal syncope.     has a past surgical history that includes Appendectomy;  Tubal DISPOSITION / PLAN     DISPOSITION Decision To Discharge 10/07/2023 04:32:06 PM      PATIENT REFERRED TO:  Nino Boogie, [address] [address]  [phone]    Schedule an appointment as soon as possible for a visit       [facility] ED  [address] [id number] [phone]    As needed      DISCHARGE MEDICATIONS:  New Prescriptions    CEPHALEXIN (KEFLEX) 500 MG CAPSULE    Take 1 capsule by mouth 2 times daily for 7 days    PHENAZOPYRIDINE (PYRIDIUM) 200 MG TABLET    Take 1 tablet by mouth 3 times daily as needed for Pain (Painful urination)       Elton Mckoy MD  Emergency Medicine Resident    (Please note that portions of this note were completed with a voice recognition program.  Efforts were made to edit the dictations but occasionally words are mis-transcribed.)      Elton Mckoy MD  Resident  10/07/23 0609

## 2023-10-07 NOTE — DISCHARGE INSTRUCTIONS
You were seen here for painful urination, frequency in urination, and blood in your urine. You were started on a medication called Keflex, this is an antibiotic that will treat the urinary tract infection. You were also given a medication called Pyridium, this medication will help with the painful urination. Take these medications as prescribed. Call and schedule follow-up appointment with your primary care provider for soon as possible. Return to the emergency department immediately if you experience worsening symptoms, develop any new symptoms, or if you have any other concerns.

## 2023-10-08 LAB
MICROORGANISM SPEC CULT: ABNORMAL
SPECIMEN DESCRIPTION: ABNORMAL

## 2023-10-09 LAB
MICROORGANISM SPEC CULT: ABNORMAL
SPECIMEN DESCRIPTION: ABNORMAL

## 2023-10-10 SDOH — HEALTH STABILITY: PHYSICAL HEALTH: ON AVERAGE, HOW MANY MINUTES DO YOU ENGAGE IN EXERCISE AT THIS LEVEL?: 90 MIN

## 2023-10-10 SDOH — HEALTH STABILITY: PHYSICAL HEALTH: ON AVERAGE, HOW MANY DAYS PER WEEK DO YOU ENGAGE IN MODERATE TO STRENUOUS EXERCISE (LIKE A BRISK WALK)?: 4 DAYS

## 2023-10-10 ASSESSMENT — LIFESTYLE VARIABLES
HAVE YOU OR SOMEONE ELSE BEEN INJURED AS A RESULT OF YOUR DRINKING: NO
HOW OFTEN DURING THE LAST YEAR HAVE YOU FOUND THAT YOU WERE NOT ABLE TO STOP DRINKING ONCE YOU HAD STARTED: NEVER
HOW OFTEN DURING THE LAST YEAR HAVE YOU FAILED TO DO WHAT WAS NORMALLY EXPECTED FROM YOU BECAUSE OF DRINKING: NEVER
HOW OFTEN DO YOU HAVE SIX OR MORE DRINKS ON ONE OCCASION: 1
HOW OFTEN DO YOU HAVE A DRINK CONTAINING ALCOHOL: 4 OR MORE TIMES A WEEK
HAS A RELATIVE, FRIEND, DOCTOR, OR ANOTHER HEALTH PROFESSIONAL EXPRESSED CONCERN ABOUT YOUR DRINKING OR SUGGESTED YOU CUT DOWN: 4
HOW OFTEN DURING THE LAST YEAR HAVE YOU FOUND THAT YOU WERE NOT ABLE TO STOP DRINKING ONCE YOU HAD STARTED: 0
HAVE YOU OR SOMEONE ELSE BEEN INJURED AS A RESULT OF YOUR DRINKING: 0
HOW OFTEN DURING THE LAST YEAR HAVE YOU FAILED TO DO WHAT WAS NORMALLY EXPECTED FROM YOU BECAUSE OF DRINKING: 0
HOW MANY STANDARD DRINKS CONTAINING ALCOHOL DO YOU HAVE ON A TYPICAL DAY: 1 OR 2
HOW OFTEN DURING THE LAST YEAR HAVE YOU BEEN UNABLE TO REMEMBER WHAT HAPPENED THE NIGHT BEFORE BECAUSE YOU HAD BEEN DRINKING: 0
HAS A RELATIVE, FRIEND, DOCTOR, OR ANOTHER HEALTH PROFESSIONAL EXPRESSED CONCERN ABOUT YOUR DRINKING OR SUGGESTED YOU CUT DOWN: YES, DURING THE PAST YEAR
HOW OFTEN DO YOU HAVE A DRINK CONTAINING ALCOHOL: 5
HOW OFTEN DURING THE LAST YEAR HAVE YOU NEEDED AN ALCOHOLIC DRINK FIRST THING IN THE MORNING TO GET YOURSELF GOING AFTER A NIGHT OF HEAVY DRINKING: NEVER
HOW MANY STANDARD DRINKS CONTAINING ALCOHOL DO YOU HAVE ON A TYPICAL DAY: 1
HOW OFTEN DURING THE LAST YEAR HAVE YOU HAD A FEELING OF GUILT OR REMORSE AFTER DRINKING: 0
HOW OFTEN DURING THE LAST YEAR HAVE YOU NEEDED AN ALCOHOLIC DRINK FIRST THING IN THE MORNING TO GET YOURSELF GOING AFTER A NIGHT OF HEAVY DRINKING: 0
HOW OFTEN DURING THE LAST YEAR HAVE YOU BEEN UNABLE TO REMEMBER WHAT HAPPENED THE NIGHT BEFORE BECAUSE YOU HAD BEEN DRINKING: NEVER
HOW OFTEN DURING THE LAST YEAR HAVE YOU HAD A FEELING OF GUILT OR REMORSE AFTER DRINKING: NEVER

## 2023-10-10 ASSESSMENT — PATIENT HEALTH QUESTIONNAIRE - PHQ9
6. FEELING BAD ABOUT YOURSELF - OR THAT YOU ARE A FAILURE OR HAVE LET YOURSELF OR YOUR FAMILY DOWN: 0
SUM OF ALL RESPONSES TO PHQ QUESTIONS 1-9: 0
1. LITTLE INTEREST OR PLEASURE IN DOING THINGS: 0
10. IF YOU CHECKED OFF ANY PROBLEMS, HOW DIFFICULT HAVE THESE PROBLEMS MADE IT FOR YOU TO DO YOUR WORK, TAKE CARE OF THINGS AT HOME, OR GET ALONG WITH OTHER PEOPLE: 0
SUM OF ALL RESPONSES TO PHQ QUESTIONS 1-9: 0
7. TROUBLE CONCENTRATING ON THINGS, SUCH AS READING THE NEWSPAPER OR WATCHING TELEVISION: 0
3. TROUBLE FALLING OR STAYING ASLEEP: 0
2. FEELING DOWN, DEPRESSED OR HOPELESS: 0
4. FEELING TIRED OR HAVING LITTLE ENERGY: 0
5. POOR APPETITE OR OVEREATING: 0
SUM OF ALL RESPONSES TO PHQ9 QUESTIONS 1 & 2: 0
SUM OF ALL RESPONSES TO PHQ QUESTIONS 1-9: 0
SUM OF ALL RESPONSES TO PHQ QUESTIONS 1-9: 0
8. MOVING OR SPEAKING SO SLOWLY THAT OTHER PEOPLE COULD HAVE NOTICED. OR THE OPPOSITE, BEING SO FIGETY OR RESTLESS THAT YOU HAVE BEEN MOVING AROUND A LOT MORE THAN USUAL: 0
9. THOUGHTS THAT YOU WOULD BE BETTER OFF DEAD, OR OF HURTING YOURSELF: 0

## 2023-10-11 ENCOUNTER — OFFICE VISIT (OUTPATIENT)
Dept: FAMILY MEDICINE CLINIC | Age: 83
End: 2023-10-11
Payer: MEDICARE

## 2023-10-11 VITALS
DIASTOLIC BLOOD PRESSURE: 82 MMHG | WEIGHT: 125.4 LBS | OXYGEN SATURATION: 96 % | BODY MASS INDEX: 22.22 KG/M2 | HEART RATE: 72 BPM | SYSTOLIC BLOOD PRESSURE: 138 MMHG | TEMPERATURE: 97.6 F | HEIGHT: 63 IN

## 2023-10-11 DIAGNOSIS — I12.9 BENIGN HYPERTENSION WITH CKD (CHRONIC KIDNEY DISEASE) STAGE III (HCC): ICD-10-CM

## 2023-10-11 DIAGNOSIS — E89.2 S/P PARATHYROIDECTOMY (HCC): ICD-10-CM

## 2023-10-11 DIAGNOSIS — M25.562 CHRONIC PAIN OF LEFT KNEE: ICD-10-CM

## 2023-10-11 DIAGNOSIS — Z00.00 MEDICARE ANNUAL WELLNESS VISIT, SUBSEQUENT: Primary | ICD-10-CM

## 2023-10-11 DIAGNOSIS — N18.30 BENIGN HYPERTENSION WITH CKD (CHRONIC KIDNEY DISEASE) STAGE III (HCC): ICD-10-CM

## 2023-10-11 DIAGNOSIS — D47.2 MGUS (MONOCLONAL GAMMOPATHY OF UNKNOWN SIGNIFICANCE): ICD-10-CM

## 2023-10-11 DIAGNOSIS — Z23 ENCOUNTER FOR IMMUNIZATION: ICD-10-CM

## 2023-10-11 DIAGNOSIS — M81.0 AGE-RELATED OSTEOPOROSIS WITHOUT CURRENT PATHOLOGICAL FRACTURE: ICD-10-CM

## 2023-10-11 DIAGNOSIS — G89.29 CHRONIC PAIN OF LEFT KNEE: ICD-10-CM

## 2023-10-11 PROCEDURE — 1123F ACP DISCUSS/DSCN MKR DOCD: CPT | Performed by: FAMILY MEDICINE

## 2023-10-11 PROCEDURE — 99214 OFFICE O/P EST MOD 30 MIN: CPT | Performed by: FAMILY MEDICINE

## 2023-10-11 PROCEDURE — G0439 PPPS, SUBSEQ VISIT: HCPCS | Performed by: FAMILY MEDICINE

## 2023-10-11 PROCEDURE — 90694 VACC AIIV4 NO PRSRV 0.5ML IM: CPT | Performed by: FAMILY MEDICINE

## 2023-10-11 PROCEDURE — G0008 ADMIN INFLUENZA VIRUS VAC: HCPCS | Performed by: FAMILY MEDICINE

## 2023-10-11 RX ORDER — AZELASTINE HYDROCHLORIDE 137 UG/1
SPRAY, METERED NASAL
COMMUNITY
Start: 2023-07-10

## 2023-10-11 RX ORDER — OMEPRAZOLE 20 MG/1
CAPSULE, DELAYED RELEASE ORAL
COMMUNITY
Start: 2023-09-17 | End: 2023-10-17 | Stop reason: SDUPTHER

## 2023-10-11 SDOH — ECONOMIC STABILITY: INCOME INSECURITY: HOW HARD IS IT FOR YOU TO PAY FOR THE VERY BASICS LIKE FOOD, HOUSING, MEDICAL CARE, AND HEATING?: NOT HARD AT ALL

## 2023-10-11 SDOH — ECONOMIC STABILITY: FOOD INSECURITY: WITHIN THE PAST 12 MONTHS, THE FOOD YOU BOUGHT JUST DIDN'T LAST AND YOU DIDN'T HAVE MONEY TO GET MORE.: NEVER TRUE

## 2023-10-11 SDOH — ECONOMIC STABILITY: FOOD INSECURITY: WITHIN THE PAST 12 MONTHS, YOU WORRIED THAT YOUR FOOD WOULD RUN OUT BEFORE YOU GOT MONEY TO BUY MORE.: NEVER TRUE

## 2023-10-11 ASSESSMENT — ENCOUNTER SYMPTOMS
COUGH: 0
DIARRHEA: 0
ABDOMINAL PAIN: 0
WHEEZING: 0
CHEST TIGHTNESS: 0
CONSTIPATION: 0
VOMITING: 0
NAUSEA: 0
SHORTNESS OF BREATH: 0
ABDOMINAL DISTENTION: 0

## 2023-10-11 ASSESSMENT — VISUAL ACUITY
OS_CC: 20/40
OD_CC: 20/40

## 2023-10-11 NOTE — PROGRESS NOTES
Visit Information    Have you changed or started any medications since your last visit including any over-the-counter medicines, vitamins, or herbal medicines? yes -    Have you stopped taking any of your medications? Is so, why? -  no  Are you having any side effects from any of your medications? - no    Have you seen any other physician or provider since your last visit? yes -    Have you had any other diagnostic tests since your last visit? yes -    Have you been seen in the emergency room and/or had an admission in a hospital since we last saw you?  yes -    Have you had your routine dental cleaning in the past 6 months?  yes -      Do you have an active MyChart account? If no, what is the barrier?   Yes    Patient Care Team:  Kristi Cordova MD as PCP - Shefali Lay MD as PCP - Empaneled Provider  Kaitlyn Castro MD as Surgeon (General Surgery)  Aron Bay MD as Surgeon (Ophthalmology)  Juana Amaro MD as Consulting Physician (Endocrinology)  Madhavi Rivera MD as Surgeon (Ophthalmology)  Mariangel Bond MD as Consulting Physician (Internal Medicine Cardiovascular Disease)  Veronica Pinto MD as Consulting Physician (Cardiology)  Gerardo Kelly, PhD (Sleep Medicine Family Practice)  Rubens John MD as Consulting Physician (Otolaryngology)  Cristina Newman MD as Consulting Physician (Cardiology)  Jodee Benitez MD as Consulting Physician (Pulmonology)  Glenn Desouza MD as Consulting Physician (Nephrology)  Damion Velazquez MD as Consulting Physician (Pulmonology)  Nelsy Wall MD as Consulting Physician (Gastroenterology)  Camilo Escobar MD (Hematology and Oncology)  Юлия Beasley DPM as Consulting Physician (Podiatry)  Gail James MD as Consulting Physician (Orthopedic Surgery)    Medical History Review  Past Medical, Family, and Social History reviewed and does contribute to the patient presenting condition    Health
05/24/2023    HDL 63 06/01/2021    HDL 66 03/16/2020     Lab Results   Component Value Date    LDLCHOLESTEROL 118 05/24/2023    LDLCHOLESTEROL 68 06/01/2021    LDLCHOLESTEROL 53 03/16/2020     Lab Results   Component Value Date    CHOLHDLRATIO 3.2 05/24/2023    CHOLHDLRATIO 2.4 06/01/2021    CHOLHDLRATIO 2.0 03/16/2020       Lab Results   Component Value Date    LABA1C 5.4 05/24/2023       Lab Results   Component Value Date    ZGIMNORZ81 403 06/07/2022       Lab Results   Component Value Date    FOLATE >20.0 06/07/2022       Lab Results   Component Value Date    VITD25 41.1 05/24/2023         Orders Placed This Encounter   Procedures    Influenza, FLUAD, (age 72 y+), IM, Preservative Free, 0.5 mL    PTH, Intact with Ionized Calcium     Standing Status:   Future     Standing Expiration Date:   85/8/9159    Basic Metabolic Panel     Standing Status:   Future     Standing Expiration Date:   10/3/2024    Magnesium     Standing Status:   Future     Standing Expiration Date:   10/3/2024    Phosphorus     Standing Status:   Future     Standing Expiration Date:   10/3/2024    Vitamin D 25 Hydroxy     Standing Status:   Future     Standing Expiration Date:   10/3/2024    Urinalysis with Reflex to Culture     Standing Status:   Future     Standing Expiration Date:   10/3/2024     Order Specific Question:   SPECIFY(EX-CATH,MIDSTREAM,CYSTO,ETC)? Answer:   Priscilla Chacon MD, Orthopaedic Surgery, [place]     Referral Priority:   Routine     Referral Type:   Eval and Treat     Referral Reason:   Specialty Services Required     Referred to Provider:   Vargas Brennan MD     Requested Specialty:   Orthopedic Surgery     Number of Visits Requested:   1    [place] OFFICE OUTPATIENT VISIT 25 MINUTES [25377]       No orders of the defined types were placed in this encounter.       Medications Discontinued During This Encounter   Medication Reason    BREZTRI AEROSPHERE 160-9-4.8 MCG/ACT AERO Therapy completed

## 2023-10-11 NOTE — PATIENT INSTRUCTIONS
[facility] [place] ([facility]). If you have questions about a medical condition or this instruction, always ask your healthcare professional. [address] any warranty or liability for your use of this information. Advance Directives: Care Instructions  Overview  An advance directive is a legal way to state your wishes at the end of your life. It tells your family and your doctor what to do if you can't say what you want. There are two main types of advance directives. You can change them any time your wishes change. Living will. This form tells your family and your doctor your wishes about life support and other treatment. The form is also called a declaration. Medical power of . This form lets you name a person to make treatment decisions for you when you can't speak for yourself. This person is called a health care agent (health care proxy, health care surrogate). The form is also called a durable power of  for health care. If you do not have an advance directive, decisions about your medical care may be made by a family member, or by a doctor or a  who doesn't know you. It may help to think of an advance directive as a gift to the people who care for you. If you have one, they won't have to make tough decisions by themselves. For more information, including forms for your state, see the [address] [place] [address] website (www.caringinfo.org/planning/advance-directives/). Follow-up care is a key part of your treatment and safety. Be sure to make and go to all appointments, and call your doctor if you are having problems. It's also a good idea to know your test results and keep a list of the medicines you take. What should you include in an advance directive? Many states have a unique advance directive form. (It may ask you to address specific issues.) Or you might use a universal form that's approved by many states.   If your form doesn't tell you what to address, it may be hard to know

## 2023-10-12 ENCOUNTER — HOSPITAL ENCOUNTER (OUTPATIENT)
Age: 83
Discharge: HOME | End: 2023-10-12
Payer: MEDICARE

## 2023-10-12 DIAGNOSIS — I12.9 BENIGN HYPERTENSION WITH CKD (CHRONIC KIDNEY DISEASE) STAGE III (HCC): ICD-10-CM

## 2023-10-12 DIAGNOSIS — E89.2 S/P PARATHYROIDECTOMY (HCC): ICD-10-CM

## 2023-10-12 DIAGNOSIS — N18.30 BENIGN HYPERTENSION WITH CKD (CHRONIC KIDNEY DISEASE) STAGE III (HCC): ICD-10-CM

## 2023-10-12 LAB
25(OH)D3 SERPL-MCNC: 41 NG/ML (ref 30–100)
ANION GAP SERPL CALCULATED.3IONS-SCNC: 10 MMOL/L (ref 9–17)
BILIRUB UR QL STRIP: NEGATIVE
BUN SERPL-MCNC: 13 MG/DL (ref 8–23)
CA-I BLD-SCNC: 1.27 MMOL/L (ref 1.13–1.33)
CALCIUM SERPL-MCNC: 9.5 MG/DL (ref 8.6–10.4)
CHLORIDE SERPL-SCNC: 104 MMOL/L (ref 98–107)
CLARITY UR: CLEAR
CO2 SERPL-SCNC: 28 MMOL/L (ref 20–31)
COLOR UR: YELLOW
COMMENT: NORMAL
CREAT SERPL-MCNC: 1.1 MG/DL (ref 0.5–0.9)
GFR SERPL CREATININE-BSD FRML MDRD: 50 ML/MIN/1.73M2
GLUCOSE SERPL-MCNC: 120 MG/DL (ref 70–99)
GLUCOSE UR STRIP-MCNC: NEGATIVE MG/DL
HGB UR QL STRIP.AUTO: NEGATIVE
KETONES UR STRIP-MCNC: NEGATIVE MG/DL
LEUKOCYTE ESTERASE UR QL STRIP: NEGATIVE
MAGNESIUM SERPL-MCNC: 1.9 MG/DL (ref 1.6–2.6)
NITRITE UR QL STRIP: NEGATIVE
PH UR STRIP: 6.5 [PH] (ref 5–8)
PHOSPHATE SERPL-MCNC: 3.4 MG/DL (ref 2.6–4.5)
POTASSIUM SERPL-SCNC: 4 MMOL/L (ref 3.7–5.3)
PROT UR STRIP-MCNC: NEGATIVE MG/DL
PTH-INTACT SERPL-MCNC: 49 PG/ML (ref 14–72)
SODIUM SERPL-SCNC: 142 MMOL/L (ref 135–144)
SP GR UR STRIP: 1.01 (ref 1–1.03)
UROBILINOGEN UR STRIP-ACNC: NORMAL EU/DL (ref 0–1)

## 2023-10-12 PROCEDURE — 36415 COLL VENOUS BLD VENIPUNCTURE: CPT

## 2023-10-12 PROCEDURE — 83735 ASSAY OF MAGNESIUM: CPT

## 2023-10-12 PROCEDURE — 84100 ASSAY OF PHOSPHORUS: CPT

## 2023-10-12 PROCEDURE — 82306 VITAMIN D 25 HYDROXY: CPT

## 2023-10-12 PROCEDURE — 81003 URINALYSIS AUTO W/O SCOPE: CPT

## 2023-10-12 PROCEDURE — 80048 BASIC METABOLIC PNL TOTAL CA: CPT

## 2023-10-12 PROCEDURE — 82330 ASSAY OF CALCIUM: CPT

## 2023-10-12 PROCEDURE — 83970 ASSAY OF PARATHORMONE: CPT

## 2023-10-13 NOTE — RESULT ENCOUNTER NOTE
Please notify patient: Blood glucose mildly high but not fasting, low-carb diet advised, increase proteins in diet  Chronic kidney disease stage III slightly worse than before, she does need to drink 8 x 8 ounce glasses of water every day    Otherwise labs within normal limits  continue current treatment    Future Appointments  2/13/2024  11:00 AM   Ayesha Alvarado MD        [facility]  2/14/2024  1:30 PM    Hosea Orr MD    [facility]  3/12/2024  2:00 PM    Vadim Madera MD          [facility]            Shan Pals  10/18/2024 1:00 PM    Hose[facility] Orr MD    [facility]

## 2023-10-17 ENCOUNTER — E-VISIT (OUTPATIENT)
Dept: FAMILY MEDICINE CLINIC | Age: 83
End: 2023-10-17
Payer: MEDICARE

## 2023-10-17 DIAGNOSIS — N39.0 URINARY TRACT INFECTION WITHOUT HEMATURIA, SITE UNSPECIFIED: Primary | ICD-10-CM

## 2023-10-17 PROBLEM — J34.89 RHINORRHEA: Status: RESOLVED | Noted: 2022-12-05 | Resolved: 2023-10-17

## 2023-10-17 PROBLEM — H93.8X2 EAR FULLNESS, LEFT: Status: RESOLVED | Noted: 2022-12-05 | Resolved: 2023-10-17

## 2023-10-17 PROBLEM — I10 PRIMARY HYPERTENSION: Status: RESOLVED | Noted: 2023-02-20 | Resolved: 2023-10-17

## 2023-10-17 PROBLEM — H92.02 OTALGIA, LEFT: Status: RESOLVED | Noted: 2022-12-05 | Resolved: 2023-10-17

## 2023-10-17 PROBLEM — R06.02 SHORTNESS OF BREATH: Status: RESOLVED | Noted: 2023-05-16 | Resolved: 2023-10-17

## 2023-10-17 PROBLEM — M81.0 AGE-RELATED OSTEOPOROSIS WITHOUT CURRENT PATHOLOGICAL FRACTURE: Status: ACTIVE | Noted: 2023-05-03

## 2023-10-17 PROBLEM — R03.0 ELEVATED BLOOD PRESSURE READING: Status: RESOLVED | Noted: 2018-07-28 | Resolved: 2023-10-17

## 2023-10-17 PROCEDURE — 99422 OL DIG E/M SVC 11-20 MIN: CPT | Performed by: FAMILY MEDICINE

## 2023-10-17 RX ORDER — NITROFURANTOIN 25; 75 MG/1; MG/1
100 CAPSULE ORAL 2 TIMES DAILY
Qty: 10 CAPSULE | Refills: 0 | Status: SHIPPED | OUTPATIENT
Start: 2023-10-17

## 2023-10-17 NOTE — PROGRESS NOTES
Lara Holt (1940) initiated an asynchronous digital communication through blabfeed. Date of service: 10/17/2023     HPI: per patient's questionnaire    EXAM: not applicable    Diagnoses and all orders for this visit:    1. Urinary tract infection without hematuria, site unspecified  Failing to resolve  - nitrofurantoin, macrocrystal-monohydrate, (MACROBID) 100 MG capsule; Take 1 capsule by mouth 2 times daily  Dispense: 10 capsule; Refill: 0      No orders of the defined types were placed in this encounter. Patient was advised to contact PCP if symptoms worsen or failing to change as expected      Time: EV2 - 11-20 minutes were spent on the digital evaluation and management of this patient.      Electronically signed by Elissa Hagen MD on 10/17/23 at 1:03 PM.

## 2023-11-06 ENCOUNTER — OFFICE VISIT (OUTPATIENT)
Dept: ORTHOPEDIC SURGERY | Age: 83
End: 2023-11-06
Payer: MEDICARE

## 2023-11-06 DIAGNOSIS — M25.562 LEFT KNEE PAIN, UNSPECIFIED CHRONICITY: Primary | ICD-10-CM

## 2023-11-06 PROCEDURE — 99203 OFFICE O/P NEW LOW 30 MIN: CPT | Performed by: ORTHOPAEDIC SURGERY

## 2023-11-06 PROCEDURE — 20610 DRAIN/INJ JOINT/BURSA W/O US: CPT | Performed by: ORTHOPAEDIC SURGERY

## 2023-11-06 PROCEDURE — 1123F ACP DISCUSS/DSCN MKR DOCD: CPT | Performed by: ORTHOPAEDIC SURGERY

## 2023-11-06 RX ORDER — BETAMETHASONE SODIUM PHOSPHATE AND BETAMETHASONE ACETATE 3; 3 MG/ML; MG/ML
12 INJECTION, SUSPENSION INTRA-ARTICULAR; INTRALESIONAL; INTRAMUSCULAR; SOFT TISSUE ONCE
Status: COMPLETED | OUTPATIENT
Start: 2023-11-06 | End: 2023-11-06

## 2023-11-06 RX ORDER — LIDOCAINE HYDROCHLORIDE 10 MG/ML
2 INJECTION, SOLUTION INFILTRATION; PERINEURAL ONCE
Status: COMPLETED | OUTPATIENT
Start: 2023-11-06 | End: 2023-11-06

## 2023-11-06 RX ORDER — BUPIVACAINE HYDROCHLORIDE 2.5 MG/ML
2 INJECTION, SOLUTION INFILTRATION; PERINEURAL ONCE
Status: COMPLETED | OUTPATIENT
Start: 2023-11-06 | End: 2023-11-06

## 2023-11-06 RX ADMIN — LIDOCAINE HYDROCHLORIDE 2 ML: 10 INJECTION, SOLUTION INFILTRATION; PERINEURAL at 10:46

## 2023-11-06 RX ADMIN — BUPIVACAINE HYDROCHLORIDE 5 MG: 2.5 INJECTION, SOLUTION INFILTRATION; PERINEURAL at 10:45

## 2023-11-06 RX ADMIN — BETAMETHASONE SODIUM PHOSPHATE AND BETAMETHASONE ACETATE 12 MG: 3; 3 INJECTION, SUSPENSION INTRA-ARTICULAR; INTRALESIONAL; INTRAMUSCULAR; SOFT TISSUE at 10:45

## 2023-11-06 NOTE — PROGRESS NOTES
Hannah Lau M.D.            [address] [facility], [address] [facility], [address] [place] [address]           Dept Phone: [phone]           Dept Fax:  [address]          Dept Phone: [phone]           Dept Fax:  [phone]      Chief Compliant:  Chief Complaint   Patient presents with    Pain     Lt knee        History of Present Illness: This is a 80 y.o. female who presents to the clinic today for evaluation / follow up of left knee pain. Patient states that she had knee pain for well over a year denies any injury or trauma. She states that she when she crosses her left leg on underneath her right bothers her bothers her at nighttime and in the day she does use Aspercreme on it. She does not describe any sharp stabbing catching pain she does not get any pain in her hip that she describes as she does describe a little bit of some radicular symptoms as well however. .       Review of Systems   Constitutional: Negative for fever, chills, sweats. Eyes: Negative for changes in vision, or pain. HENT: Negative for ear ache, epistaxis, or sore throat. Respiratory/Cardio: Negative for Chest pain, palpitations, SOB, or cough. Gastrointestinal: Negative for abdominal pain, N/V/D. Genitourinary: Negative for dysuria, frequency, urgency, or hematuria. Neurological: Negative for headache, numbness, or weakness. Integumentary: Negative for rash, itching, laceration, or abrasion. Musculoskeletal: Positive for Pain (Lt knee)       Physical Exam:  Constitutional: Patient is oriented to person, place, and time. Patient appears well-developed and well nourished. HENT: Negative otherwise noted  Head: Normocephalic and Atraumatic  Nose: Normal  Eyes: Conjunctivae and EOM are normal  Neck: Normal range of motion Neck supple.

## 2023-11-27 ENCOUNTER — CLINICAL DOCUMENTATION (OUTPATIENT)
Facility: HOSPITAL | Age: 83
End: 2023-11-27

## 2023-11-28 ENCOUNTER — HOSPITAL ENCOUNTER (OUTPATIENT)
Dept: INFUSION THERAPY | Age: 83
Setting detail: INFUSION SERIES
Discharge: HOME | End: 2023-11-28
Payer: MEDICARE

## 2023-11-28 VITALS
DIASTOLIC BLOOD PRESSURE: 84 MMHG | SYSTOLIC BLOOD PRESSURE: 126 MMHG | OXYGEN SATURATION: 97 % | HEART RATE: 75 BPM | RESPIRATION RATE: 17 BRPM | TEMPERATURE: 97 F

## 2023-11-28 DIAGNOSIS — M81.0 AGE-RELATED OSTEOPOROSIS WITHOUT CURRENT PATHOLOGICAL FRACTURE: Primary | ICD-10-CM

## 2023-11-28 LAB
CALCIUM SERPL-MCNC: 8.8 MG/DL (ref 8.6–10.4)
CREAT SERPL-MCNC: 1.1 MG/DL (ref 0.5–0.9)
GFR SERPL CREATININE-BSD FRML MDRD: 50 ML/MIN/1.73M2
MAGNESIUM SERPL-MCNC: 1.9 MG/DL (ref 1.6–2.6)
PHOSPHATE SERPL-MCNC: 3.1 MG/DL (ref 2.6–4.5)

## 2023-11-28 PROCEDURE — 82310 ASSAY OF CALCIUM: CPT

## 2023-11-28 PROCEDURE — 83735 ASSAY OF MAGNESIUM: CPT

## 2023-11-28 PROCEDURE — 36415 COLL VENOUS BLD VENIPUNCTURE: CPT

## 2023-11-28 PROCEDURE — 6360000002 HC RX W HCPCS: Performed by: INTERNAL MEDICINE

## 2023-11-28 PROCEDURE — 82565 ASSAY OF CREATININE: CPT

## 2023-11-28 PROCEDURE — 96372 THER/PROPH/DIAG INJ SC/IM: CPT

## 2023-11-28 PROCEDURE — 84100 ASSAY OF PHOSPHORUS: CPT

## 2023-11-28 RX ORDER — EPINEPHRINE 1 MG/ML
0.3 INJECTION, SOLUTION, CONCENTRATE INTRAVENOUS PRN
OUTPATIENT
Start: 2023-12-07

## 2023-11-28 RX ORDER — SODIUM CHLORIDE 9 MG/ML
INJECTION, SOLUTION INTRAVENOUS CONTINUOUS
OUTPATIENT
Start: 2023-12-07

## 2023-11-28 RX ORDER — ONDANSETRON 2 MG/ML
8 INJECTION INTRAMUSCULAR; INTRAVENOUS
OUTPATIENT
Start: 2023-12-07

## 2023-11-28 RX ORDER — DIPHENHYDRAMINE HYDROCHLORIDE 50 MG/ML
50 INJECTION INTRAMUSCULAR; INTRAVENOUS
OUTPATIENT
Start: 2023-12-07

## 2023-11-28 RX ORDER — ALBUTEROL SULFATE 90 UG/1
4 AEROSOL, METERED RESPIRATORY (INHALATION) PRN
OUTPATIENT
Start: 2023-12-07

## 2023-11-28 RX ORDER — ACETAMINOPHEN 325 MG/1
650 TABLET ORAL
OUTPATIENT
Start: 2023-12-07

## 2023-11-28 RX ADMIN — DENOSUMAB 60 MG: 60 INJECTION SUBCUTANEOUS at 13:29

## 2023-11-28 NOTE — PROGRESS NOTES
Pt arrived for Prolia injection. Vitals obtained and labs drawn. Labs within written parameters. Injection given in R arm. Pt tolerated well. No s/s adverse reaction noted. Pt discharged home, ambulatory per self with .

## 2023-12-14 ENCOUNTER — TELEPHONE (OUTPATIENT)
Dept: GASTROENTEROLOGY | Age: 83
End: 2023-12-14

## 2024-01-08 ENCOUNTER — OFFICE VISIT (OUTPATIENT)
Dept: FAMILY MEDICINE CLINIC | Age: 84
End: 2024-01-08
Payer: MEDICARE

## 2024-01-08 VITALS
TEMPERATURE: 98.2 F | DIASTOLIC BLOOD PRESSURE: 80 MMHG | SYSTOLIC BLOOD PRESSURE: 139 MMHG | OXYGEN SATURATION: 95 % | HEART RATE: 90 BPM

## 2024-01-08 DIAGNOSIS — J01.90 ACUTE BACTERIAL SINUSITIS: ICD-10-CM

## 2024-01-08 DIAGNOSIS — H66.002 NON-RECURRENT ACUTE SUPPURATIVE OTITIS MEDIA OF LEFT EAR WITHOUT SPONTANEOUS RUPTURE OF TYMPANIC MEMBRANE: Primary | ICD-10-CM

## 2024-01-08 DIAGNOSIS — B96.89 ACUTE BACTERIAL SINUSITIS: ICD-10-CM

## 2024-01-08 PROCEDURE — 99213 OFFICE O/P EST LOW 20 MIN: CPT | Performed by: NURSE PRACTITIONER

## 2024-01-08 PROCEDURE — 1123F ACP DISCUSS/DSCN MKR DOCD: CPT | Performed by: NURSE PRACTITIONER

## 2024-01-08 RX ORDER — AMOXICILLIN 875 MG/1
875 TABLET, COATED ORAL 2 TIMES DAILY
Qty: 20 TABLET | Refills: 0 | Status: SHIPPED | OUTPATIENT
Start: 2024-01-08 | End: 2024-01-18

## 2024-01-08 ASSESSMENT — ENCOUNTER SYMPTOMS
COUGH: 0
SORE THROAT: 0
HOARSE VOICE: 0
RESPIRATORY NEGATIVE: 1
SHORTNESS OF BREATH: 0
SINUS PRESSURE: 1
SWOLLEN GLANDS: 0

## 2024-01-08 NOTE — PROGRESS NOTES
[facility] PHYSICIANS [facility], [facility] WALK-IN FAMILY MEDICINE  [address]  [address]  [facility] [address]  Dept: [phone]  Dept Fax: [phone]    Ap Napier is a 83 y.o. female who presents to the urgent care today for her medical conditions/complaints as notedbelow.  Ap Napier is c/o of Sinusitis (Onset for a month with sinus pressure, nasal drainage, ear fullness and coughing. )      HPI:     83-year-old female patient presents for possible sinus infection.  She has had sinus pressure nasal drainage and ear fullness approximately 1 month.  All sx in sinuses, no cough or chest congestion, no sob or wheezing    Sinusitis  This is a new problem. The current episode started more than 1 month ago. The problem is unchanged. There has been no fever. The pain is mild. Associated symptoms include congestion, ear pain (left side) and sinus pressure. Pertinent negatives include no chills, coughing, diaphoresis, headaches, hoarse voice, neck pain, shortness of breath, sneezing, sore throat or swollen glands. Past treatments include nasal decongestants. The treatment provided no relief.       Past Medical History:   Diagnosis Date    Abdominal pain     Age-related osteoporosis without current pathological fracture 5/3/2023    Allergic rhinitis     Anxiety     Chronic back pain     Chronic fatigue     Chronic kidney disease     Chronic renal disease, stage III ([facility]) [[id number]] 04/20/2022    Chronic respiratory failure (HCC) 08/11/2020    Colon polyp 03/07/2012    TUBULAR ADENOMA    COPD (chronic obstructive pulmonary disease) (HCC)     COPD, Panlobular emphysema ([facility]) moderate to severe per PFTs     COVID 10/2022    mild case states treated with Paxlovid    Depression     Diverticul disease small and large intestine, no perforati or abscess     Elevated blood pressure reading     Emphysema of lung (HCC)     Fall 01/12/2017    Gastritis 5/22/2016    GERD (gastroesophageal

## 2024-01-15 ENCOUNTER — TELEPHONE (OUTPATIENT)
Dept: FAMILY MEDICINE CLINIC | Age: 84
End: 2024-01-15

## 2024-01-15 DIAGNOSIS — J44.1 COPD EXACERBATION (HCC): ICD-10-CM

## 2024-01-15 DIAGNOSIS — J43.1 PANLOBULAR EMPHYSEMA (HCC): ICD-10-CM

## 2024-01-15 DIAGNOSIS — J01.80 ACUTE NON-RECURRENT SINUSITIS OF OTHER SINUS: Primary | ICD-10-CM

## 2024-01-15 RX ORDER — PREDNISONE 10 MG/1
50 TABLET ORAL DAILY
Qty: 35 TABLET | Refills: 0 | Status: SHIPPED | OUTPATIENT
Start: 2024-01-15 | End: 2024-01-22

## 2024-01-15 RX ORDER — ALBUTEROL SULFATE 90 UG/1
2 AEROSOL, METERED RESPIRATORY (INHALATION) EVERY 6 HOURS PRN
Qty: 18 G | Refills: 3 | Status: SHIPPED | OUTPATIENT
Start: 2024-01-15

## 2024-01-15 RX ORDER — AZITHROMYCIN 250 MG/1
TABLET, FILM COATED ORAL
Qty: 6 TABLET | Refills: 0 | Status: SHIPPED | OUTPATIENT
Start: 2024-01-15 | End: 2024-01-20

## 2024-01-15 NOTE — TELEPHONE ENCOUNTER
Incoming call came from Patient, in regard to having onset current symptoms of WHEEZING, SHORTNESS OF BREATH EXCESSIVE RUNNING NOSE THAT IS NOT GETTING ANY BETTER, HAS 2 MORE DAYS LEFT OF ANTIBIOTIC. DENIED TO SCHEDULE AN APPT. STATED THAT SHE WILL LIKE TO KNOW WHAT  RECOMMENDS BEFORE SHE SCHEDULES SOMETHING. .   That have been ongoing for the past FEW days. Due to our availability of schedule, nothing is available today to offer telemedicine visit with provider.          I also did offer walk in clinic, urgent care. Patient verbalized  Yes.        Future Appointments   Date Time Provider Department Center   2/14/2024  1:30 PM [facility]   10/18/2024  1:00 PM [facility]            Please Approve or Refuse.  Send to Pharmacy per Pt's Request: [facility]     Next Visit Date:  2/14/2024   Last Visit Date: 10/17/2023    Hemoglobin A1C (%)   Date Value   05/24/2023 5.4   05/16/2022 5.3   03/16/2020 5.4             ( goal A1C is < 7)   BP Readings from Last 3 Encounters:   01/08/24 139/80   11/28/23 126/84   10/11/23 138/82          (goal 120/80)  BUN   Date Value Ref Range Status   10/12/2023 13 8 - 23 mg/dL Final     Creatinine   Date Value Ref Range Status   11/28/2023 1.1 (H) 0.5 - 0.9 mg/dL Final     Potassium   Date Value Ref Range Status   10/12/2023 4.0 3.7 - 5.3 mmol/L Final

## 2024-01-15 NOTE — TELEPHONE ENCOUNTER
Please let the patient know to  prescription from the pharmacy.    If worsening symptoms in few days or not getting better as expected needs to go to ED if worsening shortness of breath  Orders Placed This Encounter   Medications    predniSONE (DELTASONE) 10 MG tablet     Sig: Take 5 tablets by mouth daily for 7 days     Dispense:  35 tablet     Refill:  0    azithromycin (ZITHROMAX) 250 MG tablet     Si mg orally on day one followed by 250 mg daily on days two through five     Dispense:  6 tablet     Refill:  0    albuterol sulfate HFA (PROVENTIL;VENTOLIN;PROAIR) 108 (90 Base) MCG/ACT inhaler     Sig: Inhale 2 puffs into the lungs every 6 hours as needed for Wheezing or Shortness of Breath And when sick     Dispense:  18 g     Refill:  3         [id number] - [place], [place] - [address] [address] - P [phone] - F [phone]  [address] [address] [address]  Phone: [phone] Fax: [phone]    [facility] [place], [place] - [address] [place] [address] - P [phone] - F [phone]  [address] [place] [address]  Phone: [phone] Fax: [phone]      No orders of the defined types were placed in this encounter.      Future Appointments   Date Time Provider Department Center   2024  1:30 PM Harper Cooper MD [facility]   3/25/2024  1:30 PM [facility]   10/18/2024  1:00 PM [facility]       Thank you!

## 2024-01-15 NOTE — TELEPHONE ENCOUNTER
Do a COVID test.  When did the shortness of breath and wheezing start?  She did not have them at urgent care    She was given just amoxicillin on 1/8/2024 at urgent care    If she goes from 1 room to another does she get short of breath?    Is she using Trelegy daily, albuterol 2 puffs every 6 hours?    Current Outpatient Medications on File Prior to Visit   Medication Sig Dispense Refill    amoxicillin (AMOXIL) 875 MG tablet Take 1 tablet by mouth 2 times daily for 10 days 20 tablet 0    omeprazole (PRILOSEC) 20 MG delayed release capsule take 1 capsule by mouth every morning and at bedtime 60 capsule 3    nitrofurantoin, macrocrystal-monohydrate, (MACROBID) 100 MG capsule Take 1 capsule by mouth 2 times daily 10 capsule 0    Azelastine HCl 137 MCG/SPRAY SOLN instill 2 sprays into each nostril twice a day (MORNING AND BEFORE BEDTIME)      traZODone (DESYREL) 50 MG tablet take 1-2 tablets by mouth nightly (STOP ZALEPLON) 60 tablet 11    sucralfate (CARAFATE) 1 GM tablet Take 1 tablet by mouth in the morning and at bedtime 120 tablet 3    metoprolol succinate (TOPROL XL) 25 MG extended release tablet Take 1 tablet by mouth daily Goal BP bellow 130/80 and above 115/65, heart rate 56 to 90 bpm 90 tablet 3    TRELEGY ELLIPTA 100-62.5-25 MCG/ACT AEPB inhaler inhale 1 puff by mouth and INTO THE LUNGS once daily      amLODIPine (NORVASC) 2.5 MG tablet Take 1 tablet by mouth every morning      albuterol sulfate HFA (PROVENTIL;VENTOLIN;PROAIR) 108 (90 Base) MCG/ACT inhaler Inhale 2 puffs into the lungs every 6 hours as needed for Wheezing or Shortness of Breath And when sick 18 g 3    dorzolamide-timolol (COSOPT) 22.3-6.8 MG/ML ophthalmic solution Place 1 drop into both eyes 2 times daily      ALPHAGAN P 0.1 % SOLN Place 1 drop into both eyes 3 times daily      latanoprost (XALATAN) 0.005 % ophthalmic solution Place 1 drop into both eyes nightly       No current facility-administered medications on file prior to visit.

## 2024-01-15 NOTE — TELEPHONE ENCOUNTER
SPOKE WITH PATIENT. STATED SHE HAS SHORTNESS OF BREATH DUE TO HER C.O.P.D. STATED NOT HAVING THIS. SHE IS HAVING NASAL DRAINAGE THE PAST MONTH.     STATED YES TAKING AMOXICILLIN/ NO TO S.O.B FROM ROOM TO ROOM DOING FINE.     STATED NO ONLY TAKING 1 PUFF OF TRELLEGY ONCE DAILY

## 2024-03-18 ENCOUNTER — OFFICE VISIT (OUTPATIENT)
Dept: FAMILY MEDICINE CLINIC | Age: 84
End: 2024-03-18
Payer: MEDICARE

## 2024-03-18 VITALS
HEIGHT: 63 IN | BODY MASS INDEX: 21.83 KG/M2 | DIASTOLIC BLOOD PRESSURE: 84 MMHG | OXYGEN SATURATION: 98 % | SYSTOLIC BLOOD PRESSURE: 128 MMHG | HEART RATE: 69 BPM | WEIGHT: 123.2 LBS

## 2024-03-18 DIAGNOSIS — M54.41 RIGHT-SIDED LOW BACK PAIN WITH RIGHT-SIDED SCIATICA, UNSPECIFIED CHRONICITY: ICD-10-CM

## 2024-03-18 DIAGNOSIS — E78.5 HYPERLIPIDEMIA WITH TARGET LDL LESS THAN 100: ICD-10-CM

## 2024-03-18 DIAGNOSIS — E55.9 VITAMIN D DEFICIENCY: ICD-10-CM

## 2024-03-18 DIAGNOSIS — R53.82 CHRONIC FATIGUE: ICD-10-CM

## 2024-03-18 DIAGNOSIS — I12.9 BENIGN HYPERTENSION WITH CKD (CHRONIC KIDNEY DISEASE) STAGE III (HCC): Primary | ICD-10-CM

## 2024-03-18 DIAGNOSIS — E53.8 VITAMIN B 12 DEFICIENCY: ICD-10-CM

## 2024-03-18 DIAGNOSIS — R73.9 HYPERGLYCEMIA: ICD-10-CM

## 2024-03-18 DIAGNOSIS — N18.30 BENIGN HYPERTENSION WITH CKD (CHRONIC KIDNEY DISEASE) STAGE III (HCC): Primary | ICD-10-CM

## 2024-03-18 PROCEDURE — 99215 OFFICE O/P EST HI 40 MIN: CPT | Performed by: NURSE PRACTITIONER

## 2024-03-18 PROCEDURE — 1123F ACP DISCUSS/DSCN MKR DOCD: CPT | Performed by: NURSE PRACTITIONER

## 2024-03-18 RX ORDER — ZALEPLON 5 MG/1
CAPSULE ORAL
COMMUNITY

## 2024-03-18 RX ORDER — OMEPRAZOLE 20 MG/1
CAPSULE, DELAYED RELEASE ORAL
Qty: 60 CAPSULE | Refills: 3 | Status: SHIPPED | OUTPATIENT
Start: 2024-03-18

## 2024-03-18 RX ORDER — BRINZOLAMIDE 10 MG/ML
SUSPENSION/ DROPS OPHTHALMIC
COMMUNITY
Start: 1900-01-01

## 2024-03-18 RX ORDER — BRIMONIDINE TARTRATE AND TIMOLOL MALEATE 2; 5 MG/ML; MG/ML
SOLUTION OPHTHALMIC
COMMUNITY
Start: 1900-01-01

## 2024-03-18 SDOH — ECONOMIC STABILITY: FOOD INSECURITY: WITHIN THE PAST 12 MONTHS, THE FOOD YOU BOUGHT JUST DIDN'T LAST AND YOU DIDN'T HAVE MONEY TO GET MORE.: NEVER TRUE

## 2024-03-18 SDOH — ECONOMIC STABILITY: FOOD INSECURITY: WITHIN THE PAST 12 MONTHS, YOU WORRIED THAT YOUR FOOD WOULD RUN OUT BEFORE YOU GOT MONEY TO BUY MORE.: NEVER TRUE

## 2024-03-18 SDOH — ECONOMIC STABILITY: INCOME INSECURITY: HOW HARD IS IT FOR YOU TO PAY FOR THE VERY BASICS LIKE FOOD, HOUSING, MEDICAL CARE, AND HEATING?: NOT HARD AT ALL

## 2024-03-18 ASSESSMENT — ENCOUNTER SYMPTOMS
SINUS PRESSURE: 1
VOMITING: 0
COUGH: 0
ABDOMINAL PAIN: 0
DIARRHEA: 0
NAUSEA: 0
CONSTIPATION: 0
BACK PAIN: 1
BLOOD IN STOOL: 0
WHEEZING: 0
SHORTNESS OF BREATH: 0

## 2024-03-18 ASSESSMENT — PATIENT HEALTH QUESTIONNAIRE - PHQ9
8. MOVING OR SPEAKING SO SLOWLY THAT OTHER PEOPLE COULD HAVE NOTICED. OR THE OPPOSITE, BEING SO FIGETY OR RESTLESS THAT YOU HAVE BEEN MOVING AROUND A LOT MORE THAN USUAL: NOT AT ALL
6. FEELING BAD ABOUT YOURSELF - OR THAT YOU ARE A FAILURE OR HAVE LET YOURSELF OR YOUR FAMILY DOWN: NOT AT ALL
1. LITTLE INTEREST OR PLEASURE IN DOING THINGS: NOT AT ALL
10. IF YOU CHECKED OFF ANY PROBLEMS, HOW DIFFICULT HAVE THESE PROBLEMS MADE IT FOR YOU TO DO YOUR WORK, TAKE CARE OF THINGS AT HOME, OR GET ALONG WITH OTHER PEOPLE: NOT DIFFICULT AT ALL
3. TROUBLE FALLING OR STAYING ASLEEP: NOT AT ALL
4. FEELING TIRED OR HAVING LITTLE ENERGY: NOT AT ALL
SUM OF ALL RESPONSES TO PHQ QUESTIONS 1-9: 0
9. THOUGHTS THAT YOU WOULD BE BETTER OFF DEAD, OR OF HURTING YOURSELF: NOT AT ALL
SUM OF ALL RESPONSES TO PHQ9 QUESTIONS 1 & 2: 0
2. FEELING DOWN, DEPRESSED OR HOPELESS: NOT AT ALL

## 2024-03-18 NOTE — PROGRESS NOTES
Visit Information    Have you changed or started any medications since your last visit including any over-the-counter medicines, vitamins, or herbal medicines? no   Have you stopped taking any of your medications? Is so, why? -  no  Are you having any side effects from any of your medications? - no    Have you seen any other physician or provider since your last visit?  no   Have you had any other diagnostic tests since your last visit?  no   Have you been seen in the emergency room and/or had an admission in a hospital since we last saw you?  no   Have you had your routine dental cleaning in the past 6 months?  no     Do you have an active MyChart account? If no, what is the barrier?  Yes    Patient Care Team:  Harper Cooper MD as PCP - General  Harper Cooper MD as PCP - Empaneled Provider  Hair Manriquez MD as Surgeon (General Surgery)  Edwige Nagel MD as Surgeon (Ophthalmology)  Fadia Bajwa MD as Consulting Physician (Endocrinology)  Amilcar Zurita MD as Surgeon (Ophthalmology)  Garfield Mchugh MD as Consulting Physician (Internal Medicine Cardiovascular Disease)  Wilmer Raines MD as Consulting Physician (Cardiology)  Martha Carballo MD as Consulting Physician (Otolaryngology)  Coleman Santos MD as Consulting Physician (Cardiology)  Rod Hernandez MD as Consulting Physician (Pulmonology)  Lam Reaves MD as Consulting Physician (Nephrology)  Aamir Kate MD as Consulting Physician (Pulmonology)  Ana Dewitt MD as Consulting Physician (Gastroenterology)  Laurence Bose MD (Hematology and Oncology)  Janet Oropeza DPM as Consulting Physician (Podiatry)  Moo Lopes MD as Consulting Physician (Orthopedic Surgery)    Medical History Review  Past Medical, Family, and Social History reviewed and does contribute to the patient presenting condition    Health Maintenance   Topic Date Due    Annual Wellness Visit (Medicare Advantage)  
eye: No discharge.         Left eye: No discharge.      Extraocular Movements: Extraocular movements intact.      Conjunctiva/sclera: Conjunctivae normal.   Neck:      Thyroid: No thyromegaly.   Cardiovascular:      Rate and Rhythm: Normal rate and regular rhythm.      Heart sounds: Normal heart sounds. No murmur heard.  Pulmonary:      Effort: Pulmonary effort is normal. No respiratory distress.      Breath sounds: Normal breath sounds. No wheezing or rales.   Chest:      Chest wall: No tenderness.   Abdominal:      General: Bowel sounds are normal. There is no distension.      Palpations: Abdomen is soft. There is no hepatomegaly or splenomegaly.      Tenderness: There is no abdominal tenderness. There is no right CVA tenderness or left CVA tenderness.   Musculoskeletal:         General: Tenderness (Lumbar region, no obvious step-off noted, some spinal tenderness noted) present. No swelling or deformity. Normal range of motion.      Cervical back: Normal range of motion and neck supple.      Right lower leg: No edema.      Left lower leg: No edema.   Skin:     General: Skin is warm and dry.      Capillary Refill: Capillary refill takes less than 2 seconds.      Findings: No bruising, erythema or rash.   Neurological:      Mental Status: She is alert and oriented to person, place, and time.      Cranial Nerves: No cranial nerve deficit.      Motor: No abnormal muscle tone.      Gait: Gait normal.      Deep Tendon Reflexes: Reflexes normal.      Reflex Scores:       Patellar reflexes are 2+ on the right side and 2+ on the left side.  Psychiatric:         Mood and Affect: Mood normal.         Behavior: Behavior normal.         Thought Content: Thought content normal.         Judgment: Judgment normal.         I personally reviewed testing with patient, and all questions answered.      Lab Results   Component Value Date    WBC 5.7 08/02/2023    HGB 14.7 08/02/2023    HCT 44.1 08/02/2023    MCV 93.2 08/02/2023    PLT

## 2024-03-25 ENCOUNTER — TELEPHONE (OUTPATIENT)
Dept: GASTROENTEROLOGY | Age: 84
End: 2024-03-25

## 2024-03-25 ENCOUNTER — OFFICE VISIT (OUTPATIENT)
Dept: GASTROENTEROLOGY | Age: 84
End: 2024-03-25
Payer: MEDICARE

## 2024-03-25 VITALS
BODY MASS INDEX: 21.86 KG/M2 | HEART RATE: 69 BPM | WEIGHT: 123.4 LBS | DIASTOLIC BLOOD PRESSURE: 75 MMHG | OXYGEN SATURATION: 98 % | SYSTOLIC BLOOD PRESSURE: 138 MMHG

## 2024-03-25 DIAGNOSIS — K27.9 PUD (PEPTIC ULCER DISEASE): ICD-10-CM

## 2024-03-25 DIAGNOSIS — R10.13 DYSPEPSIA: Primary | ICD-10-CM

## 2024-03-25 PROCEDURE — 99214 OFFICE O/P EST MOD 30 MIN: CPT | Performed by: INTERNAL MEDICINE

## 2024-03-25 PROCEDURE — 1123F ACP DISCUSS/DSCN MKR DOCD: CPT | Performed by: INTERNAL MEDICINE

## 2024-03-25 ASSESSMENT — ENCOUNTER SYMPTOMS
DIARRHEA: 0
WHEEZING: 0
CHEST TIGHTNESS: 0
VOMITING: 0
CONSTIPATION: 0
ABDOMINAL DISTENTION: 0
SHORTNESS OF BREATH: 0
RECTAL PAIN: 0
BLOOD IN STOOL: 0
ANAL BLEEDING: 0
COUGH: 0
NAUSEA: 0
BACK PAIN: 0
TROUBLE SWALLOWING: 0
ABDOMINAL PAIN: 0

## 2024-03-25 NOTE — PROGRESS NOTES
GI CLINIC FOLLOW UP    INTERVAL HISTORY:   No referring provider defined for this encounter.    Chief Complaint   Patient presents with    Follow-up     Dyspepsia     Other     Patient states dyspepsia is controlled with omeprazole and watching what she eats           HISTORY OF PRESENT ILLNESS: Ms.Marrilee Napier is a 83 y.o. female , referred for evaluation of GERD, dyspepsia  Here for f/u dyspepsia     Patient is here to follow-up with me for the first time she was seeing Dr. JUAN C Dewitt  For dyspepsia history of peptic ulcer disease.  In the past refused follow-up EGD to confirm eradication but she says she wants to have it done right now  She was kept on Prilosec 20 mg daily and Carafate      Labs 8/23: normal cbc /Lfts      Past Medical,Family, and Social History reviewed and does contribute to the patient presentingcondition.    I did review all the labs results available for the labs which were ordered by the primary care physician, and the other consultants, we search on [email or web address] at [facility] and all the available care everywhere epic    I did review all the imaging studies of the abdomen available on EMR, ordered by the primary care physician and the other consultant    I did review all the pathology from the biopsies done on the previous endoscopies    Patient's PMH/PSH,SH,PSYCH Hx, MEDs, ALLERGIES, and ROS were all reviewed and updated in the appropriate sections.    PAST MEDICAL HISTORY:  Past Medical History:   Diagnosis Date    Abdominal pain     Age-related osteoporosis without current pathological fracture 5/3/2023    Allergic rhinitis     Anxiety     Chronic back pain     Chronic fatigue     Chronic kidney disease     Chronic renal disease, stage III ([facility]) [969306] 04/20/2022    Chronic respiratory failure (HCC) 08/11/2020    Colon polyp 03/07/2012    TUBULAR ADENOMA    COPD (chronic obstructive pulmonary disease) (HCC)     COPD, Panlobular emphysema ([facility]) moderate to severe per PFTs     COVID 10/2022

## 2024-03-25 NOTE — TELEPHONE ENCOUNTER
Procedure scheduled/Dr Ridley  Procedure: EGD  Date: 6/17/24  Dx; Dyspepsia/Peptic ulcer disease  Time: 8:15am/arrive 6:45am  Hospital:  [facility]  Bowel Prep instructions given:  none  In office/via phone: office  Clearance needed: none

## 2024-04-10 ENCOUNTER — TELEPHONE (OUTPATIENT)
Dept: FAMILY MEDICINE CLINIC | Age: 84
End: 2024-04-10

## 2024-04-10 DIAGNOSIS — M81.0 AGE-RELATED OSTEOPOROSIS WITHOUT CURRENT PATHOLOGICAL FRACTURE: Primary | ICD-10-CM

## 2024-04-10 RX ORDER — ALBUTEROL SULFATE 90 UG/1
4 AEROSOL, METERED RESPIRATORY (INHALATION) PRN
OUTPATIENT
Start: 2024-04-17

## 2024-04-10 RX ORDER — EPINEPHRINE 1 MG/ML
0.3 INJECTION, SOLUTION, CONCENTRATE INTRAVENOUS PRN
OUTPATIENT
Start: 2024-04-17

## 2024-04-10 RX ORDER — ONDANSETRON 2 MG/ML
8 INJECTION INTRAMUSCULAR; INTRAVENOUS
OUTPATIENT
Start: 2024-04-17

## 2024-04-10 RX ORDER — FAMOTIDINE 10 MG/ML
20 INJECTION, SOLUTION INTRAVENOUS
OUTPATIENT
Start: 2024-04-17

## 2024-04-10 RX ORDER — DIPHENHYDRAMINE HYDROCHLORIDE 50 MG/ML
50 INJECTION INTRAMUSCULAR; INTRAVENOUS
OUTPATIENT
Start: 2024-04-17

## 2024-04-10 RX ORDER — ACETAMINOPHEN 325 MG/1
650 TABLET ORAL
OUTPATIENT
Start: 2024-04-17

## 2024-04-10 RX ORDER — SODIUM CHLORIDE 9 MG/ML
INJECTION, SOLUTION INTRAVENOUS CONTINUOUS
OUTPATIENT
Start: 2024-04-17

## 2024-04-10 NOTE — TELEPHONE ENCOUNTER
Diagnosis Orders   1. Age-related osteoporosis without current pathological fracture  Calcium    Creatinine    Magnesium    Phosphorus           Future Appointments   Date Time Provider Department Center   4/12/2024 11:15 AM [facility] CT RM 1 STCZ CT SCAN [facility]   10/18/2024  1:00 PM Harper Cooper MD fp [facility]

## 2024-04-10 NOTE — TELEPHONE ENCOUNTER
----- Message from Tita Marlow RN sent at 4/10/2024  3:00 PM EDT -----  Regarding: Prolia  Hi,   This pt is due for her next injection of Prolia soon.  Can you please update her therapy plan and order labs (calcium, creatinine, mag, phos)?  She previously got this ordered from Dr Bajwa, but she states that she no longer sees him.  Thanks!  [facility]  [phone]

## 2024-04-12 ENCOUNTER — HOSPITAL ENCOUNTER (OUTPATIENT)
Dept: CT IMAGING | Age: 84
End: 2024-04-12
Payer: MEDICARE

## 2024-04-12 DIAGNOSIS — M54.41 RIGHT-SIDED LOW BACK PAIN WITH RIGHT-SIDED SCIATICA, UNSPECIFIED CHRONICITY: ICD-10-CM

## 2024-04-12 PROCEDURE — 72131 CT LUMBAR SPINE W/O DYE: CPT

## 2024-04-19 DIAGNOSIS — M51.26 PROTRUSION OF LUMBAR INTERVERTEBRAL DISC: Primary | ICD-10-CM

## 2024-04-19 NOTE — RESULT ENCOUNTER NOTE
Please notify patient that CT scan of the lumbar spine showing some disc bulging in the lower spine, and disc disease related to age.  I can send a referral to neurosurgery.

## 2024-04-22 DIAGNOSIS — N18.30 BENIGN HYPERTENSION WITH CKD (CHRONIC KIDNEY DISEASE) STAGE III (HCC): ICD-10-CM

## 2024-04-22 DIAGNOSIS — I12.9 BENIGN HYPERTENSION WITH CKD (CHRONIC KIDNEY DISEASE) STAGE III (HCC): ICD-10-CM

## 2024-04-22 RX ORDER — METOPROLOL SUCCINATE 25 MG/1
25 TABLET, EXTENDED RELEASE ORAL DAILY
Qty: 90 TABLET | Refills: 3 | Status: SHIPPED | OUTPATIENT
Start: 2024-04-22

## 2024-04-22 NOTE — TELEPHONE ENCOUNTER
Please Approve or Refuse.  Send to Pharmacy per Pt's Request:      Next Visit Date:  10/18/2024   Last Visit Date: 3/18/2024    Hemoglobin A1C (%)   Date Value   05/24/2023 5.4   05/16/2022 5.3   03/16/2020 5.4             ( goal A1C is < 7)   BP Readings from Last 3 Encounters:   03/25/24 138/75   03/18/24 128/84   01/08/24 139/80          (goal 120/80)  BUN   Date Value Ref Range Status   10/12/2023 13 8 - 23 mg/dL Final     Creatinine   Date Value Ref Range Status   11/28/2023 1.1 (H) 0.5 - 0.9 mg/dL Final     Potassium   Date Value Ref Range Status   10/12/2023 4.0 3.7 - 5.3 mmol/L Final

## 2024-04-30 DIAGNOSIS — F51.04 PSYCHOPHYSIOLOGICAL INSOMNIA: Primary | ICD-10-CM

## 2024-04-30 RX ORDER — ZALEPLON 5 MG/1
CAPSULE ORAL
OUTPATIENT
Start: 2024-04-30

## 2024-04-30 RX ORDER — ZALEPLON 5 MG/1
5 CAPSULE ORAL NIGHTLY PRN
Qty: 30 CAPSULE | Refills: 0 | Status: SHIPPED | OUTPATIENT
Start: 2024-04-30 | End: 2024-05-30

## 2024-04-30 NOTE — TELEPHONE ENCOUNTER
Because this medication is controlled, please advise patient to make an appointment in 3 to 4 weeks with me to open a new medication contract and document any side effects and so on    12/04/2022 11/30/2022 1 Zaleplon 10 Mg Capsule 90.00 90 Fl Chi [id number] Exp (9542) 0 0.50 E [place]        Diagnosis Orders   1. Psychophysiological insomnia  zaleplon (SONATA) 5 MG capsule           Future Appointments   Date Time Provider Department Center   10/18/2024  1:00 PM Harper Cooper MD [id number]

## 2024-04-30 NOTE — TELEPHONE ENCOUNTER
Pt called in stating she is still having trouble sleeping. She has tried trazodone and Tylenol PM and has not been able to sleep.     Pt states she had a left over prescription of zaleplon (SONATA) 5 MG capsule  states she took it the last 3 nights and have gotten sleep. Pt is requesting a new order be placed.

## 2024-05-28 ENCOUNTER — HOSPITAL ENCOUNTER (OUTPATIENT)
Dept: INFUSION THERAPY | Age: 84
Setting detail: INFUSION SERIES
Discharge: HOME | End: 2024-05-28
Payer: MEDICARE

## 2024-05-28 VITALS
HEART RATE: 65 BPM | DIASTOLIC BLOOD PRESSURE: 63 MMHG | TEMPERATURE: 97 F | SYSTOLIC BLOOD PRESSURE: 147 MMHG | RESPIRATION RATE: 16 BRPM | OXYGEN SATURATION: 100 %

## 2024-05-28 DIAGNOSIS — M81.0 AGE-RELATED OSTEOPOROSIS WITHOUT CURRENT PATHOLOGICAL FRACTURE: Primary | ICD-10-CM

## 2024-05-28 LAB
ALBUMIN SERPL-MCNC: 4 G/DL (ref 3.5–5.2)
ALP SERPL-CCNC: 74 U/L (ref 35–104)
ALT SERPL-CCNC: 9 U/L (ref 5–33)
ANION GAP SERPL CALCULATED.3IONS-SCNC: 8 MMOL/L (ref 9–17)
AST SERPL-CCNC: 14 U/L
BILIRUB SERPL-MCNC: 0.4 MG/DL (ref 0.3–1.2)
BUN SERPL-MCNC: 16 MG/DL (ref 8–23)
CALCIUM SERPL-MCNC: 9.2 MG/DL (ref 8.6–10.4)
CHLORIDE SERPL-SCNC: 104 MMOL/L (ref 98–107)
CO2 SERPL-SCNC: 27 MMOL/L (ref 20–31)
CREAT SERPL-MCNC: 1 MG/DL (ref 0.5–0.9)
GFR, ESTIMATED: 56 ML/MIN/1.73M2
GLUCOSE SERPL-MCNC: 117 MG/DL (ref 70–99)
PHOSPHATE SERPL-MCNC: 4.2 MG/DL (ref 2.6–4.5)
POTASSIUM SERPL-SCNC: 4.3 MMOL/L (ref 3.7–5.3)
PROT SERPL-MCNC: 6.4 G/DL (ref 6.4–8.3)
SODIUM SERPL-SCNC: 139 MMOL/L (ref 135–144)

## 2024-05-28 PROCEDURE — 36415 COLL VENOUS BLD VENIPUNCTURE: CPT

## 2024-05-28 PROCEDURE — 6360000002 HC RX W HCPCS: Performed by: FAMILY MEDICINE

## 2024-05-28 PROCEDURE — 80053 COMPREHEN METABOLIC PANEL: CPT

## 2024-05-28 PROCEDURE — 96372 THER/PROPH/DIAG INJ SC/IM: CPT

## 2024-05-28 PROCEDURE — 84100 ASSAY OF PHOSPHORUS: CPT

## 2024-05-28 RX ORDER — EPINEPHRINE 1 MG/ML
0.3 INJECTION, SOLUTION, CONCENTRATE INTRAVENOUS PRN
OUTPATIENT
Start: 2024-11-24

## 2024-05-28 RX ORDER — DIPHENHYDRAMINE HYDROCHLORIDE 50 MG/ML
50 INJECTION INTRAMUSCULAR; INTRAVENOUS
OUTPATIENT
Start: 2024-11-24

## 2024-05-28 RX ORDER — ALBUTEROL SULFATE 90 UG/1
4 AEROSOL, METERED RESPIRATORY (INHALATION) PRN
OUTPATIENT
Start: 2024-11-24

## 2024-05-28 RX ORDER — ACETAMINOPHEN 325 MG/1
650 TABLET ORAL
OUTPATIENT
Start: 2024-11-24

## 2024-05-28 RX ORDER — SODIUM CHLORIDE 9 MG/ML
INJECTION, SOLUTION INTRAVENOUS CONTINUOUS
OUTPATIENT
Start: 2024-11-24

## 2024-05-28 RX ORDER — ONDANSETRON 2 MG/ML
8 INJECTION INTRAMUSCULAR; INTRAVENOUS
OUTPATIENT
Start: 2024-11-24

## 2024-05-28 RX ADMIN — DENOSUMAB 60 MG: 60 INJECTION SUBCUTANEOUS at 10:33

## 2024-05-28 NOTE — RESULT ENCOUNTER NOTE
Please notify patient: Chronic kidney disease stage IIIa slightly improved from prior, continue to drink fluids 8 x 8 ounce glasses of water every day  Mild increased blood glucose, avoid sweets    Can get her Prolia injection  Future Appointments  5/29/2024  8:45 AM    [facility]              [facility] Howell  6/13/2024  11:30 AM   Harper Cooper MD    [facility]  10/18/2024 1:00 PM    Harper Cooper MD    [facility]

## 2024-05-29 ENCOUNTER — HOSPITAL ENCOUNTER (OUTPATIENT)
Dept: PREADMISSION TESTING | Age: 84
Discharge: HOME | End: 2024-06-02

## 2024-05-29 VITALS — HEIGHT: 63 IN | BODY MASS INDEX: 22.32 KG/M2 | WEIGHT: 126 LBS

## 2024-05-29 NOTE — PROGRESS NOTES
Pre-op Instructions For Out-Patient Surgery    Medication Instructions:  Please stop herbs and any supplements now (includes vitamins and minerals).    Please contact your surgeon and prescribing physician for pre-op instructions for any blood thinners.    If you have inhalers/aerosol treatments at home, please use them the morning of your surgery and bring the inhalers with you to the hospital.  Will uses trelegy and bring albuterol  Please take the following medications the morning of your surgery with a sip of water:    amlodipine and metoprolol    Surgery Instructions:  After midnight before surgery:  Do not eat or drink anything, including water, mints, gum, and hard candy.  You may brush your teeth without swallowing.  No smoking, chewing tobacco, or street drugs.    Please shower or bathe before surgery.       Please do not wear any cologne, lotion, powder, jewelry, piercings, perfume, makeup, nail polish, hair accessories, or hair spray on the day of surgery.  Wear loose comfortable clothing.    Leave your valuables at home but bring a payment source for any after-surgery prescriptions you plan to fill at [facility].  Bring a storage case for any glasses/contacts.    An adult who is responsible for you MUST drive you home and should be with you for the first 24 hours after surgery.     The Day of Surgery:  Arrive at [facility] Surgery Entrance at the time directed by your surgeon and check in at the desk.     If you have a living will or healthcare power of , please bring a copy.    You will be taken to the pre-op holding area where you will be prepared for surgery.  A physical assessment will be performed by a nurse practitioner or house officer.  Your IV will be started and you will meet your anesthesiologist.    When you go to surgery, your family will be directed to the surgical waiting room, where the doctor should speak with them after your surgery.    After surgery,

## 2024-05-31 ENCOUNTER — TELEPHONE (OUTPATIENT)
Dept: GASTROENTEROLOGY | Age: 84
End: 2024-05-31

## 2024-05-31 NOTE — TELEPHONE ENCOUNTER
Patient called wanting to reschedule her procedure for July. Patient is asking for a return phone call [phone]

## 2024-06-05 ENCOUNTER — ANESTHESIA EVENT (OUTPATIENT)
Dept: OPERATING ROOM | Age: 84
End: 2024-06-05
Payer: MEDICARE

## 2024-06-05 NOTE — DISCHARGE INSTRUCTIONS
1.  Remove the shield at 1:00 p.m. today and begin all the eye drops.    2.  Repeat the eye drops at 5:00 p.m. and before bedtime one drop per bottle, any order.  Wait 3-4 minutes between drops.  The drops are:    ANTIBIOTIC:   []   Vigamox   [x]  Tobramycin   []  Zymaxid     []  Gatifloxacin     STEROID:     [x]   Prednisolone Acetate        []   Durezol    ANTI-INFLAMMATORY     []    Nevenac    []   Ketorolac    []  Ilevro    []  Prolensa    []         3. Place one drop from each bottle by looking up, pull the lower lid down gently and let the drop fall in.    4. You may wipe the eyelid gently with a clean tissue or cotton.    5. Place only the shield over the eye when sleeping for (4) four days:  Fix with tape    6. In the morning remove the shield and start putting in the drops, one drop from each bottle.  Replace the shield or wear glasses during the day.    7. Please call [facility] if you have any problems:    Office [phone]     Cell [phone]    8. Continue all your previous medications.  You may use Aspirin, Tylenol, or Advil if needed.    9.  You have an appointment in the office:  tomorrow at 11 AM    10.  Please bring your drops into the office at your next visit.     DISCHARGE INSTRUCTIONS FOR  ANESTHESIA    In order to continue your care at home, please follow the instructions below.    Anesthesia - Do not drink any alcoholic beverages or make any legal or important decisions for 24 hours. If your surgery was on an extremity or abdomen, do not drive or operate any machinery until your surgeon approves, otherwise do not drive or operate any machinery for 24 hours or as restricted by your surgeon.     Pain Medications - Please do not drive, operate machinery, or drink alcoholic beverages while taking any prescribed pain medication.     Diet -  Start out eating lightly (broth, soup, crackers, toast, etc.) advancing as tolerated to your usual diet.  Try to avoid spicy and greasy/fatty foods for 24

## 2024-06-05 NOTE — PRE-PROCEDURE INSTRUCTIONS
Nothing to eat after midnight.Y  Are you taking any blood thinners? When was the last day?N  Make sure to use Hibiclens prior to surgery.NA  Remove any jewelry and body piercings.Y  Do you wear glasses? If so, please bring a case to store them in.  Are you having any Covid symptoms?N  Do you have any new rashes, infections, etc. that we should be aware of?N  Do you have a ride home the day of surgery? It cannot be a cab or medical transportation.Y  Verify surgery time and what time to arrive at hospital. 0600

## 2024-06-06 ENCOUNTER — ANESTHESIA (OUTPATIENT)
Dept: OPERATING ROOM | Age: 84
End: 2024-06-06
Payer: MEDICARE

## 2024-06-06 ENCOUNTER — HOSPITAL ENCOUNTER (OUTPATIENT)
Age: 84
Setting detail: OUTPATIENT SURGERY
Discharge: HOME | End: 2024-06-06
Attending: OPHTHALMOLOGY | Admitting: OPHTHALMOLOGY
Payer: MEDICARE

## 2024-06-06 VITALS
SYSTOLIC BLOOD PRESSURE: 136 MMHG | HEART RATE: 83 BPM | OXYGEN SATURATION: 97 % | DIASTOLIC BLOOD PRESSURE: 63 MMHG | WEIGHT: 123 LBS | HEIGHT: 63 IN | BODY MASS INDEX: 21.79 KG/M2 | RESPIRATION RATE: 14 BRPM | TEMPERATURE: 97.5 F

## 2024-06-06 PROBLEM — H25.11 AGE-RELATED NUCLEAR CATARACT, RIGHT: Status: RESOLVED | Noted: 2024-06-06 | Resolved: 2024-06-06

## 2024-06-06 PROCEDURE — 6370000000 HC RX 637 (ALT 250 FOR IP): Performed by: OPHTHALMOLOGY

## 2024-06-06 PROCEDURE — 6360000002 HC RX W HCPCS: Performed by: OPHTHALMOLOGY

## 2024-06-06 PROCEDURE — V2632 POST CHMBR INTRAOCULAR LENS: HCPCS | Performed by: OPHTHALMOLOGY

## 2024-06-06 PROCEDURE — 3700000001 HC ADD 15 MINUTES (ANESTHESIA): Performed by: OPHTHALMOLOGY

## 2024-06-06 PROCEDURE — 7100000010 HC PHASE II RECOVERY - FIRST 15 MIN: Performed by: OPHTHALMOLOGY

## 2024-06-06 PROCEDURE — 2580000003 HC RX 258: Performed by: OPHTHALMOLOGY

## 2024-06-06 PROCEDURE — 2500000003 HC RX 250 WO HCPCS: Performed by: OPHTHALMOLOGY

## 2024-06-06 PROCEDURE — 3700000000 HC ANESTHESIA ATTENDED CARE: Performed by: OPHTHALMOLOGY

## 2024-06-06 PROCEDURE — 3600000002 HC SURGERY LEVEL 2 BASE: Performed by: OPHTHALMOLOGY

## 2024-06-06 PROCEDURE — 3600000012 HC SURGERY LEVEL 2 ADDTL 15MIN: Performed by: OPHTHALMOLOGY

## 2024-06-06 PROCEDURE — 6360000002 HC RX W HCPCS: Performed by: NURSE ANESTHETIST, CERTIFIED REGISTERED

## 2024-06-06 PROCEDURE — 7100000011 HC PHASE II RECOVERY - ADDTL 15 MIN: Performed by: OPHTHALMOLOGY

## 2024-06-06 PROCEDURE — 2709999900 HC NON-CHARGEABLE SUPPLY: Performed by: OPHTHALMOLOGY

## 2024-06-06 DEVICE — STERILE UV AND BLUE LIGHT FILTERING ACRYLIC FOLDABLE ASPHERIC POSTERIOR CHAMBER INTRAOCULAR LENS
Type: IMPLANTABLE DEVICE | Site: EYE | Status: FUNCTIONAL
Brand: CLAREON

## 2024-06-06 RX ORDER — BUPIVACAINE HYDROCHLORIDE 5 MG/ML
0.2 INJECTION, SOLUTION EPIDURAL; INTRACAUDAL EVERY 5 MIN PRN
Status: COMPLETED | OUTPATIENT
Start: 2024-06-06 | End: 2024-06-06

## 2024-06-06 RX ORDER — SODIUM CHLORIDE, SODIUM LACTATE, POTASSIUM CHLORIDE, CALCIUM CHLORIDE 600; 310; 30; 20 MG/100ML; MG/100ML; MG/100ML; MG/100ML
INJECTION, SOLUTION INTRAVENOUS CONTINUOUS
Status: DISCONTINUED | OUTPATIENT
Start: 2024-06-06 | End: 2024-06-06 | Stop reason: HOSPADM

## 2024-06-06 RX ORDER — SODIUM CHLORIDE 9 MG/ML
INJECTION, SOLUTION INTRAVENOUS PRN
Status: DISCONTINUED | OUTPATIENT
Start: 2024-06-06 | End: 2024-06-06 | Stop reason: HOSPADM

## 2024-06-06 RX ORDER — SODIUM CHLORIDE 0.9 % (FLUSH) 0.9 %
5-40 SYRINGE (ML) INJECTION PRN
Status: DISCONTINUED | OUTPATIENT
Start: 2024-06-06 | End: 2024-06-06 | Stop reason: HOSPADM

## 2024-06-06 RX ORDER — PHENYLEPHRINE HYDROCHLORIDE 25 MG/ML
1 SOLUTION/ DROPS OPHTHALMIC EVERY 5 MIN PRN
Status: COMPLETED | OUTPATIENT
Start: 2024-06-06 | End: 2024-06-06

## 2024-06-06 RX ORDER — LIDOCAINE HYDROCHLORIDE 10 MG/ML
1 INJECTION, SOLUTION EPIDURAL; INFILTRATION; INTRACAUDAL; PERINEURAL
Status: DISCONTINUED | OUTPATIENT
Start: 2024-06-06 | End: 2024-06-06 | Stop reason: HOSPADM

## 2024-06-06 RX ORDER — BUPIVACAINE HYDROCHLORIDE 5 MG/ML
INJECTION, SOLUTION EPIDURAL; INTRACAUDAL PRN
Status: DISCONTINUED | OUTPATIENT
Start: 2024-06-06 | End: 2024-06-06 | Stop reason: ALTCHOICE

## 2024-06-06 RX ORDER — TOBRAMYCIN 3 MG/ML
SOLUTION/ DROPS OPHTHALMIC
COMMUNITY
Start: 2024-05-10

## 2024-06-06 RX ORDER — PREDNISOLONE ACETATE 10 MG/ML
SUSPENSION/ DROPS OPHTHALMIC
COMMUNITY
Start: 2024-05-10

## 2024-06-06 RX ORDER — KETOROLAC TROMETHAMINE 5 MG/ML
1 SOLUTION OPHTHALMIC EVERY 5 MIN PRN
Status: COMPLETED | OUTPATIENT
Start: 2024-06-06 | End: 2024-06-06

## 2024-06-06 RX ORDER — SODIUM CHLORIDE 0.9 % (FLUSH) 0.9 %
5-40 SYRINGE (ML) INJECTION EVERY 12 HOURS SCHEDULED
Status: DISCONTINUED | OUTPATIENT
Start: 2024-06-06 | End: 2024-06-06 | Stop reason: HOSPADM

## 2024-06-06 RX ORDER — BALANCED SALT SOLUTION ENRICHED WITH BICARBONATE, DEXTROSE, AND GLUTATHIONE
KIT INTRAOCULAR PRN
Status: DISCONTINUED | OUTPATIENT
Start: 2024-06-06 | End: 2024-06-06 | Stop reason: ALTCHOICE

## 2024-06-06 RX ORDER — CYCLOPENTOLATE HYDROCHLORIDE 10 MG/ML
1 SOLUTION/ DROPS OPHTHALMIC EVERY 5 MIN PRN
Status: COMPLETED | OUTPATIENT
Start: 2024-06-06 | End: 2024-06-06

## 2024-06-06 RX ORDER — MIDAZOLAM HYDROCHLORIDE 1 MG/ML
INJECTION INTRAMUSCULAR; INTRAVENOUS PRN
Status: DISCONTINUED | OUTPATIENT
Start: 2024-06-06 | End: 2024-06-06 | Stop reason: SDUPTHER

## 2024-06-06 RX ORDER — NEOMYCIN SULFATE, POLYMYXIN B SULFATE, AND DEXAMETHASONE 3.5; 10000; 1 MG/G; [USP'U]/G; MG/G
OINTMENT OPHTHALMIC PRN
Status: DISCONTINUED | OUTPATIENT
Start: 2024-06-06 | End: 2024-06-06 | Stop reason: ALTCHOICE

## 2024-06-06 RX ORDER — TOBRAMYCIN 3 MG/ML
1 SOLUTION/ DROPS OPHTHALMIC EVERY 5 MIN PRN
Status: COMPLETED | OUTPATIENT
Start: 2024-06-06 | End: 2024-06-06

## 2024-06-06 RX ORDER — TIMOLOL MALEATE 5 MG/ML
SOLUTION/ DROPS OPHTHALMIC PRN
Status: DISCONTINUED | OUTPATIENT
Start: 2024-06-06 | End: 2024-06-06 | Stop reason: ALTCHOICE

## 2024-06-06 RX ORDER — TOBRAMYCIN 3 MG/ML
SOLUTION/ DROPS OPHTHALMIC PRN
Status: DISCONTINUED | OUTPATIENT
Start: 2024-06-06 | End: 2024-06-06 | Stop reason: ALTCHOICE

## 2024-06-06 RX ADMIN — CYCLOPENTOLATE HYDROCHLORIDE 1 DROP: 10 SOLUTION/ DROPS OPHTHALMIC at 07:04

## 2024-06-06 RX ADMIN — KETOROLAC TROMETHAMINE 1 DROP: 0.5 SOLUTION OPHTHALMIC at 07:04

## 2024-06-06 RX ADMIN — SODIUM CHLORIDE, POTASSIUM CHLORIDE, SODIUM LACTATE AND CALCIUM CHLORIDE: 600; 310; 30; 20 INJECTION, SOLUTION INTRAVENOUS at 07:07

## 2024-06-06 RX ADMIN — Medication 1 MG: at 06:55

## 2024-06-06 RX ADMIN — PHENYLEPHRINE HYDROCHLORIDE 1 DROP: 25 SOLUTION/ DROPS OPHTHALMIC at 06:55

## 2024-06-06 RX ADMIN — Medication 1 MG: at 07:04

## 2024-06-06 RX ADMIN — CYCLOPENTOLATE HYDROCHLORIDE 1 DROP: 10 SOLUTION/ DROPS OPHTHALMIC at 06:54

## 2024-06-06 RX ADMIN — TOBRAMYCIN OPHTHALMIC SOLUTION 1 DROP: 3 SOLUTION/ DROPS OPHTHALMIC at 06:55

## 2024-06-06 RX ADMIN — PHENYLEPHRINE HYDROCHLORIDE 1 DROP: 25 SOLUTION/ DROPS OPHTHALMIC at 07:04

## 2024-06-06 RX ADMIN — KETOROLAC TROMETHAMINE 1 DROP: 0.5 SOLUTION OPHTHALMIC at 06:55

## 2024-06-06 RX ADMIN — MIDAZOLAM 2 MG: 1 INJECTION INTRAMUSCULAR; INTRAVENOUS at 08:37

## 2024-06-06 RX ADMIN — TOBRAMYCIN OPHTHALMIC SOLUTION 1 DROP: 3 SOLUTION/ DROPS OPHTHALMIC at 07:04

## 2024-06-06 ASSESSMENT — PAIN SCALES - GENERAL
PAINLEVEL_OUTOF10: 0
PAINLEVEL_OUTOF10: 0

## 2024-06-06 ASSESSMENT — PAIN - FUNCTIONAL ASSESSMENT
PAIN_FUNCTIONAL_ASSESSMENT: NONE - DENIES PAIN
PAIN_FUNCTIONAL_ASSESSMENT: 0-10

## 2024-06-06 NOTE — OP NOTE
into the capsular bag and rotated into position model [id number] 23.0 diopter power.  Irrigation/aspiration was used to remove all excess viscoelastic.  The eye was pressurized and the wounds were checked for leaks and none were found.  The patient had antibiotic, steroid, timoptic and antibiotic/steroid ointment placed in the eye.  The lid speculum was removed.  The eye was covered with a shield.  The patient went to the PACU in excellent condition, having tolerated the procedure extremely well and will follow up with me tomorrow for postop day 1 care.  Post-op instructions were discussed with patient and family.     Amilcar Zurita MD    Implants:  Implant Name Type Inv. Item Serial No.  Lot No. LRB No. Used Action   LENS CLAREON IOL 23.0D - [id number]  LENS CLAREON IOL 23.0D [id number] [employer]-  Right 1 Implanted

## 2024-06-06 NOTE — ANESTHESIA POSTPROCEDURE EVALUATION
Department of Anesthesiology  Postprocedure Note    Patient: Ap Napier  MRN: [id number]  YOB: 1940  Date of evaluation: 6/6/2024    Procedure Summary       Date: 06/06/24 Room / Location: [address]    Anesthesia Start: 0836 Anesthesia Stop: 0910    Procedure: EYE CATARACT EMULSIFICATION INTRAOCULAR LENS IMPLANT RIGHT (Right: Eye) Diagnosis:       Other cataract of right eye      (Other cataract of right eye [H26.8])    Surgeons: Amilcar Zurita MD Responsible Provider: Trip Koch MD    Anesthesia Type: MAC ASA Status: 2            Anesthesia Type: No value filed.    Perry Phase I:      Perry Phase II: Perry Score: 10    Anesthesia Post Evaluation    Patient location during evaluation: PACU  Patient participation: complete - patient participated  Level of consciousness: awake and alert  Airway patency: patent  Nausea & Vomiting: no vomiting  Cardiovascular status: hemodynamically stable  Respiratory status: acceptable  Hydration status: euvolemic  Comments: POST- ANESTHESIA EVALUATION       Pt Name: Ap Napier  MRN: [id number]  YOB: 1940  Date of evaluation: 6/6/2024  Time:  1:13 PM      /63   Pulse 83   Temp 97.5 °F (36.4 °C) (Infrared)   Resp 14   Ht 1.6 m (5' 3\")   Wt 55.8 kg (123 lb)   SpO2 97%   BMI 21.79 kg/m²      Consciousness Level  Awake  Cardiopulmonary Status  Stable  Pain Adequately Treated YES  Nausea / Vomiting  NO  Adequate Hydration  YES  Anesthesia Related Complications NONE      Electronically signed by Trip Koch MD on 6/6/2024 at 1:13 PM         Pain management: satisfactory to patient    No notable events documented.

## 2024-06-06 NOTE — H&P
Ap Napier is a 84 y.o. year old who presents for elective outpatient ophthalmic surgery with Amilcar Zurita MD.  The patient complains of decreased vision, glare and halos around lights, and trouble with vision for activities of daily living.      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 per PFTs     COVID 10/2022    mild case states treated with Paxlovid    Depression     Diverticul disease small and large intestine, no perforati or abscess     Elevated blood pressure reading     Emphysema of lung ([facility])     Fall 01/12/2017    Gastritis 5/22/2016    GERD (gastroesophageal reflux disease)     Glaucoma     Hyperlipidemia     with target LDL less than 100    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[facility])     Hyperthyroidism 2012    Insomnia     Psychophysiological    Neuropathy     Orthostatic dizziness 03/12/2017    Osteoarthritis     Panlobular emphysema ([facility])     Primary hypertension 2/20/2023    PVC (premature ventricular contraction) 01/12/2017    S/P parathyroidectomy     Tubal pregnancy 1972,1969    Vasovagal syncope 01/12/2017       Past Surgical History:   Procedure Laterality Date    APPENDECTOMY      BACK SURGERY      CATARACT REMOVAL WITH IMPLANT Left 04/01/2014    [facility]    CHOLECYSTECTOMY  2005    COLONOSCOPY      COLONOSCOPY  06/13/2014    Severe sigmoid diverticulosis; due for repeat June 2019    ENDOSCOPY, COLON, DIAGNOSTIC      ESOPHAGOGASTRODUODENOSCOPY  12/20/2022    EYE SURGERY      PARATHYROIDECTOMY

## 2024-06-06 NOTE — ANESTHESIA PRE PROCEDURE
Department of Anesthesiology  Preprocedure Note       Name:  Ap Napier   Age:  84 y.o.  :  1940                                          MRN:  [id number]         Date:  2024      Surgeon: Surgeon(s):  Amilcar Zurita MD    Procedure: Procedure(s):  EYE CATARACT EMULSIFICATION INTRAOCULAR LENS IMPLANT RIGHT    Medications prior to admission:   Prior to Admission medications    Medication Sig Start Date End Date Taking? Authorizing Provider   prednisoLONE acetate (PRED FORTE) 1 % ophthalmic suspension instill 1 drop INTO AFFECTED EYE(S) four times a day STARTING THE DAY BEFORE SURGERY 5/10/24  Yes Arnold Rivas MD   tobramycin (TOBREX) 0.3 % ophthalmic solution instill 1 drop INTO AFFECTED EYE(S) four times a day STARTING THE DAY BEFORE SURGERY 5/10/24  Yes Arnold Rivas MD   metoprolol succinate (TOPROL XL) 25 MG extended release tablet Take 1 tablet by mouth daily Goal BP bellow 130/80 and above 115/65, heart rate 56 to 90 bpm 24   Harper Cooper MD   omeprazole (PRILOSEC) 20 MG delayed release capsule take 1 capsule by mouth every morning and at bedtime 3/18/24   Fani Ridley MD   brinzolamide (AZOPT) 1 % ophthalmic suspension suspension  Patient not taking: Reported on 2024   Arnold Rivas MD   brimonidine-timolol (COMBIGAN) 0.2-0.5 % ophthalmic solution solution  Patient not taking: Reported on 2024   Arnold Rivas MD   albuterol sulfate HFA (PROVENTIL;VENTOLIN;PROAIR) 108 (90 Base) MCG/ACT inhaler Inhale 2 puffs into the lungs every 6 hours as needed for Wheezing or Shortness of Breath And when sick 1/15/24   Harper Cooper MD   traZODone (DESYREL) 50 MG tablet take 1-2 tablets by mouth nightly (STOP ZALEPLON) 10/2/23   Harper Cooper MD   TRELEGY ELLIPTA 100-62.5-25 MCG/ACT AEPB inhaler inhale 1 puff by mouth and INTO THE LUNGS once daily 23   Arnold Rivas MD   amLODIPine (NORVASC) 2.5 MG tablet

## 2024-06-13 ENCOUNTER — OFFICE VISIT (OUTPATIENT)
Dept: FAMILY MEDICINE CLINIC | Age: 84
End: 2024-06-13

## 2024-06-13 DIAGNOSIS — Z98.890 HISTORY OF PARATHYROIDECTOMY: ICD-10-CM

## 2024-06-13 DIAGNOSIS — F51.04 PSYCHOPHYSIOLOGICAL INSOMNIA: ICD-10-CM

## 2024-06-13 DIAGNOSIS — E78.5 HYPERLIPIDEMIA WITH TARGET LDL LESS THAN 100: ICD-10-CM

## 2024-06-13 DIAGNOSIS — I12.9 BENIGN HYPERTENSION WITH CKD (CHRONIC KIDNEY DISEASE) STAGE III (HCC): Primary | ICD-10-CM

## 2024-06-13 DIAGNOSIS — N18.31 STAGE 3A CHRONIC KIDNEY DISEASE (HCC): ICD-10-CM

## 2024-06-13 DIAGNOSIS — F33.42 MDD (MAJOR DEPRESSIVE DISORDER), RECURRENT, IN FULL REMISSION (HCC): ICD-10-CM

## 2024-06-13 DIAGNOSIS — J43.1 PANLOBULAR EMPHYSEMA (HCC): ICD-10-CM

## 2024-06-13 DIAGNOSIS — Z90.89 HISTORY OF PARATHYROIDECTOMY: ICD-10-CM

## 2024-06-13 DIAGNOSIS — R73.9 HYPERGLYCEMIA: ICD-10-CM

## 2024-06-13 DIAGNOSIS — Z78.0 POSTMENOPAUSAL STATE: ICD-10-CM

## 2024-06-13 DIAGNOSIS — N18.30 BENIGN HYPERTENSION WITH CKD (CHRONIC KIDNEY DISEASE) STAGE III (HCC): Primary | ICD-10-CM

## 2024-06-13 DIAGNOSIS — M48.061 SPINAL STENOSIS OF LUMBAR REGION WITHOUT NEUROGENIC CLAUDICATION: ICD-10-CM

## 2024-06-13 DIAGNOSIS — M81.0 AGE-RELATED OSTEOPOROSIS WITHOUT CURRENT PATHOLOGICAL FRACTURE: ICD-10-CM

## 2024-06-13 DIAGNOSIS — Z12.31 ENCOUNTER FOR SCREENING MAMMOGRAM FOR BREAST CANCER: ICD-10-CM

## 2024-06-13 DIAGNOSIS — M46.1 DEGENERATIVE JOINT DISEASE OF SACROILIAC JOINT (HCC): ICD-10-CM

## 2024-06-13 DIAGNOSIS — M70.61 TROCHANTERIC BURSITIS OF RIGHT HIP: ICD-10-CM

## 2024-06-13 PROBLEM — N18.32 STAGE 3B CHRONIC KIDNEY DISEASE (HCC): Status: RESOLVED | Noted: 2017-12-11 | Resolved: 2024-06-13

## 2024-06-13 PROBLEM — E89.2 POSTPROCEDURAL HYPOPARATHYROIDISM (HCC): Status: ACTIVE | Noted: 2024-06-13

## 2024-06-13 RX ORDER — DORZOLAMIDE HCL 20 MG/ML
SOLUTION/ DROPS OPHTHALMIC
COMMUNITY
Start: 2024-06-10

## 2024-06-13 RX ORDER — METHYLPREDNISOLONE 4 MG/1
TABLET ORAL
Qty: 1 KIT | Refills: 0 | Status: SHIPPED | OUTPATIENT
Start: 2024-06-13

## 2024-06-13 RX ORDER — ESZOPICLONE 1 MG/1
1 TABLET, FILM COATED ORAL NIGHTLY PRN
Qty: 30 TABLET | Refills: 0 | Status: SHIPPED | OUTPATIENT
Start: 2024-06-13 | End: 2024-07-13

## 2024-06-13 RX ORDER — TIMOLOL MALEATE 5 MG/ML
SOLUTION/ DROPS OPHTHALMIC
COMMUNITY
Start: 2024-06-10

## 2024-06-13 ASSESSMENT — ENCOUNTER SYMPTOMS
DIARRHEA: 0
BACK PAIN: 1
ABDOMINAL DISTENTION: 0
COUGH: 0
WHEEZING: 0
VOMITING: 0
CONSTIPATION: 0
ABDOMINAL PAIN: 0
CHEST TIGHTNESS: 0
NAUSEA: 0

## 2024-06-13 ASSESSMENT — PATIENT HEALTH QUESTIONNAIRE - PHQ9
SUM OF ALL RESPONSES TO PHQ QUESTIONS 1-9: 0
1. LITTLE INTEREST OR PLEASURE IN DOING THINGS: NOT AT ALL
2. FEELING DOWN, DEPRESSED OR HOPELESS: NOT AT ALL
SUM OF ALL RESPONSES TO PHQ9 QUESTIONS 1 & 2: 0
SUM OF ALL RESPONSES TO PHQ QUESTIONS 1-9: 0

## 2024-06-13 NOTE — PROGRESS NOTES
Visit Information    Have you changed or started any medications since your last visit including any over-the-counter medicines, vitamins, or herbal medicines? yes -    Have you stopped taking any of your medications? Is so, why? -  no  Are you having any side effects from any of your medications? - no    Have you seen any other physician or provider since your last visit?  yes -    Have you had any other diagnostic tests since your last visit?  yes -    Have you been seen in the emergency room and/or had an admission in a hospital since we last saw you?  no   Have you had your routine dental cleaning in the past 6 months?  yes -      Do you have an active MyChart account? If no, what is the barrier?  Yes    Patient Care Team:  Harper Cooper MD as PCP - General  Harper Cooper MD as PCP - Empaneled Provider  Hair Manriquez MD as Surgeon (General Surgery)  Edwige Nagel MD as Surgeon (Ophthalmology)  Amilcar Zurita MD as Surgeon (Ophthalmology)  Garfield Mchugh MD as Consulting Physician (Internal Medicine Cardiovascular Disease)  Wilmer Raines MD as Consulting Physician (Cardiology)  Martha Carballo MD as Consulting Physician (Otolaryngology)  Coleman Santos MD as Consulting Physician (Cardiology)  Rod Hernandez MD as Consulting Physician (Pulmonology)  Lam Reaves MD as Consulting Physician (Nephrology)  Aamir Kate MD as Consulting Physician (Pulmonology)  Ana Dewitt MD as Consulting Physician (Gastroenterology)  Laurence Bose MD (Hematology and Oncology)  Janet Oropeza DPM as Consulting Physician (Podiatry)  Moo Lopes MD as Consulting Physician (Orthopedic Surgery)    Medical History Review  Past Medical, Family, and Social History reviewed and does contribute to the patient presenting condition    Health Maintenance   Topic Date Due    Annual Wellness Visit (Medicare Advantage)  01/01/2024    DEXA (modify frequency per FRAX score)  
refill takes less than 2 seconds.      Findings: No rash.   Neurological:      Mental Status: She is alert and oriented to person, place, and time.      Cranial Nerves: No cranial nerve deficit.      Motor: No abnormal muscle tone.      Gait: Gait normal.   Psychiatric:         Attention and Perception: Attention normal.         Mood and Affect: Mood is anxious.         Speech: Speech normal.         Behavior: Behavior normal.         Thought Content: Thought content normal.         Cognition and Memory: Cognition normal.         Judgment: Judgment normal.             I personally reviewed testing with patient.  Mild hyperglycemia  Chronic kidney disease stage IIIa stable  Hyperlipidemia    Otherwise labs within normal limits    Lab Results   Component Value Date    WBC 5.7 08/02/2023    HGB 14.7 08/02/2023    HCT 44.1 08/02/2023    MCV 93.2 08/02/2023     08/02/2023       Lab Results   Component Value Date/Time     05/28/2024 10:03 AM    K 4.3 05/28/2024 10:03 AM     05/28/2024 10:03 AM    CO2 27 05/28/2024 10:03 AM    BUN 16 05/28/2024 10:03 AM    CREATININE 1.0 05/28/2024 10:03 AM    GLUCOSE 117 05/28/2024 10:03 AM    GLUCOSE 104 02/27/2012 09:49 AM    CALCIUM 9.2 05/28/2024 10:03 AM      Lab Results   Component Value Date    LABA1C 5.4 05/24/2023    LABA1C 5.3 05/16/2022    LABA1C 5.4 03/16/2020       Lab Results   Component Value Date    ALT 9 05/28/2024    AST 14 05/28/2024    ALKPHOS 74 05/28/2024    BILITOT 0.4 05/28/2024       Lab Results   Component Value Date    TSH 2.94 05/24/2023       Lab Results   Component Value Date    CHOL 200 (H) 05/24/2023    CHOL 153 06/01/2021    CHOL 132 03/16/2020     Lab Results   Component Value Date    TRIG 102 05/24/2023    TRIG 109 06/01/2021    TRIG 63 03/16/2020     Lab Results   Component Value Date    HDL 62 05/24/2023    HDL 63 06/01/2021    HDL 66 03/16/2020     Lab Results   Component Value Date     05/24/2023       No components found

## 2024-06-16 VITALS
SYSTOLIC BLOOD PRESSURE: 102 MMHG | TEMPERATURE: 97.6 F | HEART RATE: 77 BPM | DIASTOLIC BLOOD PRESSURE: 86 MMHG | OXYGEN SATURATION: 98 % | WEIGHT: 121.6 LBS | HEIGHT: 63 IN | BODY MASS INDEX: 21.55 KG/M2

## 2024-06-16 PROBLEM — E89.2 POSTPROCEDURAL HYPOPARATHYROIDISM (HCC): Status: RESOLVED | Noted: 2024-06-13 | Resolved: 2024-06-16

## 2024-06-16 PROBLEM — R42 DIZZINESS: Status: RESOLVED | Noted: 2023-02-20 | Resolved: 2024-06-16

## 2024-06-16 PROBLEM — E87.1 HYPONATREMIA: Status: RESOLVED | Noted: 2021-08-16 | Resolved: 2024-06-16

## 2024-06-16 PROBLEM — R77.8 ABNORMAL SERUM PROTEIN ELECTROPHORESIS: Status: RESOLVED | Noted: 2019-05-14 | Resolved: 2024-06-16

## 2024-06-24 ENCOUNTER — HOSPITAL ENCOUNTER (OUTPATIENT)
Age: 84
Discharge: HOME | End: 2024-06-24
Attending: FAMILY MEDICINE
Payer: MEDICARE

## 2024-06-24 ENCOUNTER — HOSPITAL ENCOUNTER (OUTPATIENT)
Dept: WOMENS IMAGING | Age: 84
Discharge: HOME | End: 2024-06-26
Attending: FAMILY MEDICINE
Payer: MEDICARE

## 2024-06-24 DIAGNOSIS — Z78.0 POSTMENOPAUSAL STATE: ICD-10-CM

## 2024-06-24 DIAGNOSIS — R92.8 ABNORMALITY OF LEFT BREAST ON SCREENING MAMMOGRAM: Primary | ICD-10-CM

## 2024-06-24 DIAGNOSIS — R73.9 HYPERGLYCEMIA: ICD-10-CM

## 2024-06-24 DIAGNOSIS — I12.9 BENIGN HYPERTENSION WITH CKD (CHRONIC KIDNEY DISEASE) STAGE III (HCC): ICD-10-CM

## 2024-06-24 DIAGNOSIS — E55.9 VITAMIN D DEFICIENCY: ICD-10-CM

## 2024-06-24 DIAGNOSIS — E53.8 VITAMIN B 12 DEFICIENCY: ICD-10-CM

## 2024-06-24 DIAGNOSIS — Z12.31 ENCOUNTER FOR SCREENING MAMMOGRAM FOR BREAST CANCER: ICD-10-CM

## 2024-06-24 DIAGNOSIS — R53.82 CHRONIC FATIGUE: ICD-10-CM

## 2024-06-24 DIAGNOSIS — E78.5 HYPERLIPIDEMIA WITH TARGET LDL LESS THAN 100: ICD-10-CM

## 2024-06-24 DIAGNOSIS — N18.30 BENIGN HYPERTENSION WITH CKD (CHRONIC KIDNEY DISEASE) STAGE III (HCC): ICD-10-CM

## 2024-06-24 LAB
25(OH)D3 SERPL-MCNC: 43.3 NG/ML (ref 30–100)
ALBUMIN SERPL-MCNC: 4.2 G/DL (ref 3.5–5.2)
ALP SERPL-CCNC: 76 U/L (ref 35–104)
ALT SERPL-CCNC: 8 U/L (ref 5–33)
ANION GAP SERPL CALCULATED.3IONS-SCNC: 10 MMOL/L (ref 9–17)
AST SERPL-CCNC: 13 U/L
BACTERIA URNS QL MICRO: ABNORMAL
BILIRUB SERPL-MCNC: 0.5 MG/DL (ref 0.3–1.2)
BILIRUB UR QL STRIP: NEGATIVE
BUN SERPL-MCNC: 13 MG/DL (ref 8–23)
CALCIUM SERPL-MCNC: 8.9 MG/DL (ref 8.6–10.4)
CHLORIDE SERPL-SCNC: 103 MMOL/L (ref 98–107)
CHOLEST SERPL-MCNC: 207 MG/DL
CHOLESTEROL/HDL RATIO: 3.6
CLARITY UR: CLEAR
CO2 SERPL-SCNC: 26 MMOL/L (ref 20–31)
COLOR UR: YELLOW
CREAT SERPL-MCNC: 1 MG/DL (ref 0.5–0.9)
EPI CELLS #/AREA URNS HPF: ABNORMAL /HPF
ERYTHROCYTE [DISTWIDTH] IN BLOOD BY AUTOMATED COUNT: 13.2 % (ref 11.5–14.9)
EST. AVERAGE GLUCOSE BLD GHB EST-MCNC: 105 MG/DL
FOLATE SERPL-MCNC: 7.5 NG/ML (ref 4.8–24.2)
GFR, ESTIMATED: 56 ML/MIN/1.73M2
GLUCOSE SERPL-MCNC: 110 MG/DL (ref 70–99)
GLUCOSE UR STRIP-MCNC: NEGATIVE MG/DL
HBA1C MFR BLD: 5.3 % (ref 4–6)
HCT VFR BLD AUTO: 39.5 % (ref 36–46)
HDLC SERPL-MCNC: 57 MG/DL
HGB BLD-MCNC: 13.4 G/DL (ref 12–16)
HGB UR QL STRIP.AUTO: NEGATIVE
KETONES UR STRIP-MCNC: NEGATIVE MG/DL
LDLC SERPL CALC-MCNC: 133 MG/DL (ref 0–130)
LEUKOCYTE ESTERASE UR QL STRIP: ABNORMAL
MAGNESIUM SERPL-MCNC: 1.9 MG/DL (ref 1.6–2.6)
MCH RBC QN AUTO: 31.8 PG (ref 26–34)
MCHC RBC AUTO-ENTMCNC: 34 G/DL (ref 31–37)
MCV RBC AUTO: 93.5 FL (ref 80–100)
NITRITE UR QL STRIP: NEGATIVE
PH UR STRIP: 7 [PH] (ref 5–8)
PLATELET # BLD AUTO: 206 K/UL (ref 150–450)
PMV BLD AUTO: 7.1 FL (ref 6–12)
POTASSIUM SERPL-SCNC: 4.2 MMOL/L (ref 3.7–5.3)
PROT SERPL-MCNC: 6.7 G/DL (ref 6.4–8.3)
PROT UR STRIP-MCNC: ABNORMAL MG/DL
RBC # BLD AUTO: 4.22 M/UL (ref 4–5.2)
RBC #/AREA URNS HPF: ABNORMAL /HPF
SODIUM SERPL-SCNC: 139 MMOL/L (ref 135–144)
SP GR UR STRIP: 1.02 (ref 1–1.03)
TRIGL SERPL-MCNC: 84 MG/DL
TSH SERPL DL<=0.05 MIU/L-ACNC: 2.66 UIU/ML (ref 0.3–5)
URATE SERPL-MCNC: 6.1 MG/DL (ref 2.4–5.7)
UROBILINOGEN UR STRIP-ACNC: NORMAL EU/DL (ref 0–1)
VIT B12 SERPL-MCNC: 388 PG/ML (ref 232–1245)
WBC #/AREA URNS HPF: ABNORMAL /HPF
WBC OTHER # BLD: 6.3 K/UL (ref 3.5–11)

## 2024-06-24 PROCEDURE — 84443 ASSAY THYROID STIM HORMONE: CPT

## 2024-06-24 PROCEDURE — 36415 COLL VENOUS BLD VENIPUNCTURE: CPT

## 2024-06-24 PROCEDURE — 82746 ASSAY OF FOLIC ACID SERUM: CPT

## 2024-06-24 PROCEDURE — 83036 HEMOGLOBIN GLYCOSYLATED A1C: CPT

## 2024-06-24 PROCEDURE — 77080 DXA BONE DENSITY AXIAL: CPT

## 2024-06-24 PROCEDURE — 81001 URINALYSIS AUTO W/SCOPE: CPT

## 2024-06-24 PROCEDURE — 82607 VITAMIN B-12: CPT

## 2024-06-24 PROCEDURE — 85027 COMPLETE CBC AUTOMATED: CPT

## 2024-06-24 PROCEDURE — 84550 ASSAY OF BLOOD/URIC ACID: CPT

## 2024-06-24 PROCEDURE — 80061 LIPID PANEL: CPT

## 2024-06-24 PROCEDURE — 82306 VITAMIN D 25 HYDROXY: CPT

## 2024-06-24 PROCEDURE — 83735 ASSAY OF MAGNESIUM: CPT

## 2024-06-24 PROCEDURE — 77063 BREAST TOMOSYNTHESIS BI: CPT

## 2024-06-24 PROCEDURE — 80053 COMPREHEN METABOLIC PANEL: CPT

## 2024-06-24 NOTE — RESULT ENCOUNTER NOTE
Please notify patient: DEXA scan still shows osteoporosis the lowest score -2.6, slightly increased from prior  Please verify with patient when was the last time she got the Prolia injection? I need to know    I do suggest to restart Prolia or we can switch to Reclast if no issues with her teeth or major upcoming dental work    Future Appointments  10/18/2024 1:00 PM    Harper Cooper MD    [facility]               [facility]

## 2024-06-24 NOTE — RESULT ENCOUNTER NOTE
Please notify patient: Incomplete mammogram needs additional imaging for left breast  The breasts are dense, and they want to make sure about the readings  I will order additional testing for left breast, please give her contact info to schedule    Future Appointments  10/18/2024 1:00 PM    Harper Cooper MD    [facility]               [facility]

## 2024-06-26 NOTE — RESULT ENCOUNTER NOTE
Please notify patient that there does not seem to be any infection in her urine currently.  Some protein is noted, can be secondary to hypertension.    Kidney function seems to be at baseline.    Uric acid is a bit elevated, which can lead to kidney stones and gout, recommending a diet lower in purine. Can consider adding allopurinol to her regimen as well if she is interested, please let me know.    Slightly worsening cholesterol level.  Lifestyle modifications recommended, can consider placing back on a statin if patient is interested, please let me know.    Other labs seem to be within normal limits.

## 2024-07-04 ENCOUNTER — PATIENT MESSAGE (OUTPATIENT)
Dept: FAMILY MEDICINE CLINIC | Age: 84
End: 2024-07-04

## 2024-07-05 RX ORDER — TRAZODONE HYDROCHLORIDE 100 MG/1
100 TABLET ORAL NIGHTLY
Qty: 90 TABLET | Refills: 1 | Status: SHIPPED | OUTPATIENT
Start: 2024-07-05

## 2024-07-05 NOTE — TELEPHONE ENCOUNTER
From: Ap Napier  To: Dr. Harper Cooper  Sent: 7/4/2024 1:27 PM EDT  Subject: Sleeping pills    Dr. Cooper, I would like to go back on trazadone as eszopiclone doesn't work for me. The trazadone works best for my insomnia. If I need a new script please send it to [facility] in [place] will be closing. Thank you and I hope you have a relaxing 4th of July. Ap Napier

## 2024-07-15 ENCOUNTER — HOSPITAL ENCOUNTER (OUTPATIENT)
Dept: PHYSICAL THERAPY | Age: 84
Setting detail: THERAPIES SERIES
Discharge: HOME | End: 2024-07-15
Attending: FAMILY MEDICINE
Payer: MEDICARE

## 2024-07-15 PROCEDURE — 97161 PT EVAL LOW COMPLEX 20 MIN: CPT

## 2024-07-15 PROCEDURE — 97110 THERAPEUTIC EXERCISES: CPT

## 2024-07-15 NOTE — CONSULTS
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 552: Pain in left hip , Pain in left knee M25. 562 , M54. 6: Pain in thoracic spine , M54. 5: Low back pain   Onset Date: chronic   Next 's appt: TBD    Precautions: osteoporosis, fall risk    Subjective:   Pt comes in reporting with chronic lumbar / thoracic and LLE pain. Back pain is intermittent but the LLE pain comes and goes. Denies any numbness or tingling at this time. Pain at LLE starts at the L knee and goes down into her L ankle. Reports that her L knee has arthritis in. Also notes to have had a DEXA scan done that does confirm osteoporosis. Household duties causes an increase in the back pain. Denies any recent falls. She also notes that she used to go to the [employer] but when COVID hit she stopped going. She also used to walk but no longer does this anymore.       PMHx: [] Unremarkable [] Diabetes [] HTN  [] Pacemaker   [] MI/Heart Problems [] Cancer [] Arthritis [] Other:               Past Medical History:   Diagnosis Date    Abdominal pain     Age-related osteoporosis without current pathological fracture 2023    Allergic rhinitis     Anxiety     Chronic back pain     Chronic fatigue     Chronic kidney disease     Chronic renal disease, stage III (HCC) [199341] 2022    Chronic

## 2024-07-16 ENCOUNTER — HOSPITAL ENCOUNTER (OUTPATIENT)
Dept: WOMENS IMAGING | Age: 84
Discharge: HOME | End: 2024-07-18
Attending: FAMILY MEDICINE
Payer: MEDICARE

## 2024-07-16 DIAGNOSIS — R92.8 ABNORMALITY OF LEFT BREAST ON SCREENING MAMMOGRAM: ICD-10-CM

## 2024-07-16 DIAGNOSIS — R92.8 OTHER ABNORMAL AND INCONCLUSIVE FINDINGS ON DIAGNOSTIC IMAGING OF BREAST: ICD-10-CM

## 2024-07-16 DIAGNOSIS — N63.20 MASS OF LEFT BREAST, UNSPECIFIED QUADRANT: Primary | ICD-10-CM

## 2024-07-16 PROCEDURE — G0279 TOMOSYNTHESIS, MAMMO: HCPCS

## 2024-07-16 PROCEDURE — 76642 ULTRASOUND BREAST LIMITED: CPT

## 2024-07-16 NOTE — RESULT ENCOUNTER NOTE
Noted, duplicate, order for left breast biopsy placed      Future Appointments  7/24/2024  1:45 PM    Vandana Monterroso, PTA       [facility]         [facility]  7/26/2024  2:45 PM    Vandana Monterroso, PTA       [facility]         [facility]  7/29/2024  2:30 PM    Vandana Monterroso, PTA       [facility]         [facility]  7/31/2024  2:30 PM    Vandana Monterroso, PTA       [facility]         [facility]  8/5/2024   2:45 PM    Ansley Caceres, PT         [facility]         [facility]  8/7/2024   1:45 PM    Gregoria Brunner, PTA     [facility]         [facility]  8/12/2024  1:45 PM    Ansley Caceres, PT         [facility]         [facility]  8/15/2024  1:45 PM    Ansley Caceres, PT         [facility]         [facility]  10/18/2024 1:00 PM    Harper Cooper MD    [facility]

## 2024-07-16 NOTE — RESULT ENCOUNTER NOTE
Please notify patient: A 5 mm left breast area of concern noted on the diagnostic mammogram and an ultrasound, she needs biopsy, I placed the order, someone will contact her to schedule    To let us know if any issues scheduling    Future Appointments  7/24/2024  1:45 PM    Vandana Monterroso, PTA       [facility]         [facility]  7/26/2024  2:45 PM    Vandana Monterroso, PTA       [facility]         [facility]  7/29/2024  2:30 PM    Vandana Monterroso, PTA       [facility]         [facility]  7/31/2024  2:30 PM    Vandana Monterroso, PTA       [facility]         [facility]  8/5/2024   2:45 PM    Ansley Caceres, PT         [facility]         [facility]  8/7/2024   1:45 PM    Gregoria Brunner, PTA     [facility]         [facility]  8/12/2024  1:45 PM    Ansley Caceres, PT         [facility]         [facility]  8/15/2024  1:45 PM    Ansley Caceres, PT         [facility]         [facility]  10/18/2024 1:00 PM    Harper Cooper MD    [facility]               [facility]PP

## 2024-07-24 ENCOUNTER — HOSPITAL ENCOUNTER (OUTPATIENT)
Dept: PHYSICAL THERAPY | Age: 84
Setting detail: THERAPIES SERIES
Discharge: HOME | End: 2024-07-24
Attending: FAMILY MEDICINE
Payer: MEDICARE

## 2024-07-24 PROCEDURE — 97110 THERAPEUTIC EXERCISES: CPT

## 2024-07-24 NOTE — FLOWSHEET NOTE
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 552: Pain in left hip , Pain in left knee M25. 562 , M54. 6: Pain in thoracic spine , M54. 5: Low back pain   Onset Date: chronic              Next 's appt: TBD  Visit# / total visits:   Cancels/No Shows: 0/0    Subjective:    Pt reported 7/10 pain in thoracic spine, denies pain in lumbar region and R LE. Pt with no complaints from initial eval. Pt reports compliance HEP, pt has increased thoracic pain during shoulder retractions.    Pain:  [x] Yes  [] No Location: thoracic spine   Pain Rating: (0-10 scale) 7/10  Pain altered Tx:  [x] No  [] Yes  Action:  Comments:    Objective:  Modalities:   Precautions: osteoporosis, fall risk   Exercises:  Exercise Reps/ Time Weight/ Level Completed  Today Comments   HP 25'  x Applied heat to thoracic spine during supine exercises   Mat Level        LTR 10x 5\" hold   x    SKTC 20\"x 3 ea  x    Hamstring Stretch 30\"x3   x Therapist Assisted    Open Books  10x 3\" hold ea   x    Bridges  10x 2  x           Seated        Thoracic Rotation  10x 5\" hold  x    Side-Bending  10x 5\" hold  x    Pec-Stretch  30\"x3   x Therapist assisted with pts hands behind head          LAQ 10x  x HEP   March  10x  x HEP    Scapular Retraction  10x 3\" hold  x

## 2024-07-26 ENCOUNTER — HOSPITAL ENCOUNTER (OUTPATIENT)
Dept: PHYSICAL THERAPY | Age: 84
Setting detail: THERAPIES SERIES
Discharge: HOME | End: 2024-07-26
Attending: FAMILY MEDICINE
Payer: MEDICARE

## 2024-07-26 PROCEDURE — 97110 THERAPEUTIC EXERCISES: CPT

## 2024-07-26 RX ORDER — OMEPRAZOLE 20 MG/1
CAPSULE, DELAYED RELEASE ORAL
Qty: 60 CAPSULE | Refills: 3 | Status: SHIPPED | OUTPATIENT
Start: 2024-07-26

## 2024-07-26 NOTE — FLOWSHEET NOTE
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 552: Pain in left hip , Pain in left knee M25. 562 , M54. 6: Pain in thoracic spine , M54. 5: Low back pain   Onset Date: chronic              Next 's appt: TBD  Visit# / total visits: 3/18  Cancels/No Shows: 0/0    Subjective:    Pt reported 2/10 pain in thoracic spine. Pt reports pain of a 9/10 L knee and 8/10 in L foot. Pt has been using HP at home and has noticed relief. Pt with no complains from previous treatment.    Pain:  [x] Yes  [] No Location: thoracic spine, L knee, L foot Pain Rating: (0-10 scale) 2/10, 9/10, 8/10  Pain altered Tx:  [x] No  [] Yes  Action:  Comments:    Objective:  Modalities:   Precautions: osteoporosis, fall risk   Exercises:  Exercise Reps/ Time Weight/ Level Completed  Today Comments   HP 22'   Applied heat to thoracic spine during supine exercises   Mat Level        LTR 10x 5\" hold   x    SKTC 20\"x 3 ea  x    Hamstring Stretch 30\"x3   x Therapist Assisted    Open Books  10x 3\" hold ea   x    Bridges  10x 2             Seated        Thoracic Rotation  10x 5\" hold  x    Side-Bending  10x 5\" hold  x    Pec-Stretch  30\"x3   x Therapist assisted with pts hands behind head          LAQ 10x   HEP   March  10x   HEP    Scapular Retraction  10x 3\"

## 2024-07-29 ENCOUNTER — HOSPITAL ENCOUNTER (OUTPATIENT)
Dept: PHYSICAL THERAPY | Age: 84
Setting detail: THERAPIES SERIES
Discharge: HOME | End: 2024-07-29
Attending: FAMILY MEDICINE
Payer: MEDICARE

## 2024-07-29 PROCEDURE — 97110 THERAPEUTIC EXERCISES: CPT

## 2024-07-29 NOTE — FLOWSHEET NOTE
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 552: Pain in left hip , Pain in left knee M25. 562 , M54. 6: Pain in thoracic spine , M54. 5: Low back pain   Onset Date: chronic              Next 's appt: TBD  Visit# / total visits:   Cancels/No Shows: 0/0    Subjective:    Pt reported 2/10 pain in thoracic spine. Pt reports pain of a 5/10 L knee and 6/10 in L foot that is aggravated with activities. Pt stated feeling good following previous visit.     Pain:  [x] Yes  [] No Location: thoracic spine, L knee, L foot Pain Ratin/10. 5/10, 6/10  (0-10 scale)  Pain altered Tx:  [x] No  [] Yes  Action:  Comments:    Objective:  Modalities:   Precautions: osteoporosis, fall risk   Exercises:  Exercise Reps/ Time Weight/ Level Completed  Today Comments   HP 20'  x Applied heat to thoracic spine during supine exercises   Mat Level        LTR 10x 5\" hold   x    SKTC 20\"x 3 ea  x    Hamstring Stretch 30\"x3   x Therapist Assisted    Open Books  10x 3\" hold ea       Bridges  10x 2 Yellow  x Added resistance            Seated        Thoracic Rotation  10x 5\" hold  x    Side-Bending  10x 5\" hold  x    Pec-Stretch  30\"x3   x Therapist assisted with pts hands behind head          LAQ 10x   HEP   March  10x   HEP    Scapular 
10x      Step ups: fwd, lat 10x 2 ea      Overhead Press  10x 2 1# x    Bicep Curls 10x 2  x    Other:    Specific Instructions for next treatment: Continue to work on thoracic and lumbar mobility, UE and LE strengthening, HEP (update as appropriate) , HP during mat level exercises    Assessment: [x] Progressing toward goals. Treatment began with thoracic and lumbar mobility exercises to reduce muscular tension.     Continued with most recent progressions and strengthening exercises to promote functional UE and LE.   Pt reported no increase in pain during treatment session.    [] No change.     [] Other:    [x] Patient would continue to benefit from skilled physical therapy services in order to:  decrease overall pain and improve ROM and strength to work towards improving overall functional mobility at PLOF.    Goals:  STG: (to be met in 9 treatments)  Pt will self report worst pain no greater than 6/10 in order to better tolerate ADLs/work activities with minimal dysfunction  Pt will improve AROM in FLAVIO thoracic rotation to 50% in order to demonstrate ability to move/reach in all planes unrestricted at PLOF  Pt will improve AROM in FLAVIO lumbar side bending to 50% in order to demonstrate ability to move/reach in all planes unrestricted at PLOF  Pt will improve AROM in L lumbar rotation to 75% in order to demonstrate ability to move/reach in all planes unrestricted at PLOF  LTG: (to be met in 18 treatments)  Pt will demonstrate improved FLAVIO LE strength to 4/5 or greater in order to demonstrate improved stability/strength necessary for unrestricted ADLs/work activities  Pt will demonstrate improved FLAVIO UE strength to 4/5 or greater in order to demonstrate improved stability/strength necessary for unrestricted ADLs/work activities  Pt will decrease 42% to 30% on Mod MOLLY in order to demonstrate improved functional tolerances at PLOF with minimal restriction/dysfunction  Pt will decrease from 13.23 seconds to 11 seconds

## 2024-07-31 ENCOUNTER — HOSPITAL ENCOUNTER (OUTPATIENT)
Dept: PHYSICAL THERAPY | Age: 84
Setting detail: THERAPIES SERIES
Discharge: HOME | End: 2024-07-31
Attending: FAMILY MEDICINE
Payer: MEDICARE

## 2024-07-31 PROCEDURE — 97110 THERAPEUTIC EXERCISES: CPT

## 2024-07-31 NOTE — FLOWSHEET NOTE
strength to 4/5 or greater in order to demonstrate improved stability/strength necessary for unrestricted ADLs/work activities  Pt will decrease 42% to 30% on Mod MOLLY in order to demonstrate improved functional tolerances at PLOF with minimal restriction/dysfunction  Pt will decrease from 13.23 seconds to 11 seconds or less on TUG in order to demonstrate improved functional tolerances with decrease fall risk at PLOF with minimal restriction/dysfunction  Pt will demonstrate independence with a long term HEP for continued progress/maintenance after completion of PT  Pt will report the ability to sweep at home with no more than mild pain at her dorsal area to show improving tolerance to ADLs with minimal dysfunction at PLOF  Pt will be able to navigate stairs with noting mild to no LLE pain to be able to get in and out of home with better tolerance at minimal dysfunction        Pt. Education:  [x] Yes  [] No  [] Reviewed Prior HEP/Ed  Method of Education: [x] Verbal  [x] Demo  [] Written  Comprehension of Education:  [x] Verbalizes understanding.  [x] Demonstrates understanding.  [] Needs review.  [] Demonstrates/verbalizes HEP/Ed previously given.    Access Code: [email or web address]  URL: [email or web address]  Date: 07/15/2024  Prepared by: Ansley Caceres     HEP Exercises  - Seated Long Arc Quad  - 1 x daily - 2-3 x weekly - 3 sets - 10 reps  - Seated March  - 1 x daily - 2-3 x weekly - 3 sets - 10 reps  - Standing Hip Abduction with Counter Support  - 1 x daily - 2-3 x weekly - 3 sets - 10 reps  - Standing Hip Extension with Counter Support  - 1 x daily - 2-3 x weekly - 3 sets - 10 reps  - Seated Scapular Retraction  - 1 x daily - 2-3 x weekly - 3 sets - 10 reps    Plan: [x] Continue per plan of care.   [] Other:      Treatment Charges: Mins Units   [x]  Modalities: HP 20 0   [x]  Ther Exercise 45 3   []  Manual Therapy     []  Ther Activities     []  Aquatics     []  Neuromuscular     []  Other     Total Billable time

## 2024-08-04 ENCOUNTER — PATIENT MESSAGE (OUTPATIENT)
Dept: FAMILY MEDICINE CLINIC | Age: 84
End: 2024-08-04

## 2024-08-04 DIAGNOSIS — F33.42 MDD (MAJOR DEPRESSIVE DISORDER), RECURRENT, IN FULL REMISSION (HCC): ICD-10-CM

## 2024-08-04 DIAGNOSIS — F51.04 PSYCHOPHYSIOLOGICAL INSOMNIA: Primary | ICD-10-CM

## 2024-08-05 ENCOUNTER — HOSPITAL ENCOUNTER (OUTPATIENT)
Dept: PHYSICAL THERAPY | Age: 84
Setting detail: THERAPIES SERIES
Discharge: HOME | End: 2024-08-05
Attending: FAMILY MEDICINE
Payer: MEDICARE

## 2024-08-05 PROCEDURE — 97110 THERAPEUTIC EXERCISES: CPT

## 2024-08-05 NOTE — FLOWSHEET NOTE
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 552: Pain in left hip , Pain in left knee M25. 562 , M54. 6: Pain in thoracic spine , M54. 5: Low back pain   Onset Date: chronic              Next 's appt: TBD  Visit# / total visits:   Cancels/No Shows: 0/0    Subjective:    Pt comes in with less pain at this date. She states that she was able to vacuum a little more than usual d/t the less pain.    Pain:  [x] Yes  [] No Location: thoracic spine, L knee, L foot Pain Ratin/10,  3/10, 6/10  (0-10 scale)  Pain altered Tx:  [x] No  [] Yes  Action:  Comments:    Objective:  Modalities:   Precautions: osteoporosis, fall risk   Exercises:  Exercise Reps/ Time Weight/ Level Completed  Today Comments   HP 15'  x Applied heat to thoracic spine during supine exercises   Mat Level        LTR 10x 5\" hold   x    SKTC 30\"x 3 ea  x Increase hold time 8/   Hamstring Stretch 30\"x3    Therapist Assisted    Open Books  10x 3\" hold ea   x    Bridges  10x 2 Yellow  x Added resistance     BKFO 10 x yellow x Added 8/          Seated        Thoracic Rotation  10x 5\" hold  x    Side-Bending  10x 5\" hold  x    Pec-Stretch  30\"x3   x Therapist assisted with pts hands behind head          LAQ 10x      March  10x      Scapular Retraction

## 2024-08-05 NOTE — TELEPHONE ENCOUNTER
From: Ap Napier  To: Dr. Harper Cooper  Sent: 8/4/2024 1:57 PM EDT  Subject: zaleplon    I have started taking what's left of zaleplon as the trazodone and eszopiclone aren't working for me. I have 14 pills left and would like you to order a new script for me from [facility] please. Thank you.  Ap Napier

## 2024-08-07 ENCOUNTER — HOSPITAL ENCOUNTER (OUTPATIENT)
Dept: PHYSICAL THERAPY | Age: 84
Setting detail: THERAPIES SERIES
Discharge: HOME | End: 2024-08-07
Attending: FAMILY MEDICINE
Payer: MEDICARE

## 2024-08-07 PROCEDURE — 97110 THERAPEUTIC EXERCISES: CPT

## 2024-08-07 NOTE — FLOWSHEET NOTE
lumbar side bending to 50% in order to demonstrate ability to move/reach in all planes unrestricted at PLOF  Pt will improve AROM in L lumbar rotation to 75% in order to demonstrate ability to move/reach in all planes unrestricted at PLOF  LTG: (to be met in 18 treatments)  Pt will demonstrate improved FLAVIO LE strength to 4/5 or greater in order to demonstrate improved stability/strength necessary for unrestricted ADLs/work activities  Pt will demonstrate improved FLAVIO UE strength to 4/5 or greater in order to demonstrate improved stability/strength necessary for unrestricted ADLs/work activities  Pt will decrease 42% to 30% on Mod MOLLY in order to demonstrate improved functional tolerances at PLOF with minimal restriction/dysfunction  Pt will decrease from 13.23 seconds to 11 seconds or less on TUG in order to demonstrate improved functional tolerances with decrease fall risk at PLOF with minimal restriction/dysfunction  Pt will demonstrate independence with a long term HEP for continued progress/maintenance after completion of PT  Pt will report the ability to sweep at home with no more than mild pain at her dorsal area to show improving tolerance to ADLs with minimal dysfunction at PLOF  Pt will be able to navigate stairs with noting mild to no LLE pain to be able to get in and out of home with better tolerance at minimal dysfunction        Pt. Education:  [x] Yes  [] No  [] Reviewed Prior HEP/Ed  Method of Education: [x] Verbal  [x] Demo  [] Written  Comprehension of Education:  [x] Verbalizes understanding.  [x] Demonstrates understanding.  [] Needs review.  [] Demonstrates/verbalizes HEP/Ed previously given.    Access Code: [email or web address]  URL: [email or web address]  Date: 07/15/2024  Prepared by: Ansley Caceres     HEP Exercises  - Seated Long Arc Quad  - 1 x daily - 2-3 x weekly - 3 sets - 10 reps  - Seated March  - 1 x daily - 2-3 x weekly - 3 sets - 10 reps  - Standing Hip Abduction with Counter Support  - 1

## 2024-08-12 ENCOUNTER — HOSPITAL ENCOUNTER (OUTPATIENT)
Dept: PHYSICAL THERAPY | Age: 84
Setting detail: THERAPIES SERIES
Discharge: HOME | End: 2024-08-12
Attending: FAMILY MEDICINE
Payer: MEDICARE

## 2024-08-12 PROCEDURE — 97110 THERAPEUTIC EXERCISES: CPT

## 2024-08-12 NOTE — FLOWSHEET NOTE
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 552: Pain in left hip , Pain in left knee M25. 562 , M54. 6: Pain in thoracic spine , M54. 5: Low back pain   Onset Date: chronic              Next 's appt: TBD  Visit# / total visits:   Cancels/No Shows: 0/0    Subjective:    Patient comes in stating that she was doing her HEP and it seems to be bothering her L knee more than anything.    Pain:  [x] Yes  [] No Location: L knee Pain Ratin/10  (0-10 scale)  Pain altered Tx:  [x] No  [] Yes  Action:  Comments:    Objective:  Modalities:   Precautions: osteoporosis, fall risk   Exercises:  Exercise Reps/ Time Weight/ Level Completed  Today Comments   HP 15'  x Applied heat to thoracic spine during supine exercises   Mat Level        LTR 10x 5\" hold   x    SKTC 30\"x 3 ea   Increase hold time    Hamstring Stretch 30\"x3    Therapist Assisted    Open Books  10x 3\" hold ea   x    Bridges  15 x 2 Yellow  x Added resistance    Increase reps    BKFO 10 x 2 yellow x Added    Marches 10 x 2 yellow x Added    Side Lying Hip ABD 10 x 2  x Added           Seated        Thoracic Rotation  10x 5\" hold  x    Side-Bending  10x 5\" hold      Pec-Stretch  30\"x3    Therapist assisted with pts hands behind head

## 2024-08-13 RX ORDER — ZALEPLON 5 MG/1
5 CAPSULE ORAL NIGHTLY
Qty: 90 CAPSULE | Refills: 0 | Status: SHIPPED | OUTPATIENT
Start: 2024-08-13 | End: 2024-11-11

## 2024-08-13 RX ORDER — ZALEPLON 5 MG/1
5 CAPSULE ORAL NIGHTLY
Qty: 90 CAPSULE | Refills: 0 | Status: SHIPPED | OUTPATIENT
Start: 2024-08-13 | End: 2024-08-13

## 2024-08-13 NOTE — TELEPHONE ENCOUNTER
Diagnosis Orders   1. Psychophysiological insomnia  zaleplon (SONATA) 5 MG capsule    DISCONTINUED: zaleplon (SONATA) 5 MG capsule      2. MDD (major depressive disorder), recurrent, in full remission (HCC)  zaleplon (SONATA) 5 MG capsule    DISCONTINUED: zaleplon (SONATA) 5 MG capsule           Future Appointments   Date Time Provider Department Center   8/15/2024  1:45 PM Ansley Caceres, PT [facility]   8/19/2024  1:45 PM Vandana Monterroso, PTA [facility]   8/21/2024  1:45 PM Vandana Monterroso, PTA [facility]   8/23/2024 10:30 AM [facility]  [facility]   8/26/2024  2:30 PM Ansley Caceres, PT [facility]   8/28/2024  1:45 PM Vandana Monterroso, PTA [facility]   10/18/2024  1:00 PM Harper Cooper MD fp sc [facility] DE[facility]

## 2024-08-15 ENCOUNTER — HOSPITAL ENCOUNTER (OUTPATIENT)
Dept: PHYSICAL THERAPY | Age: 84
Setting detail: THERAPIES SERIES
Discharge: HOME | End: 2024-08-15
Attending: FAMILY MEDICINE
Payer: MEDICARE

## 2024-08-15 PROCEDURE — 97110 THERAPEUTIC EXERCISES: CPT

## 2024-08-15 NOTE — PROGRESS NOTES
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 552: Pain in left hip , Pain in left knee M25. 562 , M54. 6: Pain in thoracic spine , M54. 5: Low back pain   Onset Date: chronic              Next 's appt: TBD  Visit# / total visits:   Cancels/No Shows: 0/0    Subjective:    Patient comes reporting that she tried to do too much yesterday with a lot of running around. Used her heating pad when she was finally able to relax. Reports that overall with therapy she has bee feeling pretty good, especially at her back. Overall pain has started to decrease.    Pain:  [x] Yes  [] No Location: L knee , lumbar Pain Ratin.5/10 , 4/10 (0-10 scale)  Pain altered Tx:  [x] No  [] Yes  Action:  Comments:  Pain at worst: 10/10 yesterday at the upper back    Objective:    Thoracic Rotation:  R Side: 25%  L Side: WFL  -more stretching in upper back        Range of Motion  Left Range of Motion  Right Strength  Left Strength  Right   Lumbar Flexion WFL       Lumbar Extension WFL       Lumbar Rotation 25% 75%       Lumbar Side Bend WFL WFL         Functional Assessment Used: Mod. MOLLY  Previous Score: 42% disability  Current Status Score: 34% disability   Goal Status Score: 30% disability or less    Modalities:

## 2024-08-19 ENCOUNTER — HOSPITAL ENCOUNTER (OUTPATIENT)
Dept: PHYSICAL THERAPY | Age: 84
Setting detail: THERAPIES SERIES
Discharge: HOME | End: 2024-08-19
Attending: FAMILY MEDICINE
Payer: MEDICARE

## 2024-08-19 PROCEDURE — 97110 THERAPEUTIC EXERCISES: CPT

## 2024-08-19 NOTE — FLOWSHEET NOTE
with Counter Support  - 1 x daily - 2-3 x weekly - 3 sets - 10 reps  - Standing Hip Extension with Counter Support  - 1 x daily - 2-3 x weekly - 3 sets - 10 reps  - Seated Scapular Retraction  - 1 x daily - 2-3 x weekly - 3 sets - 10 reps    Added 8/5  - Sidelying Thoracic Rotation with Open Book  - 1 x daily - 2-3 x weekly - 1-2 sets - 10 reps - 3 seconds hold  - Hooklying Single Knee to Chest Stretch  - 1 x daily - 2-3 x weekly - 1 sets - 10 reps - 20-30 seconds hold  - Standing Bicep Curls Supinated with Dumbbells  - 1 x daily - 2-3 x weekly - 2 sets - 10 reps    8/7/24  - SLOW Marching  - 1 x daily - 7 x weekly - 3 sets - 10 reps - 2-3 sec hold  - Standing Hip Flexion AROM  - 1 x daily - 7 x weekly - 3 sets - 10 reps  - Standing Hip Abduction AROM  - 1 x daily - 7 x weekly - 3 sets - 10 reps  - Standing Hip Extension with Chair  - 1 x daily - 7 x weekly - 3 sets - 10 reps  - Heel Toe Raises with Counter Support  - 1 x daily - 7 x weekly - 3 sets - 10 reps  - Mini Squat with Counter Support  - 1 x daily - 7 x weekly - 3 sets - 10 reps    Plan: [x] Continue per plan of care.   [] Other:      Treatment Charges: Mins Units   []  Modalities: HP     [x]  Ther Exercise 42 3   []  Manual Therapy     []  Ther Activities     []  Aquatics     []  Neuromuscular     []  Other     Total Billable time 42 3     Time In: 1:47 pm        Time Out: 2:29 pm    Electronically signed by:  RJ ODONNELL PTA

## 2024-08-21 ENCOUNTER — HOSPITAL ENCOUNTER (OUTPATIENT)
Dept: PHYSICAL THERAPY | Age: 84
Setting detail: THERAPIES SERIES
Discharge: HOME | End: 2024-08-21
Attending: FAMILY MEDICINE
Payer: MEDICARE

## 2024-08-21 PROCEDURE — 97110 THERAPEUTIC EXERCISES: CPT

## 2024-08-21 NOTE — FLOWSHEET NOTE
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 552: Pain in left hip , Pain in left knee M25. 562 , M54. 6: Pain in thoracic spine , M54. 5: Low back pain   Onset Date: chronic              Next 's appt: TBD  Visit# / total visits:   Cancels/No Shows: 0/0    Subjective:    Pt reports 4-5/10 pain in thoracic region. No complaints from previous visit and noted no changes otherwise.    Pain:  [x] Yes  [] No Location: L knee , lumbar Pain Ratin-5/10 (0-10 scale)  Pain altered Tx:  [x] No  [] Yes  Action:  Comments:    Objective:  Modalities:   Precautions: osteoporosis, fall risk   Exercises:  Exercise Reps/ Time Weight/ Level Completed  Today Comments   HP 20'  x Applied heat to thoracic spine during supine exercises   Mat Level        LTR 10x 5\" hold   x    SKTC 30\"x 3 ea  x Increase hold time 8   Hamstring Stretch 30\"x3   x Therapist Assisted    Open Books  10x 3\" hold ea   x    Therading the needle  10 x ea 3\" hold x Added    Cat/ Cow 10x 3\" hold x Added    Bridges  15 x 2 red  Increased resistance     BKFO 10 x 2 red  Increased resistance     Marches 10 x 2 yellow  Increased resistance     Side Lying Hip ABD 10 x 2             Seated        Thoracic Rotation  10x 5\" hold  x

## 2024-08-23 ENCOUNTER — HOSPITAL ENCOUNTER (OUTPATIENT)
Dept: WOMENS IMAGING | Age: 84
Discharge: HOME | End: 2024-08-23
Payer: MEDICARE

## 2024-08-23 ENCOUNTER — HOSPITAL ENCOUNTER (OUTPATIENT)
Dept: WOMENS IMAGING | Age: 84
End: 2024-08-23
Payer: MEDICARE

## 2024-08-23 VITALS — SYSTOLIC BLOOD PRESSURE: 139 MMHG | HEART RATE: 70 BPM | DIASTOLIC BLOOD PRESSURE: 85 MMHG

## 2024-08-23 DIAGNOSIS — R92.8 OTHER ABNORMAL AND INCONCLUSIVE FINDINGS ON DIAGNOSTIC IMAGING OF BREAST: ICD-10-CM

## 2024-08-23 DIAGNOSIS — R92.8 ABNORMAL MAMMOGRAM OF LEFT BREAST: ICD-10-CM

## 2024-08-23 DIAGNOSIS — N63.20 MASS OF LEFT BREAST, UNSPECIFIED QUADRANT: ICD-10-CM

## 2024-08-23 PROCEDURE — 88305 TISSUE EXAM BY PATHOLOGIST: CPT

## 2024-08-23 PROCEDURE — 77065 DX MAMMO INCL CAD UNI: CPT

## 2024-08-23 PROCEDURE — A4648 IMPLANTABLE TISSUE MARKER: HCPCS

## 2024-08-23 NOTE — RESULT ENCOUNTER NOTE
Noted, pending biopsy  Future Appointments  8/26/2024  2:30 PM    Ansley Caceres, PT         [facility]         [facility]  8/28/2024  1:45 PM    Vandana Monterroso, PTA       [facility]         [facility]  9/3/2024   1:30 PM    Socrates Wong, PTA       [facility]         [facility]  9/4/2024   2:00 PM    Vandana Monterroso, PTA       [facility]         [facility]  9/9/2024   1:45 PM    Ansley Caceres, PT         [facility]         [facility]  9/11/2024  1:45 PM    Vandana Monterroso, PTA       [facility]         [facility]  9/16/2024  1:45 PM    Ansley Caceres, PT         [facility]         [facility]  10/18/2024 1:00 PM    Harper Cooper MD    fp sc               [facility] DEP

## 2024-08-23 NOTE — RESULT ENCOUNTER NOTE
Noted  Awaiting biopsy pathology report  Future Appointments  8/26/2024  2:30 PM    Ansley Caceres, PT         [facility]         [facility]  8/28/2024  1:45 PM    Vandana Monterroso, PTA       [facility]         [facility]  9/3/2024   1:30 PM    Socrates Wong, PTA       [facility]         [facility]  9/4/2024   2:00 PM    Vandana Monterroso, PTA       [facility]          [facility]  9/9/2024   1:45 PM    Ansley Caceres, PT         [facility]          [facility]  9/11/2024  1:45 PM    Vandana Monterroso, PTA       [facility]          [facility]  9/16/2024  1:45 PM    Ansley Caceres, PT         [facility]         [facility]  10/18/2024 1:00 PM    Harper Cooper MD     sc               [facility] DEP

## 2024-08-26 ENCOUNTER — HOSPITAL ENCOUNTER (OUTPATIENT)
Dept: PHYSICAL THERAPY | Age: 84
Setting detail: THERAPIES SERIES
Discharge: HOME | End: 2024-08-26
Attending: FAMILY MEDICINE
Payer: MEDICARE

## 2024-08-26 LAB — SURGICAL PATHOLOGY REPORT: NORMAL

## 2024-08-26 PROCEDURE — 97110 THERAPEUTIC EXERCISES: CPT

## 2024-08-26 NOTE — FLOWSHEET NOTE
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 552: Pain in left hip , Pain in left knee M25. 562 , M54. 6: Pain in thoracic spine , M54. 5: Low back pain   Onset Date: chronic              Next 's appt: TBD  Visit# / total visits:   Cancels/No Shows: 0/0    Subjective:    Pt comes in with some upper to mid thoracic pain. Request to not do anything overhead this session d/t breast biopsy and having soreness in area.    Pain:  [x] Yes  [] No Location: L knee , lumbar Pain Rating:  3-4/10 (0-10 scale)  Pain altered Tx:  [x] No  [] Yes  Action:  Comments:    Objective:  Modalities:   Precautions: osteoporosis, fall risk   Exercises:  Exercise Reps/ Time Weight/ Level Completed  Today Comments   HP 20'   Applied heat to thoracic spine during supine exercises   Mat Level        LTR 10x 5\" hold       SKTC 30\"x 3 ea   Increase hold time    Hamstring Stretch 30\"x3    Therapist Assisted    Open Books  10x 3\" hold ea       Therading the needle  10 x ea 3\" hold  Added    Cat/ Cow 10x 3\" hold  Added    Bridges  15 x 2 red  Increased resistance     BKFO 10 x 2 red  Increased resistance     Marches 10 x 2 yellow  Increased resistance     Side Lying Hip ABD 10 x 2             Seated

## 2024-08-28 ENCOUNTER — HOSPITAL ENCOUNTER (OUTPATIENT)
Dept: PHYSICAL THERAPY | Age: 84
Setting detail: THERAPIES SERIES
Discharge: HOME | End: 2024-08-28
Attending: FAMILY MEDICINE
Payer: MEDICARE

## 2024-08-28 NOTE — RESULT ENCOUNTER NOTE
Noted, benign pathology, no malignancy, awaiting reading for concordance  Future Appointments  9/3/2024   1:30 PM    Socrates Wong, PTA       [facility]         [facility]  9/4/2024   2:00 PM    Vandana Monterroso PTA       [facility]         [facility]  9/9/2024   1:45 PM    Ansley Caceres, PT         [facility]         [facility]  9/11/2024  1:45 PM    Vandana Monterroso PTA       [facility]         [facility]  9/16/2024  1:45 PM    Ansley Caceres, PT         [facility]         [facility]  10/18/2024 1:00 PM    Harper Cooper MD    fp sc               [facility] DEP

## 2024-08-28 NOTE — FLOWSHEET NOTE
[facility]   Outpatient Rehabilitation & Therapy  [address]  P: [phone]   F: [phone]     Physical Therapy Cancel/No Show note    Date: 2024  Patient: Ap Napier  : 1940  MRN: [id number]    Visit Count:   Cancels/No Shows to date:     For today's appointment patient:    [x]  Cancelled    [] Rescheduled appointment    [] No-show     Reason given by patient:    [x]  Patient ill    []  Conflicting appointment    [] No transportation      [] Conflict with work    [] No reason given    [] Weather related    [] COVID-19    [] Other:      Comments:        [] Next appointment was confirmed    Electronically signed by: RJ ODONNELL PTA

## 2024-08-29 NOTE — RESULT ENCOUNTER NOTE
Please notify patient: Benign, noncancerous findings on the biopsy, recheck mammogram in 1 year    Addenda  ADDENDUM:  Histology, left breast biopsy at 9:30 o'clock: Breast tissue with patchy  periductal chronic inflammation negative for atypia and malignancy.  Findings  are benign and image concordant.  Patient may resume annual screening  mammographic schedule.        Future Appointments  9/3/2024   1:30 PM    Socrates Wong, PTA       [facility]         [facility]  9/4/2024   2:00 PM    Vandana Monterroso, PTA       [facility]         [facility]  9/9/2024   1:45 PM    Ansley Caceres, PT         [facility]         [facility]  9/11/2024  1:45 PM    Vandana Monterroso, PTA       [facility]         [facility]  9/16/2024  1:45 PM    Ansley Caceres, PT         [facility]         [facility]  10/18/2024 1:00 PM    Harper Cooper MD    [facility] DEP

## 2024-08-29 NOTE — RESULT ENCOUNTER NOTE
Please notify patient: Benign findings on the breast biopsy, recheck in 1 year screening mammogram  Future Appointments  9/3/2024   1:30 PM    Socrates Wong, PTA       [facility]         [facility]  9/4/2024   2:00 PM    Vandana Monterroso, PTA       [facility]         [facility]  9/9/2024   1:45 PM    Ansley Caceres, PT         [facility]         [facility]  9/11/2024  1:45 PM    Vandana Monterroso, PTA       [facility]         [facility]  9/16/2024  1:45 PM    Ansley Caceres, PT         [facility]         [facility]  10/18/2024 1:00 PM    Harper Cooper MD    fp sc               [facility]

## 2024-09-03 ENCOUNTER — HOSPITAL ENCOUNTER (OUTPATIENT)
Dept: PHYSICAL THERAPY | Age: 84
Setting detail: THERAPIES SERIES
Discharge: HOME | End: 2024-09-03
Attending: FAMILY MEDICINE
Payer: MEDICARE

## 2024-09-03 ENCOUNTER — APPOINTMENT (OUTPATIENT)
Dept: PHYSICAL THERAPY | Age: 84
End: 2024-09-03
Attending: FAMILY MEDICINE
Payer: MEDICARE

## 2024-09-03 PROCEDURE — 97110 THERAPEUTIC EXERCISES: CPT

## 2024-09-03 NOTE — FLOWSHEET NOTE
HEP/Ed  Method of Education: [x] Verbal  [x] Demo  [] Written  Comprehension of Education:  [x] Verbalizes understanding.  [x] Demonstrates understanding.  [] Needs review.  [] Demonstrates/verbalizes HEP/Ed previously given.    Access Code: [email or web address]  URL: [email or web address]  Date: 07/15/2024  Prepared by: Ansley Caceres     HEP Exercises  - Seated Long Arc Quad  - 1 x daily - 2-3 x weekly - 3 sets - 10 reps  - Seated March  - 1 x daily - 2-3 x weekly - 3 sets - 10 reps  - Standing Hip Abduction with Counter Support  - 1 x daily - 2-3 x weekly - 3 sets - 10 reps  - Standing Hip Extension with Counter Support  - 1 x daily - 2-3 x weekly - 3 sets - 10 reps  - Seated Scapular Retraction  - 1 x daily - 2-3 x weekly - 3 sets - 10 reps    Added 8/5  - Sidelying Thoracic Rotation with Open Book  - 1 x daily - 2-3 x weekly - 1-2 sets - 10 reps - 3 seconds hold  - Hooklying Single Knee to Chest Stretch  - 1 x daily - 2-3 x weekly - 1 sets - 10 reps - 20-30 seconds hold  - Standing Bicep Curls Supinated with Dumbbells  - 1 x daily - 2-3 x weekly - 2 sets - 10 reps    8/7/24  - SLOW Marching  - 1 x daily - 7 x weekly - 3 sets - 10 reps - 2-3 sec hold  - Standing Hip Flexion AROM  - 1 x daily - 7 x weekly - 3 sets - 10 reps  - Standing Hip Abduction AROM  - 1 x daily - 7 x weekly - 3 sets - 10 reps  - Standing Hip Extension with Chair  - 1 x daily - 7 x weekly - 3 sets - 10 reps  - Heel Toe Raises with Counter Support  - 1 x daily - 7 x weekly - 3 sets - 10 reps  - Mini Squat with Counter Support  - 1 x daily - 7 x weekly - 3 sets - 10 reps    Plan: [x] Continue per plan of care.   [] Other:      Treatment Charges: Mins Units   []  Modalities: HP     [x]  Ther Exercise 42 3   []  Manual Therapy     []  Ther Activities     []  Aquatics     []  Neuromuscular     []  Other     Total Billable time 42 3     Time In: 1:30 pm        Time Out: 2:12 pm    Electronically signed by:  Socrates Wong PTA

## 2024-09-04 ENCOUNTER — APPOINTMENT (OUTPATIENT)
Dept: PHYSICAL THERAPY | Age: 84
End: 2024-09-04
Attending: FAMILY MEDICINE
Payer: MEDICARE

## 2024-09-04 ENCOUNTER — HOSPITAL ENCOUNTER (OUTPATIENT)
Dept: PHYSICAL THERAPY | Age: 84
Setting detail: THERAPIES SERIES
Discharge: HOME | End: 2024-09-04
Attending: FAMILY MEDICINE
Payer: MEDICARE

## 2024-09-04 PROCEDURE — 97110 THERAPEUTIC EXERCISES: CPT

## 2024-09-04 NOTE — FLOWSHEET NOTE
No  [] Reviewed Prior HEP/Ed  Method of Education: [x] Verbal  [] Demo  [] Written  Comprehension of Education:  [x] Verbalizes understanding.  [x] Demonstrates understanding.  [] Needs review.  [] Demonstrates/verbalizes HEP/Ed previously given.    Access Code: [email or web address]  URL: [email or web address]  Date: 07/15/2024  Prepared by: Ansley Caceres     HEP Exercises  - Seated Long Arc Quad  - 1 x daily - 2-3 x weekly - 3 sets - 10 reps  - Seated March  - 1 x daily - 2-3 x weekly - 3 sets - 10 reps  - Standing Hip Abduction with Counter Support  - 1 x daily - 2-3 x weekly - 3 sets - 10 reps  - Standing Hip Extension with Counter Support  - 1 x daily - 2-3 x weekly - 3 sets - 10 reps  - Seated Scapular Retraction  - 1 x daily - 2-3 x weekly - 3 sets - 10 reps    Added 8/5  - Sidelying Thoracic Rotation with Open Book  - 1 x daily - 2-3 x weekly - 1-2 sets - 10 reps - 3 seconds hold  - Hooklying Single Knee to Chest Stretch  - 1 x daily - 2-3 x weekly - 1 sets - 10 reps - 20-30 seconds hold  - Standing Bicep Curls Supinated with Dumbbells  - 1 x daily - 2-3 x weekly - 2 sets - 10 reps    8/7/24  - SLOW Marching  - 1 x daily - 7 x weekly - 3 sets - 10 reps - 2-3 sec hold  - Standing Hip Flexion AROM  - 1 x daily - 7 x weekly - 3 sets - 10 reps  - Standing Hip Abduction AROM  - 1 x daily - 7 x weekly - 3 sets - 10 reps  - Standing Hip Extension with Chair  - 1 x daily - 7 x weekly - 3 sets - 10 reps  - Heel Toe Raises with Counter Support  - 1 x daily - 7 x weekly - 3 sets - 10 reps  - Mini Squat with Counter Support  - 1 x daily - 7 x weekly - 3 sets - 10 reps    Plan: [x] Continue per plan of care.   [] Other:      Treatment Charges: Mins Units   []  Modalities: HP     [x]  Ther Exercise 43 3   []  Manual Therapy     []  Ther Activities     []  Aquatics     []  Neuromuscular     []  Other     Total Billable time 43 3     Time In: 1:56 pm        Time Out: 2:39 pm    Electronically signed by:  RJ ODONNELL, JOSH

## 2024-09-05 ENCOUNTER — APPOINTMENT (OUTPATIENT)
Dept: PHYSICAL THERAPY | Age: 84
End: 2024-09-05
Attending: FAMILY MEDICINE
Payer: MEDICARE

## 2024-09-06 ENCOUNTER — APPOINTMENT (OUTPATIENT)
Dept: PHYSICAL THERAPY | Age: 84
End: 2024-09-06
Attending: FAMILY MEDICINE
Payer: MEDICARE

## 2024-09-09 ENCOUNTER — HOSPITAL ENCOUNTER (OUTPATIENT)
Dept: PHYSICAL THERAPY | Age: 84
Setting detail: THERAPIES SERIES
Discharge: HOME | End: 2024-09-09
Attending: FAMILY MEDICINE
Payer: MEDICARE

## 2024-09-09 ENCOUNTER — ANESTHESIA EVENT (OUTPATIENT)
Dept: OPERATING ROOM | Age: 84
End: 2024-09-09
Payer: MEDICARE

## 2024-09-09 PROCEDURE — 97110 THERAPEUTIC EXERCISES: CPT

## 2024-09-10 ENCOUNTER — HOSPITAL ENCOUNTER (OUTPATIENT)
Age: 84
Setting detail: OUTPATIENT SURGERY
Discharge: HOME | End: 2024-09-10
Attending: INTERNAL MEDICINE | Admitting: INTERNAL MEDICINE
Payer: MEDICARE

## 2024-09-10 ENCOUNTER — ANESTHESIA (OUTPATIENT)
Dept: OPERATING ROOM | Age: 84
End: 2024-09-10
Payer: MEDICARE

## 2024-09-10 VITALS
SYSTOLIC BLOOD PRESSURE: 117 MMHG | WEIGHT: 119 LBS | BODY MASS INDEX: 21.09 KG/M2 | TEMPERATURE: 97 F | RESPIRATION RATE: 16 BRPM | HEART RATE: 81 BPM | DIASTOLIC BLOOD PRESSURE: 64 MMHG | OXYGEN SATURATION: 98 % | HEIGHT: 63 IN

## 2024-09-10 DIAGNOSIS — K27.9 PEPTIC ULCER DISEASE: ICD-10-CM

## 2024-09-10 DIAGNOSIS — R10.13 DYSPEPSIA: ICD-10-CM

## 2024-09-10 PROCEDURE — 6360000002 HC RX W HCPCS

## 2024-09-10 PROCEDURE — 88305 TISSUE EXAM BY PATHOLOGIST: CPT

## 2024-09-10 PROCEDURE — 3609012400 HC EGD TRANSORAL BIOPSY SINGLE/MULTIPLE: Performed by: INTERNAL MEDICINE

## 2024-09-10 PROCEDURE — 3700000000 HC ANESTHESIA ATTENDED CARE: Performed by: INTERNAL MEDICINE

## 2024-09-10 PROCEDURE — 3700000001 HC ADD 15 MINUTES (ANESTHESIA): Performed by: INTERNAL MEDICINE

## 2024-09-10 PROCEDURE — 7100000011 HC PHASE II RECOVERY - ADDTL 15 MIN: Performed by: INTERNAL MEDICINE

## 2024-09-10 PROCEDURE — 2580000003 HC RX 258: Performed by: ANESTHESIOLOGY

## 2024-09-10 PROCEDURE — 7100000010 HC PHASE II RECOVERY - FIRST 15 MIN: Performed by: INTERNAL MEDICINE

## 2024-09-10 PROCEDURE — 2500000003 HC RX 250 WO HCPCS

## 2024-09-10 PROCEDURE — 6370000000 HC RX 637 (ALT 250 FOR IP)

## 2024-09-10 PROCEDURE — 2709999900 HC NON-CHARGEABLE SUPPLY: Performed by: INTERNAL MEDICINE

## 2024-09-10 PROCEDURE — 43239 EGD BIOPSY SINGLE/MULTIPLE: CPT | Performed by: INTERNAL MEDICINE

## 2024-09-10 RX ORDER — MORPHINE SULFATE 2 MG/ML
1 INJECTION, SOLUTION INTRAMUSCULAR; INTRAVENOUS EVERY 5 MIN PRN
Status: DISCONTINUED | OUTPATIENT
Start: 2024-09-10 | End: 2024-09-10 | Stop reason: HOSPADM

## 2024-09-10 RX ORDER — MIDAZOLAM HYDROCHLORIDE 2 MG/2ML
2 INJECTION, SOLUTION INTRAMUSCULAR; INTRAVENOUS
Status: DISCONTINUED | OUTPATIENT
Start: 2024-09-10 | End: 2024-09-10 | Stop reason: HOSPADM

## 2024-09-10 RX ORDER — IPRATROPIUM BROMIDE AND ALBUTEROL SULFATE 2.5; .5 MG/3ML; MG/3ML
SOLUTION RESPIRATORY (INHALATION)
Status: COMPLETED
Start: 2024-09-10 | End: 2024-09-10

## 2024-09-10 RX ORDER — SODIUM CHLORIDE 0.9 % (FLUSH) 0.9 %
5-40 SYRINGE (ML) INJECTION EVERY 12 HOURS SCHEDULED
Status: DISCONTINUED | OUTPATIENT
Start: 2024-09-10 | End: 2024-09-10 | Stop reason: HOSPADM

## 2024-09-10 RX ORDER — OXYCODONE HYDROCHLORIDE 5 MG/1
5 TABLET ORAL PRN
Status: DISCONTINUED | OUTPATIENT
Start: 2024-09-10 | End: 2024-09-10 | Stop reason: HOSPADM

## 2024-09-10 RX ORDER — LABETALOL HYDROCHLORIDE 5 MG/ML
10 INJECTION, SOLUTION INTRAVENOUS
Status: DISCONTINUED | OUTPATIENT
Start: 2024-09-10 | End: 2024-09-10 | Stop reason: HOSPADM

## 2024-09-10 RX ORDER — LIDOCAINE HYDROCHLORIDE 20 MG/ML
INJECTION, SOLUTION INFILTRATION; PERINEURAL PRN
Status: DISCONTINUED | OUTPATIENT
Start: 2024-09-10 | End: 2024-09-10 | Stop reason: SDUPTHER

## 2024-09-10 RX ORDER — ONDANSETRON 2 MG/ML
4 INJECTION INTRAMUSCULAR; INTRAVENOUS
Status: DISCONTINUED | OUTPATIENT
Start: 2024-09-10 | End: 2024-09-10 | Stop reason: HOSPADM

## 2024-09-10 RX ORDER — MEPERIDINE HYDROCHLORIDE 50 MG/ML
12.5 INJECTION INTRAMUSCULAR; INTRAVENOUS; SUBCUTANEOUS ONCE
Status: DISCONTINUED | OUTPATIENT
Start: 2024-09-10 | End: 2024-09-10 | Stop reason: HOSPADM

## 2024-09-10 RX ORDER — SODIUM CHLORIDE 9 MG/ML
INJECTION, SOLUTION INTRAVENOUS CONTINUOUS
Status: DISCONTINUED | OUTPATIENT
Start: 2024-09-10 | End: 2024-09-10 | Stop reason: HOSPADM

## 2024-09-10 RX ORDER — SODIUM CHLORIDE, SODIUM LACTATE, POTASSIUM CHLORIDE, CALCIUM CHLORIDE 600; 310; 30; 20 MG/100ML; MG/100ML; MG/100ML; MG/100ML
INJECTION, SOLUTION INTRAVENOUS CONTINUOUS
Status: DISCONTINUED | OUTPATIENT
Start: 2024-09-10 | End: 2024-09-10 | Stop reason: HOSPADM

## 2024-09-10 RX ORDER — HYDRALAZINE HYDROCHLORIDE 20 MG/ML
10 INJECTION INTRAMUSCULAR; INTRAVENOUS
Status: DISCONTINUED | OUTPATIENT
Start: 2024-09-10 | End: 2024-09-10 | Stop reason: HOSPADM

## 2024-09-10 RX ORDER — PROPOFOL 10 MG/ML
INJECTION, EMULSION INTRAVENOUS PRN
Status: DISCONTINUED | OUTPATIENT
Start: 2024-09-10 | End: 2024-09-10 | Stop reason: SDUPTHER

## 2024-09-10 RX ORDER — NALOXONE HYDROCHLORIDE 0.4 MG/ML
INJECTION, SOLUTION INTRAMUSCULAR; INTRAVENOUS; SUBCUTANEOUS PRN
Status: DISCONTINUED | OUTPATIENT
Start: 2024-09-10 | End: 2024-09-10 | Stop reason: HOSPADM

## 2024-09-10 RX ORDER — SODIUM CHLORIDE 9 MG/ML
INJECTION, SOLUTION INTRAVENOUS PRN
Status: DISCONTINUED | OUTPATIENT
Start: 2024-09-10 | End: 2024-09-10 | Stop reason: HOSPADM

## 2024-09-10 RX ORDER — SODIUM CHLORIDE 0.9 % (FLUSH) 0.9 %
5-40 SYRINGE (ML) INJECTION PRN
Status: DISCONTINUED | OUTPATIENT
Start: 2024-09-10 | End: 2024-09-10 | Stop reason: HOSPADM

## 2024-09-10 RX ORDER — OXYCODONE HYDROCHLORIDE 5 MG/1
10 TABLET ORAL PRN
Status: DISCONTINUED | OUTPATIENT
Start: 2024-09-10 | End: 2024-09-10 | Stop reason: HOSPADM

## 2024-09-10 RX ORDER — IPRATROPIUM BROMIDE AND ALBUTEROL SULFATE 2.5; .5 MG/3ML; MG/3ML
1 SOLUTION RESPIRATORY (INHALATION) ONCE
Status: COMPLETED | OUTPATIENT
Start: 2024-09-10 | End: 2024-09-10

## 2024-09-10 RX ORDER — DIPHENHYDRAMINE HYDROCHLORIDE 50 MG/ML
12.5 INJECTION INTRAMUSCULAR; INTRAVENOUS
Status: DISCONTINUED | OUTPATIENT
Start: 2024-09-10 | End: 2024-09-10 | Stop reason: HOSPADM

## 2024-09-10 RX ORDER — METOCLOPRAMIDE HYDROCHLORIDE 5 MG/ML
10 INJECTION INTRAMUSCULAR; INTRAVENOUS
Status: DISCONTINUED | OUTPATIENT
Start: 2024-09-10 | End: 2024-09-10 | Stop reason: HOSPADM

## 2024-09-10 RX ADMIN — IPRATROPIUM BROMIDE AND ALBUTEROL SULFATE 1 DOSE: 2.5; .5 SOLUTION RESPIRATORY (INHALATION) at 09:05

## 2024-09-10 RX ADMIN — SODIUM CHLORIDE: 9 INJECTION, SOLUTION INTRAVENOUS at 08:53

## 2024-09-10 RX ADMIN — LIDOCAINE HYDROCHLORIDE 100 MG: 20 INJECTION, SOLUTION INFILTRATION; PERINEURAL at 09:41

## 2024-09-10 RX ADMIN — PROPOFOL 120 MG: 10 INJECTION, EMULSION INTRAVENOUS at 09:46

## 2024-09-10 RX ADMIN — IPRATROPIUM BROMIDE AND ALBUTEROL SULFATE 1 DOSE: .5; 2.5 SOLUTION RESPIRATORY (INHALATION) at 09:05

## 2024-09-10 ASSESSMENT — PAIN - FUNCTIONAL ASSESSMENT
PAIN_FUNCTIONAL_ASSESSMENT: 0-10
PAIN_FUNCTIONAL_ASSESSMENT: NONE - DENIES PAIN

## 2024-09-10 ASSESSMENT — COPD QUESTIONNAIRES: CAT_SEVERITY: NO INTERVAL CHANGE

## 2024-09-11 ENCOUNTER — HOSPITAL ENCOUNTER (OUTPATIENT)
Dept: PHYSICAL THERAPY | Age: 84
Setting detail: THERAPIES SERIES
Discharge: HOME | End: 2024-09-11
Attending: FAMILY MEDICINE
Payer: MEDICARE

## 2024-09-11 PROCEDURE — 97110 THERAPEUTIC EXERCISES: CPT

## 2024-09-13 LAB — SURGICAL PATHOLOGY REPORT: NORMAL

## 2024-09-16 ENCOUNTER — HOSPITAL ENCOUNTER (OUTPATIENT)
Dept: PHYSICAL THERAPY | Age: 84
Setting detail: THERAPIES SERIES
Discharge: HOME | End: 2024-09-16
Attending: FAMILY MEDICINE
Payer: MEDICARE

## 2024-09-16 PROCEDURE — 97110 THERAPEUTIC EXERCISES: CPT

## 2024-09-20 DIAGNOSIS — F33.42 MDD (MAJOR DEPRESSIVE DISORDER), RECURRENT, IN FULL REMISSION (HCC): ICD-10-CM

## 2024-09-20 DIAGNOSIS — F51.04 PSYCHOPHYSIOLOGICAL INSOMNIA: ICD-10-CM

## 2024-09-20 RX ORDER — ZALEPLON 5 MG/1
CAPSULE ORAL
Qty: 60 CAPSULE | Refills: 0 | OUTPATIENT
Start: 2024-09-20

## 2024-09-20 NOTE — TELEPHONE ENCOUNTER
Please Approve or Refuse.  Send to Pharmacy per Pt's Request: [facility]     Next Visit Date:  10/18/2024   Last Visit Date: 6/13/2024    Hemoglobin A1C (%)   Date Value   06/24/2024 5.3   05/24/2023 5.4   05/16/2022 5.3             ( goal A1C is < 7)   BP Readings from Last 3 Encounters:   09/10/24 117/64   08/23/24 139/85   06/13/24 102/86          (goal 120/80)  BUN   Date Value Ref Range Status   06/24/2024 13 8 - 23 mg/dL Final     Creatinine   Date Value Ref Range Status   06/24/2024 1.0 (H) 0.5 - 0.9 mg/dL Final     Potassium   Date Value Ref Range Status   06/24/2024 4.2 3.7 - 5.3 mmol/L Final

## 2024-10-04 DIAGNOSIS — K27.9 PUD (PEPTIC ULCER DISEASE): ICD-10-CM

## 2024-10-04 DIAGNOSIS — F33.42 MDD (MAJOR DEPRESSIVE DISORDER), RECURRENT, IN FULL REMISSION (HCC): ICD-10-CM

## 2024-10-04 DIAGNOSIS — F51.04 PSYCHOPHYSIOLOGICAL INSOMNIA: ICD-10-CM

## 2024-10-04 DIAGNOSIS — R10.13 DYSPEPSIA: ICD-10-CM

## 2024-10-07 RX ORDER — ZALEPLON 5 MG/1
CAPSULE ORAL
Qty: 60 CAPSULE | Refills: 0 | OUTPATIENT
Start: 2024-10-07

## 2024-10-07 RX ORDER — ZALEPLON 5 MG/1
5 CAPSULE ORAL NIGHTLY
Qty: 90 CAPSULE | Refills: 0 | Status: SHIPPED | OUTPATIENT
Start: 2024-10-07 | End: 2025-01-05

## 2024-10-07 NOTE — TELEPHONE ENCOUNTER
Please Approve or Refuse.  Send to [facility] per Pt's Request[facility]     Next Visit Date:  10/4/2024   Last Visit Date: 6/13/2024    Hemoglobin A1C (%)   Date Value   06/24/2024 5.3   05/24/2023 5.4   05/16/2022 5.3             ( goal A1C is < 7)   BP Readings from Last 3 Encounters:   09/10/24 117/64   08/23/24 139/85   06/13/24 102/86          (goal 120/80)  BUN   Date Value Ref Range Status   06/24/2024 13 8 - 23 mg/dL Final     Creatinine   Date Value Ref Range Status   06/24/2024 1.0 (H) 0.5 - 0.9 mg/dL Final     Potassium   Date Value Ref Range Status   06/24/2024 4.2 3.7 - 5.3 mmol/L Final

## 2024-10-15 ENCOUNTER — TELEPHONE (OUTPATIENT)
Dept: FAMILY MEDICINE CLINIC | Age: 84
End: 2024-10-15

## 2024-10-15 SDOH — HEALTH STABILITY: PHYSICAL HEALTH: ON AVERAGE, HOW MANY MINUTES DO YOU ENGAGE IN EXERCISE AT THIS LEVEL?: 60 MIN

## 2024-10-15 SDOH — HEALTH STABILITY: PHYSICAL HEALTH: ON AVERAGE, HOW MANY DAYS PER WEEK DO YOU ENGAGE IN MODERATE TO STRENUOUS EXERCISE (LIKE A BRISK WALK)?: 4 DAYS

## 2024-10-15 ASSESSMENT — LIFESTYLE VARIABLES
HOW OFTEN DURING THE LAST YEAR HAVE YOU FAILED TO DO WHAT WAS NORMALLY EXPECTED FROM YOU BECAUSE OF DRINKING: NEVER
HOW OFTEN DURING THE LAST YEAR HAVE YOU BEEN UNABLE TO REMEMBER WHAT HAPPENED THE NIGHT BEFORE BECAUSE YOU HAD BEEN DRINKING: NEVER
HAS A RELATIVE, FRIEND, DOCTOR, OR ANOTHER HEALTH PROFESSIONAL EXPRESSED CONCERN ABOUT YOUR DRINKING OR SUGGESTED YOU CUT DOWN: NO
HOW OFTEN DURING THE LAST YEAR HAVE YOU HAD A FEELING OF GUILT OR REMORSE AFTER DRINKING: NEVER
HOW OFTEN DURING THE LAST YEAR HAVE YOU FAILED TO DO WHAT WAS NORMALLY EXPECTED FROM YOU BECAUSE OF DRINKING: NEVER
HAVE YOU OR SOMEONE ELSE BEEN INJURED AS A RESULT OF YOUR DRINKING: NO
HOW OFTEN DURING THE LAST YEAR HAVE YOU HAD A FEELING OF GUILT OR REMORSE AFTER DRINKING: NEVER
HOW OFTEN DURING THE LAST YEAR HAVE YOU FOUND THAT YOU WERE NOT ABLE TO STOP DRINKING ONCE YOU HAD STARTED: NEVER
HOW OFTEN DO YOU HAVE A DRINK CONTAINING ALCOHOL: 4 OR MORE TIMES A WEEK
HAVE YOU OR SOMEONE ELSE BEEN INJURED AS A RESULT OF YOUR DRINKING: NO
HOW OFTEN DO YOU HAVE SIX OR MORE DRINKS ON ONE OCCASION: 1
HOW OFTEN DURING THE LAST YEAR HAVE YOU BEEN UNABLE TO REMEMBER WHAT HAPPENED THE NIGHT BEFORE BECAUSE YOU HAD BEEN DRINKING: NEVER
HOW OFTEN DURING THE LAST YEAR HAVE YOU NEEDED AN ALCOHOLIC DRINK FIRST THING IN THE MORNING TO GET YOURSELF GOING AFTER A NIGHT OF HEAVY DRINKING: NEVER
HOW OFTEN DURING THE LAST YEAR HAVE YOU NEEDED AN ALCOHOLIC DRINK FIRST THING IN THE MORNING TO GET YOURSELF GOING AFTER A NIGHT OF HEAVY DRINKING: NEVER
HAS A RELATIVE, FRIEND, DOCTOR, OR ANOTHER HEALTH PROFESSIONAL EXPRESSED CONCERN ABOUT YOUR DRINKING OR SUGGESTED YOU CUT DOWN: NO
HOW MANY STANDARD DRINKS CONTAINING ALCOHOL DO YOU HAVE ON A TYPICAL DAY: 1 OR 2
HOW OFTEN DURING THE LAST YEAR HAVE YOU FOUND THAT YOU WERE NOT ABLE TO STOP DRINKING ONCE YOU HAD STARTED: NEVER
HOW OFTEN DO YOU HAVE A DRINK CONTAINING ALCOHOL: 5
HOW MANY STANDARD DRINKS CONTAINING ALCOHOL DO YOU HAVE ON A TYPICAL DAY: 4

## 2024-10-15 ASSESSMENT — PATIENT HEALTH QUESTIONNAIRE - PHQ9
SUM OF ALL RESPONSES TO PHQ QUESTIONS 1-9: 0
2. FEELING DOWN, DEPRESSED OR HOPELESS: NOT AT ALL
SUM OF ALL RESPONSES TO PHQ QUESTIONS 1-9: 0
SUM OF ALL RESPONSES TO PHQ9 QUESTIONS 1 & 2: 0
SUM OF ALL RESPONSES TO PHQ QUESTIONS 1-9: 0
SUM OF ALL RESPONSES TO PHQ QUESTIONS 1-9: 0
1. LITTLE INTEREST OR PLEASURE IN DOING THINGS: NOT AT ALL

## 2024-10-15 NOTE — TELEPHONE ENCOUNTER
Received questionnaire from [facility] asking if she has tried doxepin 3MG or 6 mg which would be less high risk for her  There is contraindication with glaucoma   There is interaction with Trelegy and albuterol when taking doxepin

## 2024-10-17 ENCOUNTER — OFFICE VISIT (OUTPATIENT)
Dept: ORTHOPEDIC SURGERY | Age: 84
End: 2024-10-17
Payer: MEDICARE

## 2024-10-17 DIAGNOSIS — M19.072 PRIMARY OSTEOARTHRITIS OF LEFT FOOT: ICD-10-CM

## 2024-10-17 DIAGNOSIS — M17.12 PRIMARY OSTEOARTHRITIS OF LEFT KNEE: Primary | ICD-10-CM

## 2024-10-17 PROCEDURE — 99214 OFFICE O/P EST MOD 30 MIN: CPT | Performed by: PHYSICIAN ASSISTANT

## 2024-10-17 PROCEDURE — 1123F ACP DISCUSS/DSCN MKR DOCD: CPT | Performed by: PHYSICIAN ASSISTANT

## 2024-10-17 PROCEDURE — 20610 DRAIN/INJ JOINT/BURSA W/O US: CPT | Performed by: PHYSICIAN ASSISTANT

## 2024-10-17 RX ORDER — LIDOCAINE HYDROCHLORIDE 10 MG/ML
2 INJECTION, SOLUTION INFILTRATION; PERINEURAL ONCE
Status: COMPLETED | OUTPATIENT
Start: 2024-10-17 | End: 2024-10-17

## 2024-10-17 RX ORDER — BETAMETHASONE SODIUM PHOSPHATE AND BETAMETHASONE ACETATE 3; 3 MG/ML; MG/ML
12 INJECTION, SUSPENSION INTRA-ARTICULAR; INTRALESIONAL; INTRAMUSCULAR; SOFT TISSUE ONCE
Status: COMPLETED | OUTPATIENT
Start: 2024-10-17 | End: 2024-10-17

## 2024-10-17 RX ORDER — BUPIVACAINE HYDROCHLORIDE 2.5 MG/ML
2 INJECTION, SOLUTION INFILTRATION; PERINEURAL ONCE
Status: COMPLETED | OUTPATIENT
Start: 2024-10-17 | End: 2024-10-17

## 2024-10-17 RX ADMIN — BUPIVACAINE HYDROCHLORIDE 5 MG: 2.5 INJECTION, SOLUTION INFILTRATION; PERINEURAL at 11:30

## 2024-10-17 RX ADMIN — BETAMETHASONE SODIUM PHOSPHATE AND BETAMETHASONE ACETATE 12 MG: 3; 3 INJECTION, SUSPENSION INTRA-ARTICULAR; INTRALESIONAL; INTRAMUSCULAR; SOFT TISSUE at 11:29

## 2024-10-17 RX ADMIN — LIDOCAINE HYDROCHLORIDE 2 ML: 10 INJECTION, SOLUTION INFILTRATION; PERINEURAL at 11:30

## 2024-10-17 NOTE — PROGRESS NOTES
[facility]                Luis Enrique Lemons PA-C            [address], [address]               [place], [place] [address]           Dept Phone: [phone]           Dept Fax:  [phone] [address]                       [address]           [place], [place] [address]          Dept Phone: [phone]           Dept Fax:  [phone]      Chief Compliant:  Chief Complaint   Patient presents with    Knee Pain     Lt knee        History of Present Illness:  This is a 84 y.o. female who presents to the clinic today for evaluation of had concerns including Knee Pain (Lt knee).     Ms. Napier is a 84-year-old female presents for evaluation of chronic left knee pain.  She was actually seen by Dr. Lopes on 11/6/2023 found to have evidence of some early osteoarthrosis especially in the patellofemoral region and elected proceed with an injection at that time.  She states the injection provided significant relief of pain in this left knee for about 4 months but over the last several months pain has gradually returned she is hoping undergo repeat injection today.    Patient denies any new injury or trauma no knee joint warmth, redness, fever or chills       Past History:    Current Outpatient Medications:     zaleplon (SONATA) 5 MG capsule, Take 1 capsule by mouth nightly for 90 days. **Stop trazodone, stop Lunesta** Max Daily Amount: 5 mg, Disp: 90 capsule, Rfl: 0    omeprazole (PRILOSEC) 20 MG delayed release capsule, take 1 capsule by mouth twice a day MORNING AND BEFORE BED, Disp: 60 capsule, Rfl: 3    dorzolamide (TRUSOPT) 2 % ophthalmic solution, , Disp: , Rfl:     timolol (TIMOPTIC) 0.5 % ophthalmic solution, , Disp: , Rfl:     metoprolol succinate (TOPROL XL) 25 MG extended release tablet, Take 1 tablet by mouth daily Goal BP bellow 130/80 and above 115/65, heart rate 56 to 90 bpm, Disp: 90 tablet, Rfl: 3    brinzolamide (AZOPT) 1 % ophthalmic suspension, ,

## 2024-10-18 ENCOUNTER — OFFICE VISIT (OUTPATIENT)
Dept: FAMILY MEDICINE CLINIC | Age: 84
End: 2024-10-18

## 2024-10-18 ENCOUNTER — TELEPHONE (OUTPATIENT)
Dept: INFUSION THERAPY | Age: 84
End: 2024-10-18

## 2024-10-18 VITALS
BODY MASS INDEX: 21.37 KG/M2 | TEMPERATURE: 97.6 F | DIASTOLIC BLOOD PRESSURE: 78 MMHG | SYSTOLIC BLOOD PRESSURE: 136 MMHG | HEIGHT: 63 IN | HEART RATE: 84 BPM | OXYGEN SATURATION: 98 % | WEIGHT: 120.6 LBS

## 2024-10-18 DIAGNOSIS — Z98.890 HISTORY OF PARATHYROIDECTOMY: ICD-10-CM

## 2024-10-18 DIAGNOSIS — G89.29 CHRONIC MIDLINE THORACIC BACK PAIN: ICD-10-CM

## 2024-10-18 DIAGNOSIS — Z90.89 HISTORY OF PARATHYROIDECTOMY: ICD-10-CM

## 2024-10-18 DIAGNOSIS — M54.6 CHRONIC MIDLINE THORACIC BACK PAIN: ICD-10-CM

## 2024-10-18 DIAGNOSIS — F33.42 MDD (MAJOR DEPRESSIVE DISORDER), RECURRENT, IN FULL REMISSION (HCC): ICD-10-CM

## 2024-10-18 DIAGNOSIS — M81.0 AGE-RELATED OSTEOPOROSIS WITHOUT CURRENT PATHOLOGICAL FRACTURE: ICD-10-CM

## 2024-10-18 DIAGNOSIS — J43.1 PANLOBULAR EMPHYSEMA (HCC): ICD-10-CM

## 2024-10-18 DIAGNOSIS — F51.04 PSYCHOPHYSIOLOGICAL INSOMNIA: ICD-10-CM

## 2024-10-18 DIAGNOSIS — J30.89 NON-SEASONAL ALLERGIC RHINITIS DUE TO OTHER ALLERGIC TRIGGER: ICD-10-CM

## 2024-10-18 DIAGNOSIS — Z00.00 MEDICARE ANNUAL WELLNESS VISIT, SUBSEQUENT: Primary | ICD-10-CM

## 2024-10-18 DIAGNOSIS — I12.9 BENIGN HYPERTENSION WITH CKD (CHRONIC KIDNEY DISEASE) STAGE III (HCC): ICD-10-CM

## 2024-10-18 DIAGNOSIS — E78.2 MIXED HYPERLIPIDEMIA: ICD-10-CM

## 2024-10-18 DIAGNOSIS — R49.0 HOARSENESS OF VOICE: ICD-10-CM

## 2024-10-18 DIAGNOSIS — N18.30 BENIGN HYPERTENSION WITH CKD (CHRONIC KIDNEY DISEASE) STAGE III (HCC): ICD-10-CM

## 2024-10-18 PROBLEM — R20.8 BURNING SENSATION OF FEET: Status: RESOLVED | Noted: 2019-08-02 | Resolved: 2024-10-18

## 2024-10-18 RX ORDER — METOPROLOL SUCCINATE 25 MG/1
25 TABLET, EXTENDED RELEASE ORAL DAILY
Qty: 90 TABLET | Refills: 3 | Status: SHIPPED | OUTPATIENT
Start: 2024-10-18

## 2024-10-18 RX ORDER — PRAZOSIN HYDROCHLORIDE 1 MG/1
1 CAPSULE ORAL NIGHTLY PRN
Qty: 30 CAPSULE | Refills: 1 | Status: SHIPPED | OUTPATIENT
Start: 2024-10-18

## 2024-10-18 RX ORDER — YEAST,DRIED (S. CEREVISIAE)
1 POWDER (GRAM) ORAL DAILY
Qty: 90 TABLET | Refills: 0
Start: 2024-10-18

## 2024-10-18 RX ORDER — DOXEPIN 3 MG/1
3 TABLET, FILM COATED ORAL NIGHTLY PRN
Qty: 30 TABLET | Refills: 0 | Status: SHIPPED | OUTPATIENT
Start: 2024-10-18

## 2024-10-18 RX ORDER — FLUTICASONE PROPIONATE 50 MCG
2 SPRAY, SUSPENSION (ML) NASAL DAILY
Qty: 48 G | Refills: 1 | Status: SHIPPED | OUTPATIENT
Start: 2024-10-18

## 2024-10-18 ASSESSMENT — ENCOUNTER SYMPTOMS
BLOOD IN STOOL: 0
CHEST TIGHTNESS: 0
SINUS PRESSURE: 0
CONSTIPATION: 0
COUGH: 0
ABDOMINAL PAIN: 0
ABDOMINAL DISTENTION: 0
SHORTNESS OF BREATH: 1
NAUSEA: 0
DIARRHEA: 0
WHEEZING: 0
RHINORRHEA: 1
TROUBLE SWALLOWING: 0
SINUS PAIN: 0
VOICE CHANGE: 1
VOMITING: 0

## 2024-10-18 ASSESSMENT — VISUAL ACUITY
OS_CC: 20/100
OD_CC: 20/25

## 2024-10-18 NOTE — TELEPHONE ENCOUNTER
Office Received order for PT to have an Infusion     Called PT to schedule     LVM     Electronically signed by Gregoria Patel on 10/18/2024 at 9:06 AM

## 2024-10-18 NOTE — ASSESSMENT & PLAN NOTE
Chronic, for many years, since her sister committed suicide when she was 13 years old and she found her dead  The insurance is not covering so not anymore, they are asking to try doxepin  We will give her Minipress to try for PTSD, if it does not work then we will give doxepin  Orders:    [place] OFFICE OUTPATIENT VISIT 25 MINUTES [20533]    prazosin (MINIPRESS) 1 MG capsule; Take 1 capsule by mouth nightly as needed (insomnia, PTSD) For PTSD and sleep    doxepin (SILENOR) 3 MG TABS tablet; Take 1 tablet by mouth nightly as needed (insomnia)

## 2024-10-18 NOTE — ASSESSMENT & PLAN NOTE
Patient had parathyroidectomy 11/5/2018  Check labs  Orders:    [place] OFFICE OUTPATIENT VISIT 25 MINUTES [73284]    PTH, Intact with Ionized Calcium; Future

## 2024-10-18 NOTE — ASSESSMENT & PLAN NOTE
Chronic, ongoing, on Prolia injections every 6 months, tolerated well, denies side effects  Check labs    Orders:    [place] OFFICE OUTPATIENT VISIT 25 MINUTES [[id number]]    XR THORACIC SPINE (3 VIEWS); Future    Magnesium; Future    Vitamin D 25 Hydroxy; Future    Phosphorus; Future

## 2024-10-18 NOTE — ASSESSMENT & PLAN NOTE
Chronic, stable, moderately to severe per PFTs recently done by pulmonologist   She is up-to-date with her immunizations  Continue Trelegy 1 puff daily, albuterol as needed  She did quit smoking many years ago      Orders:    [place] OFFICE OUTPATIENT VISIT 25 MINUTES [[id number]]

## 2024-10-18 NOTE — ASSESSMENT & PLAN NOTE
Chronic  Will start Flonase  Orders:    [place] OFFICE OUTPATIENT VISIT 25 MINUTES [[id number]]    fluticasone (FLONASE) 50 MCG/ACT nasal spray; 2 sprays by Each Nostril route daily

## 2024-10-18 NOTE — ASSESSMENT & PLAN NOTE
Chronic, stable, at goal, well-controlled  Low-salt diet advised, she does not take NSAID's  Continue metoprolol 25 Mg daily, and Norvasc 2.5 Mg daily    Orders:    [place] OFFICE OUTPATIENT VISIT 25 MINUTES [[id number]]    metoprolol succinate (TOPROL XL) 25 MG extended release tablet; Take 1 tablet by mouth daily Goal BP bellow 130/80 and above 115/65, heart rate 56 to 90 bpm    CBC with Auto Differential; Future    Comprehensive Metabolic Panel; Future    Uric Acid; Future    TSH; Future    Magnesium; Future    Urinalysis with Reflex to Culture; Future

## 2024-10-18 NOTE — ASSESSMENT & PLAN NOTE
Chronic, stable  Will continue to monitor  Her main issue is sleep, will start prazosin for PTSD, and if still not sleeping well, she will start doxepin 3 mg  Sonata is not covered anymore  In the past she has tried multiple medications including trazodone, melatonin, Lunesta, she also was evaluated by sleep specialist and psychologist     Orders:    [place] OFFICE OUTPATIENT VISIT 25 MINUTES [[id number]]    prazosin (MINIPRESS) 1 MG capsule; Take 1 capsule by mouth nightly as needed (insomnia, PTSD) For PTSD and sleep    doxepin (SILENOR) 3 MG TABS tablet; Take 1 tablet by mouth nightly as needed (insomnia)

## 2024-10-18 NOTE — PROGRESS NOTES
Visit Information    Have you changed or started any medications since your last visit including any over-the-counter medicines, vitamins, or herbal medicines? no   Have you stopped taking any of your medications? Is so, why? -  no  Are you having any side effects from any of your medications? - no    Have you seen any other physician or provider since your last visit?  yes -    Have you had any other diagnostic tests since your last visit?  no   Have you been seen in the emergency room and/or had an admission in a hospital since we last saw you?  yes -    Have you had your routine dental cleaning in the past 6 months?  yes -      Do you have an active MyChart account? If no, what is the barrier?  Yes    Patient Care Team:  Harper Cooper MD as PCP - General  Harper Cooper MD as PCP - Empaneled Provider  Hair Manriquez MD as Surgeon (General Surgery)  Amilcar Zurita MD as Surgeon (Ophthalmology)  Garfield Mchugh MD as Consulting Physician (Cardiovascular Disease)  Martha Carballo MD as Consulting Physician (Otolaryngology)  Coleman Santos MD as Consulting Physician (Cardiology)  Rod Hernandez MD as Consulting Physician (Pulmonology)  Lam Reaves MD as Consulting Physician (Nephrology)  Aamir Kate MD as Consulting Physician (Pulmonology)  Laurence Bose MD (Hematology and Oncology)  Janet Oropeza DPM as Consulting Physician (Podiatry)  Moo Lopes MD as Consulting Physician (Orthopedic Surgery)  Fani Ridley MD as Consulting Physician (Gastroenterology)    Medical History Review  Past Medical, Family, and Social History reviewed and does contribute to the patient presenting condition    Health Maintenance   Topic Date Due    Annual Wellness Visit (Medicare Advantage)  01/01/2024    Colorectal Cancer Screen  10/25/2024    Breast cancer screen  07/16/2025    Depression Monitoring  10/15/2025    DEXA (modify frequency per FRAX score)  06/24/2026    
over the shoulder blades.  Denies any falls or injury  Taking tylenol, but doesn't always help      Has known depression, which is now well-controlled  Patient says is not the depression which bothers her the most but insomnia, having difficulty falling asleep and staying asleep, since she was 13 years old when she found her sister who committed suicide dead.  Over the years she has tried multiple medications, she also had counseling and evaluation with sleep specialist  She is still keeping sleep hygiene.  Just until recently she has been on zaleplon, however the insurance does not cover it anymore, apparently she has changed insurance and they want her to try doxepin first  Patient says that she still gets nightmares about her sister and sometimes flashbacks    PHQ-2 Over the past 2 weeks, how often have you been bothered by any of the following problems?  Little interest or pleasure in doing things: Not at all  Feeling down, depressed, or hopeless: Not at all  PHQ-2 Score: 0  PHQ-9 Over the past 2 weeks, how often have you been bothered by any of the following problems?  PHQ-9 Total Score: 0  PHQ-9 Total Score: 0       [x]Negative depression screening.   []1-4 = Minimal depression   []5-9 = Mild depression   []10-14 = Moderate depression   []15-19 = Moderately severe depression   []20-27 = Severe depression        10/15/2024     6:58 PM 2024    11:45 AM 3/18/2024    12:25 PM 2024     1:31 PM 10/10/2023     2:40 PM 2023    11:00 AM 10/5/2022    11:31 AM   PHQ Scores   PHQ2 Score 0 0 0 0 0 0 0   PHQ9 Score 0 0 0 3 0 6 0           Social History     Tobacco Use    Smoking status: Former     Current packs/day: 0.00     Average packs/day: 1.5 packs/day for 40.1 years (60.1 ttl pk-yrs)     Types: Cigarettes     Start date: 1960     Quit date: 2000     Years since quittin.3     Passive exposure: Past    Smokeless tobacco: Never    Tobacco comments:     Quit smoking 2000   Vaping Use

## 2024-10-18 NOTE — PATIENT INSTRUCTIONS
9 Ways to Cut Back on Drinking  Maybe you've found yourself drinking more alcohol than you'd prefer. If you want to cut back, here are some ideas to try.    Think before you drink.  Do you really want a drink, or is it just a habit? If you're used to having a drink at a certain time, try doing something else then.     Look for substitutes.  Find some no-alcohol drinks that you enjoy, like flavored seltzer water, tea with honey, or tonic with a slice of lime. Or try alcohol-free beer or \"virgin\" cocktails (without the alcohol).     Drink more water.  Use water to quench your thirst. Drink a glass of water before you have any alcohol. Have another glass along with every drink or between drinks.     Shrink your drink.  For example, have a bottle of beer instead of a pint. Use a smaller glass for wine. Choose drinks with lower alcohol content (ABV%). Or use less liquor and more mixer in cocktails.     Slow down.  It's easy to drink quickly and without thinking about it. Pay attention, and make each drink last longer.     Do the math.  Total up how much you spend on alcohol each month. How much is that a year? If you cut back, what could you do with the money you save?     Take a break.  Choose a day or two each week when you won't drink at all. Notice how you feel on those days, physically and emotionally. How did you sleep? Do you feel better? Over time, add more break days.     Count calories.  Would you like to lose some weight? For some people that's a good motivator for cutting back. Figure out how many calories are in each drink. How many does that add up to in a day? In a week? In a month?     Practice saying no.  Be ready when someone offers you a drink. Try: \"Thanks, I've had enough.\" Or \"Thanks, but I'm cutting back.\" Or \"No, thanks. I feel better when I drink less.\"   Current as of: November 15, 2023  Content Version: 14.2  © 2024 [employer].   Care instructions adapted under license by [facility]

## 2024-10-18 NOTE — ASSESSMENT & PLAN NOTE
Ongoing, patient reports it is acute from drainage, monitoring advised  If persistent for more than 2 weeks from now, she was advised to call me back and referred for ENT evaluation and dedicated laryngoscopy  For now, will start Flonase and hopefully it will work for her.  If any yellow mucus develops, she was advised to call me back and we will add a Z-[place]    Orders:    [place] OFFICE OUTPATIENT VISIT 25 MINUTES [[id number]]

## 2024-10-20 PROBLEM — E78.2 MIXED HYPERLIPIDEMIA: Status: ACTIVE | Noted: 2017-05-12

## 2024-10-20 PROBLEM — G89.29 CHRONIC MIDLINE THORACIC BACK PAIN: Status: ACTIVE | Noted: 2024-10-20

## 2024-10-20 PROBLEM — M54.6 CHRONIC MIDLINE THORACIC BACK PAIN: Status: ACTIVE | Noted: 2024-10-20

## 2024-10-22 ENCOUNTER — HOSPITAL ENCOUNTER (OUTPATIENT)
Age: 84
Discharge: HOME | End: 2024-10-22
Payer: MEDICARE

## 2024-10-22 ENCOUNTER — HOSPITAL ENCOUNTER (OUTPATIENT)
Age: 84
Discharge: HOME | End: 2024-10-24
Payer: MEDICARE

## 2024-10-22 ENCOUNTER — HOSPITAL ENCOUNTER (OUTPATIENT)
Dept: GENERAL RADIOLOGY | Age: 84
Discharge: HOME | End: 2024-10-24
Payer: MEDICARE

## 2024-10-22 DIAGNOSIS — I12.9 BENIGN HYPERTENSION WITH CKD (CHRONIC KIDNEY DISEASE) STAGE III (HCC): ICD-10-CM

## 2024-10-22 DIAGNOSIS — G89.29 CHRONIC MIDLINE THORACIC BACK PAIN: ICD-10-CM

## 2024-10-22 DIAGNOSIS — M81.0 AGE-RELATED OSTEOPOROSIS WITHOUT CURRENT PATHOLOGICAL FRACTURE: ICD-10-CM

## 2024-10-22 DIAGNOSIS — E78.2 MIXED HYPERLIPIDEMIA: ICD-10-CM

## 2024-10-22 DIAGNOSIS — Z98.890 HISTORY OF PARATHYROIDECTOMY: ICD-10-CM

## 2024-10-22 DIAGNOSIS — M54.6 CHRONIC MIDLINE THORACIC BACK PAIN: ICD-10-CM

## 2024-10-22 DIAGNOSIS — Z90.89 HISTORY OF PARATHYROIDECTOMY: ICD-10-CM

## 2024-10-22 DIAGNOSIS — N18.30 BENIGN HYPERTENSION WITH CKD (CHRONIC KIDNEY DISEASE) STAGE III (HCC): ICD-10-CM

## 2024-10-22 LAB
25(OH)D3 SERPL-MCNC: 42.1 NG/ML (ref 30–100)
ALBUMIN SERPL-MCNC: 4.3 G/DL (ref 3.5–5.2)
ALP SERPL-CCNC: 83 U/L (ref 35–104)
ALT SERPL-CCNC: 14 U/L (ref 10–35)
ANION GAP SERPL CALCULATED.3IONS-SCNC: 10 MMOL/L (ref 9–16)
AST SERPL-CCNC: 15 U/L (ref 10–35)
BACTERIA URNS QL MICRO: ABNORMAL
BASOPHILS # BLD: 0 K/UL (ref 0–0.2)
BASOPHILS NFR BLD: 1 % (ref 0–2)
BILIRUB SERPL-MCNC: 0.4 MG/DL (ref 0–1.2)
BILIRUB UR QL STRIP: NEGATIVE
BUN SERPL-MCNC: 19 MG/DL (ref 8–23)
CA-I BLD-SCNC: 1.26 MMOL/L (ref 1.13–1.33)
CALCIUM SERPL-MCNC: 9.5 MG/DL (ref 8.6–10.4)
CASTS #/AREA URNS LPF: ABNORMAL /LPF
CHLORIDE SERPL-SCNC: 100 MMOL/L (ref 98–107)
CHOLEST SERPL-MCNC: 229 MG/DL (ref 0–199)
CHOLESTEROL/HDL RATIO: 3.5
CLARITY UR: CLEAR
CO2 SERPL-SCNC: 28 MMOL/L (ref 20–31)
COLOR UR: YELLOW
CREAT SERPL-MCNC: 1 MG/DL (ref 0.7–1.2)
EOSINOPHIL # BLD: 0.1 K/UL (ref 0–0.4)
EOSINOPHILS RELATIVE PERCENT: 1 % (ref 0–4)
EPI CELLS #/AREA URNS HPF: ABNORMAL /HPF
ERYTHROCYTE [DISTWIDTH] IN BLOOD BY AUTOMATED COUNT: 13.9 % (ref 11.5–14.9)
GFR, ESTIMATED: 56 ML/MIN/1.73M2
GLUCOSE SERPL-MCNC: 110 MG/DL (ref 74–99)
GLUCOSE UR STRIP-MCNC: NEGATIVE MG/DL
HCT VFR BLD AUTO: 43.5 % (ref 36–46)
HDLC SERPL-MCNC: 65 MG/DL
HGB BLD-MCNC: 14.9 G/DL (ref 12–16)
HGB UR QL STRIP.AUTO: NEGATIVE
KETONES UR STRIP-MCNC: NEGATIVE MG/DL
LDLC SERPL CALC-MCNC: 148 MG/DL (ref 0–100)
LEUKOCYTE ESTERASE UR QL STRIP: ABNORMAL
LYMPHOCYTES NFR BLD: 2.8 K/UL (ref 1–4.8)
LYMPHOCYTES RELATIVE PERCENT: 39 % (ref 24–44)
MAGNESIUM SERPL-MCNC: 1.9 MG/DL (ref 1.6–2.4)
MCH RBC QN AUTO: 31.9 PG (ref 26–34)
MCHC RBC AUTO-ENTMCNC: 34.2 G/DL (ref 31–37)
MCV RBC AUTO: 93.2 FL (ref 80–100)
MONOCYTES NFR BLD: 0.6 K/UL (ref 0.1–1.3)
MONOCYTES NFR BLD: 9 % (ref 1–7)
NEUTROPHILS NFR BLD: 50 % (ref 36–66)
NEUTS SEG NFR BLD: 3.7 K/UL (ref 1.3–9.1)
NITRITE UR QL STRIP: NEGATIVE
PH UR STRIP: 7 [PH] (ref 5–8)
PHOSPHATE SERPL-MCNC: 4.6 MG/DL (ref 2.5–4.5)
PLATELET # BLD AUTO: 276 K/UL (ref 150–450)
PMV BLD AUTO: 6.9 FL (ref 6–12)
POTASSIUM SERPL-SCNC: 4.2 MMOL/L (ref 3.7–5.3)
PROT SERPL-MCNC: 6.9 G/DL (ref 6.6–8.7)
PROT UR STRIP-MCNC: NEGATIVE MG/DL
PTH-INTACT SERPL-MCNC: 47 PG/ML (ref 15–65)
RBC # BLD AUTO: 4.67 M/UL (ref 4–5.2)
RBC #/AREA URNS HPF: ABNORMAL /HPF
SODIUM SERPL-SCNC: 138 MMOL/L (ref 136–145)
SP GR UR STRIP: 1.01 (ref 1–1.03)
TRIGL SERPL-MCNC: 79 MG/DL (ref 0–149)
TSH SERPL DL<=0.05 MIU/L-ACNC: 3.21 UIU/ML (ref 0.27–4.2)
URATE SERPL-MCNC: 5.5 MG/DL (ref 2.4–5.7)
UROBILINOGEN UR STRIP-ACNC: NORMAL EU/DL (ref 0–1)
WBC #/AREA URNS HPF: ABNORMAL /HPF
WBC OTHER # BLD: 7.3 K/UL (ref 3.5–11)

## 2024-10-22 PROCEDURE — 84443 ASSAY THYROID STIM HORMONE: CPT

## 2024-10-22 PROCEDURE — 80053 COMPREHEN METABOLIC PANEL: CPT

## 2024-10-22 PROCEDURE — 82306 VITAMIN D 25 HYDROXY: CPT

## 2024-10-22 PROCEDURE — 83970 ASSAY OF PARATHORMONE: CPT

## 2024-10-22 PROCEDURE — 83735 ASSAY OF MAGNESIUM: CPT

## 2024-10-22 PROCEDURE — 82330 ASSAY OF CALCIUM: CPT

## 2024-10-22 PROCEDURE — 80061 LIPID PANEL: CPT

## 2024-10-22 PROCEDURE — 85025 COMPLETE CBC W/AUTO DIFF WBC: CPT

## 2024-10-22 PROCEDURE — 81001 URINALYSIS AUTO W/SCOPE: CPT

## 2024-10-22 PROCEDURE — 84100 ASSAY OF PHOSPHORUS: CPT

## 2024-10-22 PROCEDURE — 36415 COLL VENOUS BLD VENIPUNCTURE: CPT

## 2024-10-22 PROCEDURE — 72072 X-RAY EXAM THORAC SPINE 3VWS: CPT

## 2024-10-22 PROCEDURE — 84550 ASSAY OF BLOOD/URIC ACID: CPT

## 2024-10-22 NOTE — RESULT ENCOUNTER NOTE
Please notify patient: Phosphorus is borderline high  Kidney function is stable stage IIIa, stable from 4 months ago and from 1 year ago.  Avoid dehydration, to drink 64 ounce water daily  Glucose 110 but not fasting  Lipids are very high, we spoke at the appointment to try another statin, I would like to try pravastatin (atorvastatin gave her muscle cramps)--- is she allowing me to try pravastatin?  What pharmacy?  Should be local.  Uric acid is improved  Otherwise labs within normal limits  continue current treatment    Future Appointments  2/18/2025  2:00 PM    Harper Cooper MD    [facility] DEP  10/22/2025 1:30 PM    Harper Cooper MD    [facility] DEP

## 2024-10-23 ENCOUNTER — TELEPHONE (OUTPATIENT)
Dept: FAMILY MEDICINE CLINIC | Age: 84
End: 2024-10-23

## 2024-10-23 DIAGNOSIS — E78.2 MIXED HYPERLIPIDEMIA: Primary | ICD-10-CM

## 2024-10-23 RX ORDER — PRAVASTATIN SODIUM 20 MG
20 TABLET ORAL
Qty: 30 TABLET | Refills: 0 | Status: SHIPPED | OUTPATIENT
Start: 2024-10-23

## 2024-10-23 NOTE — TELEPHONE ENCOUNTER
Owen sent to the patient     Diagnosis Orders   1. Mixed hyperlipidemia  pravastatin (PRAVACHOL) 20 MG tablet           Future Appointments   Date Time Provider Department Center   2/18/2025  2:00 PM Harper Cooper MD fp [facility] DEP   10/22/2025  1:30 PM Harper Cooper MD [facility] DEP

## 2024-10-23 NOTE — TELEPHONE ENCOUNTER
Patient called back, I spoke to her regarding her lab results she told me to let you know she is willing to try Pravastatin and would like it sent to [facility] on [address].Thanks!

## 2024-10-26 PROBLEM — M51.34 DDD (DEGENERATIVE DISC DISEASE), THORACIC: Status: ACTIVE | Noted: 2024-10-26

## 2024-10-26 NOTE — RESULT ENCOUNTER NOTE
Please notify patient: No fracture in her back, degenerative changes/arthritis, and mild scoliosis  Physical therapy might help let me know if she would like a referral and we can refer  Alternatively pain management referral?  Future Appointments  12/5/2024  11:00 AM   [facility] OP INFUSION CHAIR 06  [facility]        [facility]  2/18/2025  2:00 PM    Harper Cooper MD    [facility] DEP  10/22/2025 1:30 PM    Harper Cooper MD    [facility] DEP

## 2024-10-29 ENCOUNTER — HOSPITAL ENCOUNTER (OUTPATIENT)
Age: 84
Discharge: HOME | End: 2024-10-31
Payer: MEDICARE

## 2024-10-29 ENCOUNTER — HOSPITAL ENCOUNTER (OUTPATIENT)
Dept: GENERAL RADIOLOGY | Age: 84
Discharge: HOME | End: 2024-10-31
Payer: MEDICARE

## 2024-10-29 DIAGNOSIS — J44.9 COPD, MODERATE (HCC): ICD-10-CM

## 2024-10-29 PROCEDURE — 71046 X-RAY EXAM CHEST 2 VIEWS: CPT

## 2024-10-30 ENCOUNTER — PATIENT MESSAGE (OUTPATIENT)
Dept: FAMILY MEDICINE CLINIC | Age: 84
End: 2024-10-30

## 2024-10-30 DIAGNOSIS — F51.04 PSYCHOPHYSIOLOGICAL INSOMNIA: Primary | ICD-10-CM

## 2024-10-30 RX ORDER — ZALEPLON 5 MG/1
5 CAPSULE ORAL NIGHTLY
Qty: 30 CAPSULE | Refills: 0 | Status: SHIPPED | OUTPATIENT
Start: 2024-10-30 | End: 2024-10-30

## 2024-10-30 RX ORDER — ESZOPICLONE 2 MG/1
2 TABLET, FILM COATED ORAL NIGHTLY PRN
Qty: 30 TABLET | Refills: 0 | Status: SHIPPED | OUTPATIENT
Start: 2024-10-30 | End: 2024-11-29

## 2024-12-02 ENCOUNTER — PATIENT MESSAGE (OUTPATIENT)
Dept: FAMILY MEDICINE CLINIC | Age: 84
End: 2024-12-02

## 2024-12-02 DIAGNOSIS — F51.04 PSYCHOPHYSIOLOGICAL INSOMNIA: Primary | ICD-10-CM

## 2024-12-02 DIAGNOSIS — E78.2 MIXED HYPERLIPIDEMIA: ICD-10-CM

## 2024-12-02 RX ORDER — PRAVASTATIN SODIUM 20 MG
20 TABLET ORAL
Qty: 90 TABLET | Refills: 3 | Status: SHIPPED | OUTPATIENT
Start: 2024-12-02

## 2024-12-02 RX ORDER — ESZOPICLONE 2 MG/1
2 TABLET, FILM COATED ORAL NIGHTLY PRN
Qty: 30 TABLET | Refills: 0 | Status: SHIPPED | OUTPATIENT
Start: 2024-12-02 | End: 2025-01-01

## 2024-12-02 NOTE — TELEPHONE ENCOUNTER
Diagnosis Orders   1. Psychophysiological insomnia  eszopiclone (LUNESTA) 2 MG TABS      2. Mixed hyperlipidemia  pravastatin (PRAVACHOL) 20 MG tablet         10/30/2024 10/30/2024 1 Eszopiclone 2 Mg Tablet 30.00 30 Fl Chi         Future Appointments   Date Time Provider Department Center   12/5/2024 11:00 AM [facility] OP INFUSION CHAIR 03 [facility] INFUS[facility]   2/18/2025  2:00 PM Harper Cooper MD fp [facility] DEP   10/22/2025  1:30 PM Harper Cooper MD fp [facility] DEP

## 2024-12-05 ENCOUNTER — HOSPITAL ENCOUNTER (OUTPATIENT)
Dept: INFUSION THERAPY | Age: 84
Setting detail: INFUSION SERIES
Discharge: HOME | End: 2024-12-05
Payer: MEDICARE

## 2024-12-05 VITALS
SYSTOLIC BLOOD PRESSURE: 143 MMHG | HEART RATE: 79 BPM | OXYGEN SATURATION: 99 % | DIASTOLIC BLOOD PRESSURE: 71 MMHG | RESPIRATION RATE: 16 BRPM

## 2024-12-05 DIAGNOSIS — M81.0 AGE-RELATED OSTEOPOROSIS WITHOUT CURRENT PATHOLOGICAL FRACTURE: Primary | ICD-10-CM

## 2024-12-05 LAB
ALBUMIN SERPL-MCNC: 4.1 G/DL (ref 3.5–5.2)
ALP SERPL-CCNC: 71 U/L (ref 35–104)
ALT SERPL-CCNC: 14 U/L (ref 10–35)
ANION GAP SERPL CALCULATED.3IONS-SCNC: 8 MMOL/L (ref 9–16)
AST SERPL-CCNC: 22 U/L (ref 10–35)
BILIRUB SERPL-MCNC: 0.4 MG/DL (ref 0–1.2)
BUN SERPL-MCNC: 22 MG/DL (ref 8–23)
CALCIUM SERPL-MCNC: 9 MG/DL (ref 8.6–10.4)
CHLORIDE SERPL-SCNC: 102 MMOL/L (ref 98–107)
CO2 SERPL-SCNC: 25 MMOL/L (ref 20–31)
CREAT SERPL-MCNC: 1.2 MG/DL (ref 0.7–1.2)
GFR, ESTIMATED: 45 ML/MIN/1.73M2
GLUCOSE SERPL-MCNC: 102 MG/DL (ref 74–99)
POTASSIUM SERPL-SCNC: 4.5 MMOL/L (ref 3.7–5.3)
PROT SERPL-MCNC: 6.3 G/DL (ref 6.6–8.7)
SODIUM SERPL-SCNC: 135 MMOL/L (ref 136–145)

## 2024-12-05 PROCEDURE — 6360000002 HC RX W HCPCS: Performed by: FAMILY MEDICINE

## 2024-12-05 PROCEDURE — 36415 COLL VENOUS BLD VENIPUNCTURE: CPT

## 2024-12-05 PROCEDURE — 80053 COMPREHEN METABOLIC PANEL: CPT

## 2024-12-05 PROCEDURE — 96372 THER/PROPH/DIAG INJ SC/IM: CPT

## 2024-12-05 RX ORDER — ALBUTEROL SULFATE 90 UG/1
4 INHALANT RESPIRATORY (INHALATION) PRN
OUTPATIENT
Start: 2025-05-25

## 2024-12-05 RX ORDER — EPINEPHRINE 1 MG/ML
0.3 INJECTION, SOLUTION, CONCENTRATE INTRAVENOUS PRN
OUTPATIENT
Start: 2025-05-25

## 2024-12-05 RX ORDER — SODIUM CHLORIDE 9 MG/ML
INJECTION, SOLUTION INTRAVENOUS CONTINUOUS
OUTPATIENT
Start: 2025-05-25

## 2024-12-05 RX ORDER — ONDANSETRON 2 MG/ML
8 INJECTION INTRAMUSCULAR; INTRAVENOUS
OUTPATIENT
Start: 2025-05-25

## 2024-12-05 RX ORDER — HYDROCORTISONE SODIUM SUCCINATE 100 MG/2ML
100 INJECTION INTRAMUSCULAR; INTRAVENOUS
OUTPATIENT
Start: 2025-05-25

## 2024-12-05 RX ORDER — ACETAMINOPHEN 325 MG/1
650 TABLET ORAL
OUTPATIENT
Start: 2025-05-25

## 2024-12-05 RX ORDER — DIPHENHYDRAMINE HYDROCHLORIDE 50 MG/ML
50 INJECTION INTRAMUSCULAR; INTRAVENOUS
OUTPATIENT
Start: 2025-05-25

## 2024-12-05 RX ADMIN — DENOSUMAB 60 MG: 60 INJECTION SUBCUTANEOUS at 11:18

## 2024-12-05 NOTE — RESULT ENCOUNTER NOTE
Please notify patient: Sodium borderline low, could eat some salt  Kidney function stage IIIa, mild decreased, slightly worse than before, to make sure she drinks 48 ounce water daily no more than that  Otherwise labs within normal limits  continue current treatment    Future Appointments  2/18/2025  2:00 PM    Harper Cooper MD    [facility] DEP  10/22/2025 1:30 PM    Harper Cooper MD    [facility] DEP

## 2024-12-18 NOTE — TELEPHONE ENCOUNTER
ERIC to schedule prolia injection The likely etiology of thrombocytopenia is liver disease. The patients 3 most recent labs are listed below.  Recent Labs     12/16/24  0604 12/17/24  0641 12/18/24  0524   PLT 57* 57* 60*       Plan  - Will transfuse if platelet count is <50k (if undergoing surgical procedure or have active bleeding).

## 2024-12-30 ENCOUNTER — PATIENT MESSAGE (OUTPATIENT)
Dept: FAMILY MEDICINE CLINIC | Age: 84
End: 2024-12-30

## 2024-12-30 DIAGNOSIS — F51.04 PSYCHOPHYSIOLOGICAL INSOMNIA: ICD-10-CM

## 2024-12-30 RX ORDER — ESZOPICLONE 2 MG/1
2 TABLET, FILM COATED ORAL NIGHTLY PRN
Qty: 30 TABLET | Refills: 0 | Status: SHIPPED | OUTPATIENT
Start: 2024-12-30 | End: 2025-01-29

## 2025-01-09 ENCOUNTER — HOSPITAL ENCOUNTER (EMERGENCY)
Age: 85
Discharge: HOME | End: 2025-01-09
Attending: EMERGENCY MEDICINE
Payer: MEDICARE

## 2025-01-09 ENCOUNTER — APPOINTMENT (OUTPATIENT)
Dept: GENERAL RADIOLOGY | Age: 85
End: 2025-01-09
Payer: MEDICARE

## 2025-01-09 VITALS
RESPIRATION RATE: 16 BRPM | OXYGEN SATURATION: 98 % | WEIGHT: 118 LBS | HEART RATE: 85 BPM | HEIGHT: 63 IN | TEMPERATURE: 97.7 F | BODY MASS INDEX: 20.91 KG/M2 | DIASTOLIC BLOOD PRESSURE: 73 MMHG | SYSTOLIC BLOOD PRESSURE: 152 MMHG

## 2025-01-09 DIAGNOSIS — S93.492A SPRAIN OF ANTERIOR TALOFIBULAR LIGAMENT OF LEFT ANKLE, INITIAL ENCOUNTER: Primary | ICD-10-CM

## 2025-01-09 PROCEDURE — 73630 X-RAY EXAM OF FOOT: CPT

## 2025-01-09 PROCEDURE — 6370000000 HC RX 637 (ALT 250 FOR IP): Performed by: PHYSICIAN ASSISTANT

## 2025-01-09 PROCEDURE — 99283 EMERGENCY DEPT VISIT LOW MDM: CPT

## 2025-01-09 PROCEDURE — 73610 X-RAY EXAM OF ANKLE: CPT

## 2025-01-09 RX ORDER — IBUPROFEN 400 MG/1
400 TABLET, FILM COATED ORAL ONCE
Status: COMPLETED | OUTPATIENT
Start: 2025-01-09 | End: 2025-01-09

## 2025-01-09 RX ADMIN — IBUPROFEN 400 MG: 400 TABLET, FILM COATED ORAL at 17:42

## 2025-01-09 ASSESSMENT — PAIN DESCRIPTION - ORIENTATION
ORIENTATION: LEFT
ORIENTATION: LEFT

## 2025-01-09 ASSESSMENT — LIFESTYLE VARIABLES
HOW OFTEN DO YOU HAVE A DRINK CONTAINING ALCOHOL: 4 OR MORE TIMES A WEEK
HOW MANY STANDARD DRINKS CONTAINING ALCOHOL DO YOU HAVE ON A TYPICAL DAY: 1 OR 2

## 2025-01-09 ASSESSMENT — PAIN SCALES - GENERAL
PAINLEVEL_OUTOF10: 10
PAINLEVEL_OUTOF10: 10

## 2025-01-09 ASSESSMENT — PAIN - FUNCTIONAL ASSESSMENT: PAIN_FUNCTIONAL_ASSESSMENT: 0-10

## 2025-01-09 ASSESSMENT — PAIN DESCRIPTION - LOCATION
LOCATION: ANKLE
LOCATION: ANKLE

## 2025-01-09 NOTE — ED TRIAGE NOTES
Mode of arrival (squad #, walk in, police, etc) : walk in        Chief complaint(s): ankle injury        Arrival Note (brief scenario, treatment PTA, etc).: Pt reports twisting L ankle Pt is A&OX4.        C= \"Have you ever felt that you should Cut down on your drinking?\"  No  A= \"Have people Annoyed you by criticizing your drinking?\"  No  G= \"Have you ever felt bad or Guilty about your drinking?\"  No  E= \"Have you ever had a drink as an Eye-opener first thing in the morning to steady your nerves or to help a hangover?\"  No      Deferred []      Reason for deferring: N/A    *If yes to two or more: probable alcohol abuse.*

## 2025-01-09 NOTE — ED PROVIDER NOTES
eMERGENCY dEPARTMENT  Attending Physician Attestation     Pt Name: Ap Napier  MRN: [id number]  Birthdate 1940  Date of evaluation: 1/9/25     Ap Napier is a 84 y.o. female with CC: Ankle Injury      Based on the medical record the care appears appropriate.  I was personally available for consultation in the Emergency Department.    Filipe Gomez DO  Attending Emergency Physician                 Filipe Gomez DO  01/10/25 122    
Tobacco comments:     Quit smoking 6/20/2000   Vaping Use    Vaping status: Never Used   Substance Use Topics    Alcohol use: Yes     Alcohol/week: 7.0 standard drinks of alcohol     Types: 7 Glasses of wine per week     Comment: occassional    Drug use: No         Gregoria Calderon PA-C, Gregoria Islas PA  01/09/25 [id number]

## 2025-01-17 DIAGNOSIS — J30.89 NON-SEASONAL ALLERGIC RHINITIS DUE TO OTHER ALLERGIC TRIGGER: ICD-10-CM

## 2025-01-17 RX ORDER — FLUTICASONE PROPIONATE 50 MCG
2 SPRAY, SUSPENSION (ML) NASAL DAILY
Qty: 48 G | Refills: 1 | Status: SHIPPED | OUTPATIENT
Start: 2025-01-17

## 2025-01-17 NOTE — TELEPHONE ENCOUNTER
Please Approve or Refuse.  Send to Pharmacy per Pt's Request:      Next Visit Date:  2/18/2025   Last Visit Date: 10/18/2024    Hemoglobin A1C (%)   Date Value   06/24/2024 5.3   05/24/2023 5.4   05/16/2022 5.3             ( goal A1C is < 7)   BP Readings from Last 3 Encounters:   01/09/25 (!) 152/73   12/05/24 (!) 143/71   10/18/24 136/78          (goal 120/80)  BUN   Date Value Ref Range Status   12/05/2024 22 8 - 23 mg/dL Final     Creatinine   Date Value Ref Range Status   12/05/2024 1.2 0.7 - 1.2 mg/dL Final     Potassium   Date Value Ref Range Status   12/05/2024 4.5 3.7 - 5.3 mmol/L Final

## 2025-01-28 ENCOUNTER — TELEPHONE (OUTPATIENT)
Dept: PODIATRY | Age: 85
End: 2025-01-28

## 2025-01-28 DIAGNOSIS — F51.04 PSYCHOPHYSIOLOGICAL INSOMNIA: ICD-10-CM

## 2025-01-28 RX ORDER — ESZOPICLONE 2 MG/1
2 TABLET, FILM COATED ORAL NIGHTLY PRN
Qty: 30 TABLET | Refills: 0 | Status: SHIPPED | OUTPATIENT
Start: 2025-01-28 | End: 2025-02-27

## 2025-02-05 ENCOUNTER — OFFICE VISIT (OUTPATIENT)
Dept: PODIATRY | Age: 85
End: 2025-02-05
Payer: MEDICARE

## 2025-02-05 VITALS — BODY MASS INDEX: 20.91 KG/M2 | HEIGHT: 63 IN | WEIGHT: 118 LBS

## 2025-02-05 DIAGNOSIS — M25.572 LEFT ANKLE PAIN, UNSPECIFIED CHRONICITY: ICD-10-CM

## 2025-02-05 DIAGNOSIS — S92.025A CLOSED NONDISPLACED FRACTURE OF ANTERIOR PROCESS OF LEFT CALCANEUS, INITIAL ENCOUNTER: ICD-10-CM

## 2025-02-05 DIAGNOSIS — M79.672 LEFT FOOT PAIN: Primary | ICD-10-CM

## 2025-02-05 PROCEDURE — 99214 OFFICE O/P EST MOD 30 MIN: CPT | Performed by: PODIATRIST

## 2025-02-05 PROCEDURE — 1159F MED LIST DOCD IN RCRD: CPT | Performed by: PODIATRIST

## 2025-02-05 PROCEDURE — 1123F ACP DISCUSS/DSCN MKR DOCD: CPT | Performed by: PODIATRIST

## 2025-02-05 PROCEDURE — 1125F AMNT PAIN NOTED PAIN PRSNT: CPT | Performed by: PODIATRIST

## 2025-02-05 NOTE — PROGRESS NOTES
[facility]  Return Patient     Ap Napier 84 y.o. female that presents for follow-up of   Chief Complaint   Patient presents with    Ankle Injury     Left ankle, sprained it about 3 weeks ago        Ap whitehead stepped off of a curb, about 3-4 weeks ago. Could not put weigth on her foot immediately after. Foot went under and turned in. Was very bruised. Went to the ER. Xrays were negative. Still having pain. Symptoms began 3 week(s) ago and are unchanged .  Patient relates pain is Present.  Pain is rated 5 out of 10 and is described as constant, waxing and waning, moderate, severe.  Treatments prior to today's visit include: non.  Currently denies F/C/N/V.     Allergies   Allergen Reactions    Trazodone And Nefazodone      Passed out    Atorvastatin      Muscle cramps       Past Medical History:   Diagnosis Date    Abdominal pain     Age-related osteoporosis without current pathological fracture 05/03/2023    Allergic rhinitis     Anxiety     Chronic back pain     Chronic fatigue     Chronic kidney disease     Chronic renal disease, stage III ([facility]) [077431] 04/20/2022    Chronic respiratory failure 08/11/2020    Colon polyp 03/07/2012    TUBULAR ADENOMA    COPD (chronic obstructive pulmonary disease) ([facility])     COPD, Panlobular emphysema ([facility]) moderate to severe per PFTs     COVID 10/2022    mild case states treated with Paxlovid    DDD (degenerative disc disease), thoracic 10/26/2024    Depression     Diverticul disease small and large intestine, no perforati or abscess     Dizziness 02/20/2023    Elevated blood pressure reading     Emphysema of lung ([facility])     Fall 01/12/2017    Gastritis 05/22/2016    GERD (gastroesophageal reflux disease)     Glaucoma     Hyperlipidemia     with target LDL less than 100    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[facility])     Hyperthyroidism 2012    Hyponatremia 08/16/2021    Insomnia     Psychophysiological    Neuropathy     Orthostatic dizziness 03/12/2017

## 2025-02-15 ASSESSMENT — PATIENT HEALTH QUESTIONNAIRE - PHQ9
9. THOUGHTS THAT YOU WOULD BE BETTER OFF DEAD, OR OF HURTING YOURSELF: NOT AT ALL
6. FEELING BAD ABOUT YOURSELF - OR THAT YOU ARE A FAILURE OR HAVE LET YOURSELF OR YOUR FAMILY DOWN: NOT AT ALL
5. POOR APPETITE OR OVEREATING: NOT AT ALL
10. IF YOU CHECKED OFF ANY PROBLEMS, HOW DIFFICULT HAVE THESE PROBLEMS MADE IT FOR YOU TO DO YOUR WORK, TAKE CARE OF THINGS AT HOME, OR GET ALONG WITH OTHER PEOPLE: NOT DIFFICULT AT ALL
10. IF YOU CHECKED OFF ANY PROBLEMS, HOW DIFFICULT HAVE THESE PROBLEMS MADE IT FOR YOU TO DO YOUR WORK, TAKE CARE OF THINGS AT HOME, OR GET ALONG WITH OTHER PEOPLE: NOT DIFFICULT AT ALL
SUM OF ALL RESPONSES TO PHQ9 QUESTIONS 1 & 2: 0
3. TROUBLE FALLING OR STAYING ASLEEP: MORE THAN HALF THE DAYS
4. FEELING TIRED OR HAVING LITTLE ENERGY: SEVERAL DAYS
2. FEELING DOWN, DEPRESSED OR HOPELESS: NOT AT ALL
9. THOUGHTS THAT YOU WOULD BE BETTER OFF DEAD, OR OF HURTING YOURSELF: NOT AT ALL
SUM OF ALL RESPONSES TO PHQ QUESTIONS 1-9: 3
8. MOVING OR SPEAKING SO SLOWLY THAT OTHER PEOPLE COULD HAVE NOTICED. OR THE OPPOSITE - BEING SO FIDGETY OR RESTLESS THAT YOU HAVE BEEN MOVING AROUND A LOT MORE THAN USUAL: NOT AT ALL
SUM OF ALL RESPONSES TO PHQ QUESTIONS 1-9: 3
5. POOR APPETITE OR OVEREATING: NOT AT ALL
7. TROUBLE CONCENTRATING ON THINGS, SUCH AS READING THE NEWSPAPER OR WATCHING TELEVISION: NOT AT ALL
1. LITTLE INTEREST OR PLEASURE IN DOING THINGS: NOT AT ALL
8. MOVING OR SPEAKING SO SLOWLY THAT OTHER PEOPLE COULD HAVE NOTICED. OR THE OPPOSITE, BEING SO FIGETY OR RESTLESS THAT YOU HAVE BEEN MOVING AROUND A LOT MORE THAN USUAL: NOT AT ALL
6. FEELING BAD ABOUT YOURSELF - OR THAT YOU ARE A FAILURE OR HAVE LET YOURSELF OR YOUR FAMILY DOWN: NOT AT ALL
4. FEELING TIRED OR HAVING LITTLE ENERGY: SEVERAL DAYS
3. TROUBLE FALLING OR STAYING ASLEEP: MORE THAN HALF THE DAYS
SUM OF ALL RESPONSES TO PHQ QUESTIONS 1-9: 3
2. FEELING DOWN, DEPRESSED OR HOPELESS: NOT AT ALL
SUM OF ALL RESPONSES TO PHQ QUESTIONS 1-9: 3
SUM OF ALL RESPONSES TO PHQ QUESTIONS 1-9: 3
1. LITTLE INTEREST OR PLEASURE IN DOING THINGS: NOT AT ALL
7. TROUBLE CONCENTRATING ON THINGS, SUCH AS READING THE NEWSPAPER OR WATCHING TELEVISION: NOT AT ALL

## 2025-02-18 ENCOUNTER — OFFICE VISIT (OUTPATIENT)
Dept: FAMILY MEDICINE CLINIC | Age: 85
End: 2025-02-18
Payer: MEDICARE

## 2025-02-18 VITALS
HEART RATE: 66 BPM | HEIGHT: 63 IN | SYSTOLIC BLOOD PRESSURE: 120 MMHG | WEIGHT: 124 LBS | OXYGEN SATURATION: 98 % | DIASTOLIC BLOOD PRESSURE: 64 MMHG | TEMPERATURE: 97.4 F | BODY MASS INDEX: 21.97 KG/M2

## 2025-02-18 DIAGNOSIS — M79.672 LEFT FOOT PAIN: ICD-10-CM

## 2025-02-18 DIAGNOSIS — N18.30 BENIGN HYPERTENSION WITH CKD (CHRONIC KIDNEY DISEASE) STAGE III (HCC): Primary | ICD-10-CM

## 2025-02-18 DIAGNOSIS — R73.9 HYPERGLYCEMIA: ICD-10-CM

## 2025-02-18 DIAGNOSIS — E78.2 MIXED HYPERLIPIDEMIA: ICD-10-CM

## 2025-02-18 DIAGNOSIS — I12.9 BENIGN HYPERTENSION WITH CKD (CHRONIC KIDNEY DISEASE) STAGE III (HCC): Primary | ICD-10-CM

## 2025-02-18 DIAGNOSIS — F51.04 PSYCHOPHYSIOLOGICAL INSOMNIA: ICD-10-CM

## 2025-02-18 DIAGNOSIS — M81.0 AGE-RELATED OSTEOPOROSIS WITHOUT CURRENT PATHOLOGICAL FRACTURE: ICD-10-CM

## 2025-02-18 DIAGNOSIS — Z51.81 MEDICATION MONITORING ENCOUNTER: ICD-10-CM

## 2025-02-18 DIAGNOSIS — J43.1 PANLOBULAR EMPHYSEMA (HCC): ICD-10-CM

## 2025-02-18 PROBLEM — F33.42 MDD (MAJOR DEPRESSIVE DISORDER), RECURRENT, IN FULL REMISSION: Status: RESOLVED | Noted: 2017-03-12 | Resolved: 2025-02-18

## 2025-02-18 PROCEDURE — 1123F ACP DISCUSS/DSCN MKR DOCD: CPT | Performed by: FAMILY MEDICINE

## 2025-02-18 PROCEDURE — 1125F AMNT PAIN NOTED PAIN PRSNT: CPT | Performed by: FAMILY MEDICINE

## 2025-02-18 PROCEDURE — 1159F MED LIST DOCD IN RCRD: CPT | Performed by: FAMILY MEDICINE

## 2025-02-18 PROCEDURE — 1160F RVW MEDS BY RX/DR IN RCRD: CPT | Performed by: FAMILY MEDICINE

## 2025-02-18 PROCEDURE — 99214 OFFICE O/P EST MOD 30 MIN: CPT | Performed by: FAMILY MEDICINE

## 2025-02-18 RX ORDER — CAMPHOR, MENTHOL, METHYL SALICYLATE 3.1; 10; 15 G/100G; G/100G; G/100G
GEL PERCUTANEOUS; TOPICAL; TRANSDERMAL
Qty: 78 G | Refills: 1
Start: 2025-02-18

## 2025-02-18 RX ORDER — LOVASTATIN 10 MG/1
10 TABLET ORAL NIGHTLY
Qty: 90 TABLET | Refills: 3 | Status: SHIPPED | OUTPATIENT
Start: 2025-02-18

## 2025-02-18 SDOH — ECONOMIC STABILITY: FOOD INSECURITY: WITHIN THE PAST 12 MONTHS, THE FOOD YOU BOUGHT JUST DIDN'T LAST AND YOU DIDN'T HAVE MONEY TO GET MORE.: NEVER TRUE

## 2025-02-18 SDOH — ECONOMIC STABILITY: FOOD INSECURITY: WITHIN THE PAST 12 MONTHS, YOU WORRIED THAT YOUR FOOD WOULD RUN OUT BEFORE YOU GOT MONEY TO BUY MORE.: NEVER TRUE

## 2025-02-18 NOTE — ASSESSMENT & PLAN NOTE
Mild, intermittent, low-carb diet advisable, increase proteins in diet  Check labs    Lab Results   Component Value Date    LABA1C 5.3 06/24/2024    LABA1C 5.4 05/24/2023    LABA1C 5.3 05/16/2022       Orders:    Vitamin B12 & Folate; Future    Hemoglobin A1C; Future

## 2025-02-18 NOTE — PROGRESS NOTES
Ap Napier (:  1940) is a 84 y.o. female,Established patient, here for evaluation of the following chief complaint(s): Hypertension, COPD, and Insomnia      ASSESSMENT/PLAN:    Assessment & Plan  Benign hypertension with CKD (chronic kidney disease) stage III (HCC)    Chronic hypertension, well-controlled, at goal    Chronic kidney disease stage IIIa GFR 45, stable, will continue to monitor, avoid dehydration and NSAIDs  Check labs  Continue amlodipine 2.5 Mg daily, metoprolol XL 25 Mg daily    Lab Results   Component Value Date/Time     2024 11:00 AM    K 4.5 2024 11:00 AM     2024 11:00 AM    CO2 25 2024 11:00 AM    BUN 22 2024 11:00 AM    CREATININE 1.2 2024 11:00 AM    GLUCOSE 102 2024 11:00 AM    GLUCOSE 104 2012 09:49 AM    CALCIUM 9.0 2024 11:00 AM    LABGLOM 45 2024 11:00 AM    LABGLOM 50 2023 12:59 PM        Orders:    CBC with Auto Differential; Future    Phosphorus; Future    Magnesium; Future    Comprehensive Metabolic Panel; Future    TSH; Future    Uric Acid; Future    Urinalysis with Microscopic; Future    Mixed hyperlipidemia    Chronic, not well-controlled, unable to take pravastatin due to muscle cramps  We will try Mevacor 10 Mg daily  Orders:    lovastatin (MEVACOR) 10 MG tablet; Take 1 tablet by mouth nightly Stop Pravachol    Panlobular emphysema (HCC)    Chronic, improved with medications, she already quit smoking many years ago  Continue Trelegy 1 puff daily, albuterol as needed       Age-related osteoporosis without current pathological fracture    Chronic, likely improving with Prolia injections every 6 months  Check vitamin D level  Orders:    Vitamin D 25 Hydroxy; Future    Psychophysiological insomnia    Chronic, improved with medication  Continue Lunesta 2 Mg at night as needed, she denies side effects  Sleep hygiene discussed as well       Left foot pain    Acute, improved, postfracture of

## 2025-02-18 NOTE — ASSESSMENT & PLAN NOTE
Chronic, likely improving with Prolia injections every 6 months  Check vitamin D level  Orders:    Vitamin D 25 Hydroxy; Future

## 2025-02-18 NOTE — ASSESSMENT & PLAN NOTE
Chronic, improved with medication  Continue Lunesta 2 Mg at night as needed, she denies side effects  Sleep hygiene discussed as well

## 2025-02-18 NOTE — ASSESSMENT & PLAN NOTE
Chronic, improved with medications, she already quit smoking many years ago  Continue Trelegy 1 puff daily, albuterol as needed

## 2025-02-18 NOTE — ASSESSMENT & PLAN NOTE
Chronic, not well-controlled, unable to take pravastatin due to muscle cramps  We will try Mevacor 10 Mg daily  Orders:    lovastatin (MEVACOR) 10 MG tablet; Take 1 tablet by mouth nightly Stop Pravachol

## 2025-02-18 NOTE — ASSESSMENT & PLAN NOTE
Chronic hypertension, well-controlled, at goal    Chronic kidney disease stage IIIa GFR 45, stable, will continue to monitor, avoid dehydration and NSAIDs  Check labs  Continue amlodipine 2.5 Mg daily, metoprolol XL 25 Mg daily    Lab Results   Component Value Date/Time     12/05/2024 11:00 AM    K 4.5 12/05/2024 11:00 AM     12/05/2024 11:00 AM    CO2 25 12/05/2024 11:00 AM    BUN 22 12/05/2024 11:00 AM    CREATININE 1.2 12/05/2024 11:00 AM    GLUCOSE 102 12/05/2024 11:00 AM    GLUCOSE 104 02/27/2012 09:49 AM    CALCIUM 9.0 12/05/2024 11:00 AM    LABGLOM 45 12/05/2024 11:00 AM    LABGLOM 50 11/28/2023 12:59 PM        Orders:    CBC with Auto Differential; Future    Phosphorus; Future    Magnesium; Future    Comprehensive Metabolic Panel; Future    TSH; Future    Uric Acid; Future    Urinalysis with Microscopic; Future

## 2025-02-18 NOTE — PROGRESS NOTES
Visit Information    Have you changed or started any medications since your last visit including any over-the-counter medicines, vitamins, or herbal medicines? no   Are you having any side effects from any of your medications? -  no  Have you stopped taking any of your medications? Is so, why? -  no    Have you seen any other physician or provider since your last visit? Yes - Records Obtained  Have you had any other diagnostic tests since your last visit? Yes - Records Obtained  Have you been seen in the emergency room and/or had an admission to a hospital since we last saw you? No  Have you had your routine dental cleaning in the past 6 months? no    Have you activated your [employer] account? If not, what are your barriers? Yes     Patient Care Team:  Harper Cooper MD as PCP - General  Harper Cooper MD as PCP - Empaneled Provider  Hair Manriquez MD as Surgeon (General Surgery)  Amilcar Zurita MD as Surgeon (Ophthalmology)  Garfield Mchugh MD as Consulting Physician (Cardiovascular Disease)  Martha Carballo MD as Consulting Physician (Otolaryngology)  Coleman Santos MD as Consulting Physician (Cardiology)  Rod Hernandez MD as Consulting Physician (Pulmonology)  Aamir Kate MD as Consulting Physician (Pulmonology)  Laurence Bose MD (Hematology and Oncology)  Janet Oropeza DPM as Consulting Physician (Podiatry)  Moo Lopes MD as Consulting Physician (Orthopedic Surgery)  Fani Ridley MD as Consulting Physician (Gastroenterology)    Medical History Review  Past Medical, Family, and Social History reviewed and does contribute to the patient presenting condition    Health Maintenance   Topic Date Due    Colorectal Cancer Screen  10/25/2024    Annual Wellness Visit (Medicare Advantage)  01/01/2025    Breast cancer screen  07/16/2025    Lipids  10/22/2025    Depression Monitoring  02/15/2026    DEXA (modify frequency per FRAX score)  06/24/2026    DTaP/Tdap/Td vaccine

## 2025-02-24 PROBLEM — N18.31 STAGE 3A CHRONIC KIDNEY DISEASE (HCC): Status: RESOLVED | Noted: 2024-06-13 | Resolved: 2025-02-24

## 2025-02-24 NOTE — ASSESSMENT & PLAN NOTE
Acute, improved, postfracture of the left foot on the lateral side per her report, follow-up with podiatry, continue with walking boot  IcyHot topical cream advised, foot elevation, follow-up with podiatry  Orders:    Camphor-Menthol-Methyl Sal (SALONPAS DEEP RELIEVING) 3.1-10-15 % GEL; TID as needed for back pain

## 2025-02-26 DIAGNOSIS — F51.04 PSYCHOPHYSIOLOGICAL INSOMNIA: ICD-10-CM

## 2025-02-26 RX ORDER — ESZOPICLONE 2 MG/1
2 TABLET, FILM COATED ORAL NIGHTLY PRN
Qty: 30 TABLET | Refills: 0 | Status: SHIPPED | OUTPATIENT
Start: 2025-02-26 | End: 2025-03-28

## 2025-02-26 NOTE — TELEPHONE ENCOUNTER
Please Approve or Refuse.  Send to Pharmacy per Pt's Request: [facility]      Next Visit Date:  6/13/2025   Last Visit Date: 2/18/2025    Hemoglobin A1C (%)   Date Value   06/24/2024 5.3   05/24/2023 5.4   05/16/2022 5.3             ( goal A1C is < 7)   BP Readings from Last 3 Encounters:   02/18/25 120/64   01/09/25 (!) 152/73   12/05/24 (!) 143/71          (goal 120/80)  BUN   Date Value Ref Range Status   12/05/2024 22 8 - 23 mg/dL Final     Creatinine   Date Value Ref Range Status   12/05/2024 1.2 0.7 - 1.2 mg/dL Final     Potassium   Date Value Ref Range Status   12/05/2024 4.5 3.7 - 5.3 mmol/L Final

## 2025-03-04 ENCOUNTER — OFFICE VISIT (OUTPATIENT)
Dept: PODIATRY | Age: 85
End: 2025-03-04

## 2025-03-04 VITALS — BODY MASS INDEX: 21.97 KG/M2 | HEIGHT: 63 IN | WEIGHT: 124 LBS

## 2025-03-04 DIAGNOSIS — M79.672 LEFT FOOT PAIN: ICD-10-CM

## 2025-03-04 DIAGNOSIS — S92.025A CLOSED NONDISPLACED FRACTURE OF ANTERIOR PROCESS OF LEFT CALCANEUS, INITIAL ENCOUNTER: Primary | ICD-10-CM

## 2025-03-04 NOTE — PROGRESS NOTES
08/16/2021    Insomnia     Psychophysiological    Neuropathy     Orthostatic dizziness 03/12/2017    Osteoarthritis     Panlobular emphysema (HCC)     Postprocedural hypoparathyroidism 06/13/2024    Primary hypertension 02/20/2023    PVC (premature ventricular contraction) 01/12/2017    S/P parathyroidectomy     Stage 3a chronic kidney disease (HCC) 06/13/2024    Tubal pregnancy 1972,1969    Vasovagal syncope 01/12/2017       Prior to Admission medications    Medication Sig Start Date End Date Taking? Authorizing Provider   eszopiclone (LUNESTA) 2 MG TABS Take 1 tablet by mouth nightly as needed (insomnia) for up to 30 days. Max Daily Amount: 2 mg 2/26/25 3/28/25 Yes Harper Cooper MD   Camphor-Menthol-Methyl Sal (SALONPAS DEEP RELIEVING) 3.1-10-15 % GEL TID as needed for back pain 2/18/25  Yes Harper Cooper MD   lovastatin (MEVACOR) 10 MG tablet Take 1 tablet by mouth nightly Stop Pravachol 2/18/25  Yes Harper Cooper MD   fluticasone (FLONASE) 50 MCG/ACT nasal spray 2 sprays by Each Nostril route daily 1/17/25  Yes Harper Cooper MD   metoprolol succinate (TOPROL XL) 25 MG extended release tablet Take 1 tablet by mouth daily Goal BP bellow 130/80 and above 115/65, heart rate 56 to 90 bpm 10/18/24  Yes Harper Cooper MD   omeprazole (PRILOSEC) 20 MG delayed release capsule take 1 capsule by mouth twice a day MORNING AND BEFORE BED 7/26/24  Yes Aixa Bolanos PA-C   dorzolamide (TRUSOPT) 2 % ophthalmic solution  6/10/24  Yes ProviderArnold MD   timolol (TIMOPTIC) 0.5 % ophthalmic solution  6/10/24  Yes ProviderArnold MD   brimonidine-timolol (COMBIGAN) 0.2-0.5 % ophthalmic solution  1/1/1900  Yes Provider, MD Arnold   albuterol sulfate HFA (PROVENTIL;VENTOLIN;PROAIR) 108 (90 Base) MCG/ACT inhaler Inhale 2 puffs into the lungs every 6 hours as needed for Wheezing or Shortness of Breath And when sick 1/15/24  Yes Harper Cooper MD TRELEGY ELLIPTA

## 2025-03-17 ENCOUNTER — RESULTS FOLLOW-UP (OUTPATIENT)
Dept: FAMILY MEDICINE CLINIC | Age: 85
End: 2025-03-17

## 2025-03-17 ENCOUNTER — HOSPITAL ENCOUNTER (OUTPATIENT)
Age: 85
Discharge: HOME | End: 2025-03-17
Payer: MEDICARE

## 2025-03-17 DIAGNOSIS — N18.30 BENIGN HYPERTENSION WITH CKD (CHRONIC KIDNEY DISEASE) STAGE III (HCC): ICD-10-CM

## 2025-03-17 DIAGNOSIS — I12.9 BENIGN HYPERTENSION WITH CKD (CHRONIC KIDNEY DISEASE) STAGE III (HCC): ICD-10-CM

## 2025-03-17 DIAGNOSIS — Z51.81 MEDICATION MONITORING ENCOUNTER: ICD-10-CM

## 2025-03-17 DIAGNOSIS — N18.30 BENIGN HYPERTENSION WITH CKD (CHRONIC KIDNEY DISEASE) STAGE III (HCC): Primary | ICD-10-CM

## 2025-03-17 DIAGNOSIS — M81.0 AGE-RELATED OSTEOPOROSIS WITHOUT CURRENT PATHOLOGICAL FRACTURE: ICD-10-CM

## 2025-03-17 DIAGNOSIS — I12.9 BENIGN HYPERTENSION WITH CKD (CHRONIC KIDNEY DISEASE) STAGE III (HCC): Primary | ICD-10-CM

## 2025-03-17 DIAGNOSIS — R73.9 HYPERGLYCEMIA: ICD-10-CM

## 2025-03-17 DIAGNOSIS — N28.1 KIDNEY CYST, ACQUIRED: ICD-10-CM

## 2025-03-17 LAB
25(OH)D3 SERPL-MCNC: 45.8 NG/ML (ref 30–100)
ALBUMIN SERPL-MCNC: 4 G/DL (ref 3.5–5.2)
ALP SERPL-CCNC: 100 U/L (ref 35–104)
ALT SERPL-CCNC: 12 U/L (ref 10–35)
ANION GAP SERPL CALCULATED.3IONS-SCNC: 10 MMOL/L (ref 9–16)
AST SERPL-CCNC: 19 U/L (ref 10–35)
BACTERIA URNS QL MICRO: ABNORMAL
BASOPHILS # BLD: 0 K/UL (ref 0–0.2)
BASOPHILS NFR BLD: 1 % (ref 0–2)
BILIRUB SERPL-MCNC: 0.3 MG/DL (ref 0–1.2)
BILIRUB UR QL STRIP: NEGATIVE
BUN SERPL-MCNC: 21 MG/DL (ref 8–23)
CALCIUM SERPL-MCNC: 9.2 MG/DL (ref 8.6–10.4)
CASTS #/AREA URNS LPF: ABNORMAL /LPF
CHLORIDE SERPL-SCNC: 105 MMOL/L (ref 98–107)
CLARITY UR: CLEAR
CO2 SERPL-SCNC: 26 MMOL/L (ref 20–31)
COLOR UR: YELLOW
CREAT SERPL-MCNC: 1.2 MG/DL (ref 0.7–1.2)
EOSINOPHIL # BLD: 0.1 K/UL (ref 0–0.4)
EOSINOPHILS RELATIVE PERCENT: 2 % (ref 0–4)
EPI CELLS #/AREA URNS HPF: ABNORMAL /HPF
ERYTHROCYTE [DISTWIDTH] IN BLOOD BY AUTOMATED COUNT: 13.2 % (ref 11.5–14.9)
EST. AVERAGE GLUCOSE BLD GHB EST-MCNC: 108 MG/DL
FOLATE SERPL-MCNC: 4.8 NG/ML (ref 4.8–24.2)
GFR, ESTIMATED: 45 ML/MIN/1.73M2
GLUCOSE SERPL-MCNC: 97 MG/DL (ref 74–99)
GLUCOSE UR STRIP-MCNC: NEGATIVE MG/DL
HBA1C MFR BLD: 5.4 % (ref 4–6)
HCT VFR BLD AUTO: 41.9 % (ref 36–46)
HGB BLD-MCNC: 13.7 G/DL (ref 12–16)
HGB UR QL STRIP.AUTO: NEGATIVE
KETONES UR STRIP-MCNC: NEGATIVE MG/DL
LEUKOCYTE ESTERASE UR QL STRIP: ABNORMAL
LYMPHOCYTES NFR BLD: 1.7 K/UL (ref 1–4.8)
LYMPHOCYTES RELATIVE PERCENT: 33 % (ref 24–44)
MAGNESIUM SERPL-MCNC: 2.2 MG/DL (ref 1.6–2.4)
MCH RBC QN AUTO: 30.8 PG (ref 26–34)
MCHC RBC AUTO-ENTMCNC: 32.7 G/DL (ref 31–37)
MCV RBC AUTO: 94.2 FL (ref 80–100)
MONOCYTES NFR BLD: 0.6 K/UL (ref 0.1–1.3)
MONOCYTES NFR BLD: 11 % (ref 1–7)
NEUTROPHILS NFR BLD: 53 % (ref 36–66)
NEUTS SEG NFR BLD: 2.8 K/UL (ref 1.3–9.1)
NITRITE UR QL STRIP: NEGATIVE
PH UR STRIP: 5.5 [PH] (ref 5–8)
PHOSPHATE SERPL-MCNC: 4 MG/DL (ref 2.5–4.5)
PLATELET # BLD AUTO: 191 K/UL (ref 150–450)
PMV BLD AUTO: 7.2 FL (ref 6–12)
POTASSIUM SERPL-SCNC: 4.6 MMOL/L (ref 3.7–5.3)
PROT SERPL-MCNC: 6.3 G/DL (ref 6.6–8.7)
PROT UR STRIP-MCNC: NEGATIVE MG/DL
RBC # BLD AUTO: 4.45 M/UL (ref 4–5.2)
RBC #/AREA URNS HPF: ABNORMAL /HPF
SODIUM SERPL-SCNC: 141 MMOL/L (ref 136–145)
SP GR UR STRIP: 1.01 (ref 1–1.03)
TSH SERPL DL<=0.05 MIU/L-ACNC: 2.77 UIU/ML (ref 0.27–4.2)
URATE SERPL-MCNC: 5.9 MG/DL (ref 2.4–5.7)
UROBILINOGEN UR STRIP-ACNC: NORMAL EU/DL (ref 0–1)
VIT B12 SERPL-MCNC: 314 PG/ML (ref 232–1245)
WBC #/AREA URNS HPF: ABNORMAL /HPF
WBC OTHER # BLD: 5.2 K/UL (ref 3.5–11)

## 2025-03-17 PROCEDURE — 82607 VITAMIN B-12: CPT

## 2025-03-17 PROCEDURE — 80307 DRUG TEST PRSMV CHEM ANLYZR: CPT

## 2025-03-17 PROCEDURE — 82306 VITAMIN D 25 HYDROXY: CPT

## 2025-03-17 PROCEDURE — 83036 HEMOGLOBIN GLYCOSYLATED A1C: CPT

## 2025-03-17 PROCEDURE — 84100 ASSAY OF PHOSPHORUS: CPT

## 2025-03-17 PROCEDURE — 36415 COLL VENOUS BLD VENIPUNCTURE: CPT

## 2025-03-17 PROCEDURE — G0480 DRUG TEST DEF 1-7 CLASSES: HCPCS

## 2025-03-17 PROCEDURE — 82746 ASSAY OF FOLIC ACID SERUM: CPT

## 2025-03-17 PROCEDURE — 85025 COMPLETE CBC W/AUTO DIFF WBC: CPT

## 2025-03-17 PROCEDURE — 80053 COMPREHEN METABOLIC PANEL: CPT

## 2025-03-17 PROCEDURE — 84550 ASSAY OF BLOOD/URIC ACID: CPT

## 2025-03-17 PROCEDURE — 83735 ASSAY OF MAGNESIUM: CPT

## 2025-03-17 PROCEDURE — 84443 ASSAY THYROID STIM HORMONE: CPT

## 2025-03-17 PROCEDURE — 81001 URINALYSIS AUTO W/SCOPE: CPT

## 2025-03-17 NOTE — RESULT ENCOUNTER NOTE
Please notify patient: Uric acid borderline high, to cut down red meat, pop or any alcoholic beverages if she drinks any  Kidney disease stage IIIb stable.  As her kidney function is worsening, I will order an ultrasound of the kidneys, previously she just had a kidney cyst in 2019  Mildly low proteins, does need to increase the amount of chicken and dairy products that she eats  Otherwise labs within normal limits  continue current treatment    Future Appointments  4/1/2025   3:45 PM    Janet Oropeza DPM    [facility]          [facility]  6/13/2025  1:00 PM    Harper Cooper MD    fp [facility] DEP  10/22/2025 1:30 PM    Harper Cooper MD    fp [facility] DEP

## 2025-03-17 NOTE — PROGRESS NOTES
us     Diagnosis Orders   1. Benign hypertension with CKD (chronic kidney disease) stage III (HCC)  US RETROPERITONEAL LIMITED      2. Kidney cyst, acquired  US RETROPERITONEAL LIMITED           Future Appointments   Date Time Provider Department Center   4/1/2025  3:45 PM Jnaet Oropeza DPM [facility]   6/13/2025  1:00 PM Harper Cooper MD fp [facility] DEP   10/22/2025  1:30 PM Harper Cooper MD fp [facility] DEP

## 2025-03-21 LAB
6-ACETYLMORPHINE, UR: NOT DETECTED
7-AMINOCLONAZEPAM, URINE: NOT DETECTED
ALPHA-OH-ALPRAZ, URINE: NOT DETECTED
ALPHA-OH-MIDAZOLAM, URINE: NOT DETECTED
ALPRAZOLAM, URINE: NOT DETECTED
AMPHETAMINE, URINE: NOT DETECTED
BARBITURATES, URINE: NEGATIVE
BENZOYLECGONINE, UR: NEGATIVE
BUPRENORPHINE URINE: NOT DETECTED
CARISOPRODOL, UR: NEGATIVE
CLONAZEPAM, URINE: NOT DETECTED
CODEINE, URINE: NOT DETECTED
CREAT UR-MCNC: 78.7 MG/DL (ref 20–400)
DIAZEPAM, URINE: NOT DETECTED
DRUGS EXPECTED, UR: NORMAL
EER HI RES INTERP UR: NORMAL
ETHYL GLUCURONIDE UR: NORMAL
FENTANYL URINE: NOT DETECTED
GABAPENTIN: NOT DETECTED
HYDROCODONE, URINE: NOT DETECTED
HYDROMORPHONE, URINE: NOT DETECTED
LORAZEPAM, URINE: NOT DETECTED
MARIJUANA METAB, UR: NEGATIVE
MDA, URINE: NOT DETECTED
MDEA, EVE, UR: NOT DETECTED
MDMA, URINE: NOT DETECTED
MEPERIDINE METAB, UR: NOT DETECTED
METHADONE, URINE: NEGATIVE
METHAMPHETAMINE, URINE: NOT DETECTED
METHYLPHENIDATE: NOT DETECTED
MIDAZOLAM, URINE: NOT DETECTED
MORPHINE, OPI1M: NOT DETECTED
NALOXONE URINE: NOT DETECTED
NORBUPRENORPHINE, URINE: NOT DETECTED
NORDIAZEPAM, URINE: NOT DETECTED
NORFENTANYL, URINE: NOT DETECTED
NORHYDROCODONE, URINE: NOT DETECTED
NOROXYCODONE, URINE: NOT DETECTED
NOROXYMORPHONE, URINE: NOT DETECTED
OXAZEPAM, URINE: NOT DETECTED
OXYCODONE URINE: NOT DETECTED
OXYMORPHONE, URINE: NOT DETECTED
PAIN MANAGEMENT DRUG PANEL INTERP, URINE: NORMAL
PAIN MGT DRUG PANEL, HI RES, UR: NORMAL
PCP,URINE: NEGATIVE
PHENTERMINE, UR: NOT DETECTED
PREGABALIN: NOT DETECTED
TAPENTADOL, URINE: NOT DETECTED
TAPENTADOL-O-SULFATE, URINE: NOT DETECTED
TEMAZEPAM, URINE: NOT DETECTED
TRAMADOL, URINE: NEGATIVE
ZOLPIDEM METABOLITE (ZCA), URINE: NOT DETECTED
ZOLPIDEM, URINE: NOT DETECTED

## 2025-03-21 NOTE — RESULT ENCOUNTER NOTE
Please let the patient know:  Urine drug test did not show Lunesta but was not tested for Lunesta  Alcohol in the urine, alcohol and sleeping aids together is contraindicated as it can increase the risk for her to stop breathing.      Future Appointments  4/1/2025   3:45 PM    Janet Oropeza DPM    [facility]          [facility]  6/13/2025  1:00 PM    Harper Cooper MD    fp [facility] DEP  10/22/2025 1:30 PM    Harper Cooper MD    [facility] DEP

## 2025-03-28 DIAGNOSIS — F51.04 PSYCHOPHYSIOLOGICAL INSOMNIA: ICD-10-CM

## 2025-03-28 DIAGNOSIS — E78.2 MIXED HYPERLIPIDEMIA: ICD-10-CM

## 2025-03-28 RX ORDER — ESZOPICLONE 2 MG/1
2 TABLET, FILM COATED ORAL NIGHTLY PRN
Qty: 30 TABLET | Refills: 0 | Status: SHIPPED | OUTPATIENT
Start: 2025-03-28 | End: 2025-04-27

## 2025-03-28 RX ORDER — LOVASTATIN 10 MG/1
10 TABLET ORAL NIGHTLY
Qty: 90 TABLET | Refills: 3 | Status: SHIPPED | OUTPATIENT
Start: 2025-03-28

## 2025-03-28 NOTE — TELEPHONE ENCOUNTER
Please Approve or Refuse.  Send to Pharmacy per Pt's Request:      Next Visit Date:  6/13/2025   Last Visit Date: 2/18/2025    Hemoglobin A1C (%)   Date Value   03/17/2025 5.4   06/24/2024 5.3   05/24/2023 5.4             ( goal A1C is < 7)   BP Readings from Last 3 Encounters:   02/18/25 120/64   01/09/25 (!) 152/73   12/05/24 (!) 143/71          (goal 120/80)  BUN   Date Value Ref Range Status   03/17/2025 21 8 - 23 mg/dL Final     Creatinine   Date Value Ref Range Status   03/17/2025 1.2 0.7 - 1.2 mg/dL Final     Potassium   Date Value Ref Range Status   03/17/2025 4.6 3.7 - 5.3 mmol/L Final

## 2025-04-01 ENCOUNTER — OFFICE VISIT (OUTPATIENT)
Dept: PODIATRY | Age: 85
End: 2025-04-01
Payer: MEDICARE

## 2025-04-01 VITALS — WEIGHT: 124 LBS | BODY MASS INDEX: 21.97 KG/M2 | HEIGHT: 63 IN

## 2025-04-01 DIAGNOSIS — M77.52 BONE SPUR OF LEFT FOOT: ICD-10-CM

## 2025-04-01 DIAGNOSIS — M77.8 CAPSULITIS OF LEFT FOOT: ICD-10-CM

## 2025-04-01 DIAGNOSIS — S92.025A CLOSED NONDISPLACED FRACTURE OF ANTERIOR PROCESS OF LEFT CALCANEUS, INITIAL ENCOUNTER: Primary | ICD-10-CM

## 2025-04-01 DIAGNOSIS — M79.672 LEFT FOOT PAIN: ICD-10-CM

## 2025-04-01 PROCEDURE — 1125F AMNT PAIN NOTED PAIN PRSNT: CPT | Performed by: PODIATRIST

## 2025-04-01 PROCEDURE — 20605 DRAIN/INJ JOINT/BURSA W/O US: CPT | Performed by: PODIATRIST

## 2025-04-01 PROCEDURE — 1159F MED LIST DOCD IN RCRD: CPT | Performed by: PODIATRIST

## 2025-04-01 PROCEDURE — 1123F ACP DISCUSS/DSCN MKR DOCD: CPT | Performed by: PODIATRIST

## 2025-04-01 PROCEDURE — 99213 OFFICE O/P EST LOW 20 MIN: CPT | Performed by: PODIATRIST

## 2025-04-01 RX ORDER — BUPIVACAINE HYDROCHLORIDE 5 MG/ML
1 INJECTION, SOLUTION PERINEURAL ONCE
Status: COMPLETED | OUTPATIENT
Start: 2025-04-01 | End: 2025-04-01

## 2025-04-01 RX ORDER — DEXAMETHASONE SODIUM PHOSPHATE 4 MG/ML
4 INJECTION, SOLUTION INTRA-ARTICULAR; INTRALESIONAL; INTRAMUSCULAR; INTRAVENOUS; SOFT TISSUE ONCE
Status: COMPLETED | OUTPATIENT
Start: 2025-04-01 | End: 2025-04-01

## 2025-04-01 RX ADMIN — DEXAMETHASONE SODIUM PHOSPHATE 4 MG: 4 INJECTION, SOLUTION INTRA-ARTICULAR; INTRALESIONAL; INTRAMUSCULAR; INTRAVENOUS; SOFT TISSUE at 17:00

## 2025-04-01 RX ADMIN — BUPIVACAINE HYDROCHLORIDE 5 MG: 5 INJECTION, SOLUTION PERINEURAL at 17:00

## 2025-04-01 NOTE — PROGRESS NOTES
[facility]  Return Patient     Ap Napier 85 y.o. female that presents for follow-up of   Chief Complaint   Patient presents with    Foot Pain     Follow up left foot       Follow up left calcaneal fracture, anterior process. She is doing much better, no pain in regular shoe,  edema gone.      Ap mis stepped off of a curb, about 3-4 weeks ago. Could not put weigth on her foot immediately after. Foot went under and turned in. Was very bruised. Went to the ER. Xrays were negative. Still having pain. Symptoms began 3 week(s) ago and are unchanged .  Patient relates pain is Present.  Pain is rated 5 out of 10 and is described as constant, waxing and waning, moderate, severe.  Treatments prior to today's visit include: non.  Currently denies F/C/N/V.     She is concerned about a bump on the top of her left foot, painful in shoes, feels like a spur, had an injury many years ago, when she feel and twisted it. Asks about an injection.     Allergies   Allergen Reactions    Atorvastatin      Muscle cramps    Trazodone And Nefazodone      Passed out       Past Medical History:   Diagnosis Date    Abdominal pain     Age-related osteoporosis without current pathological fracture 05/03/2023    Allergic rhinitis     Anxiety     Chronic back pain     Chronic fatigue     Chronic kidney disease     Chronic renal disease, stage III ([facility]) [[id number]] 04/20/2022    Chronic respiratory failure 08/11/2020    Colon polyp 03/07/2012    TUBULAR ADENOMA    COPD (chronic obstructive pulmonary disease) ([facility])     COPD, 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

## 2025-04-02 RX ORDER — OMEPRAZOLE 20 MG/1
CAPSULE, DELAYED RELEASE ORAL
Qty: 90 CAPSULE | Refills: 0 | Status: SHIPPED | OUTPATIENT
Start: 2025-04-02

## 2025-04-23 DIAGNOSIS — F51.04 PSYCHOPHYSIOLOGICAL INSOMNIA: ICD-10-CM

## 2025-04-23 RX ORDER — ESZOPICLONE 2 MG/1
2 TABLET, FILM COATED ORAL NIGHTLY PRN
Qty: 30 TABLET | Refills: 0 | Status: SHIPPED | OUTPATIENT
Start: 2025-04-23 | End: 2025-05-23

## 2025-05-09 ENCOUNTER — HOSPITAL ENCOUNTER (OUTPATIENT)
Dept: WOMENS IMAGING | Age: 85
Discharge: HOME | End: 2025-05-11
Attending: FAMILY MEDICINE
Payer: MEDICARE

## 2025-05-09 DIAGNOSIS — N18.30 BENIGN HYPERTENSION WITH CKD (CHRONIC KIDNEY DISEASE) STAGE III (HCC): ICD-10-CM

## 2025-05-09 DIAGNOSIS — I12.9 BENIGN HYPERTENSION WITH CKD (CHRONIC KIDNEY DISEASE) STAGE III (HCC): ICD-10-CM

## 2025-05-09 DIAGNOSIS — N28.1 KIDNEY CYST, ACQUIRED: ICD-10-CM

## 2025-05-09 PROCEDURE — 76775 US EXAM ABDO BACK WALL LIM: CPT

## 2025-05-13 ENCOUNTER — OFFICE VISIT (OUTPATIENT)
Dept: FAMILY MEDICINE CLINIC | Age: 85
End: 2025-05-13

## 2025-05-13 ENCOUNTER — RESULTS FOLLOW-UP (OUTPATIENT)
Dept: FAMILY MEDICINE CLINIC | Age: 85
End: 2025-05-13

## 2025-05-13 ENCOUNTER — HOSPITAL ENCOUNTER (INPATIENT)
Age: 85
LOS: 3 days | Discharge: HOME | End: 2025-05-16
Attending: EMERGENCY MEDICINE | Admitting: INTERNAL MEDICINE
Payer: MEDICARE

## 2025-05-13 ENCOUNTER — APPOINTMENT (OUTPATIENT)
Dept: GENERAL RADIOLOGY | Age: 85
End: 2025-05-13
Payer: MEDICARE
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
## 2025-05-13 DIAGNOSIS — S61.451A CAT BITE OF RIGHT HAND, INITIAL ENCOUNTER: ICD-10-CM

## 2025-05-13 DIAGNOSIS — R68.83 CHILLS: ICD-10-CM

## 2025-05-13 DIAGNOSIS — L03.113 CELLULITIS OF RIGHT UPPER EXTREMITY: Primary | ICD-10-CM

## 2025-05-13 DIAGNOSIS — S61.451A CAT BITE OF RIGHT HAND, INITIAL ENCOUNTER: Primary | ICD-10-CM
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
## 2025-05-13 PROBLEM — L08.9 CAT BITE OF RIGHT HAND WITH INFECTION, INITIAL ENCOUNTER: Status: ACTIVE | Noted: 2025-05-13

## 2025-05-13 LAB
ANION GAP SERPL CALCULATED.3IONS-SCNC: 12 MMOL/L (ref 9–16)
BASOPHILS # BLD: 0 K/UL (ref 0–0.2)
BASOPHILS NFR BLD: 1 % (ref 0–2)
BUN SERPL-MCNC: 18 MG/DL (ref 8–23)
CALCIUM SERPL-MCNC: 8.9 MG/DL (ref 8.6–10.4)
CHLORIDE SERPL-SCNC: 99 MMOL/L (ref 98–107)
CO2 SERPL-SCNC: 23 MMOL/L (ref 20–31)
CREAT SERPL-MCNC: 1.2 MG/DL (ref 0.7–1.2)
EOSINOPHIL # BLD: 0 K/UL (ref 0–0.4)
EOSINOPHILS RELATIVE PERCENT: 0 % (ref 0–4)
ERYTHROCYTE [DISTWIDTH] IN BLOOD BY AUTOMATED COUNT: 13.4 % (ref 11.5–14.9)
ERYTHROCYTE [SEDIMENTATION RATE] IN BLOOD BY PHOTOMETRIC METHOD: 3 MM/HR (ref 0–30)
GFR, ESTIMATED: 44 ML/MIN/1.73M2
GLUCOSE SERPL-MCNC: 102 MG/DL (ref 74–99)
HCT VFR BLD AUTO: 37.8 % (ref 36–46)
HGB BLD-MCNC: 12.6 G/DL (ref 12–16)
LYMPHOCYTES NFR BLD: 1.2 K/UL (ref 1–4.8)
LYMPHOCYTES RELATIVE PERCENT: 15 % (ref 24–44)
MCH RBC QN AUTO: 30.7 PG (ref 26–34)
MCHC RBC AUTO-ENTMCNC: 33.4 G/DL (ref 31–37)
MCV RBC AUTO: 91.9 FL (ref 80–100)
MONOCYTES NFR BLD: 0.7 K/UL (ref 0.1–1.3)
MONOCYTES NFR BLD: 8 % (ref 1–7)
NEUTROPHILS NFR BLD: 76 % (ref 36–66)
NEUTS SEG NFR BLD: 6.2 K/UL (ref 1.3–9.1)
PLATELET # BLD AUTO: 165 K/UL (ref 150–450)
PMV BLD AUTO: 7.2 FL (ref 6–12)
POTASSIUM SERPL-SCNC: 4.1 MMOL/L (ref 3.7–5.3)
RBC # BLD AUTO: 4.11 M/UL (ref 4–5.2)
SODIUM SERPL-SCNC: 134 MMOL/L (ref 136–145)
WBC OTHER # BLD: 8.2 K/UL (ref 3.5–11)

## 2025-05-13 PROCEDURE — 6360000002 HC RX W HCPCS: Performed by: PHYSICIAN ASSISTANT

## 2025-05-13 PROCEDURE — 90715 TDAP VACCINE 7 YRS/> IM: CPT | Performed by: PHYSICIAN ASSISTANT

## 2025-05-13 PROCEDURE — 85025 COMPLETE CBC W/AUTO DIFF WBC: CPT

## 2025-05-13 PROCEDURE — 2580000003 HC RX 258: Performed by: PHYSICIAN ASSISTANT

## 2025-05-13 PROCEDURE — 99285 EMERGENCY DEPT VISIT HI MDM: CPT

## 2025-05-13 PROCEDURE — 80048 BASIC METABOLIC PNL TOTAL CA: CPT

## 2025-05-13 PROCEDURE — 2500000003 HC RX 250 WO HCPCS: Performed by: NURSE PRACTITIONER

## 2025-05-13 PROCEDURE — 36415 COLL VENOUS BLD VENIPUNCTURE: CPT

## 2025-05-13 PROCEDURE — 1200000000 HC SEMI PRIVATE

## 2025-05-13 PROCEDURE — 6370000000 HC RX 637 (ALT 250 FOR IP): Performed by: PHYSICIAN ASSISTANT

## 2025-05-13 PROCEDURE — 85652 RBC SED RATE AUTOMATED: CPT

## 2025-05-13 PROCEDURE — 90471 IMMUNIZATION ADMIN: CPT | Performed by: PHYSICIAN ASSISTANT

## 2025-05-13 PROCEDURE — 73130 X-RAY EXAM OF HAND: CPT

## 2025-05-13 RX ORDER — ONDANSETRON 2 MG/ML
4 INJECTION INTRAMUSCULAR; INTRAVENOUS EVERY 6 HOURS PRN
Status: DISCONTINUED | OUTPATIENT
Start: 2025-05-13 | End: 2025-05-16 | Stop reason: HOSPADM

## 2025-05-13 RX ORDER — SODIUM CHLORIDE 9 MG/ML
INJECTION, SOLUTION INTRAVENOUS PRN
Status: DISCONTINUED | OUTPATIENT
Start: 2025-05-13 | End: 2025-05-16 | Stop reason: HOSPADM

## 2025-05-13 RX ORDER — FLUTICASONE PROPIONATE 50 MCG
2 SPRAY, SUSPENSION (ML) NASAL DAILY PRN
Status: DISCONTINUED | OUTPATIENT
Start: 2025-05-13 | End: 2025-05-16 | Stop reason: HOSPADM

## 2025-05-13 RX ORDER — ENOXAPARIN SODIUM 100 MG/ML
30 INJECTION SUBCUTANEOUS DAILY
Status: DISCONTINUED | OUTPATIENT
Start: 2025-05-14 | End: 2025-05-14

## 2025-05-13 RX ORDER — ACETAMINOPHEN 650 MG/1
650 SUPPOSITORY RECTAL EVERY 6 HOURS PRN
Status: DISCONTINUED | OUTPATIENT
Start: 2025-05-13 | End: 2025-05-16 | Stop reason: HOSPADM

## 2025-05-13 RX ORDER — POTASSIUM CHLORIDE 7.45 MG/ML
10 INJECTION INTRAVENOUS PRN
Status: DISCONTINUED | OUTPATIENT
Start: 2025-05-13 | End: 2025-05-13 | Stop reason: CLARIF

## 2025-05-13 RX ORDER — POTASSIUM CHLORIDE 1500 MG/1
40 TABLET, EXTENDED RELEASE ORAL PRN
Status: DISCONTINUED | OUTPATIENT
Start: 2025-05-13 | End: 2025-05-13 | Stop reason: CLARIF

## 2025-05-13 RX ORDER — SODIUM CHLORIDE 0.9 % (FLUSH) 0.9 %
10 SYRINGE (ML) INJECTION PRN
Status: DISCONTINUED | OUTPATIENT
Start: 2025-05-13 | End: 2025-05-16 | Stop reason: HOSPADM

## 2025-05-13 RX ORDER — METOPROLOL SUCCINATE 25 MG/1
25 TABLET, EXTENDED RELEASE ORAL DAILY
Status: DISCONTINUED | OUTPATIENT
Start: 2025-05-14 | End: 2025-05-16 | Stop reason: HOSPADM

## 2025-05-13 RX ORDER — POLYETHYLENE GLYCOL 3350 17 G/17G
17 POWDER, FOR SOLUTION ORAL DAILY PRN
Status: DISCONTINUED | OUTPATIENT
Start: 2025-05-13 | End: 2025-05-16 | Stop reason: HOSPADM

## 2025-05-13 RX ORDER — BRIMONIDINE TARTRATE 2 MG/ML
1 SOLUTION/ DROPS OPHTHALMIC 3 TIMES DAILY
COMMUNITY

## 2025-05-13 RX ORDER — ONDANSETRON 4 MG/1
4 TABLET, ORALLY DISINTEGRATING ORAL EVERY 8 HOURS PRN
Status: DISCONTINUED | OUTPATIENT
Start: 2025-05-13 | End: 2025-05-16 | Stop reason: HOSPADM

## 2025-05-13 RX ORDER — BISACODYL 10 MG
10 SUPPOSITORY, RECTAL RECTAL DAILY PRN
Status: DISCONTINUED | OUTPATIENT
Start: 2025-05-13 | End: 2025-05-16 | Stop reason: HOSPADM

## 2025-05-13 RX ORDER — PANTOPRAZOLE SODIUM 40 MG/1
40 TABLET, DELAYED RELEASE ORAL
Status: DISCONTINUED | OUTPATIENT
Start: 2025-05-14 | End: 2025-05-16 | Stop reason: HOSPADM

## 2025-05-13 RX ORDER — SODIUM CHLORIDE 0.9 % (FLUSH) 0.9 %
5-40 SYRINGE (ML) INJECTION EVERY 12 HOURS SCHEDULED
Status: DISCONTINUED | OUTPATIENT
Start: 2025-05-13 | End: 2025-05-16 | Stop reason: HOSPADM

## 2025-05-13 RX ORDER — AMLODIPINE BESYLATE 2.5 MG/1
2.5 TABLET ORAL EVERY MORNING
Status: DISCONTINUED | OUTPATIENT
Start: 2025-05-14 | End: 2025-05-16 | Stop reason: HOSPADM

## 2025-05-13 RX ORDER — BUDESONIDE AND FORMOTEROL FUMARATE DIHYDRATE 80; 4.5 UG/1; UG/1
2 AEROSOL RESPIRATORY (INHALATION)
Status: DISCONTINUED | OUTPATIENT
Start: 2025-05-13 | End: 2025-05-16 | Stop reason: HOSPADM

## 2025-05-13 RX ORDER — ACETAMINOPHEN 325 MG/1
650 TABLET ORAL EVERY 6 HOURS PRN
Status: DISCONTINUED | OUTPATIENT
Start: 2025-05-13 | End: 2025-05-16 | Stop reason: HOSPADM

## 2025-05-13 RX ORDER — ALBUTEROL SULFATE 90 UG/1
2 INHALANT RESPIRATORY (INHALATION) EVERY 4 HOURS PRN
Status: DISCONTINUED | OUTPATIENT
Start: 2025-05-13 | End: 2025-05-16 | Stop reason: HOSPADM

## 2025-05-13 RX ADMIN — AMOXICILLIN AND CLAVULANATE POTASSIUM 1 TABLET: 875; 125 TABLET, FILM COATED ORAL at 19:04

## 2025-05-13 RX ADMIN — SODIUM CHLORIDE 3000 MG: 9 INJECTION, SOLUTION INTRAVENOUS at 19:57

## 2025-05-13 RX ADMIN — SODIUM CHLORIDE, PRESERVATIVE FREE 10 ML: 5 INJECTION INTRAVENOUS at 23:38

## 2025-05-13 RX ADMIN — TETANUS TOXOID, REDUCED DIPHTHERIA TOXOID AND ACELLULAR PERTUSSIS VACCINE, ADSORBED 0.5 ML: 5; 2.5; 8; 8; 2.5 SUSPENSION INTRAMUSCULAR at 19:04

## 2025-05-13 ASSESSMENT — LIFESTYLE VARIABLES
HOW OFTEN DO YOU HAVE A DRINK CONTAINING ALCOHOL: NEVER
HOW MANY STANDARD DRINKS CONTAINING ALCOHOL DO YOU HAVE ON A TYPICAL DAY: PATIENT DOES NOT DRINK

## 2025-05-13 ASSESSMENT — ENCOUNTER SYMPTOMS: COLOR CHANGE: 1

## 2025-05-13 NOTE — PROGRESS NOTES
Rt hand swollen, red, hot, painful, 2 scabbed puncture wounds noted to dorsum, 2 red streaks traveling up FA. Pt c/o chills  Bitten by her cat at 0230 today and reports bite prior to that    Due to red streaking, chills and sig cellulitis, rec ER for further eval  Pt and spouse agreeable    Pt went to ED and was admitted

## 2025-05-13 NOTE — ED PROVIDER NOTES
reports that she quit smoking about 24 years ago. Her smoking use included cigarettes. She started smoking about 64 years ago. She has a 60.1 pack-year smoking history. She has been exposed to tobacco smoke. She has never used smokeless tobacco. She reports current alcohol use of about 7.0 standard drinks of alcohol per week. She reports that she does not use drugs.    PHYSICAL EXAM     INITIAL VITALS: Pulse 91   Temp 98.6 °F (37 °C) (Oral)   Resp 18   Ht 1.6 m (5' 3\")   Wt 57.6 kg (127 lb)   SpO2 96%   BMI 22.50 kg/m²      Physical Exam  Vitals and nursing note reviewed.   Constitutional:       Appearance: She is well-developed.   HENT:      Head: Normocephalic and atraumatic.   Cardiovascular:      Rate and Rhythm: Normal rate and regular rhythm.      Heart sounds: Normal heart sounds.   Pulmonary:      Effort: Pulmonary effort is normal.      Breath sounds: Normal breath sounds.   Musculoskeletal:         General: Swelling and signs of injury present. Normal range of motion.      Comments: She has some swelling noted to the dorsum of the right hand 2 small puncture wounds noted on the dorsum of the hand.  Full range of motion there is some redness some swelling.  There is some red streaking noted on the forearm extending to the elbow.   Skin:     General: Skin is warm and dry.      Capillary Refill: Capillary refill takes less than 2 seconds.      Findings: No rash.   Neurological:      Mental Status: She is alert and oriented to person, place, and time.         MEDICAL DECISION MAKING:     Cat bite right hand at 230 this morning.  She now has redness swelling to the hand decreased range of motion and she has streaking to the right elbow and beyond.  She was started on IV antibiotics here in the emergency department her tetanus was updated.  X-rays do show some soft tissue swelling but no acute bony abnormality or foreign body.  I spoke with the hospitalist service on-call they agreed to admission and        -------------------------      CRITICAL CARE:       CONSULTS:  None    PROCEDURES:  Procedures    FINAL IMPRESSION      1. Cat bite of right hand, initial encounter          DISPOSITION/PLAN   DISPOSITION Admitted 05/13/2025 07:53:06 PM               PATIENT REFERREDTO:  No follow-up provider specified.    DISCHARGEMEDICATIONS:  New Prescriptions    No medications on file       (Please note that portions of this note were completed with a voice recognition program.  Efforts were made to edit thedictations but occasionally words are mis-transcribed.)    ANDRES Nair Steven D, PA-C  05/13/25 1950       Martin Zapien PA-C  05/13/25 1957

## 2025-05-13 NOTE — PROGRESS NOTES
[facility] Internal Medicine  Fernando Salamanca MD; Eduardo Dean MD; Gerry Richards MD;  Catarina Collins MD; Josie Singh MD  [facility] Internal Medicine   IN-PATIENT SERVICE  [facility]                 Date:   5/13/2025  Patientname:  Ap Napier  Date of admission:  5/13/2025  6:55 PM  MRN:   [id number]  Account:  [id number]  YOB: 1940  PCP:    Harper Cooper MD  Room:   2062/2062-01  Code Status:    DNR-CCA      Chief Complaint:     Chief Complaint   Patient presents with    Animal Bite     Cat bite on right hand       History of Present Illness:     Ap Napier is a 85 y.o. Non- / non  female who presents with Animal Bite (Cat bite on right hand)   and is admitted to the hospital for the management of Cat bite of right hand with infection, initial encounter.  Medical history significant for HTN, HLD, COPD, chronic kidney disease stage IIIa, hypothyroidism, age-related osteoporosis, depression and anxiety.    Patient presented to ED after right hand being bit by her cat around 2:30 AM.  States that pain, swelling and redness increased throughout the day prompting ED evaluation.  Noted to have red streaking from hand extending to forearm and elbow.  X-ray reveals soft tissue swelling and age-related osteoarthritis.  WBC 8.2.  Afebrile.  Started on IV Unasyn and tetanus shot administered. Denies fever, chills, chest pain, cough, abdominal pain, nausea, vomiting, diarrhea, and urinary symptoms. CODE STATUS reviewed.  Education provided.  Patient states that she would like to be a DNR-CCA with no chest compressions or shocking.  Is agreeable to intubation for short-term only.    Admit to MedSurg unit for cat bite of right hand with infection  Continue IV Unasyn, elevate extremity and apply ice as needed.  Tylenol as needed for pain control per patient request.  CODE STATUS DNR CCA.  Routine labs in a.m.  Past Medical History:     Past Medical  12/20/2022    EYE SURGERY      EYE SURGERY Right 06/06/2024    EYE CATARACT EMULSIFICATION INTRAOCULAR LENS IMPLANT RIGHT performed by Amilcar Zurita MD at [facility] OR    PARATHYROIDECTOMY  11/05/2018    Dr. Carballo; Left inferior parathyroid: adenoma    ROTATOR CUFF REPAIR Left     TONSILLECTOMY      TUBAL LIGATION      UPPER GASTROINTESTINAL ENDOSCOPY  06/13/2014    biopsy    UPPER GASTROINTESTINAL ENDOSCOPY  01/26/2016    UPPER GASTROINTESTINAL ENDOSCOPY  05/04/2016    mild gastritis    UPPER GASTROINTESTINAL ENDOSCOPY N/A 12/20/2022    EGD BIOPSY performed by Ana Dewitt MD at [facility] OR    UPPER GASTROINTESTINAL ENDOSCOPY N/A 9/10/2024    ESOPHAGOGASTRODUODENOSCOPY BIOPSY performed by Fani Ridley MD at [facility] OR     BREAST BIOPSY W LOC DEVICE 1ST LESION LEFT Left 8/23/2024    US BREAST BIOPSY W LOC DEVICE 1ST LESION LEFT 8/23/2024 [facility] [place]        Medications Prior to Admission:     Prior to Admission medications    Medication Sig Start Date End Date Taking? Authorizing Provider   brimonidine (ALPHAGAN) 0.2 % ophthalmic solution Place 1 drop into both eyes 3 times daily   Yes Arnold Rivas MD   eszopiclone (LUNESTA) 2 MG TABS Take 1 tablet by mouth nightly as needed (insomnia) for up to 30 days. Max Daily Amount: 2 mg 4/23/25 5/23/25 Yes Harper Cooper MD   omeprazole (PRILOSEC) 20 MG delayed release capsule take 1 capsule by mouth twice a day MORNING AND BEFORE BED 4/2/25  Yes Fani Ridley MD   lovastatin (MEVACOR) 10 MG tablet Take 1 tablet by mouth nightly Stop Pravachol 3/28/25  Yes Harper Cooper MD   fluticasone (FLONASE) 50 MCG/ACT nasal spray 2 sprays by Each Nostril route daily 1/17/25  Yes Harper Cooper MD   metoprolol succinate (TOPROL XL) 25 MG extended release tablet Take 1 tablet by mouth daily Goal BP bellow 130/80 and above 115/65, heart rate 56 to 90 bpm 10/18/24  Yes Harper Cooper MD   TRELEGY ELLIPTA 100-62.5-25 MCG/ACT AEPB

## 2025-05-13 NOTE — ED PROVIDER NOTES
eMERGENCY dEPARTMENT  Attending Physician Attestation     Pt Name: Ap Napier  MRN: [id number]  Birthdate 1940  Date of evaluation: 5/13/25     Ap Napier is a 85 y.o. female with CC: Animal Bite (Cat bite on right hand)      Based on the medical record the care appears appropriate.  I was personally available for consultation in the Emergency Department.    Bk Thompson DO  Attending Emergency Physician                  Bk Thompson DO  05/13/25 1949

## 2025-05-13 NOTE — RESULT ENCOUNTER NOTE
Please notify patient: Right kidney mildly smaller, atrophic, correlates with kidney disease that she has  Avoid dehydration, to drink 64 ounce water daily, avoid ibuprofen, naproxen, Aleve, Motrin      , Otherwise normal  Future Appointments  6/13/2025  1:00 PM    Harper Cooper MD    [facility] DEP  10/22/2025 1:30 PM    Harper Cooper MD    [facility] DEP

## 2025-05-14 LAB
ANION GAP SERPL CALCULATED.3IONS-SCNC: 11 MMOL/L (ref 9–16)
BASOPHILS # BLD: 0 K/UL (ref 0–0.2)
BASOPHILS NFR BLD: 1 % (ref 0–2)
BUN SERPL-MCNC: 14 MG/DL (ref 8–23)
CALCIUM SERPL-MCNC: 8.7 MG/DL (ref 8.6–10.4)
CHLORIDE SERPL-SCNC: 104 MMOL/L (ref 98–107)
CO2 SERPL-SCNC: 22 MMOL/L (ref 20–31)
CREAT SERPL-MCNC: 1 MG/DL (ref 0.7–1.2)
EOSINOPHIL # BLD: 0 K/UL (ref 0–0.4)
EOSINOPHILS RELATIVE PERCENT: 1 % (ref 0–4)
ERYTHROCYTE [DISTWIDTH] IN BLOOD BY AUTOMATED COUNT: 13.5 % (ref 11.5–14.9)
GFR, ESTIMATED: 55 ML/MIN/1.73M2
GLUCOSE SERPL-MCNC: 115 MG/DL (ref 74–99)
HCT VFR BLD AUTO: 36.1 % (ref 36–46)
HGB BLD-MCNC: 12.5 G/DL (ref 12–16)
LYMPHOCYTES NFR BLD: 1.3 K/UL (ref 1–4.8)
LYMPHOCYTES RELATIVE PERCENT: 18 % (ref 24–44)
MCH RBC QN AUTO: 31.4 PG (ref 26–34)
MCHC RBC AUTO-ENTMCNC: 34.6 G/DL (ref 31–37)
MCV RBC AUTO: 90.8 FL (ref 80–100)
MONOCYTES NFR BLD: 0.7 K/UL (ref 0.1–1.3)
MONOCYTES NFR BLD: 9 % (ref 1–7)
NEUTROPHILS NFR BLD: 71 % (ref 36–66)
NEUTS SEG NFR BLD: 5.4 K/UL (ref 1.3–9.1)
PLATELET # BLD AUTO: 155 K/UL (ref 150–450)
PMV BLD AUTO: 7.2 FL (ref 6–12)
POTASSIUM SERPL-SCNC: 3.6 MMOL/L (ref 3.7–5.3)
RBC # BLD AUTO: 3.98 M/UL (ref 4–5.2)
SODIUM SERPL-SCNC: 137 MMOL/L (ref 136–145)
WBC OTHER # BLD: 7.4 K/UL (ref 3.5–11)

## 2025-05-14 PROCEDURE — 2500000003 HC RX 250 WO HCPCS: Performed by: NURSE PRACTITIONER

## 2025-05-14 PROCEDURE — 6370000000 HC RX 637 (ALT 250 FOR IP)

## 2025-05-14 PROCEDURE — 85025 COMPLETE CBC W/AUTO DIFF WBC: CPT

## 2025-05-14 PROCEDURE — 97110 THERAPEUTIC EXERCISES: CPT

## 2025-05-14 PROCEDURE — 6370000000 HC RX 637 (ALT 250 FOR IP): Performed by: NURSE PRACTITIONER

## 2025-05-14 PROCEDURE — 99223 1ST HOSP IP/OBS HIGH 75: CPT | Performed by: INTERNAL MEDICINE

## 2025-05-14 PROCEDURE — 97165 OT EVAL LOW COMPLEX 30 MIN: CPT

## 2025-05-14 PROCEDURE — 6360000002 HC RX W HCPCS: Performed by: INTERNAL MEDICINE

## 2025-05-14 PROCEDURE — 36415 COLL VENOUS BLD VENIPUNCTURE: CPT

## 2025-05-14 PROCEDURE — 94640 AIRWAY INHALATION TREATMENT: CPT

## 2025-05-14 PROCEDURE — 94761 N-INVAS EAR/PLS OXIMETRY MLT: CPT

## 2025-05-14 PROCEDURE — 2580000003 HC RX 258: Performed by: INTERNAL MEDICINE

## 2025-05-14 PROCEDURE — 97530 THERAPEUTIC ACTIVITIES: CPT

## 2025-05-14 PROCEDURE — 2580000003 HC RX 258: Performed by: NURSE PRACTITIONER

## 2025-05-14 PROCEDURE — 1200000000 HC SEMI PRIVATE

## 2025-05-14 PROCEDURE — 80048 BASIC METABOLIC PNL TOTAL CA: CPT

## 2025-05-14 PROCEDURE — 6360000002 HC RX W HCPCS: Performed by: NURSE PRACTITIONER

## 2025-05-14 RX ORDER — ENOXAPARIN SODIUM 100 MG/ML
40 INJECTION SUBCUTANEOUS DAILY
Status: DISCONTINUED | OUTPATIENT
Start: 2025-05-15 | End: 2025-05-16 | Stop reason: HOSPADM

## 2025-05-14 RX ORDER — OMEPRAZOLE 20 MG/1
CAPSULE, DELAYED RELEASE ORAL
Qty: 90 CAPSULE | Refills: 0 | Status: SHIPPED | OUTPATIENT
Start: 2025-05-14

## 2025-05-14 RX ADMIN — METOPROLOL SUCCINATE 25 MG: 25 TABLET, FILM COATED, EXTENDED RELEASE ORAL at 08:54

## 2025-05-14 RX ADMIN — SODIUM CHLORIDE 3000 MG: 9 INJECTION, SOLUTION INTRAVENOUS at 19:54

## 2025-05-14 RX ADMIN — PANTOPRAZOLE SODIUM 40 MG: 40 TABLET, DELAYED RELEASE ORAL at 06:05

## 2025-05-14 RX ADMIN — ACETAMINOPHEN 650 MG: 325 TABLET ORAL at 03:27

## 2025-05-14 RX ADMIN — BUDESONIDE AND FORMOTEROL FUMARATE DIHYDRATE 2 PUFF: 80; 4.5 AEROSOL RESPIRATORY (INHALATION) at 08:32

## 2025-05-14 RX ADMIN — SODIUM CHLORIDE 3000 MG: 9 INJECTION, SOLUTION INTRAVENOUS at 15:05

## 2025-05-14 RX ADMIN — ACETAMINOPHEN 650 MG: 325 TABLET ORAL at 12:59

## 2025-05-14 RX ADMIN — DOXYLAMINE SUCCINATE 25 MG: 25 TABLET ORAL at 22:48

## 2025-05-14 RX ADMIN — ENOXAPARIN SODIUM 30 MG: 100 INJECTION SUBCUTANEOUS at 08:54

## 2025-05-14 RX ADMIN — AMLODIPINE BESYLATE 2.5 MG: 2.5 TABLET ORAL at 08:54

## 2025-05-14 RX ADMIN — SODIUM CHLORIDE 3000 MG: 9 INJECTION, SOLUTION INTRAVENOUS at 08:54

## 2025-05-14 RX ADMIN — SODIUM CHLORIDE, PRESERVATIVE FREE 10 ML: 5 INJECTION INTRAVENOUS at 19:52

## 2025-05-14 RX ADMIN — TIOTROPIUM BROMIDE INHALATION SPRAY 2 PUFF: 3.12 SPRAY, METERED RESPIRATORY (INHALATION) at 08:32

## 2025-05-14 RX ADMIN — ACETAMINOPHEN 650 MG: 325 TABLET ORAL at 22:48

## 2025-05-14 ASSESSMENT — PAIN SCALES - GENERAL
PAINLEVEL_OUTOF10: 4
PAINLEVEL_OUTOF10: 3
PAINLEVEL_OUTOF10: 6
PAINLEVEL_OUTOF10: 7
PAINLEVEL_OUTOF10: 4
PAINLEVEL_OUTOF10: 4
PAINLEVEL_OUTOF10: 3
PAINLEVEL_OUTOF10: 2

## 2025-05-14 ASSESSMENT — PAIN DESCRIPTION - LOCATION
LOCATION: HAND

## 2025-05-14 ASSESSMENT — PAIN DESCRIPTION - ORIENTATION
ORIENTATION: RIGHT

## 2025-05-14 ASSESSMENT — PAIN DESCRIPTION - DESCRIPTORS
DESCRIPTORS: ACHING

## 2025-05-14 ASSESSMENT — PAIN - FUNCTIONAL ASSESSMENT
PAIN_FUNCTIONAL_ASSESSMENT: ACTIVITIES ARE NOT PREVENTED
PAIN_FUNCTIONAL_ASSESSMENT: ACTIVITIES ARE NOT PREVENTED

## 2025-05-14 NOTE — PROGRESS NOTES
Pharmacist Review and Automatic Dose Adjustment of Prophylactic Enoxaparin    Reviewed reason(s) for admission/hospital problem list    The reviewing pharmacist has made an adjustment to the ordered enoxaparin dose or converted to UFH per the approved [facility] protocol and table as identified below.        Ap Napier is a 85 y.o. female.     Recent Labs     05/13/25 1942 05/14/25  0604   CREATININE 1.2 1.0       Estimated Creatinine Clearance: 34 mL/min (based on SCr of 1 mg/dL).    Recent Labs     05/13/25 1942 05/14/25  0604   HGB 12.6 12.5   HCT 37.8 36.1    155     No results for input(s): \"INR\" in the last 72 hours.    Height:   Ht Readings from Last 1 Encounters:   05/13/25 1.6 m (5' 3\")     Weight:  Wt Readings from Last 1 Encounters:   05/13/25 57.6 kg (127 lb)               Plan: Based upon the patient's weight and renal function    Ordered: Enoxaparin 30mg SUBQ Daily    Changed/converted to    New Order: Enoxaparin 40mg SUBQ Daily      Thank you,  Kem Infante, PharmD, [facility]  5/14/2025 2:08 PM

## 2025-05-14 NOTE — PROGRESS NOTES
Patient reports she would like to be DNR-CCA. [employer] sent to NP regarding this. New order received. See Orders.

## 2025-05-14 NOTE — CARE COORDINATION
Case Management Assessment  Initial Evaluation    Date/Time of Evaluation: 5/14/2025 10:02 AM  Assessment Completed by: Mikki Sim RN    If patient is discharged prior to next notation, then this note serves as note for discharge by case management.    Patient Name: Ap Napier                   YOB: 1940  Diagnosis: Cat bite of right hand with infection, initial encounter [S61.451A, L08.9, W55.01XA]  Cat bite of right hand, initial encounter [S61.451A, W55.01XA]                   Date / Time: 5/13/2025  6:55 PM    Patient Admission Status: Inpatient   Readmission Risk (Low < 19, Mod (19-27), High > 27): Readmission Risk Score: 7.7    Current PCP: Harper Cooper MD  PCP verified by CM? Yes    Chart Reviewed: Yes      History Provided by: Patient  Patient Orientation: Alert and Oriented    Patient Cognition: Alert    Hospitalization in the last 30 days (Readmission):  No    If yes, Readmission Assessment in  Navigator will be completed.    Advance Directives:      Code Status: DNR-CCA   Patient's Primary Decision Maker is: Legal Next of Kin    Primary Decision Maker: Lam Napier - Spouse - [phone]    Secondary Decision Maker: Jose Juan Napier - Child - [phone]    Discharge Planning:    Patient lives with: Spouse/Significant Other Type of Home: House  Primary Care Giver: Self  Patient Support Systems include: Spouse/Significant Other, Family Members   Current Financial resources: Medicare  Current community resources: None  Current services prior to admission: None            Current DME:              Type of Home Care services:  None    ADLS  Prior functional level: Independent in ADLs/IADLs  Current functional level: Independent in ADLs/IADLs    PT AM-PAC:   /24  OT AM-PAC:   /24    Family can provide assistance at DC: Yes  Would you like Case Management to discuss the discharge plan with any other family members/significant others, and if so, who? Yes (Spouse; Lam)  Plans to

## 2025-05-14 NOTE — PLAN OF CARE
Problem: Discharge Planning  Goal: Discharge to home or other facility with appropriate resources  Outcome: Progressing     Problem: ABCDS Injury Assessment  Goal: Absence of physical injury  Outcome: Progressing     Problem: Skin/Tissue Integrity - Adult  Goal: Incisions, wounds, or drain sites healing without S/S of infection  Outcome: Progressing     Problem: Infection - Adult  Goal: Absence of infection at discharge  Outcome: Progressing     Problem: Pain  Goal: Verbalizes/displays adequate comfort level or baseline comfort level  Outcome: Progressing

## 2025-05-14 NOTE — H&P
[facility]   IN-PATIENT SERVICE   [facility]    HISTORY AND PHYSICAL EXAMINATION            Date:   5/14/2025  Patient name:  Ap Napier  Date of admission:  5/13/2025  6:55 PM  MRN:   [id number]  Account:  [id number]  YOB: 1940  PCP:    Harper Cooper MD  Room:   2062/2062-01  Code Status:    DNR-CCA    Chief Complaint:     Chief Complaint   Patient presents with    Animal Bite     Cat bite on right hand       History Obtained From:     patient, electronic medical record    History of Present Illness:     The patient is a 85 y.o.  Non- / non  female who presents with Animal Bite (Cat bite on right hand)   and she is admitted to the hospital for the management of cat bite  Medical history significant for HTN, HLD, COPD, chronic kidney disease stage IIIa, hypothyroidism, age-related osteoporosis, depression and anxiety.     Patient presented to ED after right hand being bit by her cat around 2:30 AM.  States that pain, swelling and redness increased throughout the day prompting ED evaluation.  Noted to have red streaking from hand extending to forearm and elbow.  X-ray reveals soft tissue swelling and age-related osteoarthritis.  WBC 8.2.  Afebrile.  Started on IV Unasyn and tetanus shot administered. Denies fever, chills, chest pain, cough, abdominal pain, nausea, vomiting, diarrhea, and urinary symptoms. CODE STATUS reviewed.  Education provided.  Patient states that she would like to be a DNR-CCA with no chest compressions or shocking.  Is agreeable to intubation for short-term only.     Admit to MedSurg unit for cat bite of right hand with infection  Continue IV Unasyn, elevate extremity and apply ice as needed.  Tylenol as needed for pain control per patient request.  CODE STATUS DNR CCA.  Routine labs in a.m.        Past Medical History:     Past Medical History:   Diagnosis Date    Abdominal pain     Age-related osteoporosis without current  differential    Collection Time: 05/14/25  6:04 AM   Result Value Ref Range    WBC 7.4 3.5 - 11.0 k/uL    RBC 3.98 (L) 4.0 - 5.2 m/uL    Hemoglobin 12.5 12.0 - 16.0 g/dL    Hematocrit 36.1 36 - 46 %    MCV 90.8 80 - 100 fL    MCH 31.4 26 - 34 pg    MCHC 34.6 31 - 37 g/dL    RDW 13.5 11.5 - 14.9 %    Platelets 155 150 - 450 k/uL    MPV 7.2 6.0 - 12.0 fL    Neutrophils % 71 (H) 36 - 66 %    Lymphocytes % 18 (L) 24 - 44 %    Monocytes % 9 (H) 1 - 7 %    Eosinophils % 1 0 - 4 %    Basophils % 1 0 - 2 %    Neutrophils Absolute 5.40 1.3 - 9.1 k/uL    Lymphocytes Absolute 1.30 1.0 - 4.8 k/uL    Monocytes Absolute 0.70 0.1 - 1.3 k/uL    Eosinophils Absolute 0.00 0.0 - 0.4 k/uL    Basophils Absolute 0.00 0.0 - 0.2 k/uL       Imaging/Diagnostics:      Assessment :      Primary Problem  Cat bite of right hand with infection, initial encounter    Active Hospital Problems    Diagnosis Date Noted    Cat bite of right hand with infection, initial encounter [S61.451A, L08.9, W55.01XA] 05/13/2025       Plan:     Patient status Admit as inpatient in the  Med/Surge    Patient, admitted with cat bite, affecting right hand, right forearm, getting treated with IV Unasyn, tetanus was given in emergency room, advised to keep right hand elevated, ice application, local site, x-ray negative for osteomyelitis  COPD, compensated on Symbicort and Spiriva inhaler  Hypertension, controlled on Lopressor, Norvasc  Lovenox for DVT prophylaxis      Consultations:   None     Patient is admitted as inpatient status because of co-morbidities listed above, severity of signs and symptoms as outlined, requirement for current medical therapies and most importantly because of direct risk to patient if care not provided in a hospital setting.    Eduardo Dean MD  5/14/2025  1:08 PM    Copy sent to Harper Millan MD    Please note that this chart was generated using voice recognition Dragon dictation software.  Although every effort was made to

## 2025-05-14 NOTE — PROGRESS NOTES
Pharmacy Note     Renal Dose Adjustment    Ap Napier is a 85 y.o. female. Pharmacist assessment of renally cleared medications.    Recent Labs     05/13/25 1942 05/14/25  0604   BUN 18 14       Recent Labs     05/13/25 1942 05/14/25  0604   CREATININE 1.2 1.0       Estimated Creatinine Clearance: 34 mL/min (based on SCr of 1 mg/dL).      Height:   Ht Readings from Last 1 Encounters:   05/13/25 1.6 m (5' 3\")     Weight:  Wt Readings from Last 1 Encounters:   05/13/25 57.6 kg (127 lb)       The following medication dose has been adjusted based upon renal function per P&T Guidelines:             Unasyn 3000 mg q12h has been adjusted to q6h for CrCl>30 mL/min.    Kem Infante, PharmD, [facility]  5/14/2025 2:14 PM

## 2025-05-14 NOTE — ACP (ADVANCE CARE PLANNING)
Advance Care Planning     Advance Care Planning Activator (Inpatient)  Conversation Note      Date of ACP Conversation: 5/14/2025     Conversation Conducted with: Patient with Decision Making Capacity    ACP Activator: Mikki Sim RN    Health Care Decision Maker:     Current Designated Health Care Decision Maker:     Primary Decision Maker: Lam Naiper - Spouse - [phone]    Secondary Decision Maker: Jose Juan Napier - Child - [phone]  Click here to complete Healthcare Decision Makers including section of the Healthcare Decision Maker Relationship (ie \"Primary\")  Today we documented Decision Maker(s) consistent with Legal Next of Kin hierarchy.    Care Preferences    Ventilation:  \"If you were in your present state of health and suddenly became very ill and were unable to breathe on your own, what would your preference be about the use of a ventilator (breathing machine) if it were available to you?\"      Would the patient desire the use of ventilator (breathing machine)?: no    \"If your health worsens and it becomes clear that your chance of recovery is unlikely, what would your preference be about the use of a ventilator (breathing machine) if it were available to you?\"     Would the patient desire the use of ventilator (breathing machine)?: No      Resuscitation  \"CPR works best to restart the heart when there is a sudden event, like a heart attack, in someone who is otherwise healthy. Unfortunately, CPR does not typically restart the heart for people who have serious health conditions or who are very sick.\"    \"In the event your heart stopped as a result of an underlying serious health condition, would you want attempts to be made to restart your heart (answer \"yes\" for attempt to resuscitate) or would you prefer a natural death (answer \"no\" for do not attempt to resuscitate)?\" no       [] Yes   [x] No   Educated Patient / Decision Maker regarding differences between Advance Directives and portable  DNR orders.    Length of ACP Conversation in minutes:      Conversation Outcomes:  ACP discussion completed    Follow-up plan:    [] Schedule follow-up conversation to continue planning  [x] Referred individual to Provider for additional questions/concerns   [] Advised patient/agent/surrogate to review completed ACP document and update if needed with changes in condition, patient preferences or care setting    [] This note routed to one or more involved healthcare providers

## 2025-05-14 NOTE — PROGRESS NOTES
[facility]   Occupational Therapy Evaluation  Date: 25  Patient Name: Ap Napier       Room:   MRN: [id number]  Account: [id number]   : 1940  (85 y.o.) Gender: female     Discharge Recommendations:  The patient's needs are being met with no further Occupational Therapy recommended at discharge.     OT Equipment Recommendations  Equipment Needed: No    Referring Practitioner: Kiara Yanez APRN - NP  Diagnosis: cat bite of right hand with infection.             Past Medical History:  has a past medical history of Abdominal pain, Age-related osteoporosis without current pathological fracture, Allergic rhinitis, Anxiety, Chronic back pain, Chronic fatigue, Chronic kidney disease, Chronic renal disease, stage III ([facility]) [800400], Chronic respiratory failure ([facility]), Colon polyp, COPD (chronic obstructive pulmonary disease) ([facility]), COPD, Panlobular emphysema ([facility]) moderate to severe per PFTs, COVID, DDD (degenerative disc disease), thoracic, Depression, Diverticul disease small and large intestine, no perforati or abscess, Dizziness, Elevated blood pressure reading, Emphysema of lung ([facility]), Fall, Gastritis, GERD (gastroesophageal reflux disease), Glaucoma, Hyperlipidemia, Hyperparathyroid bone disease, Hyperthyroidism, Hyponatremia, Insomnia, Neuropathy, Orthostatic dizziness, Osteoarthritis, Panlobular emphysema ([facility]), Postprocedural hypoparathyroidism, Primary hypertension, PVC (premature ventricular contraction), S/P parathyroidectomy, Stage 3a chronic kidney disease ([facility]), Tubal pregnancy, and Vasovagal syncope.    Past Surgical History:   has a past surgical history that includes Appendectomy; Tubal ligation; back surgery; eye surgery; Rotator cuff repair (Left); Tonsillectomy; Endoscopy, colon, diagnostic; Cataract removal with implant (Left, 2014); Upper gastrointestinal endoscopy (2014); Colonoscopy; Colonoscopy (2014); Cholecystectomy ();  Exceptions: Bilateral hearing aid (has hearing aids but reports that she does not wear them too often.)         UE Function  LUE AROM (degrees)  LUE AROM : WNL  Left Hand AROM (degrees)  Left Hand AROM: WNL  Tone LUE  LUE Tone: Normotonic  LUE Strength  L Hand General: 5/5  LUE Strength Comment: grossly 5/5    RUE AROM (degrees)  RUE AROM : WFL  Right Hand AROM (degrees)  Right Hand AROM: WFL  Right Hand General AROM: pt's AROM is WFL's , edema and redness noted with R UE hand/digits and forearm.  Tone RUE  RUE Tone: Normotonic  RUE Strength  R Hand General: 4+/5  RUE Strength Comment: grossly 4+/5    Fine Motor Skills/Coordination                 Bed Mobility  Bed mobility  Rolling to Left: Independent  Supine to Sit: Independent  Sit to Supine: Independent    Balance  Balance  Sitting Balance: Independent  Standing Balance: Independent  Standing Balance  Time: 3minutes  Activity: funct transfers, func mobility in room.    Transfers  Transfers  Sit to stand: Independent  Stand to sit: Independent    Functional Mobility  Functional - Mobility Device: No device  Activity: Other (in room and into hallway and back to bed.)  Assist Level: Independent    OT Exercises  Exercise Treatment: pt stated she was already educated on edema control techniques for her R UE. OTR asked pt to review and pt was able to demonstrate and verbalize keeping R UE hand elevated onto pillow with fingertips higher than her elbow when pt is at rest. pt has ice pack and asked OT to refill with ice. pt has been using ice to aid in the elevation in order to minimize and lessen the edema.  PROM Exercises: pt demonstrated to OTR the exercises she has been doing with her R hand/wrist and forearm since last night. AROM completed with R forearm/wrist, and hand at 10 reps each movement. exercises completed to decrease edema and promote strength and movement of her dominant R hand for self care tasks. pt demonstrated IND level with these simple AROM

## 2025-05-14 NOTE — PLAN OF CARE
Problem: Discharge Planning  Goal: Discharge to home or other facility with appropriate resources  Outcome: Progressing  Flowsheets (Taken 5/13/2025 2226)  Discharge to home or other facility with appropriate resources:   Identify barriers to discharge with patient and caregiver   Identify discharge learning needs (meds, wound care, etc)     Problem: ABCDS Injury Assessment  Goal: Absence of physical injury  Outcome: Progressing     Problem: Skin/Tissue Integrity - Adult  Goal: Incisions, wounds, or drain sites healing without S/S of infection  Outcome: Progressing     Problem: Infection - Adult  Goal: Absence of infection at discharge  Outcome: Progressing

## 2025-05-14 NOTE — PROGRESS NOTES
Physical Therapy        Physical Therapy Cancel Note      DATE: 2025    NAME: Ap Napier  MRN: [id number]   : 1940      Patient not seen this date for Physical Therapy due to:    Patient independent with functional mobility. Will defer PT evaluation at this time. Please reorder PT if future needs arise.   PT screen [phone]      Electronically signed by Orquidea Wing PT on 2025 at 11:40 AM

## 2025-05-14 NOTE — PROGRESS NOTES
Pharmacy Medication History Note      List of current medications patient is taking is complete.    Source of information: [employer] Scripts, OARRS, Care Everywhere    Changes made to medication list:  Medications removed (include reason, ex. therapy complete or physician discontinued, noncompliance):  None     Medications flagged for provider review:  Salonpas - patient requests removal. Not taking.   Combigan eye drops - currently taking brimonidine alone    Medications added/doses adjusted:  Brimonidine 0.2% place 1 drop in each eye three times daily     Other notes (ex. Recent course of antibiotics, Coumadin dosing):  Per OARRS, last fill of eszopiclone was on 3/31/25 with quantity 30 for 30 days.       Current Home Medication List at Time of Admission:  Prior to Admission medications    Medication Sig   brimonidine (ALPHAGAN) 0.2 % ophthalmic solution Place 1 drop into both eyes 3 times daily   eszopiclone (LUNESTA) 2 MG TABS Take 1 tablet by mouth nightly as needed (insomnia) for up to 30 days. Max Daily Amount: 2 mg   omeprazole (PRILOSEC) 20 MG delayed release capsule take 1 capsule by mouth twice a day MORNING AND BEFORE BED   lovastatin (MEVACOR) 10 MG tablet Take 1 tablet by mouth nightly Stop Pravachol   fluticasone (FLONASE) 50 MCG/ACT nasal spray 2 sprays by Each Nostril route daily   metoprolol succinate (TOPROL XL) 25 MG extended release tablet Take 1 tablet by mouth daily Goal BP bellow 130/80 and above 115/65, heart rate 56 to 90 bpm   TRELEGY ELLIPTA 100-62.5-25 MCG/ACT AEPB inhaler inhale 1 puff by mouth and INTO THE LUNGS once daily   amLODIPine (NORVASC) 2.5 MG tablet Take 1 tablet by mouth every morning   latanoprost (XALATAN) 0.005 % ophthalmic solution Place 1 drop into both eyes nightly   Camphor-Menthol-Methyl Sal (SALONPAS DEEP RELIEVING) 3.1-10-15 % GEL TID as needed for back pain  Patient not taking: Reported on 5/13/2025   brimonidine-timolol (COMBIGAN) 0.2-0.5 % ophthalmic solution Patient

## 2025-05-15 LAB
ANION GAP SERPL CALCULATED.3IONS-SCNC: 13 MMOL/L (ref 9–16)
BASOPHILS # BLD: 0 K/UL (ref 0–0.2)
BASOPHILS NFR BLD: 1 % (ref 0–2)
BUN SERPL-MCNC: 11 MG/DL (ref 8–23)
CALCIUM SERPL-MCNC: 8.8 MG/DL (ref 8.6–10.4)
CHLORIDE SERPL-SCNC: 104 MMOL/L (ref 98–107)
CO2 SERPL-SCNC: 21 MMOL/L (ref 20–31)
CREAT SERPL-MCNC: 1 MG/DL (ref 0.7–1.2)
EOSINOPHIL # BLD: 0.1 K/UL (ref 0–0.4)
EOSINOPHILS RELATIVE PERCENT: 1 % (ref 0–4)
ERYTHROCYTE [DISTWIDTH] IN BLOOD BY AUTOMATED COUNT: 13.4 % (ref 11.5–14.9)
GFR, ESTIMATED: 55 ML/MIN/1.73M2
GLUCOSE SERPL-MCNC: 105 MG/DL (ref 74–99)
HCT VFR BLD AUTO: 37 % (ref 36–46)
HGB BLD-MCNC: 12.9 G/DL (ref 12–16)
LYMPHOCYTES NFR BLD: 1.5 K/UL (ref 1–4.8)
LYMPHOCYTES RELATIVE PERCENT: 23 % (ref 24–44)
MAGNESIUM SERPL-MCNC: 1.8 MG/DL (ref 1.6–2.4)
MCH RBC QN AUTO: 31.7 PG (ref 26–34)
MCHC RBC AUTO-ENTMCNC: 34.8 G/DL (ref 31–37)
MCV RBC AUTO: 91.3 FL (ref 80–100)
MONOCYTES NFR BLD: 0.8 K/UL (ref 0.1–1.3)
MONOCYTES NFR BLD: 12 % (ref 1–7)
NEUTROPHILS NFR BLD: 63 % (ref 36–66)
NEUTS SEG NFR BLD: 4.1 K/UL (ref 1.3–9.1)
PLATELET # BLD AUTO: 160 K/UL (ref 150–450)
PMV BLD AUTO: 7.5 FL (ref 6–12)
POTASSIUM SERPL-SCNC: 3.5 MMOL/L (ref 3.7–5.3)
RBC # BLD AUTO: 4.05 M/UL (ref 4–5.2)
SODIUM SERPL-SCNC: 138 MMOL/L (ref 136–145)
WBC OTHER # BLD: 6.5 K/UL (ref 3.5–11)

## 2025-05-15 PROCEDURE — 85025 COMPLETE CBC W/AUTO DIFF WBC: CPT

## 2025-05-15 PROCEDURE — 6370000000 HC RX 637 (ALT 250 FOR IP): Performed by: INTERNAL MEDICINE

## 2025-05-15 PROCEDURE — 94640 AIRWAY INHALATION TREATMENT: CPT

## 2025-05-15 PROCEDURE — 6370000000 HC RX 637 (ALT 250 FOR IP): Performed by: NURSE PRACTITIONER

## 2025-05-15 PROCEDURE — 1200000000 HC SEMI PRIVATE

## 2025-05-15 PROCEDURE — 6370000000 HC RX 637 (ALT 250 FOR IP)

## 2025-05-15 PROCEDURE — 83735 ASSAY OF MAGNESIUM: CPT

## 2025-05-15 PROCEDURE — 6360000002 HC RX W HCPCS: Performed by: INTERNAL MEDICINE

## 2025-05-15 PROCEDURE — 2500000003 HC RX 250 WO HCPCS: Performed by: NURSE PRACTITIONER

## 2025-05-15 PROCEDURE — 99232 SBSQ HOSP IP/OBS MODERATE 35: CPT | Performed by: INTERNAL MEDICINE

## 2025-05-15 PROCEDURE — 2580000003 HC RX 258: Performed by: INTERNAL MEDICINE

## 2025-05-15 PROCEDURE — 94761 N-INVAS EAR/PLS OXIMETRY MLT: CPT

## 2025-05-15 PROCEDURE — 80048 BASIC METABOLIC PNL TOTAL CA: CPT

## 2025-05-15 PROCEDURE — 36415 COLL VENOUS BLD VENIPUNCTURE: CPT

## 2025-05-15 RX ORDER — POTASSIUM CHLORIDE 7.45 MG/ML
10 INJECTION INTRAVENOUS PRN
Status: DISCONTINUED | OUTPATIENT
Start: 2025-05-15 | End: 2025-05-16 | Stop reason: HOSPADM

## 2025-05-15 RX ORDER — POTASSIUM CHLORIDE 1500 MG/1
40 TABLET, EXTENDED RELEASE ORAL PRN
Status: DISCONTINUED | OUTPATIENT
Start: 2025-05-15 | End: 2025-05-16 | Stop reason: HOSPADM

## 2025-05-15 RX ADMIN — ACETAMINOPHEN 650 MG: 325 TABLET ORAL at 22:00

## 2025-05-15 RX ADMIN — POTASSIUM CHLORIDE 40 MEQ: 1500 TABLET, EXTENDED RELEASE ORAL at 09:37

## 2025-05-15 RX ADMIN — PANTOPRAZOLE SODIUM 40 MG: 40 TABLET, DELAYED RELEASE ORAL at 08:36

## 2025-05-15 RX ADMIN — SODIUM CHLORIDE 3000 MG: 9 INJECTION, SOLUTION INTRAVENOUS at 08:40

## 2025-05-15 RX ADMIN — SODIUM CHLORIDE 3000 MG: 9 INJECTION, SOLUTION INTRAVENOUS at 14:15

## 2025-05-15 RX ADMIN — SODIUM CHLORIDE, PRESERVATIVE FREE 10 ML: 5 INJECTION INTRAVENOUS at 21:29

## 2025-05-15 RX ADMIN — SODIUM CHLORIDE 3000 MG: 9 INJECTION, SOLUTION INTRAVENOUS at 03:34

## 2025-05-15 RX ADMIN — SODIUM CHLORIDE 3000 MG: 9 INJECTION, SOLUTION INTRAVENOUS at 21:29

## 2025-05-15 RX ADMIN — TIOTROPIUM BROMIDE INHALATION SPRAY 2 PUFF: 3.12 SPRAY, METERED RESPIRATORY (INHALATION) at 08:49

## 2025-05-15 RX ADMIN — AMLODIPINE BESYLATE 2.5 MG: 2.5 TABLET ORAL at 08:35

## 2025-05-15 RX ADMIN — BUDESONIDE AND FORMOTEROL FUMARATE DIHYDRATE 2 PUFF: 80; 4.5 AEROSOL RESPIRATORY (INHALATION) at 08:49

## 2025-05-15 RX ADMIN — DOXYLAMINE SUCCINATE 25 MG: 25 TABLET ORAL at 22:00

## 2025-05-15 RX ADMIN — ACETAMINOPHEN 650 MG: 325 TABLET ORAL at 08:35

## 2025-05-15 RX ADMIN — BUDESONIDE AND FORMOTEROL FUMARATE DIHYDRATE 2 PUFF: 80; 4.5 AEROSOL RESPIRATORY (INHALATION) at 19:40

## 2025-05-15 RX ADMIN — METOPROLOL SUCCINATE 25 MG: 25 TABLET, FILM COATED, EXTENDED RELEASE ORAL at 08:35

## 2025-05-15 ASSESSMENT — PAIN DESCRIPTION - ORIENTATION
ORIENTATION: RIGHT

## 2025-05-15 ASSESSMENT — PAIN DESCRIPTION - LOCATION
LOCATION: HAND

## 2025-05-15 ASSESSMENT — PAIN SCALES - GENERAL
PAINLEVEL_OUTOF10: 0
PAINLEVEL_OUTOF10: 3
PAINLEVEL_OUTOF10: 4
PAINLEVEL_OUTOF10: 5
PAINLEVEL_OUTOF10: 1

## 2025-05-15 ASSESSMENT — PAIN DESCRIPTION - PAIN TYPE
TYPE: CHRONIC PAIN
TYPE: CHRONIC PAIN

## 2025-05-15 ASSESSMENT — PAIN DESCRIPTION - DESCRIPTORS
DESCRIPTORS: DISCOMFORT
DESCRIPTORS: ACHING
DESCRIPTORS: SORE
DESCRIPTORS: ACHING;SORE

## 2025-05-15 ASSESSMENT — PAIN - FUNCTIONAL ASSESSMENT
PAIN_FUNCTIONAL_ASSESSMENT: PREVENTS OR INTERFERES SOME ACTIVE ACTIVITIES AND ADLS
PAIN_FUNCTIONAL_ASSESSMENT: PREVENTS OR INTERFERES SOME ACTIVE ACTIVITIES AND ADLS

## 2025-05-15 ASSESSMENT — PAIN DESCRIPTION - ONSET: ONSET: ON-GOING

## 2025-05-15 ASSESSMENT — PAIN DESCRIPTION - FREQUENCY: FREQUENCY: CONTINUOUS

## 2025-05-15 NOTE — CARE COORDINATION
Case Management   Daily Progress Note       Patient Name: Ap Napier                   YOB: 1940  Diagnosis: Cat bite of right hand with infection, initial encounter [S61.451A, L08.9, W55.01XA]  Cat bite of right hand, initial encounter [S61.451A, W55.01XA]                       GMLOS: 2.6 days  Length of Stay: 2  days    Readmission Risk (Low < 19, Mod (19-27), High > 27): Readmission Risk Score: 7.6      Patient is alert and oriented.    Spoke with Pt, and Current Transitional Plan is:    [x] Home Independently W/    [] Home with HC    [] Skilled Nursing Facility    [] Acute Rehabilitation    [] Long Term Acute Care (LTAC)    [] Other:     Medical Management: Remains on IV Unasyn. Rt. Hand Swelling improving. Anticipate DC marychuy.     Testing Ordered: NA    Additional Notes: Denies Needs/VNS.    Electronically signed by Yanelis Velasco RN on 5/15/2025 at 1:12 PM

## 2025-05-15 NOTE — PLAN OF CARE
Problem: Discharge Planning  Goal: Discharge to home or other facility with appropriate resources  5/15/2025 0126 by Sacha Smith RN  Outcome: Progressing  Flowsheets (Taken 5/14/2025 2000)  Discharge to home or other facility with appropriate resources:   Identify barriers to discharge with patient and caregiver   Identify discharge learning needs (meds, wound care, etc)  5/14/2025 1731 by Caitlin Goss RN  Outcome: Progressing     Problem: ABCDS Injury Assessment  Goal: Absence of physical injury  5/15/2025 0126 by Sacha Smith RN  Outcome: Progressing  Flowsheets (Taken 5/15/2025 0122)  Absence of Physical Injury: Implement safety measures based on patient assessment  5/14/2025 1731 by Caitlin Goss RN  Outcome: Progressing     Problem: Skin/Tissue Integrity - Adult  Goal: Incisions, wounds, or drain sites healing without S/S of infection  5/15/2025 0126 by Sacha Smith RN  Outcome: Progressing  Flowsheets  Taken 5/15/2025 0122  Incisions, Wounds, or Drain Sites Healing Without Sign and Symptoms of Infection: TWICE DAILY: Assess and document skin integrity  Taken 5/14/2025 2000  Incisions, Wounds, or Drain Sites Healing Without Sign and Symptoms of Infection: TWICE DAILY: Assess and document skin integrity  5/14/2025 1731 by Caitlin Goss RN  Outcome: Progressing     Problem: Infection - Adult  Goal: Absence of infection at discharge  5/15/2025 0126 by Sacha Smith RN  Outcome: Progressing  Flowsheets (Taken 5/14/2025 2000)  Absence of infection at discharge: Assess and monitor for signs and symptoms of infection  5/14/2025 1731 by Caitlin Goss RN  Outcome: Progressing     Problem: Pain  Goal: Verbalizes/displays adequate comfort level or baseline comfort level  5/15/2025 0126 by Sacha Smith RN  Outcome: Progressing  5/14/2025 1731 by Caitlin Goss RN  Outcome: Progressing     Problem: Safety - Adult  Goal: Free from fall injury  Outcome: Progressing  Flowsheets (Taken 5/15/2025  Post-Anesthesia Evaluation and Assessment    Patient: Dash Zavala MRN: [id number]  SSN: [id number]    YOB: 1936  Age: 80 y.o. Sex: male       Cardiovascular Function/Vital Signs  Visit Vitals    /55    Pulse (!) 54    Temp 36.4 °C (97.5 °F)    Resp 16    Ht 5' 9\" (1.753 m)    Wt 73.6 kg (162 lb 4.1 oz)    SpO2 98%    BMI 23.96 kg/m2       Patient is status post regional, MAC anesthesia for Procedure(s):  AMPUTATION RIGHT 5TH TOE (ANKLE BLOCK). Nausea/Vomiting: None    Postoperative hydration reviewed and adequate. Pain:  Pain Scale 1: Numeric (0 - 10) (05/31/17 1214)  Pain Intensity 1: 0 (05/31/17 1214)   Managed    Neurological Status:   Neuro (WDL): Within Defined Limits (05/31/17 1214)  Neuro  Neurologic State: Drowsy; Appropriate for age (05/31/17 1214)   At baseline    Mental Status and Level of Consciousness: Arousable    Pulmonary Status:   O2 Device: Room air (05/31/17 1220)   Adequate oxygenation and airway patent    Complications related to anesthesia: None    Post-anesthesia assessment completed.  No concerns    Signed By: Marybeth Rosenberg MD     May 31, 2017

## 2025-05-15 NOTE — PLAN OF CARE
Problem: Discharge Planning  Goal: Discharge to home or other facility with appropriate resources  5/15/2025 1332 by April Young RN  Outcome: Progressing  Flowsheets (Taken 5/15/2025 0843)  Discharge to home or other facility with appropriate resources:   Identify barriers to discharge with patient and caregiver   Identify discharge learning needs (meds, wound care, etc)  5/15/2025 0126 by Sacha Smith RN  Outcome: Progressing  Flowsheets (Taken 5/14/2025 2000)  Discharge to home or other facility with appropriate resources:   Identify barriers to discharge with patient and caregiver   Identify discharge learning needs (meds, wound care, etc)     Problem: ABCDS Injury Assessment  Goal: Absence of physical injury  5/15/2025 1332 by April Young RN  Outcome: Progressing  5/15/2025 0126 by Sacha Smith RN  Outcome: Progressing  Flowsheets (Taken 5/15/2025 0122)  Absence of Physical Injury: Implement safety measures based on patient assessment     Problem: Skin/Tissue Integrity - Adult  Goal: Incisions, wounds, or drain sites healing without S/S of infection  5/15/2025 1332 by April Young RN  Outcome: Progressing  Flowsheets (Taken 5/15/2025 0843)  Incisions, Wounds, or Drain Sites Healing Without Sign and Symptoms of Infection:   TWICE DAILY: Assess and document skin integrity   TWICE DAILY: Assess and document dressing/incision, wound bed, drain sites and surrounding tissue  5/15/2025 0126 by Sacha Smith RN  Outcome: Progressing  Flowsheets  Taken 5/15/2025 0122  Incisions, Wounds, or Drain Sites Healing Without Sign and Symptoms of Infection: TWICE DAILY: Assess and document skin integrity  Taken 5/14/2025 2000  Incisions, Wounds, or Drain Sites Healing Without Sign and Symptoms of Infection: TWICE DAILY: Assess and document skin integrity     Problem: Infection - Adult  Goal: Absence of infection at discharge  5/15/2025 1332 by April Young RN  Outcome: Progressing  Flowsheets (Taken 5/15/2025

## 2025-05-15 NOTE — PROGRESS NOTES
[facility]   IN-PATIENT SERVICE   [facility]    HISTORY AND PHYSICAL EXAMINATION            Date:   5/15/2025  Patient name:  Ap Napier  Date of admission:  5/13/2025  6:55 PM  MRN:   [id number]  Account:  [id number]  YOB: 1940  PCP:    Harper Cooper MD  Room:   2062/2062-01  Code Status:    DNR-CCA    Chief Complaint:     Chief Complaint   Patient presents with    Animal Bite     Cat bite on right hand       History Obtained From:     patient, electronic medical record    History of Present Illness:     The patient is a 85 y.o.  Non- / non  female who presents with Animal Bite (Cat bite on right hand)   and she is admitted to the hospital for the management of cat bite  Medical history significant for HTN, HLD, COPD, chronic kidney disease stage IIIa, hypothyroidism, age-related osteoporosis, depression and anxiety.     Patient presented to ED after right hand being bit by her cat around 2:30 AM.  States that pain, swelling and redness increased throughout the day prompting ED evaluation.  Noted to have red streaking from hand extending to forearm and elbow.  X-ray reveals soft tissue swelling and age-related osteoarthritis.  WBC 8.2.  Afebrile.  Started on IV Unasyn and tetanus shot administered. Denies fever, chills, chest pain, cough, abdominal pain, nausea, vomiting, diarrhea, and urinary symptoms. CODE STATUS reviewed.  Education provided.  Patient states that she would like to be a DNR-CCA with no chest compressions or shocking.  Is agreeable to intubation for short-term only.     Admit to MedSurg unit for cat bite of right hand with infection  Continue IV Unasyn, elevate extremity and apply ice as needed.  Tylenol as needed for pain control per patient request.  CODE STATUS DNR CCA.  Routine labs in a.m.  5/15  Redness and swelling of right hand is better but not completely gone  Patient still have problem in making a fist  Pain is  splenomegaly  Neurologic: There are no new focal motor or sensory deficits, normal muscle tone and bulk, no abnormal sensation, normal speech, cranial nerves II through XII grossly intact  Skin: No gross lesions, rashes, bruising or bleeding on exposed skin area  Extremities: Redness, swelling of right hand, right forearm    Psych: normal affect     Investigations:      Laboratory Testing:  Recent Results (from the past 24 hours)   Basic Metabolic Panel w/ Reflex to MG    Collection Time: 05/15/25  6:28 AM   Result Value Ref Range    Sodium 138 136 - 145 mmol/L    Potassium 3.5 (L) 3.7 - 5.3 mmol/L    Chloride 104 98 - 107 mmol/L    CO2 21 20 - 31 mmol/L    Anion Gap 13 9 - 16 mmol/L    Glucose 105 (H) 74 - 99 mg/dL    BUN 11 8 - 23 mg/dL    Creatinine 1.0 0.7 - 1.2 mg/dL    Est, Glom Filt Rate 55 (L) >60 mL/min/1.73m2    Calcium 8.8 8.6 - 10.4 mg/dL   CBC with auto differential    Collection Time: 05/15/25  6:28 AM   Result Value Ref Range    WBC 6.5 3.5 - 11.0 k/uL    RBC 4.05 4.0 - 5.2 m/uL    Hemoglobin 12.9 12.0 - 16.0 g/dL    Hematocrit 37.0 36 - 46 %    MCV 91.3 80 - 100 fL    MCH 31.7 26 - 34 pg    MCHC 34.8 31 - 37 g/dL    RDW 13.4 11.5 - 14.9 %    Platelets 160 150 - 450 k/uL    MPV 7.5 6.0 - 12.0 fL    Neutrophils % 63 36 - 66 %    Lymphocytes % 23 (L) 24 - 44 %    Monocytes % 12 (H) 1 - 7 %    Eosinophils % 1 0 - 4 %    Basophils % 1 0 - 2 %    Neutrophils Absolute 4.10 1.3 - 9.1 k/uL    Lymphocytes Absolute 1.50 1.0 - 4.8 k/uL    Monocytes Absolute 0.80 0.1 - 1.3 k/uL    Eosinophils Absolute 0.10 0.0 - 0.4 k/uL    Basophils Absolute 0.00 0.0 - 0.2 k/uL   Magnesium    Collection Time: 05/15/25  6:28 AM   Result Value Ref Range    Magnesium 1.8 1.6 - 2.4 mg/dL       Imaging/Diagnostics:      Assessment :      Primary Problem  Cat bite of right hand with infection, initial encounter    Active Hospital Problems    Diagnosis Date Noted    Cat bite of right hand with infection, initial encounter [H81.384H,

## 2025-05-16 VITALS
HEIGHT: 63 IN | BODY MASS INDEX: 22.5 KG/M2 | SYSTOLIC BLOOD PRESSURE: 120 MMHG | OXYGEN SATURATION: 95 % | DIASTOLIC BLOOD PRESSURE: 86 MMHG | WEIGHT: 127 LBS | TEMPERATURE: 98.1 F | HEART RATE: 73 BPM | RESPIRATION RATE: 18 BRPM

## 2025-05-16 LAB
ANION GAP SERPL CALCULATED.3IONS-SCNC: 10 MMOL/L (ref 9–16)
BASOPHILS # BLD: 0 K/UL (ref 0–0.2)
BASOPHILS NFR BLD: 1 % (ref 0–2)
BUN SERPL-MCNC: 10 MG/DL (ref 8–23)
CALCIUM SERPL-MCNC: 8.7 MG/DL (ref 8.6–10.4)
CHLORIDE SERPL-SCNC: 106 MMOL/L (ref 98–107)
CO2 SERPL-SCNC: 22 MMOL/L (ref 20–31)
CREAT SERPL-MCNC: 0.9 MG/DL (ref 0.7–1.2)
EOSINOPHIL # BLD: 0.1 K/UL (ref 0–0.4)
EOSINOPHILS RELATIVE PERCENT: 2 % (ref 0–4)
ERYTHROCYTE [DISTWIDTH] IN BLOOD BY AUTOMATED COUNT: 13.1 % (ref 11.5–14.9)
GFR, ESTIMATED: 63 ML/MIN/1.73M2
GLUCOSE SERPL-MCNC: 98 MG/DL (ref 74–99)
HCT VFR BLD AUTO: 37.6 % (ref 36–46)
HGB BLD-MCNC: 12.5 G/DL (ref 12–16)
LYMPHOCYTES NFR BLD: 1.5 K/UL (ref 1–4.8)
LYMPHOCYTES RELATIVE PERCENT: 32 % (ref 24–44)
MCH RBC QN AUTO: 30.6 PG (ref 26–34)
MCHC RBC AUTO-ENTMCNC: 33.3 G/DL (ref 31–37)
MCV RBC AUTO: 91.8 FL (ref 80–100)
MONOCYTES NFR BLD: 0.6 K/UL (ref 0.1–1.3)
MONOCYTES NFR BLD: 12 % (ref 1–7)
NEUTROPHILS NFR BLD: 53 % (ref 36–66)
NEUTS SEG NFR BLD: 2.5 K/UL (ref 1.3–9.1)
PLATELET # BLD AUTO: 161 K/UL (ref 150–450)
PMV BLD AUTO: 7.5 FL (ref 6–12)
POTASSIUM SERPL-SCNC: 3.9 MMOL/L (ref 3.7–5.3)
RBC # BLD AUTO: 4.1 M/UL (ref 4–5.2)
SODIUM SERPL-SCNC: 138 MMOL/L (ref 136–145)
WBC OTHER # BLD: 4.7 K/UL (ref 3.5–11)

## 2025-05-16 PROCEDURE — 99239 HOSP IP/OBS DSCHRG MGMT >30: CPT | Performed by: INTERNAL MEDICINE

## 2025-05-16 PROCEDURE — 6370000000 HC RX 637 (ALT 250 FOR IP): Performed by: NURSE PRACTITIONER

## 2025-05-16 PROCEDURE — 36415 COLL VENOUS BLD VENIPUNCTURE: CPT

## 2025-05-16 PROCEDURE — 85025 COMPLETE CBC W/AUTO DIFF WBC: CPT

## 2025-05-16 PROCEDURE — 2580000003 HC RX 258: Performed by: INTERNAL MEDICINE

## 2025-05-16 PROCEDURE — 94761 N-INVAS EAR/PLS OXIMETRY MLT: CPT

## 2025-05-16 PROCEDURE — 80048 BASIC METABOLIC PNL TOTAL CA: CPT

## 2025-05-16 PROCEDURE — 94640 AIRWAY INHALATION TREATMENT: CPT

## 2025-05-16 PROCEDURE — 6360000002 HC RX W HCPCS: Performed by: INTERNAL MEDICINE

## 2025-05-16 RX ADMIN — PANTOPRAZOLE SODIUM 40 MG: 40 TABLET, DELAYED RELEASE ORAL at 07:57

## 2025-05-16 RX ADMIN — AMLODIPINE BESYLATE 2.5 MG: 2.5 TABLET ORAL at 07:57

## 2025-05-16 RX ADMIN — SODIUM CHLORIDE 3000 MG: 9 INJECTION, SOLUTION INTRAVENOUS at 08:00

## 2025-05-16 RX ADMIN — BUDESONIDE AND FORMOTEROL FUMARATE DIHYDRATE 2 PUFF: 80; 4.5 AEROSOL RESPIRATORY (INHALATION) at 08:28

## 2025-05-16 RX ADMIN — SODIUM CHLORIDE 3000 MG: 9 INJECTION, SOLUTION INTRAVENOUS at 02:55

## 2025-05-16 RX ADMIN — METOPROLOL SUCCINATE 25 MG: 25 TABLET, FILM COATED, EXTENDED RELEASE ORAL at 07:57

## 2025-05-16 RX ADMIN — TIOTROPIUM BROMIDE INHALATION SPRAY 2 PUFF: 3.12 SPRAY, METERED RESPIRATORY (INHALATION) at 08:28

## 2025-05-16 RX ADMIN — ACETAMINOPHEN 650 MG: 325 TABLET ORAL at 08:02

## 2025-05-16 ASSESSMENT — PAIN DESCRIPTION - FREQUENCY: FREQUENCY: CONTINUOUS

## 2025-05-16 ASSESSMENT — PAIN SCALES - GENERAL: PAINLEVEL_OUTOF10: 3

## 2025-05-16 ASSESSMENT — PAIN DESCRIPTION - DESCRIPTORS: DESCRIPTORS: SORE

## 2025-05-16 ASSESSMENT — PAIN DESCRIPTION - LOCATION: LOCATION: HAND

## 2025-05-16 ASSESSMENT — PAIN DESCRIPTION - ONSET: ONSET: ON-GOING

## 2025-05-16 ASSESSMENT — PAIN DESCRIPTION - PAIN TYPE: TYPE: ACUTE PAIN

## 2025-05-16 ASSESSMENT — PAIN - FUNCTIONAL ASSESSMENT: PAIN_FUNCTIONAL_ASSESSMENT: PREVENTS OR INTERFERES SOME ACTIVE ACTIVITIES AND ADLS

## 2025-05-16 ASSESSMENT — PAIN DESCRIPTION - ORIENTATION: ORIENTATION: RIGHT

## 2025-05-16 NOTE — DISCHARGE INSTR - COC
Continuity of Care Form    Patient Name: Ap Napier   :  1940  MRN:  [id number]    Admit date:  2025  Discharge date:  ***    Code Status Order: DNR-CCA   Advance Directives:     Admitting Physician:  Eduardo Dean MD  PCP: Harper Cooper MD    Discharging Nurse: ***  Discharging Hospital Unit/Room#:   Discharging Unit Phone Number: ***    Emergency Contact:   Extended Emergency Contact Information  Primary Emergency Contact: Lam Napier  Address: [address] [address]  Home Phone: [phone]  Mobile Phone: [phone]  Relation: Spouse  Hearing or visual needs: None  Other needs: None  Preferred language: English   needed? No  Secondary Emergency Contact: Jose Juan Napier  Home Phone: [phone]  Mobile Phone: [phone]  Relation: Child    Past Surgical History:  Past Surgical History:   Procedure Laterality Date    APPENDECTOMY      BACK SURGERY      CATARACT EXTRACTION EXTRACAPSULAR W/ INTRAOCULAR LENS IMPLANTATION Right 2024    [facility]    CATARACT REMOVAL WITH IMPLANT Left 2014    [facility]    CHOLECYSTECTOMY      COLONOSCOPY      COLONOSCOPY  2014    Severe sigmoid diverticulosis; due for repeat 2019    ENDOSCOPY, COLON, DIAGNOSTIC      ESOPHAGOGASTRODUODENOSCOPY  2022    EYE SURGERY      EYE SURGERY Right 2024    EYE CATARACT EMULSIFICATION INTRAOCULAR LENS IMPLANT RIGHT performed by Amilcar Zurita MD at [facility] OR    PARATHYROIDECTOMY  2018    Dr. Carballo; Left inferior parathyroid: adenoma    ROTATOR CUFF REPAIR Left     TONSILLECTOMY      TUBAL LIGATION      UPPER GASTROINTESTINAL ENDOSCOPY  2014    biopsy    UPPER GASTROINTESTINAL ENDOSCOPY  2016    UPPER GASTROINTESTINAL ENDOSCOPY  2016    mild gastritis    UPPER GASTROINTESTINAL ENDOSCOPY N/A 2022    EGD BIOPSY performed by Ana Dewitt MD at [facility] OR

## 2025-05-16 NOTE — CARE COORDINATION
Case Management   Daily Progress Note       Patient Name: Ap Napier                   YOB: 1940  Diagnosis: Cat bite of right hand with infection, initial encounter [S61.451A, L08.9, W55.01XA]  Cat bite of right hand, initial encounter [S61.451A, W55.01XA]                       GMLOS: 2.6 days  Length of Stay: 3  days    Readmission Risk (Low < 19, Mod (19-27), High > 27): Readmission Risk Score: 7.4      Patient is alert and oriented.    Spoke with Pt, and Current Transitional Plan is:    [x] Home Independently W/.    [] Home with HC    [] Skilled Nursing Facility    [] Acute Rehabilitation    [] Long Term Acute Care (LTAC)    [] Other:     Medical Management: Remains on IV Unasyn. Anticipate DC today.     Testing Ordered: NA    Additional Notes:  Denies Needs/VNS.     Electronically signed by Yanelis Velasco RN on 5/16/2025 at 9:11 AM

## 2025-05-16 NOTE — PROGRESS NOTES
AVS reviewed with patient. All questions answered. IV removed no complications. Waiting on personal transportation.

## 2025-05-16 NOTE — PROGRESS NOTES
[facility]   IN-PATIENT SERVICE   [facility]    HISTORY AND PHYSICAL EXAMINATION            Date:   5/16/2025  Patient name:  Ap Napier  Date of admission:  5/13/2025  6:55 PM  MRN:   [id number]  Account:  [id number]  YOB: 1940  PCP:    Harper Cooper MD  Room:   2062/2062-01  Code Status:    DNR-CCA    Chief Complaint:     Chief Complaint   Patient presents with    Animal Bite     Cat bite on right hand       History Obtained From:     patient, electronic medical record    History of Present Illness:     The patient is a 85 y.o.  Non- / non  female who presents with Animal Bite (Cat bite on right hand)   and she is admitted to the hospital for the management of cat bite  Medical history significant for HTN, HLD, COPD, chronic kidney disease stage IIIa, hypothyroidism, age-related osteoporosis, depression and anxiety.     Patient presented to ED after right hand being bit by her cat around 2:30 AM.  States that pain, swelling and redness increased throughout the day prompting ED evaluation.  Noted to have red streaking from hand extending to forearm and elbow.  X-ray reveals soft tissue swelling and age-related osteoarthritis.  WBC 8.2.  Afebrile.  Started on IV Unasyn and tetanus shot administered. Denies fever, chills, chest pain, cough, abdominal pain, nausea, vomiting, diarrhea, and urinary symptoms. CODE STATUS reviewed.  Education provided.  Patient states that she would like to be a DNR-CCA with no chest compressions or shocking.  Is agreeable to intubation for short-term only.     Admit to MedSurg unit for cat bite of right hand with infection  Continue IV Unasyn, elevate extremity and apply ice as needed.  Tylenol as needed for pain control per patient request.  CODE STATUS DNR CCA.  Routine labs in a.m.  5/15  Redness and swelling of right hand is better but not completely gone  Patient still have problem in making a fist  Pain is

## 2025-05-16 NOTE — PLAN OF CARE
Problem: Discharge Planning  Goal: Discharge to home or other facility with appropriate resources  5/16/2025 1010 by Jane Sarabia RN  Outcome: Adequate for Discharge  5/15/2025 2323 by Sacha Smith RN  Outcome: Progressing  Flowsheets (Taken 5/15/2025 2200)  Discharge to home or other facility with appropriate resources:   Identify barriers to discharge with patient and caregiver   Identify discharge learning needs (meds, wound care, etc)     Problem: ABCDS Injury Assessment  Goal: Absence of physical injury  5/16/2025 1010 by Jane Sarabia RN  Outcome: Adequate for Discharge  5/15/2025 2323 by Sacha Smith RN  Outcome: Progressing  Flowsheets (Taken 5/15/2025 2319)  Absence of Physical Injury: Implement safety measures based on patient assessment     Problem: Skin/Tissue Integrity - Adult  Goal: Incisions, wounds, or drain sites healing without S/S of infection  5/16/2025 1010 by Jane Sarabia RN  Outcome: Adequate for Discharge  5/15/2025 2323 by Sacha Smith RN  Outcome: Progressing  Flowsheets  Taken 5/15/2025 2319  Incisions, Wounds, or Drain Sites Healing Without Sign and Symptoms of Infection: TWICE DAILY: Assess and document skin integrity  Taken 5/15/2025 2200  Incisions, Wounds, or Drain Sites Healing Without Sign and Symptoms of Infection: TWICE DAILY: Assess and document skin integrity     Problem: Infection - Adult  Goal: Absence of infection at discharge  5/16/2025 1010 by Jane Sarabia RN  Outcome: Adequate for Discharge  5/15/2025 2323 by Sacha Smith RN  Outcome: Progressing  Flowsheets (Taken 5/15/2025 2200)  Absence of infection at discharge: Assess and monitor for signs and symptoms of infection     Problem: Pain  Goal: Verbalizes/displays adequate comfort level or baseline comfort level  5/16/2025 1010 by Jane Sarabia RN  Outcome: Adequate for Discharge  5/15/2025 2323 by Sacha Smith RN  Outcome: Progressing  Flowsheets (Taken 5/15/2025

## 2025-05-19 ENCOUNTER — CARE COORDINATION (OUTPATIENT)
Dept: CARE COORDINATION | Age: 85
End: 2025-05-19

## 2025-05-19 ENCOUNTER — TELEPHONE (OUTPATIENT)
Dept: FAMILY MEDICINE CLINIC | Age: 85
End: 2025-05-19

## 2025-05-19 NOTE — TELEPHONE ENCOUNTER
[facility] Transitions Initial Follow Up Call    Outreach made within 2 business days of discharge: Yes    Patient: Ap Napier Patient : 1940   MRN: [id number]  Reason for Admission: Cat Bite  Discharge Date: 25       Spoke with: Chaparrita    If Virtual visit made for hospital follow up, advise patient to make sure to have family member present to help assist with visit. Please make sure mobile number is updated in patient chart.     Discharge department/facility: [facility] Interactive Patient Contact:  Was patient able to fill all prescriptions: Yes  Was patient instructed to bring all medications to the follow-up visit: Yes  Is patient taking all medications as directed in the discharge summary? Yes  Does patient understand their discharge instructions: Yes  Does patient have questions or concerns that need addressed prior to 7-14 day follow up office visit: yes -     Scheduled appointment with PCP within 7-14 days    Follow Up  Future Appointments   Date Time Provider Department Center   2025  1:30 PM Andrey Cuba APRN - CNP fp [facility] DEP   2025  1:00 PM Harper Cooper MD fp [facility] DEP   10/22/2025  1:30 PM Harper Cooper MD [facility] DEP       Chaparrita Jasmine MA

## 2025-05-19 NOTE — DISCHARGE SUMMARY
IN-PATIENT SERVICE   [facility]    Discharge Summary     Patient ID: Ap Napier  :  1940   MRN: [id number]     ACCOUNT:  [id number]   Patient's PCP: Harper Cooper MD  Admit Date: 2025   Discharge Date: 2025    Length of Stay: 3  Code Status:  Prior  Admitting Physician: Eduardo Dean MD  Discharge Physician: Eduardo Dean MD     Active Discharge Diagnoses:     Primary Problem  Cat bite of right hand with infection, initial encounter      Hospital Problems  Active Hospital Problems    Diagnosis Date Noted    Cat bite of right hand with infection, initial encounter [S61.451A, L08.9, W55.01XA] 2025       Admission Condition:  fair     Discharged Condition: fair    Hospital Stay:     Hospital Course:  Ap Napier is a 85 y.o. female who was admitted for the management of Cat bite of right hand with infection, initial encounter , presented to ER with Animal Bite (Cat bite on right hand)  Patient presented to ED after right hand being bit by her cat around 2:30 AM. States that pain, swelling and redness increased throughout the day prompting ED evaluation.  She has past medical history of multiple medical problems include hypertension, hyperlipidemia, COPD, chronic kidney disease stage III, hypothyroidism, age-related osteoporosis, depression anxiety  Noted to have red streaking from hand extending to forearm and elbow. X-ray reveals soft tissue swelling and age-related osteoarthritis. WBC 8.2. Afebrile. Started on IV Unasyn and tetanus shot administered   Patient, swelling improved  Getting discharged on p.o. Augmentin      Significant therapeutic interventions:     Significant Diagnostic Studies:   Labs / Micro:        Radiology:    XR HAND RIGHT (MIN 3 VIEWS)  Result Date: 2025  EXAMINATION: THREE XRAY VIEWS OF THE RIGHT HAND 2025 7:04 pm COMPARISON: None. HISTORY: ORDERING SYSTEM PROVIDED HISTORY: cat bite TECHNOLOGIST PROVIDED

## 2025-05-19 NOTE — CARE COORDINATION
Care Transitions Note    Initial Call - Call within 2 business days of discharge: Yes    Attempted to reach patient for transitions of care follow up. Unable to reach patient.    Outreach Attempts:   HIPAA compliant voicemail left for patient.     Patient: Ap Napier    Patient : 1940   MRN: [id number]    Reason for Admission: Cat bite of right hand with infection, initial encounter   Discharge Date: 25  RURS: Readmission Risk Score: 7.2    Last Discharge Facility       Date Complaint Diagnosis Description Type Department Provider    25 Animal Bite Cat bite of right hand, initial encounter ED to Hosp-Admission (Discharged) (ADMITTED) [facility] Eduardo Castellon MD; [facility]...            Was this an external facility discharge? No    Follow Up Appointment:   Patient has hospital follow up appointment scheduled within 7 days of discharge.    Future Appointments         Provider Specialty Dept Phone    2025 1:30 PM Andrey Cuba APRN - CNP Family Medicine [phone]    2025 1:00 PM Harper Cooper MD Family Medicine [phone]    10/22/2025 1:30 PM Harper Cooper MD Family Medicine [phone]            Plan for follow-up on next business day.      Munira Bernal LPN

## 2025-05-20 ENCOUNTER — HOSPITAL ENCOUNTER (OUTPATIENT)
Dept: GENERAL RADIOLOGY | Age: 85
Discharge: HOME | End: 2025-05-22
Payer: MEDICARE

## 2025-05-20 ENCOUNTER — OFFICE VISIT (OUTPATIENT)
Dept: FAMILY MEDICINE CLINIC | Age: 85
End: 2025-05-20

## 2025-05-20 ENCOUNTER — CARE COORDINATION (OUTPATIENT)
Dept: CARE COORDINATION | Age: 85
End: 2025-05-20

## 2025-05-20 VITALS
BODY MASS INDEX: 22.71 KG/M2 | HEART RATE: 64 BPM | DIASTOLIC BLOOD PRESSURE: 86 MMHG | SYSTOLIC BLOOD PRESSURE: 134 MMHG | OXYGEN SATURATION: 97 % | WEIGHT: 128.2 LBS
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
## 2025-05-20 DIAGNOSIS — Z09 HOSPITAL DISCHARGE FOLLOW-UP: Primary | ICD-10-CM

## 2025-05-20 DIAGNOSIS — S61.451S CAT BITE OF RIGHT HAND, SEQUELA: ICD-10-CM

## 2025-05-20 DIAGNOSIS — R05.1 ACUTE COUGH: ICD-10-CM

## 2025-05-20 PROCEDURE — 71046 X-RAY EXAM CHEST 2 VIEWS: CPT

## 2025-05-20 RX ORDER — METHYLPREDNISOLONE 4 MG/1
TABLET ORAL
Qty: 1 KIT | Refills: 0 | Status: SHIPPED | OUTPATIENT
Start: 2025-05-20 | End: 2025-05-26

## 2025-05-20 RX ORDER — GUAIFENESIN 600 MG/1
600 TABLET, EXTENDED RELEASE ORAL 2 TIMES DAILY
Qty: 30 TABLET | Refills: 0 | Status: SHIPPED | OUTPATIENT
Start: 2025-05-20 | End: 2025-06-04

## 2025-05-20 ASSESSMENT — ENCOUNTER SYMPTOMS
SHORTNESS OF BREATH: 0
BLOOD IN STOOL: 0
CONSTIPATION: 0
ABDOMINAL DISTENTION: 0
WHEEZING: 0
VOMITING: 0
BACK PAIN: 0
CHEST TIGHTNESS: 0
DIARRHEA: 0
NAUSEA: 0
ABDOMINAL PAIN: 0
COUGH: 1

## 2025-05-20 NOTE — PROGRESS NOTES
Post-Discharge Transitional Care Follow Up      Ap Napier   YOB: 1940    Date of Office Visit:  5/20/2025  Date of Hospital Admission: 5/13/25  Date of Hospital Discharge: 5/16/25  Readmission Risk Score (high >=14%. Medium >=10%):Readmission Risk Score: 7.2      Care management risk score Rising risk (score 2-5) and Complex Care (Scores >=6): No Risk Score On File     Non face to face  following discharge, date last encounter closed (first attempt may have been earlier): 05/19/2025     Call initiated 2 business days of discharge: Yes     Hospital discharge follow-up  -     [place] DISCHARGE MEDS RECONCILED W/ CURRENT OUTPATIENT MED LIST  Acute cough  -Symptoms started last week  -Plan to order chest x-ray, Mucinex, Medrol Dosepak and extend Augmentin for another 3 days  -Discussed concerning symptoms and when to seek in person evaluation and patient states understanding  -     XR CHEST STANDARD (2 VW); Future  -     guaiFENesin (MUCINEX) 600 MG extended release tablet; Take 1 tablet by mouth 2 times daily for 15 days, Disp-30 tablet, R-0Normal  -     methylPREDNISolone (MEDROL DOSEPACK) 4 MG tablet; Take by mouth., Disp-1 kit, R-0Normal  -     amoxicillin-clavulanate (AUGMENTIN) 875-125 MG per tablet; Take 1 tablet by mouth 2 times daily for 3 days, Disp-6 tablet, R-0Normal  Cat bite of right hand, sequela  - Symptoms are improving  - Still a little erythema and swelling noted  - No red streaking  - No systemic symptoms  - Discussed concerning symptoms when to seek in person evaluation and patient states understanding    Medical Decision Making: moderate complexity  Return if symptoms worsen or fail to improve, for as scheduled .    On this date 5/20/2025 I have spent 35 minutes reviewing previous notes, test results and face to face with the patient discussing the diagnosis and importance of compliance with the treatment plan as well as documenting on the day of the visit.       Subjective:   HPI

## 2025-05-20 NOTE — CARE COORDINATION
Care Transitions Note    Initial Call - Call within 2 business days of discharge: Yes    Attempted to reach patient for transitions of care follow up. Unable to reach patient.    Outreach Attempts:   Multiple attempts to contact patient at phone numbers on file.   HIPAA compliant voicemail left for patient.   2nd attempt-will end care transitions if no return call received.     Patient: Ap Napier    Patient : 1940   MRN: [id number]    Reason for Admission: cat bite right hand  Discharge Date: 25  RURS: Readmission Risk Score: 7.2    Last Discharge Facility       Date Complaint Diagnosis Description Type Department Provider    25 Animal Bite Cat bite of right hand, initial encounter ED to Hosp-Admission (Discharged) (ADMITTED) [facility] Eduardo Castellon MD; [facility]...            Was this an external facility discharge? No    Follow Up Appointment:   Patient has hospital follow up appointment scheduled within 7 days of discharge.    Future Appointments         Provider Specialty Dept Phone    2025 1:30 PM Andrey Cuba APRN - CNP Family Medicine [phone]    2025 1:00 PM Harper Cooper MD Family Medicine [phone]    10/22/2025 1:30 PM Harper Cooper MD Family Medicine [phone]            No further follow-up call indicated     Munira Bernal LPN

## 2025-05-21 ENCOUNTER — RESULTS FOLLOW-UP (OUTPATIENT)
Dept: FAMILY MEDICINE CLINIC | Age: 85
End: 2025-05-21

## 2025-05-21 DIAGNOSIS — J18.9 PNEUMONIA OF RIGHT LUNG DUE TO INFECTIOUS ORGANISM, UNSPECIFIED PART OF LUNG: Primary | ICD-10-CM

## 2025-05-21 RX ORDER — DOXYCYCLINE HYCLATE 100 MG
100 TABLET ORAL 2 TIMES DAILY
Qty: 14 TABLET | Refills: 0 | Status: SHIPPED | OUTPATIENT
Start: 2025-05-21 | End: 2025-05-27 | Stop reason: ALTCHOICE

## 2025-05-21 NOTE — RESULT ENCOUNTER NOTE
Please notify patient that it looks like she does have pneumonia.  Continue Augmentin and I will add doxycycline as well.

## 2025-05-27 ENCOUNTER — TELEPHONE (OUTPATIENT)
Dept: FAMILY MEDICINE CLINIC | Age: 85
End: 2025-05-27

## 2025-05-27 ENCOUNTER — ENROLLMENT (OUTPATIENT)
Dept: CARE COORDINATION | Age: 85
End: 2025-05-27

## 2025-05-27 ENCOUNTER — PATIENT MESSAGE (OUTPATIENT)
Dept: FAMILY MEDICINE CLINIC | Age: 85
End: 2025-05-27

## 2025-05-27 DIAGNOSIS — J20.9 ACUTE BRONCHITIS DUE TO INFECTION: Primary | ICD-10-CM

## 2025-05-27 RX ORDER — AZITHROMYCIN 250 MG/1
TABLET, FILM COATED ORAL
Qty: 6 TABLET | Refills: 0 | Status: SHIPPED | OUTPATIENT
Start: 2025-05-27 | End: 2025-06-01

## 2025-05-27 NOTE — TELEPHONE ENCOUNTER
Noted. Thank you!  Did she take both Augmentin and doxycycline as she sent to the pharmacy?  If she is delayed in Trelegy daily?  And albuterol as needed throughout the day?  If yes I could send Sandie to the pharmacy, what pharmacy?  To check the pharmacy later       Future Appointments   Date Time Provider Department Center   6/13/2025  1:00 PM Harper Cooper MD fp [facility] DEP   10/22/2025  1:30 PM Harper Cooper MD fp [facility] DEP

## 2025-05-27 NOTE — TELEPHONE ENCOUNTER
Noted. Thank you!  Already addressed in the [employer] message    Future Appointments   Date Time Provider Department Center   6/13/2025  1:00 PM Harper Cooper MD fp [facility] DEP   10/22/2025  1:30 PM Harper Cooper MD fp [facility] DEP

## 2025-05-27 NOTE — TELEPHONE ENCOUNTER
[employer] message sent to the patient   Diagnosis Orders   1. Acute bronchitis due to infection  azithromycin (ZITHROMAX) 250 MG tablet           Future Appointments   Date Time Provider Department Center   6/13/2025  1:00 PM Harper Cooper MD [facility] DEP   10/22/2025  1:30 PM Harper Cooper MD [facility] DEP

## 2025-05-27 NOTE — PROGRESS NOTES
Did she take both Augmentin and doxycycline as she sent to the pharmacy?  If she is delayed in Trelegy daily?  And albuterol as needed throughout the day?  If yes I could send SRINIVASA-Allen to the pharmacy, what pharmacy?  To check the pharmacy later

## 2025-05-27 NOTE — TELEPHONE ENCOUNTER
Ap Napier to [facility] (supporting Harper Cooper MD)        5/27/25  2:53 PM  I have been'trying to return your call but don't get an answer.  Yes I have taken  both the prescriptions given tome but am still coughing and  bringing up yellowish mucus. I have been using my trelegy and pro air inhaler.  Please advise if you are going to send another script to [facility] in [place] on [address] [facility].  Ap Napier

## 2025-05-27 NOTE — TELEPHONE ENCOUNTER
Patient called in to inform us that she saw dominga last week and was told she had pneumonia. She was prescribed antibiotics and she has since finished those but she is still coughing and bringing up a lot of colored mucus. She would like to see if she needs another round of antibiotic or something else? Please advise.

## 2025-05-30 ENCOUNTER — CARE COORDINATION (OUTPATIENT)
Dept: CARE COORDINATION | Age: 85
End: 2025-05-30

## 2025-05-30 ASSESSMENT — ENCOUNTER SYMPTOMS: DYSPNEA ASSOCIATED WITH: EXERTION

## 2025-05-30 NOTE — CARE COORDINATION
Ambulatory Care Coordination Note     2025 12:41 PM     Patient Current Location:  Home: [address] [address]     This patient was received as a referral from Care Transition Nurse.    Patient contacted the ACM by telephone. Verified name and  with patient as identifiers. Provided introduction to self, and explanation of the ACM role.   Patient accepted care management services at this time.          ACM: Keara Chung RN     Challenges to be reviewed by the provider   Additional needs identified to be addressed with provider No                  Method of communication with provider: none.    Utilization: Initial Call - N/A    Care Summary Note: Spoke to Ap. Introduced self and reason for call. She had been hospitalized -  for a cat bite to her right hand which had been infected. She stated that her hand is fine, but now she has pneumonia. She feels she got it while she was hospitalized.   She had been discharged home on Augmentin. She had her HFU appt and was started on a Medrol dosepak, mucinex, and additional Augmentin. CXR confirmed pneumonia and she was started on doxycycline.   She said she is still congested and coughing up clear phlegm. She gets short of breath with activity/exertion. She was started on Zithromax on . She is using her Trelegy inhaler and albuterol as needed. She has follow up on . She does see a pulmonologist. Not sure when she saw him last. Encouraged her to call and notify of her pneumonia and current treatment.   She is agreeable to follow up call next week.   Offered patient enrollment in the Remote Patient Monitoring (RPM) program for in-home monitoring: Deferred at this time because ACM; will discuss at next outreach.     Assessments Completed:   COPD Assessment    Does the patient understand envrionmental exposure?: Yes  Is the patient able to verbalize Rescue vs. Long Acting medications?: Yes  Does the patient have a nebulizer?:

## 2025-05-30 NOTE — CARE COORDINATION
Ambulatory Care Coordination Note     5/30/2025 11:49 AM     Patient outreach attempt by this ACM today to offer care management services. ACM was unable to reach the patient by telephone today;   left voice message requesting a return phone call to this ACM.     ACM: Keara Chung RN     Care Summary Note: first attempt to reach patient for enrollment     PCP/Specialist follow up:   Future Appointments         Provider Specialty Dept Phone    6/13/2025 1:00 PM Harper Cooper MD Family Medicine [phone]    10/22/2025 1:30 PM Harper Cooper MD Family Medicine [phone]            Follow Up:   Plan for next ACM outreach in approximately 1 week to complete:  - outreach attempt to offer care management services.

## 2025-06-02 ENCOUNTER — TELEPHONE (OUTPATIENT)
Dept: FAMILY MEDICINE CLINIC | Age: 85
End: 2025-06-02

## 2025-06-02 DIAGNOSIS — M81.0 AGE-RELATED OSTEOPOROSIS WITHOUT CURRENT PATHOLOGICAL FRACTURE: Primary | ICD-10-CM

## 2025-06-02 RX ORDER — ONDANSETRON 2 MG/ML
8 INJECTION INTRAMUSCULAR; INTRAVENOUS
OUTPATIENT
Start: 2025-06-02

## 2025-06-02 RX ORDER — SODIUM CHLORIDE 9 MG/ML
INJECTION, SOLUTION INTRAVENOUS CONTINUOUS
OUTPATIENT
Start: 2025-06-02

## 2025-06-02 RX ORDER — FAMOTIDINE 10 MG/ML
20 INJECTION, SOLUTION INTRAVENOUS
OUTPATIENT
Start: 2025-06-02

## 2025-06-02 RX ORDER — EPINEPHRINE 1 MG/ML
0.3 INJECTION, SOLUTION, CONCENTRATE INTRAVENOUS PRN
OUTPATIENT
Start: 2025-06-02

## 2025-06-02 RX ORDER — HYDROCORTISONE SODIUM SUCCINATE 100 MG/2ML
100 INJECTION INTRAMUSCULAR; INTRAVENOUS
OUTPATIENT
Start: 2025-06-02

## 2025-06-02 RX ORDER — ACETAMINOPHEN 325 MG/1
650 TABLET ORAL
OUTPATIENT
Start: 2025-06-02

## 2025-06-02 RX ORDER — ALBUTEROL SULFATE 90 UG/1
4 INHALANT RESPIRATORY (INHALATION) PRN
OUTPATIENT
Start: 2025-06-02

## 2025-06-02 RX ORDER — DIPHENHYDRAMINE HYDROCHLORIDE 50 MG/ML
50 INJECTION, SOLUTION INTRAMUSCULAR; INTRAVENOUS
OUTPATIENT
Start: 2025-06-02

## 2025-06-02 NOTE — TELEPHONE ENCOUNTER
Diagnosis Orders   1. Age-related osteoporosis without current pathological fracture  Calcium    Creatinine    Magnesium    Phosphorus           Future Appointments   Date Time Provider Department Center   6/13/2025  1:00 PM Harper Cooper MD fp [facility]   10/22/2025  1:30 PM Harper Cooper MD fp [facility]

## 2025-06-06 ENCOUNTER — CARE COORDINATION (OUTPATIENT)
Dept: CARE COORDINATION | Age: 85
End: 2025-06-06

## 2025-06-06 NOTE — CARE COORDINATION
Ambulatory Care Coordination Note     6/6/2025 9:26 AM     Patient outreach attempt by this ACM today to perform care management follow up . ACM was unable to reach the patient by telephone today;   left voice message requesting a return phone call to this ACM.     ACM: Keara Chung RN     Care Summary Note: left message reminding of PCP appt next week, 6/13, and lab work to be completed prior to the appt    PCP/Specialist follow up:   Future Appointments         Provider Specialty Dept Phone    6/13/2025 1:00 PM Harper Cooper MD Family Medicine [phone]    10/22/2025 1:30 PM Harper Cooper MD Family Medicine [phone]            Follow Up:   Plan for next ACM outreach in approximately 1 week to complete:  - CC Protocol assessments  - disease specific assessments  - goal progression  - RPM.

## 2025-06-13 ENCOUNTER — OFFICE VISIT (OUTPATIENT)
Dept: FAMILY MEDICINE CLINIC | Age: 85
End: 2025-06-13

## 2025-06-13 ENCOUNTER — CARE COORDINATION (OUTPATIENT)
Dept: CARE COORDINATION | Age: 85
End: 2025-06-13

## 2025-06-13 VITALS
DIASTOLIC BLOOD PRESSURE: 60 MMHG | HEART RATE: 74 BPM | OXYGEN SATURATION: 96 % | TEMPERATURE: 97.5 F | SYSTOLIC BLOOD PRESSURE: 104 MMHG | HEIGHT: 63 IN | BODY MASS INDEX: 22.86 KG/M2 | WEIGHT: 129 LBS

## 2025-06-13 DIAGNOSIS — N18.2 BENIGN HYPERTENSION WITH CKD (CHRONIC KIDNEY DISEASE), STAGE II: ICD-10-CM

## 2025-06-13 DIAGNOSIS — E78.2 MIXED HYPERLIPIDEMIA: ICD-10-CM

## 2025-06-13 DIAGNOSIS — M81.0 AGE-RELATED OSTEOPOROSIS WITHOUT CURRENT PATHOLOGICAL FRACTURE: ICD-10-CM

## 2025-06-13 DIAGNOSIS — E53.8 VITAMIN B 12 DEFICIENCY: ICD-10-CM

## 2025-06-13 DIAGNOSIS — I12.9 BENIGN HYPERTENSION WITH CKD (CHRONIC KIDNEY DISEASE), STAGE II: ICD-10-CM

## 2025-06-13 DIAGNOSIS — F51.04 PSYCHOPHYSIOLOGICAL INSOMNIA: ICD-10-CM

## 2025-06-13 DIAGNOSIS — Z00.00 MEDICARE ANNUAL WELLNESS VISIT, SUBSEQUENT: Primary | ICD-10-CM

## 2025-06-13 DIAGNOSIS — L23.89 ALLERGIC CONTACT DERMATITIS DUE TO OTHER AGENTS: ICD-10-CM

## 2025-06-13 RX ORDER — METHYLPREDNISOLONE SODIUM SUCCINATE 125 MG/2ML
125 INJECTION INTRAMUSCULAR; INTRAVENOUS ONCE
Status: COMPLETED | OUTPATIENT
Start: 2025-06-13 | End: 2025-06-13

## 2025-06-13 RX ORDER — METHYLPREDNISOLONE 4 MG/1
TABLET ORAL
Qty: 1 KIT | Refills: 0 | Status: SHIPPED | OUTPATIENT
Start: 2025-06-13

## 2025-06-13 RX ORDER — FAMOTIDINE 20 MG/1
20 TABLET, FILM COATED ORAL 2 TIMES DAILY
Qty: 60 TABLET | Refills: 3 | Status: SHIPPED | OUTPATIENT
Start: 2025-06-13

## 2025-06-13 RX ADMIN — METHYLPREDNISOLONE SODIUM SUCCINATE 125 MG: 125 INJECTION INTRAMUSCULAR; INTRAVENOUS at 13:38

## 2025-06-13 SDOH — ECONOMIC STABILITY: FOOD INSECURITY: WITHIN THE PAST 12 MONTHS, THE FOOD YOU BOUGHT JUST DIDN'T LAST AND YOU DIDN'T HAVE MONEY TO GET MORE.: NEVER TRUE

## 2025-06-13 SDOH — ECONOMIC STABILITY: FOOD INSECURITY: WITHIN THE PAST 12 MONTHS, YOU WORRIED THAT YOUR FOOD WOULD RUN OUT BEFORE YOU GOT MONEY TO BUY MORE.: NEVER TRUE

## 2025-06-13 ASSESSMENT — PATIENT HEALTH QUESTIONNAIRE - PHQ9
1. LITTLE INTEREST OR PLEASURE IN DOING THINGS: NOT AT ALL
SUM OF ALL RESPONSES TO PHQ QUESTIONS 1-9: 0
4. FEELING TIRED OR HAVING LITTLE ENERGY: NOT AT ALL
6. FEELING BAD ABOUT YOURSELF - OR THAT YOU ARE A FAILURE OR HAVE LET YOURSELF OR YOUR FAMILY DOWN: NOT AT ALL
10. IF YOU CHECKED OFF ANY PROBLEMS, HOW DIFFICULT HAVE THESE PROBLEMS MADE IT FOR YOU TO DO YOUR WORK, TAKE CARE OF THINGS AT HOME, OR GET ALONG WITH OTHER PEOPLE: NOT DIFFICULT AT ALL
SUM OF ALL RESPONSES TO PHQ QUESTIONS 1-9: 0
8. MOVING OR SPEAKING SO SLOWLY THAT OTHER PEOPLE COULD HAVE NOTICED. OR THE OPPOSITE, BEING SO FIGETY OR RESTLESS THAT YOU HAVE BEEN MOVING AROUND A LOT MORE THAN USUAL: NOT AT ALL
SUM OF ALL RESPONSES TO PHQ QUESTIONS 1-9: 0
7. TROUBLE CONCENTRATING ON THINGS, SUCH AS READING THE NEWSPAPER OR WATCHING TELEVISION: NOT AT ALL
2. FEELING DOWN, DEPRESSED OR HOPELESS: NOT AT ALL
5. POOR APPETITE OR OVEREATING: NOT AT ALL
SUM OF ALL RESPONSES TO PHQ QUESTIONS 1-9: 0
9. THOUGHTS THAT YOU WOULD BE BETTER OFF DEAD, OR OF HURTING YOURSELF: NOT AT ALL
3. TROUBLE FALLING OR STAYING ASLEEP: NOT AT ALL

## 2025-06-13 ASSESSMENT — LIFESTYLE VARIABLES
HOW OFTEN DURING THE LAST YEAR HAVE YOU NEEDED AN ALCOHOLIC DRINK FIRST THING IN THE MORNING TO GET YOURSELF GOING AFTER A NIGHT OF HEAVY DRINKING: NEVER
HOW OFTEN DURING THE LAST YEAR HAVE YOU FAILED TO DO WHAT WAS NORMALLY EXPECTED FROM YOU BECAUSE OF DRINKING: NEVER
HOW OFTEN DURING THE LAST YEAR HAVE YOU FOUND THAT YOU WERE NOT ABLE TO STOP DRINKING ONCE YOU HAD STARTED: NEVER
HAVE YOU OR SOMEONE ELSE BEEN INJURED AS A RESULT OF YOUR DRINKING: NO
HOW OFTEN DO YOU HAVE A DRINK CONTAINING ALCOHOL: 4 OR MORE TIMES A WEEK
HOW OFTEN DURING THE LAST YEAR HAVE YOU HAD A FEELING OF GUILT OR REMORSE AFTER DRINKING: NEVER
HOW OFTEN DURING THE LAST YEAR HAVE YOU BEEN UNABLE TO REMEMBER WHAT HAPPENED THE NIGHT BEFORE BECAUSE YOU HAD BEEN DRINKING: NEVER
HOW MANY STANDARD DRINKS CONTAINING ALCOHOL DO YOU HAVE ON A TYPICAL DAY: 1 OR 2
HAS A RELATIVE, FRIEND, DOCTOR, OR ANOTHER HEALTH PROFESSIONAL EXPRESSED CONCERN ABOUT YOUR DRINKING OR SUGGESTED YOU CUT DOWN: NO

## 2025-06-13 ASSESSMENT — ENCOUNTER SYMPTOMS: DYSPNEA ASSOCIATED WITH: EXERTION

## 2025-06-13 NOTE — ASSESSMENT & PLAN NOTE
Ongoing, continue with B12 supplementation    Orders:    ESTABLISHED, MOD MDM, 30-39 MIN [32349]    cyanocobalamin ([facility] VITAMIN B12) 1000 MCG tablet; Take 1 tablet by mouth daily

## 2025-06-13 NOTE — ASSESSMENT & PLAN NOTE
Orders:    famotidine (PEPCID) 20 MG tablet; Take 1 tablet by mouth 2 times daily    ESTABLISHED, MOD MDM, 30-39 MIN [[id number]]

## 2025-06-13 NOTE — CARE COORDINATION
Planning:   Not reviewed during this call     Care Planning:   Education Documentation  Educate on COPD Zone, taught by Keara Chung RN at 6/13/2025  4:24 PM.  Learner: Patient  Readiness: Acceptance  Method: Explanation, Teachback  Response: Verbalizes Understanding    Educate chronic obstructive pulmonary disease exacerbation signs and symptoms, taught by Keara Chung RN at 6/13/2025  4:24 PM.  Learner: Patient  Readiness: Acceptance  Method: Explanation, Teachback  Response: Verbalizes Understanding    Education Comments  No comments found.     ,    Goals Addressed                   This Visit's Progress     Conditions and Symptoms   On track     I will schedule office visits, as directed by my provider.  I will keep my appointment or reschedule if I have to cancel.  I will notify my provider of any barriers to my plan of care.  I will follow my Zone Management tool to seek urgent or emergent care.  I will notify my provider of any symptoms that indicate a worsening of my condition.    Barriers: lack of education  Plan for overcoming my barriers: Care Management   Confidence: 8/10  Anticipated Goal Completion Date: 8/30/2025                 PCP/Specialist follow up:   Future Appointments         Provider Specialty Dept Phone    12/5/2025 10:00 AM Harper Cooper MD Family Medicine [phone]            Follow Up:   Plan for next ACM outreach in approximately 3 weeks to complete:  - disease specific assessments  - advance care planning   - goal progression.   Patient  is agreeable to this plan.

## 2025-06-13 NOTE — ASSESSMENT & PLAN NOTE
Chronic and likely improving, on Prolia injections, check labs, continue Prolia every 6 months, will do DEXA scan at 2 years interval    Orders:    ESTABLISHED, MOD MDM, 30-39 MIN [[id number]]

## 2025-06-13 NOTE — ASSESSMENT & PLAN NOTE
Chronic hypertension, well-controlled, continue current medication amlodipine 2.5 Mg, metoprolol XL 25 Mg daily  Chronic kidney disease stage II improved from previously stage III a, will continue to monitor with labs, avoid NSAIDs and dehydration  Orders:    ESTABLISHED, MOD MDM, 30-39 MIN [[id number]]    Phosphorus; Future    Uric Acid; Future    Urinalysis with Microscopic; Future    Comprehensive Metabolic Panel; Future    Magnesium; Future

## 2025-06-13 NOTE — PROGRESS NOTES
Visit Information    Have you changed or started any medications since your last visit including any over-the-counter medicines, vitamins, or herbal medicines? no   Are you having any side effects from any of your medications? -  no  Have you stopped taking any of your medications? Is so, why? -  no    Have you seen any other physician or provider since your last visit? No  Have you had any other diagnostic tests since your last visit? No  Have you been seen in the emergency room and/or had an admission to a hospital since we last saw you? No  Have you had your routine dental cleaning in the past 6 months? no    Have you activated your [employer] account? If not, what are your barriers? Yes     Patient Care Team:  Harper Cooper MD as PCP - General  Harper Cooper MD as PCP - Empaneled Provider  Hair Manriquez MD as Surgeon (General Surgery)  Amilcar Zurita MD as Surgeon (Ophthalmology)  Garfield Mchugh MD as Consulting Physician (Cardiovascular Disease)  Martha Carballo MD as Consulting Physician (Otolaryngology)  Coleman Santos MD as Consulting Physician (Cardiology)  Rod Hernandez MD as Consulting Physician (Pulmonology)  Aamir aKte MD as Consulting Physician (Pulmonology)  Laurence Bose MD (Hematology and Oncology)  Janet Oropeza DPM as Consulting Physician (Podiatry)  Moo Lopes MD as Consulting Physician (Orthopedic Surgery)  Fani Ridley MD as Consulting Physician (Gastroenterology)  Keara Chung RN as Ambulatory Care Manager    Medical History Review  Past Medical, Family, and Social History reviewed and does contribute to the patient presenting condition    Health Maintenance   Topic Date Due    Annual Wellness Visit (Medicare Advantage)  01/01/2025    COVID-19 Vaccine (8 - 2024-25 season) 03/18/2025    Breast cancer screen  07/16/2025    Lipids  10/22/2025    Depression Monitoring  02/15/2026    DEXA (modify frequency per FRAX score)  06/24/2026 
walnut trees during a windy day in her garden. The rash, which began on 06/12/2025, has been progressively worsening. She has not introduced any new topical products and has been using Cetaphil soap for an extended period. She has attempted to alleviate the symptoms with cold water compresses, which provided minimal relief. She has a history of hair dye use every 5 weeks but does not believe this is the cause of her current symptoms. She also reports itching but no difficulty swallowing.  Nobody else in the family has similar rash.    She has been experiencing persistent pain in her left foot, which is exacerbated by shoe pressure on a bone spur. This pain radiates up to her knee. She has consulted a podiatrist for this issue. She recalls [facility] year, which resulted in a foot fracture.     She reports no urinary issues and normal bowel movements.    She has been taking pravastatin and called in for a refill, but was informed that she should be taking lovastatin instead. She has not been taking lovastatin.    She has been using Unisom as a sleep aid and reports satisfactory sleep quality.    She has glaucoma and sees the eye doctor. She uses hearing aids. She occasionally experiences shortness of breath with exertion and uses her inhaler more frequently since having pneumonia. She reports no current issues with heartburn. She has not been engaging in regular exercise.    PAST SURGICAL HISTORY:  Rotator cuff surgery    Her sleep has improved since using Unisom.  Her insurance does not cover Lunesta anymore      Negative depression screening  PHQ-2 Over the past 2 weeks, how often have you been bothered by any of the following problems?  Little interest or pleasure in doing things: Not at all  Feeling down, depressed, or hopeless: Not at all  PHQ-2 Score: 0  PHQ-9 Over the past 2 weeks, how often have you been bothered by any of the following problems?  Trouble falling

## 2025-06-18 ASSESSMENT — ENCOUNTER SYMPTOMS: TROUBLE SWALLOWING: 0

## 2025-06-18 NOTE — ASSESSMENT & PLAN NOTE
Chronic and improved  Insurance does not cover Lunesta anymore   She is currently taking Unisom (diphenhydramine) for sleep and reports it is effective.  - No new symptoms reported.  - She will continue with Unisom as it helps her sleep.  - No new medications or referrals were ordered.  Orders:    ESTABLISHED, MOD MDM, 30-39 MIN [[id number]]

## 2025-07-11 ENCOUNTER — CARE COORDINATION (OUTPATIENT)
Dept: CARE COORDINATION | Age: 85
End: 2025-07-11

## 2025-07-11 NOTE — CARE COORDINATION
.Ambulatory Care Coordination Note     7/11/2025 9:38 AM     Patient outreach attempt by this ACM today to perform care management follow up . ACM was unable to reach the patient by telephone today;   left voice message requesting a return phone call to this ACM.     ACM: Keara Chung RN     Care Summary Note: first attempt to reach for follow up    PCP/Specialist follow up:   Future Appointments         Provider Specialty Dept Phone    7/14/2025 2:30 PM [facility] 06 Infusion Therapy [phone]    12/5/2025 10:00 AM Harper Cooper MD Family Medicine [phone]    6/16/2026 3:30 PM Harper Cooper MD Family Medicine [phone]            Follow Up:   Plan for next AC outreach in approximately 1 week to complete:  - disease specific assessments  - goal progression.

## 2025-07-14 ENCOUNTER — HOSPITAL ENCOUNTER (OUTPATIENT)
Dept: INFUSION THERAPY | Age: 85
Setting detail: INFUSION SERIES
Discharge: HOME | End: 2025-07-14
Payer: MEDICARE

## 2025-07-14 VITALS
DIASTOLIC BLOOD PRESSURE: 60 MMHG | SYSTOLIC BLOOD PRESSURE: 126 MMHG | TEMPERATURE: 96.7 F | RESPIRATION RATE: 18 BRPM | OXYGEN SATURATION: 97 % | HEART RATE: 69 BPM

## 2025-07-14 DIAGNOSIS — M81.0 AGE-RELATED OSTEOPOROSIS WITHOUT CURRENT PATHOLOGICAL FRACTURE: Primary | ICD-10-CM

## 2025-07-14 DIAGNOSIS — I12.9 BENIGN HYPERTENSION WITH CKD (CHRONIC KIDNEY DISEASE), STAGE II: ICD-10-CM

## 2025-07-14 DIAGNOSIS — N18.2 BENIGN HYPERTENSION WITH CKD (CHRONIC KIDNEY DISEASE), STAGE II: ICD-10-CM

## 2025-07-14 LAB
ALBUMIN SERPL-MCNC: 3.9 G/DL (ref 3.5–5.2)
ALP SERPL-CCNC: 66 U/L (ref 35–104)
ALT SERPL-CCNC: 11 U/L (ref 10–35)
ANION GAP SERPL CALCULATED.3IONS-SCNC: 10 MMOL/L (ref 9–16)
AST SERPL-CCNC: 17 U/L (ref 10–35)
BILIRUB SERPL-MCNC: 0.4 MG/DL (ref 0–1.2)
BUN SERPL-MCNC: 14 MG/DL (ref 8–23)
CALCIUM SERPL-MCNC: 9.2 MG/DL (ref 8.6–10.4)
CHLORIDE SERPL-SCNC: 101 MMOL/L (ref 98–107)
CO2 SERPL-SCNC: 23 MMOL/L (ref 20–31)
CREAT SERPL-MCNC: 1 MG/DL (ref 0.7–1.2)
GFR, ESTIMATED: 55 ML/MIN/1.73M2
GLUCOSE SERPL-MCNC: 107 MG/DL (ref 74–99)
MAGNESIUM SERPL-MCNC: 1.9 MG/DL (ref 1.6–2.4)
POTASSIUM SERPL-SCNC: 4.3 MMOL/L (ref 3.7–5.3)
PROT SERPL-MCNC: 6.1 G/DL (ref 6.6–8.7)
SODIUM SERPL-SCNC: 134 MMOL/L (ref 136–145)

## 2025-07-14 PROCEDURE — 96372 THER/PROPH/DIAG INJ SC/IM: CPT

## 2025-07-14 PROCEDURE — 6360000002 HC RX W HCPCS: Performed by: FAMILY MEDICINE

## 2025-07-14 PROCEDURE — 83735 ASSAY OF MAGNESIUM: CPT

## 2025-07-14 PROCEDURE — 36415 COLL VENOUS BLD VENIPUNCTURE: CPT

## 2025-07-14 PROCEDURE — 80053 COMPREHEN METABOLIC PANEL: CPT

## 2025-07-14 RX ORDER — HYDROCORTISONE SODIUM SUCCINATE 100 MG/2ML
100 INJECTION INTRAMUSCULAR; INTRAVENOUS
OUTPATIENT
Start: 2025-11-03

## 2025-07-14 RX ORDER — DIPHENHYDRAMINE HYDROCHLORIDE 50 MG/ML
50 INJECTION, SOLUTION INTRAMUSCULAR; INTRAVENOUS
OUTPATIENT
Start: 2025-11-03

## 2025-07-14 RX ORDER — EPINEPHRINE 1 MG/ML
0.3 INJECTION, SOLUTION, CONCENTRATE INTRAVENOUS PRN
OUTPATIENT
Start: 2025-11-03

## 2025-07-14 RX ORDER — ONDANSETRON 2 MG/ML
8 INJECTION INTRAMUSCULAR; INTRAVENOUS
OUTPATIENT
Start: 2025-11-03

## 2025-07-14 RX ORDER — ACETAMINOPHEN 325 MG/1
650 TABLET ORAL
OUTPATIENT
Start: 2025-11-03

## 2025-07-14 RX ORDER — ALBUTEROL SULFATE 90 UG/1
4 INHALANT RESPIRATORY (INHALATION) PRN
OUTPATIENT
Start: 2025-11-03

## 2025-07-14 RX ORDER — SODIUM CHLORIDE 9 MG/ML
INJECTION, SOLUTION INTRAVENOUS CONTINUOUS
OUTPATIENT
Start: 2025-11-03

## 2025-07-14 RX ADMIN — DENOSUMAB 60 MG: 60 INJECTION SUBCUTANEOUS at 14:43

## 2025-07-18 ENCOUNTER — CARE COORDINATION (OUTPATIENT)
Dept: CARE COORDINATION | Age: 85
End: 2025-07-18

## 2025-07-18 ASSESSMENT — ENCOUNTER SYMPTOMS: DYSPNEA ASSOCIATED WITH: EXERTION

## 2025-07-18 NOTE — CARE COORDINATION
General Assessment    Do you have any symptoms that are causing concern?: No          Medications Reviewed:   Patient denies any changes with medications and reports taking all medications as prescribed.    Advance Care Planning:   Reviewed during previous call      Care Planning:   Education Documentation  Educate on COPD Zone, taught by Keara Chung RN at 7/18/2025 10:37 AM.  Learner: Patient  Readiness: Acceptance  Method: Explanation, Teachback  Response: Verbalizes Understanding    Educate chronic obstructive pulmonary disease exacerbation signs and symptoms, taught by Keara Chung RN at 7/18/2025 10:37 AM.  Learner: Patient  Readiness: Acceptance  Method: Explanation, Teachback  Response: Verbalizes Understanding    Education Comments  No comments found.     ,    Goals Addressed                   This Visit's Progress     COMPLETED: Conditions and Symptoms   On track     I will schedule office visits, as directed by my provider.  I will keep my appointment or reschedule if I have to cancel.  I will notify my provider of any barriers to my plan of care.  I will follow my Zone Management tool to seek urgent or emergent care.  I will notify my provider of any symptoms that indicate a worsening of my condition.    Barriers: lack of education  Plan for overcoming my barriers: Care Management   Confidence: 8/10  Anticipated Goal Completion Date: 8/30/2025                 PCP/Specialist follow up:   Future Appointments         Provider Specialty Dept Phone    12/5/2025 10:00 AM Harper Cooper MD Family Medicine [phone]    6/16/2026 3:30 PM Harper Cooper MD Family Medicine [phone]            Follow Up:   No further Ambulatory Care Management follow-up scheduled at this time.  Patient  has Ambulatory Care Manager's contact information for any further questions, concerns or needs.

## 2025-07-21 ENCOUNTER — HOSPITAL ENCOUNTER (OUTPATIENT)
Age: 85
Setting detail: SPECIMEN
Discharge: HOME | End: 2025-07-21
Payer: MEDICARE

## 2025-07-21 DIAGNOSIS — N17.9 ACUTE KIDNEY INJURY: ICD-10-CM

## 2025-07-21 DIAGNOSIS — E78.2 MIXED HYPERLIPIDEMIA: ICD-10-CM

## 2025-07-21 DIAGNOSIS — E87.1 HYPONATREMIA: ICD-10-CM

## 2025-07-21 DIAGNOSIS — N18.2 BENIGN HYPERTENSION WITH CKD (CHRONIC KIDNEY DISEASE), STAGE II: ICD-10-CM

## 2025-07-21 DIAGNOSIS — M81.0 AGE-RELATED OSTEOPOROSIS WITHOUT CURRENT PATHOLOGICAL FRACTURE: ICD-10-CM

## 2025-07-21 DIAGNOSIS — I12.9 BENIGN HYPERTENSION WITH CKD (CHRONIC KIDNEY DISEASE), STAGE II: ICD-10-CM

## 2025-07-21 LAB
ANION GAP SERPL CALCULATED.3IONS-SCNC: 10 MMOL/L (ref 9–16)
BACTERIA URNS QL MICRO: ABNORMAL
BILIRUB UR QL STRIP: NEGATIVE
BUN SERPL-MCNC: 15 MG/DL (ref 8–23)
CALCIUM SERPL-MCNC: 9.3 MG/DL (ref 8.6–10.4)
CASTS #/AREA URNS LPF: ABNORMAL /LPF
CHLORIDE SERPL-SCNC: 102 MMOL/L (ref 98–107)
CHOLEST SERPL-MCNC: 178 MG/DL (ref 0–199)
CHOLESTEROL/HDL RATIO: 3.1
CLARITY UR: CLEAR
CO2 SERPL-SCNC: 25 MMOL/L (ref 20–31)
COLOR UR: YELLOW
CREAT SERPL-MCNC: 1.2 MG/DL (ref 0.7–1.2)
EPI CELLS #/AREA URNS HPF: ABNORMAL /HPF
GFR, ESTIMATED: 44 ML/MIN/1.73M2
GLUCOSE SERPL-MCNC: 106 MG/DL (ref 74–99)
GLUCOSE UR STRIP-MCNC: NEGATIVE MG/DL
HDLC SERPL-MCNC: 58 MG/DL
HGB UR QL STRIP.AUTO: NEGATIVE
KETONES UR STRIP-MCNC: NEGATIVE MG/DL
LDLC SERPL CALC-MCNC: 107 MG/DL (ref 0–100)
LEUKOCYTE ESTERASE UR QL STRIP: ABNORMAL
NITRITE UR QL STRIP: NEGATIVE
PH UR STRIP: 7 [PH] (ref 5–8)
PHOSPHATE SERPL-MCNC: 3.6 MG/DL (ref 2.5–4.5)
POTASSIUM SERPL-SCNC: 4.6 MMOL/L (ref 3.7–5.3)
PROT UR STRIP-MCNC: NEGATIVE MG/DL
RBC #/AREA URNS HPF: ABNORMAL /HPF
SODIUM SERPL-SCNC: 137 MMOL/L (ref 136–145)
SP GR UR STRIP: 1.01 (ref 1–1.03)
TRIGL SERPL-MCNC: 65 MG/DL (ref 0–149)
URATE SERPL-MCNC: 5.6 MG/DL (ref 2.4–5.7)
UROBILINOGEN UR STRIP-ACNC: NORMAL EU/DL (ref 0–1)
WBC #/AREA URNS HPF: ABNORMAL /HPF

## 2025-07-21 PROCEDURE — 84550 ASSAY OF BLOOD/URIC ACID: CPT

## 2025-07-21 PROCEDURE — 80061 LIPID PANEL: CPT

## 2025-07-21 PROCEDURE — 80048 BASIC METABOLIC PNL TOTAL CA: CPT

## 2025-07-21 PROCEDURE — 84100 ASSAY OF PHOSPHORUS: CPT

## 2025-07-21 PROCEDURE — 81001 URINALYSIS AUTO W/SCOPE: CPT

## 2025-07-21 PROCEDURE — 36415 COLL VENOUS BLD VENIPUNCTURE: CPT

## (undated) DEVICE — ADAPTER CLEANING PORPOISE CLEANING

## (undated) DEVICE — LABEL MED EYE STRL

## (undated) DEVICE — GLOVE ORANGE PI 7 1/2   MSG9075

## (undated) DEVICE — PERRYSBURG ENDO PACK: Brand: MEDLINE INDUSTRIES, INC.

## (undated) DEVICE — Device: Brand: DEFENDO VALVE AND CONNECTOR KIT

## (undated) DEVICE — BITEBLOCK 54FR W/ DENT RIM BLOX

## (undated) DEVICE — GOWN,AURORA,NONREINFORCED,LARGE: Brand: MEDLINE

## (undated) DEVICE — GAUZE,SPONGE,4"X4",16PLY,XRAY,STRL,LF: Brand: MEDLINE

## (undated) DEVICE — CYSTOTOME OPHTH 4X60 MM VISITE

## (undated) DEVICE — SURGICAL PROCEDURE PACK LT EYE CUST ST CHARLES STRL LF DISP

## (undated) DEVICE — THE MONARCH® "D" CARTRIDGE IS A SINGLE-USE POLYPROPYLENE CARTRIDGE FOR POSTERIOR CHAMBER IOL DELIVERY: Brand: MONARCH® III

## (undated) DEVICE — SOLUTION PREP PAINT POV IOD FOR SKIN MUCOUS MEM

## (undated) DEVICE — CLEARCUT® SIDEPORT KNIFE DUAL BEVEL 1.0MM ANGLED: Brand: CLEARCUT®

## (undated) DEVICE — PREMIUM DRY TRAY LF: Brand: MEDLINE INDUSTRIES, INC.

## (undated) DEVICE — FORCEPS BX L240CM JAW DIA2.8MM L CAP W/ NDL MIC MESH TOOTH

## (undated) DEVICE — PACK PROC FLD MGMT SYS CENTURION CUST

## (undated) DEVICE — SOLUTION IRRIGATION BAL SALT SOLUTION 500 ML STRL BSS

## (undated) DEVICE — SHIELD EYE PLAS CATRCT PT CARE

## (undated) DEVICE — COVER,MAYO STAND,STERILE: Brand: MEDLINE

## (undated) DEVICE — TOWEL,OR,DSP,ST,WHITE,DLX,2/PK,40PK/CS: Brand: MEDLINE